# Patient Record
Sex: FEMALE | Race: WHITE | NOT HISPANIC OR LATINO | Employment: UNEMPLOYED | URBAN - METROPOLITAN AREA
[De-identification: names, ages, dates, MRNs, and addresses within clinical notes are randomized per-mention and may not be internally consistent; named-entity substitution may affect disease eponyms.]

---

## 2018-01-18 NOTE — MISCELLANEOUS
Message  Return to work or school:   Yadiel Chandra is under my professional care  She was seen in my office on 02/25/2016       Please excuse illness          Signatures   Electronically signed by : Yakov Moran, [facility]; Feb 25 2016  9:24PM EST                       (Author)    Electronically signed by : Yakov Moran, [facility]; Feb 25 2016 10:10PM EST

## 2018-01-18 NOTE — PROGRESS NOTES
Assessment    1  Acute sinusitis (461 9) (J01 90)    Plan  Acute sinusitis    · Amoxicillin-Pot Clavulanate 875-125 MG Oral Tablet (Augmentin); take 1 tab po BID  x 7 days   · Follow Up if Not Better Evaluation and Treatment  Follow-up  Status: Complete  Done:  [id number]   · Apply warm moist compresses to the affected area 3 times a day for 5 minutes ;  Status:Complete;   Done: 60XLM5565   · Drink at least 6 glasses of water or juice a day ; Status:Complete;   Done: 85ZRK4123   · How to use a nasal spray ; Status:Complete;   Done: 99IYF0131   · Taking a hot steamy shower may help your condition ; Status:Complete;   Done:  [id number]  Sore throat    · (1) THROAT CULTURE (CULTURE, UPPER RESPIRATORY); Status:Active; Requested  for:[id number];     Discussion/Summary  Discussion Summary:   Acute Sinusitis   - rapid strep test is negative   - Augmentin x 7 days prescribed   - I have advised rest, plenty of fluids, Tylenol or Motrin as needed, running a humidifier at home, and Flonase nasal spray   - if symptoms persist despite treatment or worsen, should follow up w/ PCP for re-check  - patient verbalizes understanding and agrees w/ treatment plan  Medication Side Effects Reviewed: Possible side effects of new medications were reviewed with the patient/guardian today  Understands and agrees with treatment plan: The treatment plan was reviewed with the patient/guardian  The patient/guardian understands and agrees with the treatment plan   Counseling Documentation With Imm: The patient was counseled regarding instructions for management, importance of compliance with treatment  Follow Up Instructions: Follow Up with your Primary Care Provider in 5-7 days  If your symptoms worsen, go to the nearest [facility] Emergency [facility]        Chief Complaint  Chief Complaint Free Text Note Form: pt is here for an acute visit due to sore throat, fever, chills, congestion, ear pain for more than 2 days      History of Present Illness  HPI: 24 yo F presents c/o fever, sore throat, body aches, +nasal congestion w/ greenish drainage, sinus pain and pressure, bilateral ear pain, HA x 3 days  Has not taken her temperature at home but felt warm, does have a fever at this time  No GI sx  No skin rashes  No eye complaints  No cough  No chest pain, SOB, or wheezing  Non-smoker  Has had sick contacts at work  No recent travel  Has not tried much for the symptoms at home  Hospital Based Practices Required Assessment:   Abuse And Domestic Violence Screen    Yes, the patient is safe at home  The patient states no one is hurting them  Depression And Suicide Screen  No, the patient has not had thoughts of hurting themself  No, the patient has not felt depressed in the past 7 days  Prefered Language is  [place]  Primary Language is  English  Readiness To Learn: Receptive  Barriers To Learning: none  Teaching Method: verbal   Person Taught: patient   Evaluation Of Learning: verbalized/demonstrated understanding      Review of Systems  Focused-Female:   Constitutional: fever, chills and feeling tired, but as noted in HPI    ENT: earache, sore throat and nasal discharge, but as noted in HPI  Cardiovascular: no complaints of slow or fast heart rate, no chest pain, no palpitations, no leg claudication or lower extremity edema, no chest pain and no palpitations  Respiratory: as noted in HPI, no shortness of breath, no cough and no wheezing  Gastrointestinal: no complaints of abdominal pain, no constipation, no nausea or diarrhea, no vomiting, no bloody stools  Musculoskeletal: myalgias, but as noted in HPI, no arthralgias, no joint swelling, no limb pain, no joint stiffness and no limb swelling  Integumentary: no complaints of skin rash or lesion, no itching or dry skin, no skin wounds  Neurological: headache, but as noted in HPI, no numbness, no tingling, no confusion, no dizziness and no fainting        Active Problems 1  Depo contraception (V25 49) (Z30 49)   2  Female pelvic pain (625 9) (R10 2)   3  Folliculitis (249 9) (F72 5)   4  Glomerulonephritis (583 9)   5  Gynecologic Services Contraceptive Management (V25 9)   6  Hashimoto's thyroiditis (245 2) (E06 3)   7  Irregular menstrual cycle (626 4) (N92 6)   8  Vaginal discharge (623 5) (N89 8)   9  Vaginitis due to Candida albicans (112 1) (B37 3)    Past Medical History    1  History of eczema (V13 3) (Z87 2)   2  History of vaginitis (V13 29) (Z87 42)   3  History of Never Pregnant  Active Problems And Past Medical History Reviewed: The active problems and past medical history were reviewed and updated today  Family History    1  Family history of Alcoholism    2  Family history of Hypercholesterolemia   3  Family history of Hypertension (V17 49)   4  Family history of Lung Cancer (V16 1)   5  Family history of Pancreatic Lymphoma  Family History Reviewed: The family history was reviewed and updated today  Social History    · Denied: History of Alcohol Use (History)   · Never A Smoker   · Single  Social History Reviewed: The social history was reviewed and updated today  Surgical History    1  History of Destruction Of Benign Lesion   2  Gynecologic Services Contraceptive Management (V25 9)   3  History of Oral Surgery Tooth Extraction   4  History of Tonsillectomy  Surgical History Reviewed: The surgical history was reviewed and updated today  Current Meds   1  Levothyroxine Sodium 75 MCG Oral Tablet; Therapy: 43OII3271 to Recorded   2  Vitamin C Oral Tablet Chewable; Therapy: 42UZI5886 to Recorded   3  Xanax 0 5 MG Oral Tablet; Therapy: 37OPG3340 to Recorded  Medication List Reviewed: The medication list was reviewed and updated today  Allergies    1  No Known Drug Allergies    2  Animal dander   3   Milk    Vitals  Signs [Data Includes: Current Encounter]   Recorded: 86VOE0674 07:25PM   Temperature: 100 5 F  Heart Rate: 98  Respiration: 16  Systolic: 317  Diastolic: 65  Height: 5 ft 8 8 in  Weight: 127 lb 4 oz  BMI Calculated: 18 9  BSA Calculated: 1 7  O2 Saturation: 100    Physical Exam    Constitutional   General appearance: No acute distress, well appearing and well nourished  Eyes   Conjunctiva and lids: No swelling, erythema or discharge  Pupils and irises: Equal, round and reactive to light  Ears, Nose, Mouth, and Throat   External inspection of ears and nose: Normal     Otoscopic examination: Tympanic membranes translucent with normal light reflex  Canals patent without erythema  Nasal mucosa, septum, and turbinates: Abnormal   maxillary sinuses tender to palpation  Oropharynx: Abnormal   The posterior pharynx was erythematous, but did not have an exudate  Oral mucosa was normal  The palate examination showed no abnormalities  The tongue was normal  The tonsils were absent  Pulmonary   Respiratory effort: No increased work of breathing or signs of respiratory distress  Auscultation of lungs: Clear to auscultation  Cardiovascular   Auscultation of heart: Normal rate and rhythm, normal S1 and S2, without murmurs  Abdomen   Abdomen: Non-tender, no masses  Liver and spleen: No hepatomegaly or splenomegaly  Lymphatic   Palpation of lymph nodes in neck: No lymphadenopathy  no axillary lymphadenopathy  Musculoskeletal   Gait and station: Normal     Skin   Skin and subcutaneous tissue: Normal without rashes or lesions      Psychiatric   Orientation to person, place, and time: Normal     Mood and affect: Normal        Results/Data  Lab Studies Reviewed: Rapid strep test: NEGATIVE      Signatures   Electronically signed by : Verner Rosella, M D ; Feb 4 2016  6:55PM EST                       (Author)

## 2018-01-18 NOTE — MISCELLANEOUS
Message  Return to work or school:   Vini Braden is under my professional care   She was seen in my office on 02/02/2016   She is able to return to work on  02/04/2016            Signatures   Electronically signed by : DANA Vickers ; Feb 4 2016  7:10PM EST                       (Author)

## 2018-03-28 ENCOUNTER — OFFICE VISIT (OUTPATIENT)
Dept: URGENT CARE | Facility: CLINIC | Age: 23
End: 2018-03-28
Payer: COMMERCIAL

## 2018-03-28 VITALS
HEART RATE: 75 BPM | OXYGEN SATURATION: 98 % | TEMPERATURE: 98 F | SYSTOLIC BLOOD PRESSURE: 100 MMHG | RESPIRATION RATE: 16 BRPM | DIASTOLIC BLOOD PRESSURE: 56 MMHG

## 2018-03-28 DIAGNOSIS — L03.115 CELLULITIS OF RIGHT LOWER EXTREMITY: Primary | ICD-10-CM

## 2018-03-28 PROCEDURE — 99213 OFFICE O/P EST LOW 20 MIN: CPT | Performed by: FAMILY MEDICINE

## 2018-03-28 PROCEDURE — S9088 SERVICES PROVIDED IN URGENT: HCPCS | Performed by: FAMILY MEDICINE

## 2018-03-28 RX ORDER — SULFAMETHOXAZOLE AND TRIMETHOPRIM 800; 160 MG/1; MG/1
1 TABLET ORAL EVERY 12 HOURS SCHEDULED
Qty: 20 TABLET | Refills: 0 | Status: SHIPPED | OUTPATIENT
Start: 2018-03-28 | End: 2018-04-07

## 2018-03-28 NOTE — PROGRESS NOTES
[facility]        NAME: Emmie Rueda is a 23 y o  female  : 1995    MRN: [id number]  DATE: 2018  TIME: 11:37 AM    Assessment and Plan   Cellulitis of right lower extremity [L03 115]  1  Cellulitis of right lower extremity  sulfamethoxazole-trimethoprim (BACTRIM DS) 800-160 mg per tablet         Patient Instructions   Take bactrim as directed  Apply warm compresses to the area  OTC ibuprofen for pain  Go to ER if develop fever or chills  Follow up with PCP in 3 days if no improvement      Follow up with PCP in 3-5 days  Proceed to  ER if symptoms worsen  Chief Complaint     Chief Complaint   Patient presents with    Insect Bite     Pt reports a spider bite on her right leg 3 days ago  The area is red and painful  History of Present Illness       HPI    Review of Systems   Review of Systems   Constitutional: Negative for chills, fatigue and fever  Skin: Positive for color change (erythema to lower extremity) and wound (3 bites to the lower right extremity)  Current Medications       Current Outpatient Prescriptions:     sulfamethoxazole-trimethoprim (BACTRIM DS) 800-160 mg per tablet, Take 1 tablet by mouth every 12 (twelve) hours for 10 days, Disp: 20 tablet, Rfl: 0    Current Allergies     Allergies as of 2018    (No Known Allergies)            The following portions of the patient's history were reviewed and updated as appropriate: allergies, current medications, past family history, past medical history, past social history, past surgical history and problem list      Past Medical History:   Diagnosis Date    Disease of thyroid gland        Past Surgical History:   Procedure Laterality Date    TONSILLECTOMY      WISDOM TOOTH EXTRACTION         No family history on file  Medications have been verified          Objective   /56   Pulse 75   Temp 98 °F (36 7 °C)   Resp 16   SpO2 98%        Physical Exam     Physical Exam   Constitutional: She appears well-developed and well-nourished  No distress  Skin:   Right lower extremity: 3 superficial insect bite wounds to the posterior upper calf area with surrounding erythema, slightly warm to touch  Without discharge or ecchymosis

## 2018-03-28 NOTE — LETTER
March 28, 2018     Patient: Fazal Casey   YOB: 1995   Date of Visit: 3/28/2018       To Whom it May Concern:    Fazal Casey was seen in my clinic on 3/28/2018  If you have any questions or concerns, please don't hesitate to call           Sincerely,          Valente Lancaster DO        CC: No Recipients

## 2018-03-28 NOTE — PATIENT INSTRUCTIONS
Take bactrim as directed  Apply warm compresses to the area  OTC ibuprofen for pain  Go to ER if develop fever or chills  Follow up with PCP in 3 days if no improvement

## 2018-05-10 ENCOUNTER — APPOINTMENT (OUTPATIENT)
Dept: LAB | Facility: CLINIC | Age: 23
End: 2018-05-10
Payer: COMMERCIAL

## 2018-05-10 ENCOUNTER — TRANSCRIBE ORDERS (OUTPATIENT)
Dept: LAB | Facility: CLINIC | Age: 23
End: 2018-05-10

## 2018-05-10 ENCOUNTER — OFFICE VISIT (OUTPATIENT)
Dept: INTERNAL MEDICINE CLINIC | Facility: CLINIC | Age: 23
End: 2018-05-10
Payer: COMMERCIAL

## 2018-05-10 VITALS
TEMPERATURE: 97 F | BODY MASS INDEX: 18.46 KG/M2 | HEIGHT: 69 IN | OXYGEN SATURATION: 98 % | DIASTOLIC BLOOD PRESSURE: 60 MMHG | HEART RATE: 79 BPM | WEIGHT: 124.6 LBS | RESPIRATION RATE: 16 BRPM | SYSTOLIC BLOOD PRESSURE: 100 MMHG

## 2018-05-10 DIAGNOSIS — L72.3 SEBACEOUS CYST: ICD-10-CM

## 2018-05-10 DIAGNOSIS — F41.1 GENERALIZED ANXIETY DISORDER: ICD-10-CM

## 2018-05-10 DIAGNOSIS — Z11.59 ENCOUNTER FOR HEPATITIS C SCREENING TEST FOR LOW RISK PATIENT: ICD-10-CM

## 2018-05-10 DIAGNOSIS — F17.200 CURRENT SMOKER: ICD-10-CM

## 2018-05-10 DIAGNOSIS — E06.3 HASHIMOTO'S THYROIDITIS: ICD-10-CM

## 2018-05-10 DIAGNOSIS — E06.3 HASHIMOTO'S THYROIDITIS: Primary | ICD-10-CM

## 2018-05-10 LAB
ALBUMIN SERPL BCP-MCNC: 3.9 G/DL (ref 3.5–5)
ALP SERPL-CCNC: 38 U/L (ref 46–116)
ALT SERPL W P-5'-P-CCNC: 20 U/L (ref 12–78)
ANION GAP SERPL CALCULATED.3IONS-SCNC: 3 MMOL/L (ref 4–13)
AST SERPL W P-5'-P-CCNC: 14 U/L (ref 5–45)
BASOPHILS # BLD AUTO: 0.01 THOUSANDS/ΜL (ref 0–0.1)
BASOPHILS NFR BLD AUTO: 0 % (ref 0–1)
BILIRUB SERPL-MCNC: 0.36 MG/DL (ref 0.2–1)
BUN SERPL-MCNC: 16 MG/DL (ref 5–25)
CALCIUM SERPL-MCNC: 9.9 MG/DL (ref 8.3–10.1)
CHLORIDE SERPL-SCNC: 106 MMOL/L (ref 100–108)
CO2 SERPL-SCNC: 28 MMOL/L (ref 21–32)
CREAT SERPL-MCNC: 0.71 MG/DL (ref 0.6–1.3)
EOSINOPHIL # BLD AUTO: 0.25 THOUSAND/ΜL (ref 0–0.61)
EOSINOPHIL NFR BLD AUTO: 5 % (ref 0–6)
ERYTHROCYTE [DISTWIDTH] IN BLOOD BY AUTOMATED COUNT: 13 % (ref 11.6–15.1)
GFR SERPL CREATININE-BSD FRML MDRD: 119 ML/MIN/1.73SQ M
GLUCOSE SERPL-MCNC: 77 MG/DL (ref 65–140)
HCT VFR BLD AUTO: 42.1 % (ref 34.8–46.1)
HGB BLD-MCNC: 13.2 G/DL (ref 11.5–15.4)
LYMPHOCYTES # BLD AUTO: 2 THOUSANDS/ΜL (ref 0.6–4.47)
LYMPHOCYTES NFR BLD AUTO: 40 % (ref 14–44)
MCH RBC QN AUTO: 29.9 PG (ref 26.8–34.3)
MCHC RBC AUTO-ENTMCNC: 31.4 G/DL (ref 31.4–37.4)
MCV RBC AUTO: 96 FL (ref 82–98)
MONOCYTES # BLD AUTO: 0.69 THOUSAND/ΜL (ref 0.17–1.22)
MONOCYTES NFR BLD AUTO: 14 % (ref 4–12)
NEUTROPHILS # BLD AUTO: 2.09 THOUSANDS/ΜL (ref 1.85–7.62)
NEUTS SEG NFR BLD AUTO: 41 % (ref 43–75)
NRBC BLD AUTO-RTO: 0 /100 WBCS
PLATELET # BLD AUTO: 217 THOUSANDS/UL (ref 149–390)
PMV BLD AUTO: 9.9 FL (ref 8.9–12.7)
POTASSIUM SERPL-SCNC: 4.6 MMOL/L (ref 3.5–5.3)
PROT SERPL-MCNC: 8 G/DL (ref 6.4–8.2)
RBC # BLD AUTO: 4.41 MILLION/UL (ref 3.81–5.12)
SODIUM SERPL-SCNC: 137 MMOL/L (ref 136–145)
T4 FREE SERPL-MCNC: 0.86 NG/DL (ref 0.76–1.46)
TSH SERPL DL<=0.05 MIU/L-ACNC: 6.74 UIU/ML (ref 0.36–3.74)
WBC # BLD AUTO: 5.05 THOUSAND/UL (ref 4.31–10.16)

## 2018-05-10 PROCEDURE — 84439 ASSAY OF FREE THYROXINE: CPT | Performed by: INTERNAL MEDICINE

## 2018-05-10 PROCEDURE — 84443 ASSAY THYROID STIM HORMONE: CPT | Performed by: INTERNAL MEDICINE

## 2018-05-10 PROCEDURE — 87389 HIV-1 AG W/HIV-1&-2 AB AG IA: CPT

## 2018-05-10 PROCEDURE — 86803 HEPATITIS C AB TEST: CPT

## 2018-05-10 PROCEDURE — 99203 OFFICE O/P NEW LOW 30 MIN: CPT | Performed by: INTERNAL MEDICINE

## 2018-05-10 PROCEDURE — 85025 COMPLETE CBC W/AUTO DIFF WBC: CPT | Performed by: INTERNAL MEDICINE

## 2018-05-10 PROCEDURE — 80053 COMPREHEN METABOLIC PANEL: CPT | Performed by: INTERNAL MEDICINE

## 2018-05-10 PROCEDURE — 36415 COLL VENOUS BLD VENIPUNCTURE: CPT | Performed by: INTERNAL MEDICINE

## 2018-05-10 RX ORDER — TRIAMCINOLONE ACETONIDE 5 MG/G
OINTMENT TOPICAL
COMMUNITY
Start: 2017-10-12 | End: 2018-08-21 | Stop reason: ALTCHOICE

## 2018-05-10 RX ORDER — CEPHALEXIN 500 MG/1
500 CAPSULE ORAL
COMMUNITY
Start: 2018-05-08 | End: 2018-05-13

## 2018-05-10 RX ORDER — CHLORHEXIDINE GLUCONATE 4 G/100ML
1 SOLUTION TOPICAL DAILY PRN
Qty: 120 ML | Refills: 0 | Status: SHIPPED | OUTPATIENT
Start: 2018-05-10 | End: 2018-10-23 | Stop reason: SDUPTHER

## 2018-05-10 NOTE — PROGRESS NOTES
Assessment/Plan:     Diagnoses and all orders for this visit:    Hashimoto's thyroiditis  -     Comprehensive metabolic panel  -     TSH, 3rd generation with T4 reflex  -     CBC and differential  -     HIV 1/2 AG-AB combo; Future    Sebaceous cyst  -     HIV 1/2 AG-AB combo; Future  -     chlorhexidine (HIBICLENS) 4 % external liquid; Apply 1 application topically daily as needed for wound care    Encounter for hepatitis C screening test for low risk patient  -     Hepatitis C antibody; Future    Generalized anxiety disorder    Other orders  -     cephalexin (KEFLEX) 500 mg capsule; Take 500 mg by mouth  -     triamcinolone (KENALOG) 0 5 % ointment; Apply thin layer to affected area BID prn eczema flare      Hugh Reid was seen and examined in the office today  Please see HPI for details  With her history, I have requested that she complete additional blood work  She was advised to use a warm compress over her current infected lesion as well as complete the course of Keflex  She was given a script for hibeclens to use thereafter  I'd like to keep her off of benzodiazepines for her anxiety and referred her to therapist which she thought may be helpful as well  Otherwise no other changes were made  Subjective:      Patient ID: Sandie Lenz is a 20 y o  female  Hugh Reid is here today to establish care  Medical history was reviewed and updated in the chart  See chart for history  She reports having GN as a child and was previously following with a nephrologist but has not in some time  She also reports having abnormal thyroid function and was previously on medication but has not been in over a year  She has not had follow up for this either  She reports have generalized anxiety and previously was on anxiolytics  She does continue to experience this time to time but is not on anything at this time  She reports having recurrent infectious lesions of the RLE and is unsure why  She denies known bites   She has previously been on Bactrim but caused headaches  She is currently on Keflex for a lesion of the LE and it is improving  The following portions of the patient's history were reviewed and updated as appropriate: allergies, current medications, past family history, past medical history, past social history, past surgical history and problem list     Review of Systems   Constitutional: Negative for chills, fever and unexpected weight change  HENT: Positive for rhinorrhea  Negative for sinus pain and sinus pressure  Respiratory: Negative for cough, shortness of breath and wheezing  Cardiovascular: Negative for chest pain, palpitations and leg swelling  Gastrointestinal: Negative for abdominal pain, blood in stool, constipation, diarrhea, nausea and vomiting  Genitourinary: Negative for difficulty urinating  Musculoskeletal: Negative for arthralgias, back pain, joint swelling and myalgias  Skin: Positive for rash  Neurological: Positive for numbness  Negative for syncope, light-headedness and headaches  Psychiatric/Behavioral: Negative for dysphoric mood and sleep disturbance  The patient is not nervous/anxious  Objective:      /60 (BP Location: Left arm, Patient Position: Sitting, Cuff Size: Adult)   Pulse 79   Temp (!) 97 °F (36 1 °C) (Tympanic)   Resp 16   Ht 5' 9" (1 753 m)   Wt 56 5 kg (124 lb 9 6 oz)   SpO2 98%   BMI 18 40 kg/m²          Physical Exam   Constitutional: She is oriented to person, place, and time  She appears well-developed and well-nourished  Thin   HENT:   Head: Normocephalic and atraumatic  Eyes: Conjunctivae are normal    Neck: Normal range of motion  Neck supple  Cardiovascular: Normal rate, regular rhythm and normal heart sounds  No murmur heard  Pulmonary/Chest: Effort normal and breath sounds normal  No respiratory distress  Abdominal: Soft  Bowel sounds are normal    Musculoskeletal: Normal range of motion  She exhibits no edema  Lymphadenopathy:     She has no cervical adenopathy  Neurological: She is alert and oriented to person, place, and time  Skin: Skin is warm and dry          Psychiatric: Thought content normal    Flat affect somewhat

## 2018-05-11 LAB
HCV AB SER QL: NORMAL
HIV 1+2 AB+HIV1 P24 AG SERPL QL IA: NORMAL

## 2018-05-14 ENCOUNTER — TELEPHONE (OUTPATIENT)
Dept: INTERNAL MEDICINE CLINIC | Facility: CLINIC | Age: 23
End: 2018-05-14

## 2018-05-16 DIAGNOSIS — E03.9 HYPOTHYROIDISM, UNSPECIFIED TYPE: Primary | ICD-10-CM

## 2018-05-16 NOTE — TELEPHONE ENCOUNTER
Notified patient with providers message  Patient is aware that Endo will call her for appt after I put the referral in  Patient is also aware that lab slip will be mailed to her home to have completed in six months

## 2018-05-16 NOTE — TELEPHONE ENCOUNTER
Lab results message is in there already  I think you left her a message  She could see [facility] if she would like   I think we could just monitor the thyroid function for now and repeat in 6 months (TSH and reflex T4)

## 2018-06-11 ENCOUNTER — OFFICE VISIT (OUTPATIENT)
Dept: INTERNAL MEDICINE CLINIC | Facility: CLINIC | Age: 23
End: 2018-06-11
Payer: COMMERCIAL

## 2018-06-11 VITALS
RESPIRATION RATE: 16 BRPM | SYSTOLIC BLOOD PRESSURE: 90 MMHG | HEART RATE: 93 BPM | OXYGEN SATURATION: 98 % | TEMPERATURE: 98.8 F | HEIGHT: 69 IN | WEIGHT: 124.8 LBS | BODY MASS INDEX: 18.48 KG/M2 | DIASTOLIC BLOOD PRESSURE: 50 MMHG

## 2018-06-11 DIAGNOSIS — E06.3 HASHIMOTO'S THYROIDITIS: ICD-10-CM

## 2018-06-11 DIAGNOSIS — L72.3 SEBACEOUS CYST: Primary | ICD-10-CM

## 2018-06-11 LAB — SL AMB POCT HEMOGLOBIN AIC: 5.1

## 2018-06-11 PROCEDURE — 83036 HEMOGLOBIN GLYCOSYLATED A1C: CPT | Performed by: INTERNAL MEDICINE

## 2018-06-11 PROCEDURE — 99213 OFFICE O/P EST LOW 20 MIN: CPT | Performed by: INTERNAL MEDICINE

## 2018-06-11 RX ORDER — CEPHALEXIN 500 MG/1
500 CAPSULE ORAL EVERY 8 HOURS SCHEDULED
Qty: 21 CAPSULE | Refills: 0 | Status: SHIPPED | OUTPATIENT
Start: 2018-06-11 | End: 2018-06-18

## 2018-06-12 NOTE — PROGRESS NOTES
Assessment/Plan:     Diagnoses and all orders for this visit:    Sebaceous cyst  -     cephalexin (KEFLEX) 500 mg capsule; Take 1 capsule (500 mg total) by mouth every 8 (eight) hours for 7 days    Hashimoto's thyroiditis  -     POCT hemoglobin A1c      Neetu Terrazas was seen and examined in the office today  I have given her another course of keflex but also suggested she get the prescribed Hibiclens given the recurrent nature  Otherwise no other changes were made  Subjective:      Patient ID: Rosina Capone is a 20 y o  female  Neetu Terrazas is here today for a recurrent abscess  See previous note  She reports that she had an abscess of the RLE and started taking the prescribed Keflex  She reports that it dissipated and did not complete the course  It reoccurred in the last few days  She reports it drained somewhat  The following portions of the patient's history were reviewed and updated as appropriate: allergies, current medications, past family history, past medical history, past social history, past surgical history and problem list     Review of Systems   Constitutional: Negative for chills, fatigue and fever  Cardiovascular: Negative for chest pain, palpitations and leg swelling  Gastrointestinal: Negative for abdominal pain, diarrhea, nausea and vomiting  Skin: Negative for color change, rash and wound  Abscess          Objective:      BP 90/50 (BP Location: Left arm, Patient Position: Sitting, Cuff Size: Adult)   Pulse 93   Temp 98 8 °F (37 1 °C) (Tympanic)   Resp 16   Ht 5' 9" (1 753 m)   Wt 56 6 kg (124 lb 12 8 oz)   SpO2 98%   BMI 18 43 kg/m²          Physical Exam   Constitutional: She is oriented to person, place, and time  She appears well-developed and well-nourished  Pulmonary/Chest: Effort normal    Musculoskeletal: She exhibits no edema  Neurological: She is alert and oriented to person, place, and time  Skin: Skin is warm and dry  No erythema

## 2018-08-21 ENCOUNTER — OFFICE VISIT (OUTPATIENT)
Dept: OBGYN CLINIC | Facility: CLINIC | Age: 23
End: 2018-08-21
Payer: COMMERCIAL

## 2018-08-21 VITALS
WEIGHT: 120.2 LBS | HEIGHT: 65 IN | SYSTOLIC BLOOD PRESSURE: 100 MMHG | DIASTOLIC BLOOD PRESSURE: 60 MMHG | BODY MASS INDEX: 20.03 KG/M2

## 2018-08-21 DIAGNOSIS — Z01.419 ENCOUNTER FOR GYNECOLOGICAL EXAMINATION WITHOUT ABNORMAL FINDING: Primary | ICD-10-CM

## 2018-08-21 PROCEDURE — 99385 PREV VISIT NEW AGE 18-39: CPT | Performed by: OBSTETRICS & GYNECOLOGY

## 2018-08-21 RX ORDER — ALPRAZOLAM 0.5 MG/1
TABLET ORAL
COMMUNITY
End: 2018-08-21 | Stop reason: ALTCHOICE

## 2018-08-21 RX ORDER — CLONAZEPAM 0.5 MG/1
0.5 TABLET ORAL 2 TIMES DAILY
COMMUNITY
End: 2018-08-21 | Stop reason: ALTCHOICE

## 2018-08-21 RX ORDER — LEVOTHYROXINE SODIUM 0.05 MG/1
50 TABLET ORAL DAILY
COMMUNITY
End: 2018-08-21 | Stop reason: ALTCHOICE

## 2018-08-21 NOTE — PROGRESS NOTES
CC:  Annual exam    HPI: Donal Burnett presents for routine annual visit  She is doing well and her only concern is whether not she can get pregnant  She does live with her boyfriend of several years, they do not use birth control  On the other hand, when asked if they are actively trying, Crista Valdez did reply no  I explained how it was necessary for them to be actively trying, for least a year, before we could consider an infertility evaluation  The patient was also advised that there is no test yet available, that can absolutely determine, whether or not a patient can become pregnant  Crista Valdez did have a Pap smear 2 years ago which was within normal limits  She also has had a fibroadenoma of the right breast which was followed by ultrasounds and found to be stable  Past Medical History:  Past Medical History:   Diagnosis Date    Disease of thyroid gland     Eczema     Glomerulonephritis     Urinary tract infection        Past Surgical History:  Past Surgical History:   Procedure Laterality Date    SKIN LESION EXCISION      destruction of benign lesion    TONSILLECTOMY      TONSILLECTOMY  2006    WISDOM TOOTH EXTRACTION      WISDOM TOOTH EXTRACTION  2011       Past OB/Gyn History:  Menstrual cycles every 25 days, with 5 days of normal bleeding  Denies any history of sexually transmitted infection  No history of abnormal pap smears  Her last pap smear was 2016      ALLERGIES:   Allergies   Allergen Reactions    Lac Bovis     Other     Bactrim [Sulfamethoxazole-Trimethoprim] Headache     migrains       MEDS:   Current Outpatient Prescriptions:     chlorhexidine (HIBICLENS) 4 % external liquid    Family History:  Family History   Problem Relation Age of Onset    Coronary artery disease Father     Alcohol abuse Father     Drug abuse Father     Anxiety disorder Mother     Anxiety disorder Brother     Post-traumatic stress disorder Brother     Drug abuse Brother     Lung cancer Maternal Grandfather  Breast cancer Maternal Aunt     Hypertension Family     Lung cancer Family     Lymphoma Family         pancreatic    Hyperlipidemia Family         pure       Social History:  Social History     Social History    Marital status: Single     Spouse name: N/A    Number of children: N/A    Years of education: N/A     Occupational History    Not on file  Social History Main Topics    Smoking status: Current Every Day Smoker     Packs/day: 0 50     Types: Cigarettes    Smokeless tobacco: Never Used    Alcohol use No      Comment: social     Drug use: No    Sexual activity: Yes     Partners: Male     Birth control/ protection: None     Other Topics Concern    Not on file     Social History Narrative    No narrative on file         Review of Systems:  Skin: No rashes or discolorations of any concern  RESP: Denies SOB, no cough  CV: Denies chest pain or palpitations  Breasts: Denies masses, pain, skin changes and nipple discharge  GI: Denies abdominal pain, heartburn, nausea, vomiting, changes in bowel habits  : Denies dysuria, frequency, CVA tenderness, incontinence and hematuria  Genitalia: Denies abnormal vaginal discharge, external lesions, rashes, pelvic pain, pressure, abnormal bleeding  Rectal:  Denies pain, bleeding, hemorrhoids,    Physical Exam:  /60 (BP Location: Left arm, Patient Position: Sitting, Cuff Size: Standard)   Ht 5' 5" (1 651 m)   Wt 54 5 kg (120 lb 3 2 oz)   LMP 07/27/2018 (Exact Date)   Breastfeeding? No   BMI 20 00 kg/m²    Gen: The patient was alert and oriented x3, pleasant well-appearing female in no acute distress  Neck:  Unremarkable, no anterior or posterior lymphadenopathy, no thyromegaly  Breasts: Symmetric  No dominant, discrete, fixed  or suspicious masses are noted  Masses of the right breast, consistent with fibroadenoma, are palpated  No skin or nipple changes  No palpable axillary nodes     Abd:  Soft, nontender, nondistended, no masses or organomegaly  Back:  No CVA tenderness, no tenderness to palpation along spine  Pelvic  Normal appearing external female genitalia, no visible lesions, no rashes  Vagina is free of discharge, normal vaginal epithelium, no abnormal  lesions, no evidence of prolapse anteriorly or posteriorly  Normal appearing cervix, mobile and nontender  A thin prep pap smear was not obtained  Uterus is normal size, mobile and, nontender  No palpable adnexal masses or tenderness  No anoperineal lesions  Skin:  No concerning lesions  Extremeties: No edema      Assessment & Plan:   1  Routine annual exam      RTO one year orPRN  2   Infertility issues, discussed with patient as noted above under HPI

## 2018-09-17 ENCOUNTER — OFFICE VISIT (OUTPATIENT)
Dept: ENDOCRINOLOGY | Facility: CLINIC | Age: 23
End: 2018-09-17
Payer: COMMERCIAL

## 2018-09-17 VITALS
DIASTOLIC BLOOD PRESSURE: 62 MMHG | BODY MASS INDEX: 18.44 KG/M2 | HEART RATE: 66 BPM | WEIGHT: 124.5 LBS | HEIGHT: 69 IN | SYSTOLIC BLOOD PRESSURE: 104 MMHG

## 2018-09-17 DIAGNOSIS — E06.3 HYPOTHYROIDISM DUE TO HASHIMOTO'S THYROIDITIS: Primary | ICD-10-CM

## 2018-09-17 DIAGNOSIS — E03.9 HYPOTHYROIDISM, UNSPECIFIED TYPE: ICD-10-CM

## 2018-09-17 DIAGNOSIS — E03.8 HYPOTHYROIDISM DUE TO HASHIMOTO'S THYROIDITIS: Primary | ICD-10-CM

## 2018-09-17 DIAGNOSIS — R53.83 FATIGUE, UNSPECIFIED TYPE: ICD-10-CM

## 2018-09-17 DIAGNOSIS — E06.3 HASHIMOTO'S THYROIDITIS: ICD-10-CM

## 2018-09-17 PROCEDURE — 99244 OFF/OP CNSLTJ NEW/EST MOD 40: CPT | Performed by: INTERNAL MEDICINE

## 2018-09-17 RX ORDER — LEVOTHYROXINE SODIUM 0.03 MG/1
TABLET ORAL
Qty: 30 TABLET | Refills: 6 | Status: SHIPPED | OUTPATIENT
Start: 2018-09-17 | End: 2018-11-13 | Stop reason: SDUPTHER

## 2018-09-17 NOTE — ASSESSMENT & PLAN NOTE
Subclinical hypothyroidism secondary to Hashimoto thyroiditis-given her age will opt to treat and normalized TS H  Start levothyroxine 25 mcg daily  Repeat thyroid function test in the next 6 weeks    Goal TSH between 1 and 2   She has history of anxiety-advised to monitor her symptoms after starting levothyroxine

## 2018-09-17 NOTE — PROGRESS NOTES
Katya Hooper 20 y o  female MRN: [id number]    Encounter: [id number]      Assessment/Plan     Problem List Items Addressed This Visit     Hashimoto's thyroiditis    Relevant Medications    levothyroxine 25 mcg tablet    Hypothyroidism - Primary     Subclinical hypothyroidism secondary to Hashimoto thyroiditis-given her age will opt to treat and normalized TS H  Start levothyroxine 25 mcg daily  Repeat thyroid function test in the next 6 weeks  Goal TSH between 1 and 2   She has history of anxiety-advised to monitor her symptoms after starting levothyroxine         Relevant Medications    levothyroxine 25 mcg tablet    Other Relevant Orders    TSH, 3rd generation Lab Collect    T4, free Lab Collect    TSH, 3rd generation Lab Collect    T4, free Lab Collect    Fatigue     Reassess after TSH normalizes             CC:   Thyroid dysfunction    History of Present Illness     HPI:  18-year-old woman here for evaluation of thyroid dysfunction  She states that she was diagnosed with hypothyroidism and Hashimoto thyroiditis a few years back  She was on levothyroxine a couple of years back but it was stopped after TSH normalized  She currently complains of fatigue, weight has been stable, denies any menstrual irregularities, denies any constipation or cold intolerance  Denies any dry skin or hair loss  Review of Systems   Constitutional: Positive for fatigue  Negative for unexpected weight change  Eyes: Negative for visual disturbance  Respiratory: Negative for cough and shortness of breath  Cardiovascular: Negative for chest pain and leg swelling  Gastrointestinal: Negative for constipation, diarrhea, nausea and vomiting  Endocrine: Negative for polydipsia and polyuria  Musculoskeletal: Negative for gait problem  Skin: Negative for color change, pallor, rash and wound  Psychiatric/Behavioral: Negative for confusion and sleep disturbance  The patient is not nervous/anxious      All other systems reviewed and are negative  Historical Information   Past Medical History:   Diagnosis Date    Disease of thyroid gland     Eczema     Glomerulonephritis     Urinary tract infection      Past Surgical History:   Procedure Laterality Date    SKIN LESION EXCISION      destruction of benign lesion    TONSILLECTOMY      TONSILLECTOMY  2006    WISDOM TOOTH EXTRACTION      WISDOM TOOTH EXTRACTION  2011     Social History   History   Alcohol Use No     Comment: social      History   Drug Use No     History   Smoking Status    Current Every Day Smoker    Packs/day: 0 50    Types: Cigarettes   Smokeless Tobacco    Never Used     Family History:   Family History   Problem Relation Age of Onset    Coronary artery disease Father     Alcohol abuse Father     Drug abuse Father     Anxiety disorder Mother     Anxiety disorder Brother     Post-traumatic stress disorder Brother     Drug abuse Brother     Lung cancer Maternal Grandfather     Breast cancer Maternal Aunt     Hypertension Family     Lung cancer Family     Lymphoma Family         pancreatic    Hyperlipidemia Family         pure       Meds/Allergies   Current Outpatient Prescriptions   Medication Sig Dispense Refill    chlorhexidine (HIBICLENS) 4 % external liquid Apply 1 application topically daily as needed for wound care (Patient not taking: Reported on 8/21/2018 ) 120 mL 0    levothyroxine 25 mcg tablet 1 tab daily 30 tablet 6     No current facility-administered medications for this visit  Allergies   Allergen Reactions    Lac Bovis     Other     Bactrim [Sulfamethoxazole-Trimethoprim] Headache     migrains       Objective   Vitals: Blood pressure 104/62, pulse 66, height 5' 9" (1 753 m), weight 56 5 kg (124 lb 8 oz), not currently breastfeeding  Physical Exam   Constitutional: She is oriented to person, place, and time  She appears well-developed and well-nourished  No distress  HENT:   Head: Normocephalic and atraumatic  Mouth/Throat: No oropharyngeal exudate  Eyes: Conjunctivae and EOM are normal  No scleral icterus  Neck: Normal range of motion  Neck supple  No thyromegaly present  Cardiovascular: Normal rate, regular rhythm and normal heart sounds  No murmur heard  Pulmonary/Chest: Effort normal and breath sounds normal  No respiratory distress  She has no wheezes  She has no rales  Abdominal: Soft  Bowel sounds are normal  She exhibits no distension  There is no tenderness  There is no rebound  Musculoskeletal: Normal range of motion  She exhibits no edema or deformity  Lymphadenopathy:     She has no cervical adenopathy  Neurological: She is alert and oriented to person, place, and time  Skin: Skin is warm and dry  No rash noted  No erythema  No pallor  Psychiatric: She has a normal mood and affect  Her behavior is normal  Thought content normal        The history was obtained from the review of the chart, patient  Lab Results:   Lab Results   Component Value Date/Time    TSH 3RD GENERATON 6 740 (H) 05/10/2018 11:20 AM    Free T4 0 86 05/10/2018 11:20 AM     October 2015-TSH 5 9, free T4 0 85    October 2015-TPO antibody 245    Portions of the record may have been created with voice recognition software  Occasional wrong word or "sound a like" substitutions may have occurred due to the inherent limitations of voice recognition software  Read the chart carefully and recognize, using context, where substitutions have occurred

## 2018-09-17 NOTE — LETTER
September 17, 2018     DO Kori  [address]    Patient: Grace Workman   YOB: 1995   Date of Visit: 9/17/2018       Dear Dr Cesar Levin: Thank you for referring Grace Workman to me for evaluation  Below are my notes for this consultation  If you have questions, please do not hesitate to call me  I look forward to following your patient along with you  Sincerely,        David Mc MD        CC: No Recipients  David Mc MD  9/17/2018 11:59 AM  Sign at close encounter   Grace Workman 20 y o  female MRN: [id number]    Encounter: [id number]      Assessment/Plan     Problem List Items Addressed This Visit     Hashimoto's thyroiditis    Relevant Medications    levothyroxine 25 mcg tablet    Hypothyroidism - Primary     Subclinical hypothyroidism secondary to [place] thyroiditis-given her age will opt to treat and normalized TS H  Start levothyroxine 25 mcg daily  Repeat thyroid function test in the next 6 weeks  Goal TSH between 1 and 2   She has history of anxiety-advised to monitor her symptoms after starting levothyroxine         Relevant Medications    levothyroxine 25 mcg tablet    Other Relevant Orders    TSH, 3rd generation Lab Collect    T4, free Lab Collect    TSH, 3rd generation Lab Collect    T4, free Lab Collect    Fatigue     Reassess after TSH normalizes             CC:   Thyroid dysfunction    History of Present Illness     HPI:  24-year-old woman here for evaluation of thyroid dysfunction  She states that she was diagnosed with hypothyroidism and Hashimoto thyroiditis a few years back  She was on levothyroxine a couple of years back but it was stopped after TSH normalized  She currently complains of fatigue, weight has been stable, denies any menstrual irregularities, denies any constipation or cold intolerance  Denies any dry skin or hair loss  Review of Systems   Constitutional: Positive for fatigue   Negative for unexpected weight change  Eyes: Negative for visual disturbance  Respiratory: Negative for cough and shortness of breath  Cardiovascular: Negative for chest pain and leg swelling  Gastrointestinal: Negative for constipation, diarrhea, nausea and vomiting  Endocrine: Negative for polydipsia and polyuria  Musculoskeletal: Negative for gait problem  Skin: Negative for color change, pallor, rash and wound  Psychiatric/Behavioral: Negative for confusion and sleep disturbance  The patient is not nervous/anxious  All other systems reviewed and are negative        Historical Information   Past Medical History:   Diagnosis Date    Disease of thyroid gland     Eczema     Glomerulonephritis     Urinary tract infection      Past Surgical History:   Procedure Laterality Date    SKIN LESION EXCISION      destruction of benign lesion    TONSILLECTOMY      TONSILLECTOMY  2006    WISDOM TOOTH EXTRACTION      WISDOM TOOTH EXTRACTION  2011     Social History   History   Alcohol Use No     Comment: social      History   Drug Use No     History   Smoking Status    Current Every Day Smoker    Packs/day: 0 50    Types: Cigarettes   Smokeless Tobacco    Never Used     Family History:   Family History   Problem Relation Age of Onset    Coronary artery disease Father     Alcohol abuse Father     Drug abuse Father     Anxiety disorder Mother     Anxiety disorder Brother     Post-traumatic stress disorder Brother     Drug abuse Brother     Lung cancer Maternal Grandfather     Breast cancer Maternal Aunt     Hypertension Family     Lung cancer Family     Lymphoma Family         pancreatic    Hyperlipidemia Family         pure       Meds/Allergies   Current Outpatient Prescriptions   Medication Sig Dispense Refill    chlorhexidine (HIBICLENS) 4 % external liquid Apply 1 application topically daily as needed for wound care (Patient not taking: Reported on 8/21/2018 ) 120 mL 0    levothyroxine 25 mcg tablet 1 tab daily 30 tablet 6     No current facility-administered medications for this visit  Allergies   Allergen Reactions    Lac Bovis     Other     Bactrim [Sulfamethoxazole-Trimethoprim] Headache     migrains       Objective   Vitals: Blood pressure 104/62, pulse 66, height 5' 9" (1 753 m), weight 56 5 kg (124 lb 8 oz), not currently breastfeeding  Physical Exam   Constitutional: She is oriented to person, place, and time  She appears well-developed and well-nourished  No distress  HENT:   Head: Normocephalic and atraumatic  Mouth/Throat: No oropharyngeal exudate  Eyes: Conjunctivae and EOM are normal  No scleral icterus  Neck: Normal range of motion  Neck supple  No thyromegaly present  Cardiovascular: Normal rate, regular rhythm and normal heart sounds  No murmur heard  Pulmonary/Chest: Effort normal and breath sounds normal  No respiratory distress  She has no wheezes  She has no rales  Abdominal: Soft  Bowel sounds are normal  She exhibits no distension  There is no tenderness  There is no rebound  Musculoskeletal: Normal range of motion  She exhibits no edema or deformity  Lymphadenopathy:     She has no cervical adenopathy  Neurological: She is alert and oriented to person, place, and time  Skin: Skin is warm and dry  No rash noted  No erythema  No pallor  Psychiatric: She has a normal mood and affect  Her behavior is normal  Thought content normal        The history was obtained from the review of the chart, patient  Lab Results:   Lab Results   Component Value Date/Time    TSH 3RD GENERATON 6 740 (H) 05/10/2018 11:20 AM    Free T4 0 86 05/10/2018 11:20 AM     October 2015-TSH 5 9, free T4 0 85    October 2015-TPO antibody 245    Portions of the record may have been created with voice recognition software  Occasional wrong word or "sound a like" substitutions may have occurred due to the inherent limitations of voice recognition software   Read the chart carefully and recognize, using context, where substitutions have occurred

## 2018-10-14 ENCOUNTER — OFFICE VISIT (OUTPATIENT)
Dept: URGENT CARE | Facility: CLINIC | Age: 23
End: 2018-10-14
Payer: COMMERCIAL

## 2018-10-14 VITALS
DIASTOLIC BLOOD PRESSURE: 70 MMHG | RESPIRATION RATE: 16 BRPM | HEART RATE: 96 BPM | BODY MASS INDEX: 18.22 KG/M2 | TEMPERATURE: 98.2 F | WEIGHT: 123 LBS | HEIGHT: 69 IN | OXYGEN SATURATION: 98 % | SYSTOLIC BLOOD PRESSURE: 110 MMHG

## 2018-10-14 DIAGNOSIS — H57.89 REDNESS OF EYE, LEFT: Primary | ICD-10-CM

## 2018-10-14 PROCEDURE — S9088 SERVICES PROVIDED IN URGENT: HCPCS | Performed by: FAMILY MEDICINE

## 2018-10-14 PROCEDURE — 99213 OFFICE O/P EST LOW 20 MIN: CPT | Performed by: FAMILY MEDICINE

## 2018-10-14 RX ORDER — TOBRAMYCIN 3 MG/ML
1 SOLUTION/ DROPS OPHTHALMIC
Qty: 5 ML | Refills: 0 | Status: SHIPPED | OUTPATIENT
Start: 2018-10-14 | End: 2018-11-06

## 2018-10-14 NOTE — LETTER
October 14, 2018     Patient: Bridget Tyson   YOB: 1995   Date of Visit: 10/14/2018       To Whom it May Concern:    Bridget Tyson was seen in my clinic on 10/14/2018  She may return to work on 10/15/2018  If you have any questions or concerns, please don't hesitate to call           Sincerely,          Manda Avendaño DO        CC: No Recipients

## 2018-10-14 NOTE — PROGRESS NOTES
[facility]        NAME: Corazon Ricks is a 20 y o  female  : 1995    MRN: [id number]  DATE: 2018  TIME: 3:50 PM    Assessment and Plan   Redness of eye, left [H57 89]  1  Redness of eye, left           Patient Instructions   Patient Instructions   Can continue to use Bleph 10 eye drops she has been using for the past few days however eye exam is normal  Patient called out of work yesterday as well as today and came in today to be evaluated, note written for out of work today may return tomorrow        Proceed to  ER if symptoms worsen  Chief Complaint     Chief Complaint   Patient presents with    Conjunctivitis     Started 2 days ago with crusted shut left eye, yellow discharge  Today woke up with bright red eye  Using old eye drops   Infection     Started a few days ago with abcess on right calf area  Hx) abcess- Treated with antibiotics previously but they keep coming back  History of Present Illness       Patient presents with complaint of left eye purulent drainage for the past 2 days  She has been using an existing prescription of Bleph 10 eye drops in the left eye 4-5 times daily and states she awakens every morning with crusting of the eye and yellow globs that she removes daily  No sinus congestion or pressure she states she started to have early involvement of the contralateral eye  No fever no chills  No visual changes  Review of Systems   Review of Systems   Constitutional: Negative  HENT: Negative  Eyes: Positive for discharge, redness and itching  Negative for pain  Respiratory: Negative  Cardiovascular: Negative  Neurological: Negative            Current Medications       Current Outpatient Prescriptions:     levothyroxine 25 mcg tablet, 1 tab daily, Disp: 30 tablet, Rfl: 6    chlorhexidine (HIBICLENS) 4 % external liquid, Apply 1 application topically daily as needed for wound care (Patient not taking: Reported on 2018 ), Disp: 120 mL, Rfl: 0    Current Allergies     Allergies as of 10/14/2018 - Reviewed 10/14/2018   Allergen Reaction Noted    Lac bovis  10/02/2015    Other  11/14/2013    Bactrim [sulfamethoxazole-trimethoprim] Headache 05/10/2018            The following portions of the patient's history were reviewed and updated as appropriate: allergies, current medications, past family history, past medical history, past social history, past surgical history and problem list      Past Medical History:   Diagnosis Date    Disease of thyroid gland     Eczema     Glomerulonephritis     Urinary tract infection        Past Surgical History:   Procedure Laterality Date    SKIN LESION EXCISION      destruction of benign lesion    TONSILLECTOMY      TONSILLECTOMY  2006    WISDOM TOOTH EXTRACTION      WISDOM TOOTH EXTRACTION  2011       Family History   Problem Relation Age of Onset    Coronary artery disease Father     Alcohol abuse Father     Drug abuse Father     Anxiety disorder Mother     Anxiety disorder Brother     Post-traumatic stress disorder Brother     Drug abuse Brother     Lung cancer Maternal Grandfather     Breast cancer Maternal Aunt     Hypertension Family     Lung cancer Family     Lymphoma Family         pancreatic    Hyperlipidemia Family         pure         Medications have been verified  Objective   /70   Pulse 96   Temp 98 2 °F (36 8 °C)   Resp 16   Ht 5' 9" (1 753 m)   Wt 55 8 kg (123 lb)   SpO2 98%   BMI 18 16 kg/m²        Physical Exam     Physical Exam   Constitutional: She appears well-developed and well-nourished  No distress  HENT:   Head: Normocephalic  Right Ear: External ear normal    Left Ear: External ear normal    Mouth/Throat: Oropharynx is clear and moist  No oropharyngeal exudate  Eyes: Pupils are equal, round, and reactive to light  Conjunctivae and EOM are normal  Right eye exhibits no discharge  Left eye exhibits no discharge     Neck: Neck supple  Cardiovascular: Normal rate, regular rhythm and normal heart sounds  Pulmonary/Chest: Effort normal and breath sounds normal    Lymphadenopathy:     She has no cervical adenopathy  Skin:   Right calf with juan diego follicular mild hyperemia, no induration no warmth no swelling

## 2018-10-14 NOTE — PATIENT INSTRUCTIONS
Can continue to use Bleph 10 eye drops she has been using for the past few days however eye exam is normal  Patient called out of work yesterday as well as today and came in today to be evaluated, note written for out of work today may return tomorrow    After visit:  Patient's mother called on happy because patient has been using somebody else's eyedrop prescription and she wants her own  Tobramycin given instead of Bleph 10 given patient's history of sulfa allergy    Patient also wanting an antibiotic for a complaint of what she states is an abscess on her right calf and states she gets them recurrently and is always given antibiotics by her PCP, urgent care centers etc  Patient does not have evidence of infections cellulitis or abscess on the calf, antibiotics not indicated at this time she is to follow up with her PCP if symptoms worsen, if she gets them recurrently she should be evaluated for the possibility of nosocomial MRSA and treated according to protocol  Patient also wanted and extended out of work note on the possibility she has pinkeye could be contagious and wants out of work tomorrow, again eye exam is normal with no drainage no redness and no crusting

## 2018-10-23 ENCOUNTER — OFFICE VISIT (OUTPATIENT)
Dept: INTERNAL MEDICINE CLINIC | Facility: CLINIC | Age: 23
End: 2018-10-23
Payer: COMMERCIAL

## 2018-10-23 ENCOUNTER — TELEPHONE (OUTPATIENT)
Dept: INTERNAL MEDICINE CLINIC | Facility: CLINIC | Age: 23
End: 2018-10-23

## 2018-10-23 VITALS
TEMPERATURE: 99.3 F | SYSTOLIC BLOOD PRESSURE: 100 MMHG | OXYGEN SATURATION: 98 % | DIASTOLIC BLOOD PRESSURE: 70 MMHG | HEART RATE: 81 BPM | BODY MASS INDEX: 18.6 KG/M2 | WEIGHT: 125.6 LBS | HEIGHT: 69 IN

## 2018-10-23 DIAGNOSIS — Z23 NEEDS FLU SHOT: ICD-10-CM

## 2018-10-23 DIAGNOSIS — E03.9 HYPOTHYROIDISM, UNSPECIFIED TYPE: Primary | ICD-10-CM

## 2018-10-23 DIAGNOSIS — L72.3 SEBACEOUS CYST: ICD-10-CM

## 2018-10-23 DIAGNOSIS — F41.1 GENERALIZED ANXIETY DISORDER: ICD-10-CM

## 2018-10-23 PROCEDURE — 90686 IIV4 VACC NO PRSV 0.5 ML IM: CPT

## 2018-10-23 PROCEDURE — 90471 IMMUNIZATION ADMIN: CPT

## 2018-10-23 PROCEDURE — 99214 OFFICE O/P EST MOD 30 MIN: CPT | Performed by: INTERNAL MEDICINE

## 2018-10-23 RX ORDER — BUSPIRONE HYDROCHLORIDE 5 MG/1
5 TABLET ORAL 3 TIMES DAILY
Qty: 90 TABLET | Refills: 1 | Status: SHIPPED | OUTPATIENT
Start: 2018-10-23 | End: 2018-11-06

## 2018-10-23 RX ORDER — CHLORHEXIDINE GLUCONATE 4 G/100ML
1 SOLUTION TOPICAL DAILY PRN
Qty: 120 ML | Refills: 2 | Status: SHIPPED | OUTPATIENT
Start: 2018-10-23 | End: 2018-11-06

## 2018-10-23 NOTE — PROGRESS NOTES
Assessment/Plan:       Diagnoses and all orders for this visit:    Hypothyroidism, unspecified type  -She was started on Levothyroxine and reports that she stopped this thinking it may be worsening her anxiety  I asked that she restart this and have her blood work completed in about 4-6 weeks  Generalized anxiety disorder  -     busPIRone (BUSPAR) 5 mg tablet; Take 1 tablet (5 mg total) by mouth 3 (three) times a day  We will start buspar and have her come back in about 4 weeks for a recheck  I would like to avoid benzodiazepines and has been on this in the past via Dr Curt Khan  Sebaceous cyst  -     chlorhexidine (HIBICLENS) 4 % external liquid; Apply 1 application topically daily as needed for wound care  She does not have any currently but reports that the will occur  Encouraged to use Hibiclens  Needs flu shot  -     SYRINGE/SINGLE-DOSE VIAL: influenza vaccine, [id number], quadrivalent, 0 5 mL, preservative-free, for patients 3+ yr (FLUZONE)        Subjective:      Patient ID: Rosalba Crane is a 20 y o  female  Nessa Victoria is here for a few things  She reports recently having conjunctivitis and was seen by the urgent care as well as her optometrist for this  She was given new contacts and does not have glasses to wear  She has been using the eye drops and notes some crusting  Denies vision changes or pain  She has a history of anxiety and has been having increased anxiety and depression  It may be related to life stressors with her brother now living with her  He has had a substance abuse problem and she is helping him through this, including getting him to appointments  She is missing work for this as well and is thinking about applying for short term disability           The following portions of the patient's history were reviewed and updated as appropriate: allergies, past family history, past medical history, past social history, past surgical history and problem list     Review of Systems Constitutional: Negative for chills, diaphoresis and fever  Eyes: Positive for discharge and itching  Negative for redness and visual disturbance  Cardiovascular: Negative for chest pain, palpitations and leg swelling  Gastrointestinal: Negative for abdominal pain, nausea and vomiting  Musculoskeletal: Negative for arthralgias  Skin:        Recurrent cysts    Neurological: Negative for dizziness, light-headedness and headaches  Psychiatric/Behavioral: Positive for dysphoric mood and sleep disturbance  Negative for decreased concentration  The patient is nervous/anxious  Objective:      /70 (BP Location: Left arm, Patient Position: Sitting, Cuff Size: Standard)   Pulse 81   Temp 99 3 °F (37 4 °C) (Tympanic)   Ht 5' 9" (1 753 m)   Wt 57 kg (125 lb 9 6 oz)   SpO2 98%   BMI 18 55 kg/m²          Physical Exam   Constitutional: She is oriented to person, place, and time  She appears well-developed and well-nourished  No distress  HENT:   Head: Normocephalic and atraumatic  Eyes: Conjunctivae and EOM are normal  Right eye exhibits no discharge  Left eye exhibits no discharge  No scleral icterus  Neck: Normal range of motion  Cardiovascular: Normal rate, regular rhythm and normal heart sounds  No murmur heard  Pulmonary/Chest: Effort normal and breath sounds normal  No respiratory distress  She has no wheezes  She exhibits no tenderness  Musculoskeletal: Normal range of motion  She exhibits no edema  Lymphadenopathy:     She has no cervical adenopathy  Neurological: She is alert and oriented to person, place, and time  No cranial nerve deficit  Skin: Skin is warm and dry  She is not diaphoretic  No erythema  Psychiatric: She has a normal mood and affect  Her speech is normal and behavior is normal  Judgment and thought content normal    Vitals reviewed

## 2018-10-23 NOTE — TELEPHONE ENCOUNTER
Sloan Bragg not too sure if she wants to try something else? If so, we can do Zoloft 25 mg daily   I do not know what the cost will be

## 2018-11-06 ENCOUNTER — HOSPITAL ENCOUNTER (EMERGENCY)
Facility: HOSPITAL | Age: 23
Discharge: HOME | End: 2018-11-06
Attending: EMERGENCY MEDICINE
Payer: COMMERCIAL

## 2018-11-06 VITALS
BODY MASS INDEX: 18.51 KG/M2 | SYSTOLIC BLOOD PRESSURE: 118 MMHG | RESPIRATION RATE: 18 BRPM | DIASTOLIC BLOOD PRESSURE: 75 MMHG | TEMPERATURE: 98.9 F | HEART RATE: 86 BPM | WEIGHT: 125 LBS | HEIGHT: 69 IN | OXYGEN SATURATION: 100 %

## 2018-11-06 DIAGNOSIS — L73.9 FOLLICULITIS: Primary | ICD-10-CM

## 2018-11-06 PROCEDURE — 99283 EMERGENCY DEPT VISIT LOW MDM: CPT

## 2018-11-06 PROCEDURE — 87186 SC STD MICRODIL/AGAR DIL: CPT | Performed by: EMERGENCY MEDICINE

## 2018-11-06 PROCEDURE — 87147 CULTURE TYPE IMMUNOLOGIC: CPT | Performed by: EMERGENCY MEDICINE

## 2018-11-06 PROCEDURE — 87205 SMEAR GRAM STAIN: CPT | Performed by: EMERGENCY MEDICINE

## 2018-11-06 PROCEDURE — 87070 CULTURE OTHR SPECIMN AEROBIC: CPT | Performed by: EMERGENCY MEDICINE

## 2018-11-07 NOTE — DISCHARGE INSTRUCTIONS
Folliculitis   WHAT YOU NEED TO KNOW:   Folliculitis is inflammation of your hair follicles  A hair follicle is a sac under your skin  Your hair grows out of the follicle  Folliculitis is caused by bacteria or fungus, most commonly a germ called Staph  Folliculitis can occur anywhere you have hair  DISCHARGE INSTRUCTIONS:   Medicines:   · Antibiotics: This medicine is given to fight or prevent an infection caused by bacteria  It may be given as an ointment that you apply to your skin or as a pill  Always take your antibiotics exactly as ordered by your healthcare provider  Never save antibiotics or take leftover antibiotics that were given to you for another illness  · Antifungal medicine: This medicine helps kill fungus that may be causing your folliculitis  It may be given as an cream that you apply to your skin or as a pill  · NSAIDs , such as ibuprofen, help decrease swelling, pain, and fever  This medicine is available with or without a doctor's order  NSAIDs can cause stomach bleeding or kidney problems in certain people  If you take blood thinner medicine, always ask if NSAIDs are safe for you  Always read the medicine label and follow directions  Do not give these medicines to children under 10months of age without direction from your child's healthcare provider  · Antihistamines: This medicine may be given to help decrease itching  · Take your medicine as directed  Contact your healthcare provider if you think your medicine is not helping or if you have side effects  Tell him of her if you are allergic to any medicine  Keep a list of the medicines, vitamins, and herbs you take  Include the amounts, and when and why you take them  Bring the list or the pill bottles to follow-up visits  Carry your medicine list with you in case of an emergency    Follow up with your healthcare provider or dermatologist as directed:  Write down your questions so you remember to ask them during your visits  Manage folliculitis:   · Use warm compresses:  Wet a washcloth with warm water and apply it to the infected skin area to help decrease pain and swelling  Warm compresses may also help drain pus and improve healing  · Clean the area:  Use antibacterial soap to wash the affected area  Change your washcloths and towels every day  · Avoid shaving the area: If possible, do not shave areas that have folliculitis  If you must shave, use an electric razor or new blade every time you shave  Prevent folliculitis:   · Do not share personal items:  Do not share towels, soap, or any personal items with other people  · Do not wear tight clothing:  Do not wear tight-fitting clothes that rub against and irritate your skin  · Treat skin injuries right away:  Treat injuries such as cuts and scrapes right away  Wash them with warm, soapy water, and cover the area to prevent infection  Contact your healthcare provider or dermatologist if:  · You have a fever  · You have foul-smelling pus coming from the bumps on your skin  · Your rash is spreading  · You have questions or concerns about your condition or care  Return to the emergency department if:  · You develop large areas of red, warm, tender skin around the folliculitis  · You develop boils  © 2017 [employer]  Information is for End User's use only and may not be sold, redistributed or otherwise used for commercial purposes  All illustrations and images included in CareNotes® are the copyrighted property of A [employer]  or Tobi Jaffe  The above information is an  only  It is not intended as medical advice for individual conditions or treatments  Talk to your doctor, nurse or pharmacist before following any medical regimen to see if it is safe and effective for you

## 2018-11-07 NOTE — ED PROVIDER NOTES
History  Chief Complaint   Patient presents with    Abscess     patient presents to the ED with C/O reoccurring abscesses on right lower leg for the past year  Patient has a hx of MRSA 10 years ago in her arm and hand  This 70-year-old female with history of hypothyroidism and anxiety complains of recurrent skin infection right lower extremity  In the past she was told with sebaceous cyst   She has been washing antibacterial soap occasionally  She states she has a small wound that she just noticed today and wants it checked before gets worse  She requests wound culture to rule out MRSA  Patient denies recent fever chills cough dysuria vomiting diarrhea  She does have frequent postnasal drip  He had recent conjunctivitis after starting new contact lenses  She is concerned that she is immune suppressed for some reason  Prior to Admission Medications   Prescriptions Last Dose Informant Patient Reported? Taking?   levothyroxine 25 mcg tablet   No No   Si tab daily      Facility-Administered Medications: None       Past Medical History:   Diagnosis Date    Disease of thyroid gland     Eczema     Glomerulonephritis     Urinary tract infection        Past Surgical History:   Procedure Laterality Date    SKIN LESION EXCISION      destruction of benign lesion    TONSILLECTOMY      TONSILLECTOMY  2006    WISDOM TOOTH EXTRACTION      WISDOM TOOTH EXTRACTION         Family History   Problem Relation Age of Onset    Coronary artery disease Father     Alcohol abuse Father     Drug abuse Father     Anxiety disorder Mother     Anxiety disorder Brother     Post-traumatic stress disorder Brother     Drug abuse Brother     Lung cancer Maternal Grandfather     Breast cancer Maternal Aunt     Hypertension Family     Lung cancer Family     Lymphoma Family         pancreatic    Hyperlipidemia Family         pure     I have reviewed and agree with the history as documented      Social History   Substance Use Topics    Smoking status: Current Every Day Smoker     Packs/day: 0 50     Types: Cigarettes    Smokeless tobacco: Never Used    Alcohol use No      Comment: social         Review of Systems   Constitutional: Negative  HENT: Positive for postnasal drip  Negative for dental problem, rhinorrhea, sinus pain, sore throat and trouble swallowing  Eyes: Positive for discharge  Negative for photophobia, pain and visual disturbance  Respiratory: Negative  Cardiovascular: Negative  Gastrointestinal: Negative  Endocrine: Negative  Genitourinary: Negative  Musculoskeletal: Negative  Skin: Positive for wound (Recurrent skin abscesses of right lower extremity)  Allergic/Immunologic: Negative  Neurological: Negative  Hematological: Negative  Psychiatric/Behavioral: Negative  All other systems reviewed and are negative  Physical Exam  Physical Exam   Constitutional: She is oriented to person, place, and time  She appears well-developed and well-nourished  HENT:   Head: Normocephalic and atraumatic  Eyes: Pupils are equal, round, and reactive to light  Conjunctivae and EOM are normal    Neck: Normal range of motion  Neck supple  Cardiovascular: Normal rate and regular rhythm  No murmur heard  Pulmonary/Chest: Effort normal and breath sounds normal    Abdominal: Soft  Bowel sounds are normal    Musculoskeletal: Normal range of motion  She exhibits no edema  Neurological: She is alert and oriented to person, place, and time  She has normal reflexes  No cranial nerve deficit  Coordination normal    Skin: Skin is warm and dry  No rash noted  There is a non draining 2 millimeter vesicle complaining yellow exudate on the posterior right calf  There is no surrounding induration erythema or lymphangitic streaks  No vesicles or other signs of cellulitis  No calf cord tenderness  Psychiatric: She has a normal mood and affect     Nursing note and vitals reviewed  Vital Signs  ED Triage Vitals   Temperature Pulse Respirations Blood Pressure SpO2   11/06/18 2318 11/06/18 2315 11/06/18 2315 11/06/18 2315 11/06/18 2315   98 9 °F (37 2 °C) 86 18 116/72 100 %      Temp Source Heart Rate Source Patient Position - Orthostatic VS BP Location FiO2 (%)   11/06/18 2318 11/06/18 2318 11/06/18 2318 11/06/18 2318 --   Temporal Monitor Sitting Right arm       Pain Score       11/06/18 2318       No Pain           Vitals:    11/06/18 2315 11/06/18 2318   BP: 116/72 118/75   Pulse: 86 86   Patient Position - Orthostatic VS:  Sitting       Visual Acuity      ED Medications  Medications - No data to display    Diagnostic Studies  Results Reviewed     Procedure Component Value Units Date/Time    Wound culture and Gram stain [[id number]] Collected:  11/06/18 2329    Lab Status:  No result Specimen:  Wound from Leg, Right                  No orders to display              Procedures  Incision/Drainage  Date/Time: 11/6/2018 11:23 PM  Performed by: Jorden Grace by: Dc Ladd     Patient location:  ED  Other Assisting Provider: No    Consent:     Consent obtained:  Verbal    Consent given by:  Patient    Risks discussed:  Bleeding, incomplete drainage and infection    Alternatives discussed:  No treatment  Universal protocol:     Procedure explained and questions answered to patient or proxy's satisfaction: yes    Location:     Indications for incision and drainage: Folliculitis  Size:  0 2    Location: Right lower extremity  Anesthesia (see MAR for exact dosages): Anesthesia method:  None  Procedure details:     Complexity:  Simple    Incision types:  Single straight    Scalpel size: Tip of an 18 gauge needle  Approach:  Open    Incision depth:  Skin    Drainage:  Purulent    Drainage amount:  Scant    Wound treatment:  Wound left open    Packing materials:  None  Post-procedure details:     Patient tolerance of procedure:   Tolerated well, no immediate complications           Phone Contacts  ED Phone Contact    ED Course                               MDM  Number of Diagnoses or Management Options  Folliculitis: established and worsening     Amount and/or Complexity of Data Reviewed  Clinical lab tests: ordered      CritCare Time    Disposition  Final diagnoses: Folliculitis     Time reflects when diagnosis was documented in both MDM as applicable and the Disposition within this note     Time User Action Codes Description Comment    11/6/2018 11:29 PM Imelda Newman Add [D36 9] Folliculitis       ED Disposition     ED Disposition Condition Comment    Discharge  Bienvenido King discharge to home/self care  Condition at discharge: Stable        Follow-up Information     Follow up With Specialties Details Why [address], DO Internal Medicine  As needed; ask them to check results of the wound culture in 2 or more days [address]  [address] [place] [address]  [phone]            Patient's Medications   Discharge Prescriptions    No medications on file     No discharge procedures on file      ED Provider  Electronically Signed by           Nelly Knowles DO  11/06/18 3867

## 2018-11-07 NOTE — ED NOTES
Patient states that the abscesses on her right lower leg are reoccurring over the past year  Patient states they start out as small pimple like abscesses that grow to golf ball sized abscesses that affect her mobility  Patient states that she has seen doctors for it in the past but with no improvement        Carlos Childress RN  11/06/18 7673

## 2018-11-08 ENCOUNTER — TELEPHONE (OUTPATIENT)
Dept: FAMILY MEDICINE CLINIC | Facility: CLINIC | Age: 23
End: 2018-11-08

## 2018-11-08 NOTE — PROGRESS NOTES
Called patient concerning result  She is not currently on an antibiotic, but was told to f/u with PCP in 2 days according to ER chart  LMOM x 1  Awaiting final result

## 2018-11-08 NOTE — ED NOTES
Addendum 11/8/18 1504: Patient returned call, she states the area that was cultured has healed already, but she is concerned because these abscesses keep recurring  I advised chlorhexadine wash every other week for a couple months and bacitracin to nose and groin and f/u with PCP        Elba Kaufman PA-C  11/08/18 [id number]

## 2018-11-08 NOTE — TELEPHONE ENCOUNTER
Was seen in  ER on 11/6 for lesion on leg  It was cultured and she was told she has staph infection  She is not currently on any abx and asking if she needs rx? She was told to micah zhou her pcp

## 2018-11-09 LAB
BACTERIA WND AEROBE CULT: ABNORMAL
GRAM STN SPEC: ABNORMAL

## 2018-11-11 ENCOUNTER — LAB (OUTPATIENT)
Dept: LAB | Facility: MEDICAL CENTER | Age: 23
End: 2018-11-11
Payer: COMMERCIAL

## 2018-11-11 DIAGNOSIS — E03.8 HYPOTHYROIDISM DUE TO HASHIMOTO'S THYROIDITIS: ICD-10-CM

## 2018-11-11 DIAGNOSIS — E06.3 HYPOTHYROIDISM DUE TO HASHIMOTO'S THYROIDITIS: ICD-10-CM

## 2018-11-11 DIAGNOSIS — E03.9 HYPOTHYROIDISM, UNSPECIFIED TYPE: ICD-10-CM

## 2018-11-11 LAB
T4 FREE SERPL-MCNC: 0.81 NG/DL (ref 0.76–1.46)
TSH SERPL DL<=0.05 MIU/L-ACNC: 9.26 UIU/ML (ref 0.36–3.74)

## 2018-11-11 PROCEDURE — 84439 ASSAY OF FREE THYROXINE: CPT

## 2018-11-11 PROCEDURE — 36415 COLL VENOUS BLD VENIPUNCTURE: CPT

## 2018-11-11 PROCEDURE — 84443 ASSAY THYROID STIM HORMONE: CPT

## 2018-11-11 NOTE — PROGRESS NOTES
Please call the patient regarding labs - tsh higher - is she taking levothyroxine on a regular basis ?

## 2018-11-12 ENCOUNTER — TELEPHONE (OUTPATIENT)
Dept: ENDOCRINOLOGY | Facility: CLINIC | Age: 23
End: 2018-11-12

## 2018-11-12 NOTE — TELEPHONE ENCOUNTER
Sorry about that  I can send a script for bactrim to her pharmacy which I will do  I had another message about this too but we have tested her for things including HIV to make sure there was nothing underlying causing increased risk for infection  Literature says to continue proper hygiene including not sharing towels, using topical triple antibiotic to lesions, and hibiclens to the skin daily  At times, I refer to the surgeon can stella and treat the lesions too and she can certainly see Dr Rayna Mckinney or even ID but im not sure they would suggest anything else  I could also repeat blood work as well

## 2018-11-12 NOTE — TELEPHONE ENCOUNTER
----- Message from Naomi Ron MD sent at 11/11/2018  3:42 PM EST -----  Please call the patient regarding labs - tsh higher - is she taking levothyroxine on a regular basis ?

## 2018-11-13 ENCOUNTER — OFFICE VISIT (OUTPATIENT)
Dept: ENDOCRINOLOGY | Facility: CLINIC | Age: 23
End: 2018-11-13
Payer: COMMERCIAL

## 2018-11-13 VITALS
SYSTOLIC BLOOD PRESSURE: 108 MMHG | DIASTOLIC BLOOD PRESSURE: 62 MMHG | HEIGHT: 69 IN | WEIGHT: 125 LBS | HEART RATE: 68 BPM | BODY MASS INDEX: 18.51 KG/M2

## 2018-11-13 DIAGNOSIS — E03.9 HYPOTHYROIDISM, UNSPECIFIED TYPE: ICD-10-CM

## 2018-11-13 DIAGNOSIS — E06.3 HYPOTHYROIDISM DUE TO HASHIMOTO'S THYROIDITIS: ICD-10-CM

## 2018-11-13 DIAGNOSIS — E03.8 HYPOTHYROIDISM DUE TO HASHIMOTO'S THYROIDITIS: ICD-10-CM

## 2018-11-13 DIAGNOSIS — E06.3 HASHIMOTO'S THYROIDITIS: Primary | ICD-10-CM

## 2018-11-13 PROCEDURE — 99213 OFFICE O/P EST LOW 20 MIN: CPT | Performed by: PHYSICIAN ASSISTANT

## 2018-11-13 RX ORDER — DOXYCYCLINE HYCLATE 100 MG
1 TABLET ORAL 2 TIMES DAILY
COMMUNITY
End: 2018-11-19 | Stop reason: ALTCHOICE

## 2018-11-13 RX ORDER — LEVOTHYROXINE SODIUM 0.03 MG/1
TABLET ORAL
Qty: 45 TABLET | Refills: 1 | Status: SHIPPED | OUTPATIENT
Start: 2018-11-13 | End: 2019-08-23 | Stop reason: SDUPTHER

## 2018-11-13 NOTE — TELEPHONE ENCOUNTER
PT WENT TO THE ER AND WAS TOLD THAT SHE HAS MRSA SHE STATES ITS OK BECAUSE SHE KNOWS THAT'S IT WOULD HAVE TO BE AN OUT BREAK FOR YOU TO DIAGNOSE HER THEY DID GIVE HER A MEDICATION

## 2018-11-13 NOTE — ASSESSMENT & PLAN NOTE
Resume levothyroxine at 12 5mcg daily  Check TSH/Free T4 in 6 weeks  Advised her to use birth control until TSH <2 5 due to risk of accidental pregnancy with untreated hypothyroidism

## 2018-11-13 NOTE — PATIENT INSTRUCTIONS
Reduce levothyroxine 1/2 tab daily (12 5mcg daily)  Take daily on empty stomach  Do lab testing in 6 weeks  Need to use form of birth control to prevent pregnancy until TSH level less than 2 5  If you do become pregnant, let us know

## 2018-11-13 NOTE — PROGRESS NOTES
Established Patient Progress Note       Chief Complaint   Patient presents with    Hypothyroidism        History of Present Illness:     Lexie Mora is a 20 y o  female with a history of hypothyroidism due to hashimotot's thyroiditis  At last visit, was started on levothyroxine 25mcg daily  Since starting medication she does complain of irritability so medication was stopped  Since last test, has started taking it on and off  Denies symptoms of hypothyroidism such as weight gain and constipation  She does get periods on a regular basis  She lives with boyfriend of 5 years and is not trying to get pregnant, but no preventing pregnancy either  She is concerned that she will have difficulty conceiving          Patient Active Problem List   Diagnosis    Hashimoto's thyroiditis    History of glomerulonephritis    Sebaceous cyst    Generalized anxiety disorder    Hypothyroidism    Fatigue      Past Medical History:   Diagnosis Date    Disease of thyroid gland     Eczema     Glomerulonephritis     Urinary tract infection       Past Surgical History:   Procedure Laterality Date    SKIN LESION EXCISION      destruction of benign lesion    TONSILLECTOMY      TONSILLECTOMY  2006    WISDOM TOOTH EXTRACTION      WISDOM TOOTH EXTRACTION  2011      Family History   Problem Relation Age of Onset    Coronary artery disease Father     Alcohol abuse Father     Drug abuse Father     Anxiety disorder Mother     Anxiety disorder Brother     Post-traumatic stress disorder Brother     Drug abuse Brother     Lung cancer Maternal Grandfather     Breast cancer Maternal Aunt     Hypertension Family     Lung cancer Family     Lymphoma Family         pancreatic    Hyperlipidemia Family         pure     Social History   Substance Use Topics    Smoking status: Current Every Day Smoker     Packs/day: 0 50     Types: Cigarettes    Smokeless tobacco: Never Used    Alcohol use No      Comment: social Allergies   Allergen Reactions    Lac Bovis     Other     Bactrim [Sulfamethoxazole-Trimethoprim] Headache     migrains       Current Outpatient Prescriptions:     doxycycline hyclate (VIBRA-TABS) 100 mg tablet, Take 1 tablet by mouth 2 (two) times a day, Disp: , Rfl:     levothyroxine 25 mcg tablet, 1/2 tab daily, Disp: 45 tablet, Rfl: 1    Review of Systems   Constitutional: Negative for activity change, appetite change, chills, diaphoresis, fatigue, fever and unexpected weight change  HENT: Negative for trouble swallowing and voice change  Eyes: Negative for visual disturbance  Respiratory: Negative for shortness of breath  Cardiovascular: Negative for chest pain and palpitations  Gastrointestinal: Negative for abdominal pain, constipation and diarrhea  Endocrine: Negative for cold intolerance, heat intolerance, polydipsia, polyphagia and polyuria  Genitourinary: Negative for frequency and menstrual problem  Musculoskeletal: Negative for arthralgias and myalgias  Skin: Negative for rash  Allergic/Immunologic: Negative for food allergies  Neurological: Negative for dizziness and tremors  Hematological: Negative for adenopathy  Psychiatric/Behavioral: Negative for sleep disturbance  The patient is nervous/anxious and is hyperactive  All other systems reviewed and are negative  Physical Exam:  Body mass index is 18 46 kg/m²  /62   Pulse 68   Ht 5' 9" (1 753 m)   Wt 56 7 kg (125 lb)   BMI 18 46 kg/m²    Wt Readings from Last 3 Encounters:   11/13/18 56 7 kg (125 lb)   11/06/18 56 7 kg (125 lb)   10/23/18 57 kg (125 lb 9 6 oz)       Physical Exam   Constitutional: She is oriented to person, place, and time  She appears well-developed and well-nourished  No distress  HENT:   Head: Normocephalic and atraumatic  Eyes: Pupils are equal, round, and reactive to light  Conjunctivae are normal    Neck: Normal range of motion  Neck supple  No thyromegaly present  Cardiovascular: Normal rate, regular rhythm and normal heart sounds  Pulmonary/Chest: Effort normal and breath sounds normal  No respiratory distress  She has no wheezes  She has no rales  Abdominal: Soft  Bowel sounds are normal  She exhibits no distension  There is no tenderness  Musculoskeletal: Normal range of motion  She exhibits no edema  Neurological: She is alert and oriented to person, place, and time  Skin: Skin is warm and dry  Psychiatric: She has a normal mood and affect  Vitals reviewed  Labs:   Component      Latest Ref Rng & Units 4/13/2016 5/10/2018 11/11/2018   TSH 3RD GENERATON      0 358 - 3 740 uIU/mL 1 794 6 740 (H) 9 260 (H)   Free T4      0 76 - 1 46 ng/dL  0 86 0 81       Lab Results   Component Value Date    CREATININE 0 71 05/10/2018    CREATININE 0 75 04/13/2016    BUN 16 05/10/2018    K 4 6 05/10/2018     05/10/2018    CO2 28 05/10/2018     eGFR   Date Value Ref Range Status   05/10/2018 119 ml/min/1 73sq m Final       No results found for: CHOL, HDL, TRIG, CHOLHDL    Lab Results   Component Value Date    ALT 20 05/10/2018    AST 14 05/10/2018    ALKPHOS 38 (L) 05/10/2018       Lab Results   Component Value Date    FREET4 0 81 11/11/2018         Impression & Plan:    Problem List Items Addressed This Visit     Hashimoto's thyroiditis - Primary    Relevant Medications    levothyroxine 25 mcg tablet    Hypothyroidism     Resume levothyroxine at 12 5mcg daily  Check TSH/Free T4 in 6 weeks  Advised her to use birth control until TSH <2 5 due to risk of accidental pregnancy with untreated hypothyroidism  Relevant Medications    levothyroxine 25 mcg tablet    Other Relevant Orders    TSH, 3rd generation    T4, free          Orders Placed This Encounter   Procedures    TSH, 3rd generation     This is a patient instruction: This test is non-fasting  Please drink two glasses of water morning of bloodwork          Standing Status:   Future     Standing Expiration Date:   11/13/2019    T4, free     Standing Status:   Future     Standing Expiration Date:   11/13/2019       Patient Instructions   Reduce levothyroxine 1/2 tab daily (12 5mcg daily)  Take daily on empty stomach  Do lab testing in 6 weeks  Need to use form of birth control to prevent pregnancy until TSH level less than 2 5  If you do become pregnant, let us know  Discussed with the patient and all questioned fully answered  She will call me if any problems arise  Follow-up appointment in 3 months       Counseled patient on diagnostic results, prognosis, risk and benefit of treatment options, instruction for management, importance of treatment compliance, Risk  factor reduction and impressions      Radha Alegre PA-C

## 2018-11-18 ENCOUNTER — OFFICE VISIT (OUTPATIENT)
Dept: URGENT CARE | Facility: CLINIC | Age: 23
End: 2018-11-18
Payer: COMMERCIAL

## 2018-11-18 VITALS
HEIGHT: 69 IN | WEIGHT: 125 LBS | TEMPERATURE: 98.2 F | RESPIRATION RATE: 16 BRPM | DIASTOLIC BLOOD PRESSURE: 60 MMHG | HEART RATE: 71 BPM | SYSTOLIC BLOOD PRESSURE: 90 MMHG | OXYGEN SATURATION: 98 % | BODY MASS INDEX: 18.51 KG/M2

## 2018-11-18 DIAGNOSIS — J02.9 ACUTE VIRAL PHARYNGITIS: Primary | ICD-10-CM

## 2018-11-18 LAB — S PYO AG THROAT QL: NEGATIVE

## 2018-11-18 PROCEDURE — S9088 SERVICES PROVIDED IN URGENT: HCPCS | Performed by: PHYSICIAN ASSISTANT

## 2018-11-18 PROCEDURE — 99213 OFFICE O/P EST LOW 20 MIN: CPT | Performed by: PHYSICIAN ASSISTANT

## 2018-11-18 PROCEDURE — 87070 CULTURE OTHR SPECIMN AEROBIC: CPT | Performed by: PHYSICIAN ASSISTANT

## 2018-11-18 NOTE — PATIENT INSTRUCTIONS
RST-  Call in 48 hours throat culture results  Follow up with PCP in 1-2 days  Go to the ER for worsening symptoms  Warm water salt gargles, throat lozenges, honey/tea for sore throat  Take motrin as directed for pain  Increase fluid intake

## 2018-11-18 NOTE — PROGRESS NOTES
[facility] [facility]        NAME: Bren Anguiano is a 20 y o  female  : 1995    MRN: [id number]      Assessment and Plan   Acute viral pharyngitis [J02 8, B97 89]  1  Acute viral pharyngitis  POCT rapid strepA    Throat culture         Patient Instructions     Patient Instructions   RST-  Call in 48 hours throat culture results  Follow up with PCP in 1-2 days  Go to the ER for worsening symptoms  Warm water salt gargles, throat lozenges, honey/tea for sore throat  Take motrin as directed for pain  Increase fluid intake       Chief Complaint     Chief Complaint   Patient presents with    Sore Throat     fever ( but has no thermometer), c/o dizziness, took dramamine @ 1200; reports blister on lip appeared 2 days ago, pain 3/10         History of Present Illness       Sore Throat    This is a new problem  The current episode started today  The problem has been unchanged  There has been no fever  Associated symptoms include congestion  Pertinent negatives include no abdominal pain, coughing, diarrhea, drooling, ear discharge, ear pain, headaches, hoarse voice, plugged ear sensation, neck pain, shortness of breath, stridor, swollen glands, trouble swallowing or vomiting  Associated symptoms comments: Dizziness made better with dramamine    She has had no exposure to strep or mono  She has tried nothing for the symptoms  Review of Systems   Review of Systems   Constitutional: Negative for chills, fatigue and fever  HENT: Positive for congestion and sore throat  Negative for drooling, ear discharge, ear pain, hearing loss, hoarse voice, postnasal drip, sinus pain, sinus pressure and trouble swallowing  Eyes: Negative for pain and discharge  Respiratory: Negative for cough, chest tightness, shortness of breath and stridor  Cardiovascular: Negative for chest pain  Gastrointestinal: Negative for abdominal pain, constipation, diarrhea, nausea and vomiting     Genitourinary: Negative for difficulty urinating  Musculoskeletal: Negative for arthralgias, myalgias and neck pain  Skin: Negative for rash  Neurological: Negative for dizziness and headaches  Psychiatric/Behavioral: Negative for behavioral problems  Current Medications       Current Outpatient Prescriptions:     doxycycline hyclate (VIBRA-TABS) 100 mg tablet, Take 1 tablet by mouth 2 (two) times a day, Disp: , Rfl:     levothyroxine 25 mcg tablet, 1/2 tab daily, Disp: 45 tablet, Rfl: 1    Current Allergies     Allergies as of 11/18/2018 - Reviewed 11/18/2018   Allergen Reaction Noted    Lac bovis  10/02/2015    Other  11/14/2013    Bactrim [sulfamethoxazole-trimethoprim] Headache 05/10/2018            The following portions of the patient's history were reviewed and updated as appropriate: allergies, current medications, past family history, past medical history, past social history, past surgical history and problem list      Past Medical History:   Diagnosis Date    Disease of thyroid gland     Eczema     Glomerulonephritis     Urinary tract infection        Past Surgical History:   Procedure Laterality Date    SKIN LESION EXCISION      destruction of benign lesion    TONSILLECTOMY      TONSILLECTOMY  2006    WISDOM TOOTH EXTRACTION      WISDOM TOOTH EXTRACTION  2011       Family History   Problem Relation Age of Onset    Coronary artery disease Father     Alcohol abuse Father     Drug abuse Father     Anxiety disorder Mother     Anxiety disorder Brother     Post-traumatic stress disorder Brother     Drug abuse Brother     Lung cancer Maternal Grandfather     Breast cancer Maternal Aunt     Hypertension Family     Lung cancer Family     Lymphoma Family         pancreatic    Hyperlipidemia Family         pure         Medications have been verified          Objective   BP 90/60   Pulse 71   Temp 98 2 °F (36 8 °C) (Tympanic)   Resp 16   Ht 5' 9" (1 753 m)   Wt 56 7 kg (125 lb)   SpO2 98%   BMI 18 46 kg/m²        Physical Exam     Physical Exam   Constitutional: She is oriented to person, place, and time  She appears well-developed and well-nourished  HENT:   Right Ear: Tympanic membrane and external ear normal    Left Ear: Tympanic membrane and external ear normal    Nose: Rhinorrhea present  Mouth/Throat:       Neck: Normal range of motion  No edema present  Cardiovascular: Normal rate, regular rhythm, S1 normal, S2 normal and normal heart sounds  No murmur heard  Pulmonary/Chest: Effort normal and breath sounds normal  No respiratory distress  She has no wheezes  She has no rales  She exhibits no tenderness  Lymphadenopathy:     She has no cervical adenopathy  Neurological: She is alert and oriented to person, place, and time  Skin: Skin is warm, dry and intact  No rash noted  Psychiatric: She has a normal mood and affect  Her speech is normal and behavior is normal    Nursing note and vitals reviewed

## 2018-11-19 ENCOUNTER — OFFICE VISIT (OUTPATIENT)
Dept: INTERNAL MEDICINE CLINIC | Facility: CLINIC | Age: 23
End: 2018-11-19
Payer: COMMERCIAL

## 2018-11-19 VITALS
HEIGHT: 69 IN | WEIGHT: 123.2 LBS | BODY MASS INDEX: 18.25 KG/M2 | DIASTOLIC BLOOD PRESSURE: 62 MMHG | SYSTOLIC BLOOD PRESSURE: 92 MMHG | HEART RATE: 65 BPM | TEMPERATURE: 99.2 F | OXYGEN SATURATION: 99 %

## 2018-11-19 DIAGNOSIS — L20.82 FLEXURAL ECZEMA: ICD-10-CM

## 2018-11-19 DIAGNOSIS — F40.10 SOCIAL ANXIETY DISORDER: Primary | ICD-10-CM

## 2018-11-19 DIAGNOSIS — E03.9 HYPOTHYROIDISM, UNSPECIFIED TYPE: ICD-10-CM

## 2018-11-19 DIAGNOSIS — F41.0 PANIC ATTACKS: ICD-10-CM

## 2018-11-19 DIAGNOSIS — F17.200 SMOKING: ICD-10-CM

## 2018-11-19 PROBLEM — R53.83 FATIGUE: Status: RESOLVED | Noted: 2018-09-17 | Resolved: 2018-11-19

## 2018-11-19 PROBLEM — IMO0001 SMOKING: Status: ACTIVE | Noted: 2018-11-19

## 2018-11-19 PROCEDURE — 3008F BODY MASS INDEX DOCD: CPT | Performed by: INTERNAL MEDICINE

## 2018-11-19 PROCEDURE — 99214 OFFICE O/P EST MOD 30 MIN: CPT | Performed by: INTERNAL MEDICINE

## 2018-11-19 NOTE — PROGRESS NOTES
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, panic disorder and OCD:  After lengthy conversation, turn I agree that counseling would be very effective for her with a right psychologist   She is somewhat averse to taking medications on a regular basis but might consider anti anxiety medications but declines antianxiety antidepressant combination medications  Fortunately, there is no element of any drug or alcohol use involved  Social supports are good  Plan is referral to [facility], understand that will not receive direct communication and I asked Allison Beavers call about 1 her first appointment is, or for any issue she has with starting this process  She will return in 4 weeks but was encouraged to call sooner for any questions or problems  Hypothyroidism:  Currently, levothyroxine dose is 25 mcg a day, recently increased from 12 5 mcg a day, by Endocrinology and most recent note was reviewed as well as a TSH of 9 260 on November 11th, preceded by [facility] of 6 740 on May 10th when initially on 25 mcg a day, with subsequent decrease in dose because of subjective feeling of anxiety, and normal TSH of 1 096 on April 13, 2016 prior to 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  Active follow-up with Endocrinology will continue including follow-up TSH  Eczema with history of chronic MRSA:  Definitive treatment in the past week with doxycycline 200 mg twice daily seems have been very effective and well tolerated  Prior to this, Hibiclens washes, and cephalosporin antibiotics were not effective    Bactrim was not tolerate because of headache  We discussed treating underlying eczema triamcinolone cream and being very vigilant about any symptoms or signs of recurrent MRSA and call the office right away at which time doxycycline will be restarted ahead of being seen in short order  Smoking:  Mariia Hopson feels she is not ready to quit smoking but can do this on her own  I encouraged her to call if she changes her mind about needing help encourage her to quit smoking explained the benefits, verses the risks of continuing to smoke  Subjective   Patient ID: Solitario Borden is a 20 y o  female, here to establish care with me today  I did review her electronic record including recent visits with Endocrinology and recent visits to this office  Also reviewed was recent labs including normal CBC, and essentially normal CMP of May 10th, a series of TSH values, as documented in the assessment, and we discussed at length her health history and recent problems  Vitals:    11/19/18 1427   BP: 92/62   Pulse: 65   Temp: 99 2 °F (37 3 °C)   SpO2: 99%     HPI   Mariia Hopson had a 1 year history of chronic MRSA cellulitis, with some initial difficulty with diagnosis, then trouble tolerating treatment, initially with Bactrim with intolerance due to headache, then a trial of Hibiclens which did not help multiple cellulitic furuncles of the extremities, with emergency department evaluation and specially follow-up, culminating with doxycycline 200 mg twice daily starting 1 week ago and completing 7 day course yesterday with resolution of MRSA  We also had a lengthy conversation better longstanding history of endogenous generalized anxiety disorder with panic disorder, OCD, without history of depression, inpatient psychiatric care, any drug or alcohol use    Mariia Hopson also describes a significant social anxiety disorder, leading her to quit her job recently because of lots of social pressures and some extenuating circumstance with living with a co-worker where she has to live   She is now living with her brother, and boyfriend and she is helping him with his problems with drug and alcohol issues  She did see a counselor once 5 to 6 years ago, very briefly and found this helpful but has not seen a counselor since that time  She denies depression on review  There are no episodes of sue, no history of bipolar illness  We discussed options for treatment and I recommended starting a foundation of treating with a clinical psychologist and recommended [facility] and referral was made  The past she had responded somewhat to clonazepam but I felt this was not an initial step that would be warranted, but rather start evaluation by Psychology and treatment and then decide about medications at another date  She does not wish to take SSRIs, SNRI eyes, other medicines taken daily that treat both anxiety and depression as she she is concerned that she should not be taking medications for depression which she does not have depression  Since quitting her job recently, she has since smoking half a pack of cigarettes a day, is going to ask every 1 house to smoke outside the house is going to try to quit  She states she started this out of habit of working third shift, 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 [place] she quit her job recently, and feels she can quit on her own  The following portions of the patient's history were reviewed and updated as appropriate: allergies, current medications, past family history, past medical history, past social history, past surgical history and problem list     Review of Systems no cough, no wheezing, no history of asthma  Objective   Physical Exam   Vital signs stable, appears in no acute distress  Eye contact is good, well dressed and well groomed, nutritional status normal   Skin without eczema or evidence of cellulitis  Thyroid exam:  Very mild thyroid enlargement without nodularity or tenderness    There is no appearance of myxedema and conversely, no presentation consistent with thyrotoxicosis  ENT exam unremarkable  Eyes without iritis or conjunctivitis  Hearing and vision grossly normal   Head neck without adenopathy  Lungs clear to auscultation  Cardiac:  Regular rate and rhythm, normal S1 and S2, no click and no murmur is audible  Peripheral circulation intact        Patient Active Problem List   Diagnosis    Hashimoto's thyroiditis    History of glomerulonephritis    Sebaceous cyst    Generalized anxiety disorder    Hypothyroidism    Smoking    Flexural eczema     Current Outpatient Prescriptions:     levothyroxine 25 mcg tablet, 1/2 tab daily, Disp: 45 tablet, Rfl: 1

## 2018-11-20 LAB — BACTERIA THROAT CULT: NORMAL

## 2018-12-01 ENCOUNTER — HOSPITAL ENCOUNTER (EMERGENCY)
Facility: HOSPITAL | Age: 23
Discharge: HOME | End: 2018-12-01
Attending: EMERGENCY MEDICINE | Admitting: EMERGENCY MEDICINE
Payer: COMMERCIAL

## 2018-12-01 VITALS
BODY MASS INDEX: 18.51 KG/M2 | WEIGHT: 125 LBS | SYSTOLIC BLOOD PRESSURE: 132 MMHG | TEMPERATURE: 97.1 F | DIASTOLIC BLOOD PRESSURE: 57 MMHG | OXYGEN SATURATION: 96 % | HEART RATE: 94 BPM | HEIGHT: 69 IN | RESPIRATION RATE: 18 BRPM

## 2018-12-01 DIAGNOSIS — Z75.8 OTHER PROBLEMS RELATED TO MEDICAL FACILITIES AND OTHER HEALTH CARE: Primary | ICD-10-CM

## 2018-12-01 LAB
AMPHETAMINES SERPL QL SCN: NEGATIVE
BARBITURATES UR QL: NEGATIVE
BENZODIAZ UR QL: NEGATIVE
COCAINE UR QL: NEGATIVE
METHADONE UR QL: NEGATIVE
OPIATES UR QL SCN: NEGATIVE
PCP UR QL: NEGATIVE
THC UR QL: NEGATIVE

## 2018-12-01 PROCEDURE — 80307 DRUG TEST PRSMV CHEM ANLYZR: CPT | Performed by: EMERGENCY MEDICINE

## 2018-12-01 PROCEDURE — 99282 EMERGENCY DEPT VISIT SF MDM: CPT
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
## 2018-12-01 NOTE — DISCHARGE INSTRUCTIONS
Visit [email or web address] if you have additional concerns and would like additional testing done  Drugs of abuse testing, urine   GENERAL INFORMATION:  What is this panel? Laboratory panels (a set of tests) are done for many reasons  Panels may be performed for routine health screenings or if a disease or toxicity is suspected  Panels may be used to determine if a medical condition is improving or worsening  Panels may also be used to measure the success or failure of a medication or treatment plan  Lab panels may be ordered for professional or legal reasons  Laboratory tests included in a panel may require one or more samples of different types  For example, both blood and urine samples may be needed  The samples may be taken at different times and using different methods  The following are tests that may be included in this panel:   · Benzodiazepine measurement, urine  · Drugs of abuse urine screening test  · Lysergic acid diethylamide measurement  · Urine amphetamine measurement  · Urine barbiturate measurement  · Urine cannabinoids screening test  · Urine codeine measurement  · Urine flunitrazepam level  · Urine opiates screening test  · Urine propoxyphene screening test    CARE AGREEMENT:   You have the right to help plan your care  To help with this plan, you must learn about your health condition and how it may be treated  You can then discuss treatment options with your caregivers  Work with them to decide what care may be used to treat you  You always have the right to refuse treatment  © 2018 [address]  Information is for End User's use only and may not be sold, redistributed or otherwise used for commercial purposes  The above information is an  only  It is not intended as a substitute for medical advice for your individual conditions or treatments   Talk to your doctor, nurse or pharmacist before following any medical regimen to see if it is safe and effective for you

## 2018-12-03 ENCOUNTER — TELEPHONE (OUTPATIENT)
Dept: INTERNAL MEDICINE CLINIC | Facility: CLINIC | Age: 23
End: 2018-12-03

## 2018-12-03 DIAGNOSIS — L02.91 ABSCESS: Primary | ICD-10-CM

## 2018-12-03 DIAGNOSIS — Z22.322 MRSA (METHICILLIN RESISTANT STAPH AUREUS) CULTURE POSITIVE: ICD-10-CM

## 2018-12-03 NOTE — TELEPHONE ENCOUNTER
Patient called in stating that she has another abscess that had appeared on her back  She was instructed by the doctor if one more had appeared to call immediately,  since she has tested positive for MRSA in the past   I spoke with Dr Michael Alfaro and he suggested that she see a general surgeon  I did let her know this information and set her up to see Dr Laci Hernandez at [facility]   She has an appointment for 12/4/2018 at 11:00  The patient was very happy and willing to go see the surgeon

## 2018-12-04 ENCOUNTER — OFFICE VISIT (OUTPATIENT)
Dept: SURGERY | Facility: CLINIC | Age: 23
End: 2018-12-04
Payer: COMMERCIAL

## 2018-12-04 VITALS
SYSTOLIC BLOOD PRESSURE: 132 MMHG | DIASTOLIC BLOOD PRESSURE: 57 MMHG | WEIGHT: 125 LBS | BODY MASS INDEX: 18.51 KG/M2 | TEMPERATURE: 97.2 F | RESPIRATION RATE: 18 BRPM | HEART RATE: 88 BPM | HEIGHT: 69 IN

## 2018-12-04 DIAGNOSIS — L72.3 SEBACEOUS CYST: Primary | ICD-10-CM

## 2018-12-04 DIAGNOSIS — L02.91 ABSCESS: ICD-10-CM

## 2018-12-04 DIAGNOSIS — Z22.322 MRSA (METHICILLIN RESISTANT STAPH AUREUS) CULTURE POSITIVE: ICD-10-CM

## 2018-12-04 PROCEDURE — 99243 OFF/OP CNSLTJ NEW/EST LOW 30: CPT | Performed by: PHYSICIAN ASSISTANT

## 2018-12-04 RX ORDER — CHLORHEXIDINE GLUCONATE 4 G/100ML
1 SOLUTION TOPICAL DAILY
Qty: 120 ML | Refills: 1 | Status: SHIPPED | OUTPATIENT
Start: 2018-12-04 | End: 2018-12-11

## 2018-12-04 RX ORDER — DOXYCYCLINE HYCLATE 100 MG/1
100 TABLET, DELAYED RELEASE ORAL 2 TIMES DAILY
Qty: 14 TABLET | Refills: 0 | Status: SHIPPED | OUTPATIENT
Start: 2018-12-04 | End: 2018-12-11

## 2018-12-04 NOTE — PROGRESS NOTES
Assessment/Plan:   Andres Hagen is a 20 y  o female who is here for Diagnoses of Abscess and MRSA (methicillin resistant staph aureus) culture positive were pertinent to this visit  Patient with history of MRSA , patient normally responsive doxycycline and Hibiclens in the past   Area of tenderness and redness, patient concern for recurrent abscess    On physical exam area of scabbing with tenderness and erythema no fluctuance but induration      Plan: We will start oral antibiotics and Hibiclens washes along with warm compresses  Do not feel it is ready for incision and drainage at this point but may need in the near future        Preoperative Clearance: None          _______________________________________________________  CC:No chief complaint on file  Devaughn Ballard HPI:  Andres Hagen is a 20 y  o female who was referred for evaluation of No chief complaint on file       Currently patient reports area of erythema and redness  Patient with history of MRSA and history of multiple abscesses  Most recent abscess was on lower extremity that was I indeed and positive for MRSA  Patient normally drains her wounds by herself and admits to picking at scabbed on back  Reports: not changing and painful    ROS:  General ROS: negative  negative for - chills, fatigue, fever or night sweats, weight loss  Respiratory ROS: no cough, shortness of breath, or wheezing  Cardiovascular ROS: no chest pain or dyspnea on exertion  Genito-Urinary ROS: no dysuria, trouble voiding, or hematuria  Musculoskeletal ROS: negative for - gait disturbance, joint pain or muscle pain  Neurological ROS: no TIA or stroke symptoms  Skin ROS: See HPI  GI ROS: see HPI  Skin ROS: no new rashes or lesions   Lymphatic ROS: no new adenopathy noted by pt     GYN ROS: see HPI, no new GYN history or bleeding noted  Psy ROS: no new mental or behavioral disturbances       Patient Active Problem List   Diagnosis    Hashimoto's thyroiditis    History of glomerulonephritis    Sebaceous cyst    Generalized anxiety disorder    Hypothyroidism    Smoking    Flexural eczema         Allergies:  Lac bovis; Other; and Bactrim [sulfamethoxazole-trimethoprim]      Current Outpatient Prescriptions:     chlorhexidine (HIBICLENS) 4 % external liquid, Apply 1 application topically daily for 7 days, Disp: 120 mL, Rfl: 1    doxycycline (DORYX) 100 MG EC tablet, Take 1 tablet (100 mg total) by mouth 2 (two) times a day for 7 days, Disp: 14 tablet, Rfl: 0    levothyroxine 25 mcg tablet, 1/2 tab daily, Disp: 45 tablet, Rfl: 1    Past Medical History:   Diagnosis Date    Disease of thyroid gland     Eczema     Glomerulonephritis     Urinary tract infection        Past Surgical History:   Procedure Laterality Date    SKIN LESION EXCISION      destruction of benign lesion    TONSILLECTOMY      TONSILLECTOMY  2006    WISDOM TOOTH EXTRACTION      WISDOM TOOTH EXTRACTION  2011       Family History   Problem Relation Age of Onset    Coronary artery disease Father     Alcohol abuse Father     Drug abuse Father     Anxiety disorder Mother     Anxiety disorder Brother     Post-traumatic stress disorder Brother     Drug abuse Brother     Lung cancer Maternal Grandfather     Breast cancer Maternal Aunt     Hypertension Family     Lung cancer Family     Lymphoma Family         pancreatic    Hyperlipidemia Family         pure        reports that she has been smoking Cigarettes  She has been smoking about 0 50 packs per day  She has never used smokeless tobacco  She reports that she does not drink alcohol or use drugs      PHYSICAL EXAM  General Appearance:    Alert, cooperative, no distress   Head:    Normocephalic without obvious abnormality   Eyes:    PERRL, conjunctiva/corneas clear, EOM's intact        Neck:   Supple, no adenopathy, no JVD   Back:     Symmetric, no spinal or CVA tenderness   Lungs:     Clear to auscultation bilaterally, no wheezing or rhonchi Heart:    Regular rate and rhythm, S1 and S2 normal, no murmur   Abdomen:     Benign, no rebound or guarding  Extremities:   Extremities normal  No clubbing, cyanosis or edema   Psych:   Normal Affect, AOx3  Neurologic:  Skin:   CNII-XII intact  Strength symmetric, speech intact    Warm, dry, intact, no visible rashes or lesions except as follows:  Small assure on upper back with surrounding erythema and tenderness some induration but no fluctuance               Some portions of this record may have been generated with voice recognition software  There may be translation, syntax,  or grammatical errors  Occasional wrong word or "sound-a-like" substitutions may have occurred due to the inherent limitations of the voice recognition software  Read the chart carefully and recognize, using context, where substitutions may have occurred  If you have any questions, please contact the dictating provider for clarification or correction, as needed  This encounter has been coded by a non-certified coder         Peter Boone PA-C    Date: 12/4/2018 Time: 11:23 AM

## 2018-12-04 NOTE — LETTER
December 4, 2018     Tameka Marrero MD  [id number]     Patient: Governor Begum   YOB: 1995   Date of Visit: 12/4/2018       Dear Dr Zina Cabrera: Thank you for referring Governor Begum to me for evaluation  Below are my notes for this consultation  If you have questions, please do not hesitate to call me  I look forward to following your patient along with you  Sincerely,        Molly Hamilton PA-C        CC: No Recipients  Pat Porras  12/4/2018 11:36 AM  Sign at close encounter  Assessment/Plan:   Governor Begum is a 20 y  o female who is here for Diagnoses of Abscess and MRSA (methicillin resistant staph aureus) culture positive were pertinent to this visit  Patient with history of MRSA , patient normally responsive doxycycline and Hibiclens in the past   Area of tenderness and redness, patient concern for recurrent abscess    On physical exam area of scabbing with tenderness and erythema no fluctuance but induration      Plan: We will start oral antibiotics and Hibiclens washes along with warm compresses  Do not feel it is ready for incision and drainage at this point but may need in the near future        Preoperative Clearance: None          _______________________________________________________  CC:No chief complaint on file  Anastacia Gerard HPI:  Governor Begum is a 20 y  o female who was referred for evaluation of No chief complaint on file       Currently patient reports area of erythema and redness  Patient with history of MRSA and history of multiple abscesses  Most recent abscess was on lower extremity that was I indeed and positive for MRSA  Patient normally drains her wounds by herself and admits to picking at scabbed on back       Reports: not changing and painful    ROS:  General ROS: negative  negative for - chills, fatigue, fever or night sweats, weight loss  Respiratory ROS: no cough, shortness of breath, or wheezing  Cardiovascular ROS: no chest pain or dyspnea on exertion  Genito-Urinary ROS: no dysuria, trouble voiding, or hematuria  Musculoskeletal ROS: negative for - gait disturbance, joint pain or muscle pain  Neurological ROS: no TIA or stroke symptoms  Skin ROS: See HPI  GI ROS: see HPI  Skin ROS: no new rashes or lesions   Lymphatic ROS: no new adenopathy noted by pt  GYN ROS: see HPI, no new GYN history or bleeding noted  Psy ROS: no new mental or behavioral disturbances       Patient Active Problem List   Diagnosis    Hashimoto's thyroiditis    History of glomerulonephritis    Sebaceous cyst    Generalized anxiety disorder    Hypothyroidism    Smoking    Flexural eczema         Allergies:  Lac bovis;  Other; and Bactrim [sulfamethoxazole-trimethoprim]      Current Outpatient Prescriptions:     chlorhexidine (HIBICLENS) 4 % external liquid, Apply 1 application topically daily for 7 days, Disp: 120 mL, Rfl: 1    doxycycline (DORYX) 100 MG EC tablet, Take 1 tablet (100 mg total) by mouth 2 (two) times a day for 7 days, Disp: 14 tablet, Rfl: 0    levothyroxine 25 mcg tablet, 1/2 tab daily, Disp: 45 tablet, Rfl: 1    Past Medical History:   Diagnosis Date    Disease of thyroid gland     Eczema     Glomerulonephritis     Urinary tract infection        Past Surgical History:   Procedure Laterality Date    SKIN LESION EXCISION      destruction of benign lesion    TONSILLECTOMY      TONSILLECTOMY  2006    WISDOM TOOTH EXTRACTION      WISDOM TOOTH EXTRACTION  2011       Family History   Problem Relation Age of Onset    Coronary artery disease Father     Alcohol abuse Father     Drug abuse Father     Anxiety disorder Mother     Anxiety disorder Brother     Post-traumatic stress disorder Brother     Drug abuse Brother     Lung cancer Maternal Grandfather     Breast cancer Maternal Aunt     Hypertension Family     Lung cancer Family     Lymphoma Family         pancreatic    Hyperlipidemia Family         pure reports that she has been smoking Cigarettes  She has been smoking about 0 50 packs per day  She has never used smokeless tobacco  She reports that she does not drink alcohol or use drugs  PHYSICAL EXAM  General Appearance:    Alert, cooperative, no distress   Head:    Normocephalic without obvious abnormality   Eyes:    PERRL, conjunctiva/corneas clear, EOM's intact        Neck:   Supple, no adenopathy, no JVD   Back:     Symmetric, no spinal or CVA tenderness   Lungs:     Clear to auscultation bilaterally, no wheezing or rhonchi   Heart:    Regular rate and rhythm, S1 and S2 normal, no murmur   Abdomen:     Benign, no rebound or guarding  Extremities:   Extremities normal  No clubbing, cyanosis or edema   Psych:   Normal Affect, AOx3  Neurologic:  Skin:   CNII-XII intact  Strength symmetric, speech intact    Warm, dry, intact, no visible rashes or lesions except as follows:  Small assure on upper back with surrounding erythema and tenderness some induration but no fluctuance               Some portions of this record may have been generated with voice recognition software  There may be translation, syntax,  or grammatical errors  Occasional wrong word or "sound-a-like" substitutions may have occurred due to the inherent limitations of the voice recognition software  Read the chart carefully and recognize, using context, where substitutions may have occurred  If you have any questions, please contact the dictating provider for clarification or correction, as needed  This encounter has been coded by a non-certified coder         Crissy Underwood PA-C    Date: 12/4/2018 Time: 11:23 AM

## 2018-12-10 ENCOUNTER — HOSPITAL ENCOUNTER (EMERGENCY)
Facility: HOSPITAL | Age: 23
Discharge: HOME | End: 2018-12-10
Attending: EMERGENCY MEDICINE | Admitting: EMERGENCY MEDICINE
Payer: COMMERCIAL

## 2018-12-10 VITALS
HEIGHT: 69 IN | SYSTOLIC BLOOD PRESSURE: 123 MMHG | TEMPERATURE: 96.8 F | RESPIRATION RATE: 20 BRPM | WEIGHT: 125 LBS | OXYGEN SATURATION: 99 % | DIASTOLIC BLOOD PRESSURE: 71 MMHG | BODY MASS INDEX: 18.51 KG/M2 | HEART RATE: 73 BPM

## 2018-12-10 DIAGNOSIS — T78.40XA ALLERGIC REACTION, INITIAL ENCOUNTER: Primary | ICD-10-CM

## 2018-12-10 LAB — EXT PREG TEST URINE: NEGATIVE

## 2018-12-10 PROCEDURE — 81025 URINE PREGNANCY TEST: CPT | Performed by: EMERGENCY MEDICINE

## 2018-12-10 PROCEDURE — 99283 EMERGENCY DEPT VISIT LOW MDM: CPT

## 2018-12-10 RX ORDER — FAMOTIDINE 20 MG/1
20 TABLET, FILM COATED ORAL ONCE
Status: COMPLETED | OUTPATIENT
Start: 2018-12-10 | End: 2018-12-10

## 2018-12-10 RX ORDER — PREDNISONE 20 MG/1
60 TABLET ORAL ONCE
Status: COMPLETED | OUTPATIENT
Start: 2018-12-10 | End: 2018-12-10

## 2018-12-10 RX ADMIN — FAMOTIDINE 20 MG: 20 TABLET ORAL at 02:22

## 2018-12-10 RX ADMIN — PREDNISONE 60 MG: 20 TABLET ORAL at 02:22

## 2018-12-10 NOTE — DISCHARGE INSTRUCTIONS
General Allergic Reaction   AMBULATORY CARE:   A general allergic reaction  is your body's response to an allergen  Allergens include medicines, food, insect stings, animal dander, mold, latex, chemicals, and dust mites  Pollen from trees, grass, and weeds can also cause an allergic reaction  Seek care immediately if:   · You have a skin rash, hives, swelling, or itching that gets worse  · You have trouble breathing, shortness of breath, wheezing, or coughing  · Your throat tightens, or your lips or tongue swell  · You have trouble swallowing or speaking  · You have dizziness, lightheadedness, fainting, or confusion  · You have nausea, vomiting, diarrhea, or abdominal cramps  · You have chest pain or tightness  Contact your healthcare provider if:   · You have questions or concerns about your condition or care  Treatment for a general allergic reaction  may include medicines to relieve certain allergy symptoms such as itching, sneezing, and swelling  You may take them as a pill or use drops in your nose or eyes  Topical treatments may be given to put directly on your skin to help decrease itching or swelling  Manage allergic reactions:   · Avoid the allergen  that you think may have caused your allergic reaction  · Use cold compresses  on your skin or eyes if they were affected by the allergic reaction  Cold compresses may help to soothe your skin or eyes  · Rinse your nasal passages  with a saline solution  Daily rinsing may help clear your nose of allergens  · Do not smoke  Your allergy symptoms may decrease if you are not around smoke  Nicotine and other chemicals in cigarettes and cigars can also cause lung damage  Ask your healthcare provider for information if you currently smoke and need help to quit  E-cigarettes or smokeless tobacco still contain nicotine  Talk to your healthcare provider before you use these products    Follow up with your healthcare provider as directed:  Write down your questions so you remember to ask them during your visits  © 2017 [address] Information is for End User's use only and may not be sold, redistributed or otherwise used for commercial purposes  All illustrations and images included in CareNotes® are the copyrighted property of [employer]  or [employer]obi Jaffe  The above information is an  only  It is not intended as medical advice for individual conditions or treatments  Talk to your doctor, nurse or pharmacist before following any medical regimen to see if it is safe and effective for you

## 2018-12-10 NOTE — ED PROVIDER NOTES
History  Chief Complaint   Patient presents with    Allergic Reaction     patient presents to the ED with c/o itchy and swollen eyes after exposure to a wild rabbit  Patient took benadryl 15 minutes ago with no relief      Patient complains of eye swelling, redness and itchy, tearing since petting and holding a rabbit about 30 minutes ago  She took benadryl 15 minutes ago but did not notice any relief  Vision is intact, no trouble swallowing or breathing  Her throat was itchy that's when she put the rabbit down and got out of the house  She almost used her epi-pen but was concerned with the side effects of the pen  Prior to Admission Medications   Prescriptions Last Dose Informant Patient Reported?  Taking?   chlorhexidine (HIBICLENS) 4 % external liquid  Self No No   Sig: Apply 1 application topically daily for 7 days   doxycycline (DORYX) 100 MG EC tablet  Self No No   Sig: Take 1 tablet (100 mg total) by mouth 2 (two) times a day for 7 days   levothyroxine 25 mcg tablet  Self No No   Si/2 tab daily      Facility-Administered Medications: None       Past Medical History:   Diagnosis Date    Disease of thyroid gland     Eczema     Glomerulonephritis     Urinary tract infection        Past Surgical History:   Procedure Laterality Date    SKIN LESION EXCISION      destruction of benign lesion    TONSILLECTOMY      TONSILLECTOMY  2006    WISDOM TOOTH EXTRACTION      WISDOM TOOTH EXTRACTION         Family History   Problem Relation Age of Onset    Coronary artery disease Father     Alcohol abuse Father     Drug abuse Father     Anxiety disorder Mother     Anxiety disorder Brother     Post-traumatic stress disorder Brother     Drug abuse Brother     Lung cancer Maternal Grandfather     Breast cancer Maternal Aunt     Hypertension Family     Lung cancer Family     Lymphoma Family         pancreatic    Hyperlipidemia Family         pure     I have reviewed and agree with the history as documented  Social History   Substance Use Topics    Smoking status: Current Every Day Smoker     Packs/day: 0 50     Types: Cigarettes    Smokeless tobacco: Never Used    Alcohol use No      Comment: social         Review of Systems   All other systems reviewed and are negative  Physical Exam  Physical Exam   Constitutional: She appears well-developed and well-nourished  HENT:   Mouth/Throat: Oropharynx is clear and moist    Eyes: Pupils are equal, round, and reactive to light  EOM are normal  Right eye exhibits chemosis  Left eye exhibits chemosis  Right conjunctiva is injected  Left conjunctiva is injected  Right eye exhibits normal extraocular motion and no nystagmus  Left eye exhibits normal extraocular motion and no nystagmus  Mild periorbital edema bilateral   Neck: Normal range of motion  Neck supple  No spinous process tenderness present  Cardiovascular: Normal rate, regular rhythm, normal heart sounds and intact distal pulses  Pulmonary/Chest: Effort normal and breath sounds normal  No respiratory distress  She has no wheezes  Abdominal: Soft  Bowel sounds are normal  She exhibits no distension  There is no tenderness  Musculoskeletal: Normal range of motion  Neurological: She is alert  She has normal strength  No sensory deficit  GCS eye subscore is 4  GCS verbal subscore is 5  GCS motor subscore is 6  Skin: Skin is warm and dry  No rash noted  Psychiatric: She has a normal mood and affect  Nursing note and vitals reviewed        Vital Signs  ED Triage Vitals [12/10/18 0209]   Temperature Pulse Respirations Blood Pressure SpO2   (!) 96 8 °F (36 °C) 73 20 123/71 99 %      Temp Source Heart Rate Source Patient Position - Orthostatic VS BP Location FiO2 (%)   Temporal -- Sitting Right arm --      Pain Score       9           Vitals:    12/10/18 0209   BP: 123/71   Pulse: 73   Patient Position - Orthostatic VS: Sitting       Visual Acuity      ED Medications  Medications   predniSONE tablet 60 mg (60 mg Oral Given 12/10/18 0222)   famotidine (PEPCID) tablet 20 mg (20 mg Oral Given 12/10/18 0222)       Diagnostic Studies  Results Reviewed     Procedure Component Value Units Date/Time    POCT pregnancy, urine [[id number]]  (Normal) Resulted:  12/10/18 0226    Lab Status:  Final result Updated:  12/10/18 0226     EXT PREG TEST UR (Ref: Negative) negative                 No orders to display              Procedures  Procedures       Phone Contacts  ED Phone Contact    ED Course      Confirmed that patient was not pregnant before giving prednisone    less swelling less redness at eyes upon reassessment                          MDM  Number of Diagnoses or Management Options  Allergic reaction, initial encounter: new and does not require workup     Amount and/or Complexity of Data Reviewed  Clinical lab tests: ordered and reviewed    Patient Progress  Patient progress: improved    CritCare Time    Disposition  Final diagnoses: Allergic reaction, initial encounter - to a rabbit     Time reflects when diagnosis was documented in both MDM as applicable and the Disposition within this note     Time User Action Codes Description Comment    12/10/2018  2:51 AM Cookie Nichoel Add [T78 40XA] Allergic reaction, initial encounter     12/10/2018  2:51 AM Cookie Nichole Modify [T78 40XA] Allergic reaction, initial encounter to a rabbit      ED Disposition     ED Disposition Condition Comment    Discharge  Bienvenido King discharge to home/self care  Condition at discharge: Good        Follow-up Information     Follow up With Specialties Details Why Evan Maxwell MD Internal Medicine  As needed [id number]  [facility]            Discharge Medication List as of 12/10/2018  2:52 AM      CONTINUE these medications which have NOT CHANGED    Details   chlorhexidine (HIBICLENS) 4 % external liquid Apply 1 application topically daily for 7 days, Starting Tue 12/4/2018, Until Tue 12/11/2018, Normal      doxycycline (DORYX) 100 MG EC tablet Take 1 tablet (100 mg total) by mouth 2 (two) times a day for 7 days, Starting Tue 12/4/2018, Until Tue 12/11/2018, Normal      levothyroxine 25 mcg tablet 1/2 tab daily, Normal           No discharge procedures on file      ED Provider  Electronically Signed by           Judith Pierce DO  12/10/18 1234

## 2018-12-30 ENCOUNTER — HOSPITAL ENCOUNTER (EMERGENCY)
Facility: HOSPITAL | Age: 23
Discharge: HOME | End: 2018-12-31
Attending: EMERGENCY MEDICINE
Payer: COMMERCIAL

## 2018-12-30 VITALS
HEART RATE: 74 BPM | RESPIRATION RATE: 18 BRPM | WEIGHT: 125 LBS | TEMPERATURE: 98 F | SYSTOLIC BLOOD PRESSURE: 121 MMHG | BODY MASS INDEX: 18.51 KG/M2 | HEIGHT: 69 IN | DIASTOLIC BLOOD PRESSURE: 78 MMHG | OXYGEN SATURATION: 99 %

## 2018-12-30 DIAGNOSIS — T07.XXXA MULTIPLE CONTUSIONS: Primary | ICD-10-CM

## 2018-12-30 LAB
BASOPHILS # BLD AUTO: 0.03 THOUSANDS/ΜL (ref 0–0.1)
BASOPHILS NFR BLD AUTO: 1 % (ref 0–1)
EOSINOPHIL # BLD AUTO: 0.24 THOUSAND/ΜL (ref 0–0.61)
EOSINOPHIL NFR BLD AUTO: 4 % (ref 0–6)
ERYTHROCYTE [DISTWIDTH] IN BLOOD BY AUTOMATED COUNT: 12.5 % (ref 11.6–15.1)
HCT VFR BLD AUTO: 37.3 % (ref 34.8–46.1)
HGB BLD-MCNC: 12.3 G/DL (ref 11.5–15.4)
IMM GRANULOCYTES # BLD AUTO: 0.03 THOUSAND/UL (ref 0–0.2)
IMM GRANULOCYTES NFR BLD AUTO: 1 % (ref 0–2)
LYMPHOCYTES # BLD AUTO: 2.11 THOUSANDS/ΜL (ref 0.6–4.47)
LYMPHOCYTES NFR BLD AUTO: 37 % (ref 14–44)
MCH RBC QN AUTO: 31.1 PG (ref 26.8–34.3)
MCHC RBC AUTO-ENTMCNC: 33 G/DL (ref 31.4–37.4)
MCV RBC AUTO: 94 FL (ref 82–98)
MONOCYTES # BLD AUTO: 0.76 THOUSAND/ΜL (ref 0.17–1.22)
MONOCYTES NFR BLD AUTO: 13 % (ref 4–12)
NEUTROPHILS # BLD AUTO: 2.61 THOUSANDS/ΜL (ref 1.85–7.62)
NEUTS SEG NFR BLD AUTO: 44 % (ref 43–75)
NRBC BLD AUTO-RTO: 0 /100 WBCS
PLATELET # BLD AUTO: 194 THOUSANDS/UL (ref 149–390)
PMV BLD AUTO: 9.3 FL (ref 8.9–12.7)
RBC # BLD AUTO: 3.96 MILLION/UL (ref 3.81–5.12)
WBC # BLD AUTO: 5.78 THOUSAND/UL (ref 4.31–10.16)

## 2018-12-30 PROCEDURE — 81025 URINE PREGNANCY TEST: CPT | Performed by: EMERGENCY MEDICINE

## 2018-12-30 PROCEDURE — 36415 COLL VENOUS BLD VENIPUNCTURE: CPT | Performed by: EMERGENCY MEDICINE

## 2018-12-30 PROCEDURE — 85025 COMPLETE CBC W/AUTO DIFF WBC: CPT | Performed by: EMERGENCY MEDICINE

## 2018-12-30 PROCEDURE — 85610 PROTHROMBIN TIME: CPT | Performed by: EMERGENCY MEDICINE

## 2018-12-30 PROCEDURE — 80053 COMPREHEN METABOLIC PANEL: CPT | Performed by: EMERGENCY MEDICINE

## 2018-12-30 PROCEDURE — 81002 URINALYSIS NONAUTO W/O SCOPE: CPT | Performed by: EMERGENCY MEDICINE

## 2018-12-30 PROCEDURE — 85730 THROMBOPLASTIN TIME PARTIAL: CPT | Performed by: EMERGENCY MEDICINE

## 2018-12-30 PROCEDURE — 99283 EMERGENCY DEPT VISIT LOW MDM: CPT

## 2018-12-31 LAB
ALBUMIN SERPL BCP-MCNC: 3.5 G/DL (ref 3.5–5)
ALP SERPL-CCNC: 39 U/L (ref 46–116)
ALT SERPL W P-5'-P-CCNC: 18 U/L (ref 12–78)
ANION GAP SERPL CALCULATED.3IONS-SCNC: 13 MMOL/L (ref 4–13)
APTT PPP: 30 SECONDS (ref 26–38)
AST SERPL W P-5'-P-CCNC: 15 U/L (ref 5–45)
BILIRUB SERPL-MCNC: 0.2 MG/DL (ref 0.2–1)
BUN SERPL-MCNC: 17 MG/DL (ref 5–25)
CALCIUM SERPL-MCNC: 8.5 MG/DL (ref 8.3–10.1)
CHLORIDE SERPL-SCNC: 106 MMOL/L (ref 100–108)
CLARITY, POC: CLEAR
CO2 SERPL-SCNC: 22 MMOL/L (ref 21–32)
COLOR, POC: YELLOW
CREAT SERPL-MCNC: 0.76 MG/DL (ref 0.6–1.3)
EXT BILIRUBIN, UA: NEGATIVE
EXT BLOOD URINE: NORMAL
EXT GLUCOSE, UA: NEGATIVE
EXT KETONES: NEGATIVE
EXT NITRITE, UA: NEGATIVE
EXT PH, UA: 6.5
EXT PREG TEST URINE: NORMAL
EXT PROTEIN, UA: NORMAL
EXT SPECIFIC GRAVITY, UA: 1.01
EXT UROBILINOGEN: NEGATIVE
GFR SERPL CREATININE-BSD FRML MDRD: 109 ML/MIN/1.73SQ M
GLUCOSE SERPL-MCNC: 91 MG/DL (ref 65–140)
INR PPP: 1.11 (ref 0.86–1.17)
POTASSIUM SERPL-SCNC: 3.9 MMOL/L (ref 3.5–5.3)
PROT SERPL-MCNC: 7.2 G/DL (ref 6.4–8.2)
PROTHROMBIN TIME: 13.7 SECONDS (ref 11.8–14.2)
SODIUM SERPL-SCNC: 141 MMOL/L (ref 136–145)
WBC # BLD EST: NEGATIVE 10*3/UL

## 2018-12-31 NOTE — DISCHARGE INSTRUCTIONS

## 2018-12-31 NOTE — ED PROVIDER NOTES
History  Chief Complaint   Patient presents with    Skin Problem     to ed c/o "random bruising" on both legs for past weeks  Denies any injury  This is a 70-year-old female presents with scattered ecchymotic areas over the bilateral lateral upper thigh areas over the past week she denies any bleeding in her urine or stool no other areas bruising she denies any known trauma to the area she was previously on doxycycline for a MRSA infection she denies any chest pain shortness of breath fevers or chills        History provided by:  Patient  Medical Problem   Location:  Bilateral thighs  Quality:  Bruises  Severity:  Mild  Onset quality:  Unable to specify  Duration:  1 week  Chronicity:  New  Context:  No specific trauma or other areas of bleeding      Prior to Admission Medications   Prescriptions Last Dose Informant Patient Reported? Taking?   levothyroxine 25 mcg tablet  Self No No   Si/2 tab daily      Facility-Administered Medications: None       Past Medical History:   Diagnosis Date    Disease of thyroid gland     Eczema     Glomerulonephritis     Urinary tract infection        Past Surgical History:   Procedure Laterality Date    SKIN LESION EXCISION      destruction of benign lesion    TONSILLECTOMY      TONSILLECTOMY  2006    WISDOM TOOTH EXTRACTION      WISDOM TOOTH EXTRACTION         Family History   Problem Relation Age of Onset    Coronary artery disease Father     Alcohol abuse Father     Drug abuse Father     Anxiety disorder Mother     Anxiety disorder Brother     Post-traumatic stress disorder Brother     Drug abuse Brother     Lung cancer Maternal Grandfather     Breast cancer Maternal Aunt     Hypertension Family     Lung cancer Family     Lymphoma Family         pancreatic    Hyperlipidemia Family         pure     I have reviewed and agree with the history as documented      Social History   Substance Use Topics    Smoking status: Current Every Day Smoker Packs/day: 0 50     Types: Cigarettes    Smokeless tobacco: Never Used    Alcohol use No      Comment: social         Review of Systems   Skin:        Contusions   All other systems reviewed and are negative  Physical Exam  Physical Exam   Constitutional: She is oriented to person, place, and time  She appears well-developed  No distress  HENT:   Head: Normocephalic and atraumatic  Nose: Nose normal    Mouth/Throat: Oropharynx is clear and moist    Eyes: Pupils are equal, round, and reactive to light  EOM are normal  Right eye exhibits no discharge  Left eye exhibits no discharge  No scleral icterus  Neck: Neck supple  No tracheal deviation present  Cardiovascular: Normal rate, regular rhythm and intact distal pulses  Exam reveals no gallop and no friction rub  No murmur heard  Pulmonary/Chest: Effort normal and breath sounds normal  No stridor  No respiratory distress  She has no wheezes  She has no rales  Abdominal: Soft  Bowel sounds are normal  She exhibits no distension  There is no tenderness  There is no rebound and no guarding  Musculoskeletal: Normal range of motion  She exhibits no edema, tenderness or deformity  Neurological: She is alert and oriented to person, place, and time  No cranial nerve deficit or sensory deficit  She exhibits normal muscle tone  Coordination normal    Skin: Skin is warm and dry  She is not diaphoretic  Scattered 1-2 cm contusions to the bilateral lateral thigh areas without any vesicles pustules or sign of infection  No petechiae or skin rashes   Psychiatric: She has a normal mood and affect  Her behavior is normal  Thought content normal    Nursing note and vitals reviewed        Vital Signs  ED Triage Vitals [12/30/18 2314]   Temperature Pulse Respirations Blood Pressure SpO2   98 °F (36 7 °C) 74 18 121/78 99 %      Temp Source Heart Rate Source Patient Position - Orthostatic VS BP Location FiO2 (%)   Temporal Monitor Lying Right arm --      Pain Score       --           Vitals:    12/30/18 2314   BP: 121/78   Pulse: 74   Patient Position - Orthostatic VS: Lying       Visual Acuity      ED Medications  Medications - No data to display    Diagnostic Studies  Results Reviewed     Procedure Component Value Units Date/Time    POCT pregnancy, urine [[id number]]  (Normal) Resulted:  12/31/18 0009    Lab Status:  Final result Updated:  12/31/18 0009     EXT PREG TEST UR (Ref: Negative) negative  POCT urinalysis dipstick [[id number]]  (Normal) Resulted:  12/31/18 0008    Lab Status:  Final result Specimen:  Urine Updated:  12/31/18 0009     Color, UA yellow     Clarity, UA clear     Glucose, UA (Ref: Negative) negative     Bilirubin, UA (Ref: Negative) negative     Ketones, UA (Ref: Negative) negative     Spec Grav, UA (Ref:1 003-1 030) 1 010     Blood, UA (Ref: Negative) LARGE     pH, UA (Ref: 4 5-8 0) 6 5     Protein, UA (Ref: Negative) 100+     Urobilinogen, UA (Ref: 0 2- 1 0) negative      Leukocytes, UA (Ref: Negative) negative     Nitrite, UA (Ref: Negative) negative    Comprehensive metabolic panel [[id number]]  (Abnormal) Collected:  12/30/18 2332    Lab Status:  Final result Specimen:  Blood from Arm, Right Updated:  12/31/18 0006     Sodium 141 mmol/L      Potassium 3 9 mmol/L      Chloride 106 mmol/L      CO2 22 mmol/L      ANION GAP 13 mmol/L      BUN 17 mg/dL      Creatinine 0 76 mg/dL      Glucose 91 mg/dL      Calcium 8 5 mg/dL      AST 15 U/L      ALT 18 U/L      Alkaline Phosphatase 39 (L) U/L      Total Protein 7 2 g/dL      Albumin 3 5 g/dL      Total Bilirubin 0 20 mg/dL      eGFR 109 ml/min/1 73sq m     Narrative:         National Kidney Disease Education Program recommendations are as follows:  GFR calculation is accurate only with a steady state creatinine  Chronic Kidney disease less than 60 ml/min/1 73 sq  meters  Kidney failure less than 15 ml/min/1 73 sq  meters      Protime-INR [[id number]]  (Normal) Collected:  12/30/18 2332    Lab Status: Final result Specimen:  Blood from Arm, Right Updated:  12/31/18 0002     Protime 13 7 seconds      INR 1 11    APTT [[id number]]  (Normal) Collected:  12/30/18 2332    Lab Status:  Final result Specimen:  Blood from Arm, Right Updated:  12/31/18 0002     PTT 30 seconds     CBC and differential [[id number]]  (Abnormal) Collected:  12/30/18 2332    Lab Status:  Final result Specimen:  Blood from Arm, Right Updated:  12/30/18 2348     WBC 5 78 Thousand/uL      RBC 3 96 Million/uL      Hemoglobin 12 3 g/dL      Hematocrit 37 3 %      MCV 94 fL      MCH 31 1 pg      MCHC 33 0 g/dL      RDW 12 5 %      MPV 9 3 fL      Platelets 622 Thousands/uL      nRBC 0 /100 WBCs      Neutrophils Relative 44 %      Immat GRANS % 1 %      Lymphocytes Relative 37 %      Monocytes Relative 13 (H) %      Eosinophils Relative 4 %      Basophils Relative 1 %      Neutrophils Absolute 2 61 Thousands/µL      Immature Grans Absolute 0 03 Thousand/uL      Lymphocytes Absolute 2 11 Thousands/µL      Monocytes Absolute 0 76 Thousand/µL      Eosinophils Absolute 0 24 Thousand/µL      Basophils Absolute 0 03 Thousands/µL                  No orders to display              Procedures  Procedures       Phone Contacts  ED Phone Contact    ED Course                               MDM  CritCare Time    Disposition  Final diagnoses:   Multiple contusions - Bilateral thighs     Time reflects when diagnosis was documented in both MDM as applicable and the Disposition within this note     Time User Action Codes Description Comment    12/30/2018 11:22 PM Rosemary Chavis  XXXA] Multiple contusions     12/30/2018 11:22 PM Laura Wood  XXXA] Multiple contusions Bilateral thighs      ED Disposition     ED Disposition Condition Comment    Discharge  Azalea Felton discharge to home/self care      Condition at discharge: Stable        Follow-up Information     Follow up With Specialties Details Why Chris Lincoln MD Internal Medicine In 1 week  [id number] [facility]            Patient's Medications   Discharge Prescriptions    No medications on file     No discharge procedures on file      ED Provider  Electronically Signed by           Eric Ohara DO  12/31/18 9613

## 2019-01-10 ENCOUNTER — OFFICE VISIT (OUTPATIENT)
Dept: INTERNAL MEDICINE CLINIC | Facility: CLINIC | Age: 24
End: 2019-01-10
Payer: COMMERCIAL

## 2019-01-10 VITALS
HEART RATE: 72 BPM | TEMPERATURE: 98.5 F | DIASTOLIC BLOOD PRESSURE: 72 MMHG | WEIGHT: 128.8 LBS | BODY MASS INDEX: 19.08 KG/M2 | OXYGEN SATURATION: 97 % | HEIGHT: 69 IN | SYSTOLIC BLOOD PRESSURE: 110 MMHG

## 2019-01-10 DIAGNOSIS — L21.9 SEBORRHEA: ICD-10-CM

## 2019-01-10 DIAGNOSIS — R59.0 POSTERIOR CERVICAL ADENOPATHY: ICD-10-CM

## 2019-01-10 DIAGNOSIS — Z87.448 HISTORY OF GLOMERULONEPHRITIS: Primary | ICD-10-CM

## 2019-01-10 DIAGNOSIS — L30.9 ECZEMA, UNSPECIFIED TYPE: ICD-10-CM

## 2019-01-10 PROCEDURE — 99214 OFFICE O/P EST MOD 30 MIN: CPT | Performed by: INTERNAL MEDICINE

## 2019-01-10 RX ORDER — KETOCONAZOLE 20 MG/ML
1 SHAMPOO TOPICAL 2 TIMES WEEKLY
Qty: 120 ML | Refills: 2 | Status: SHIPPED | OUTPATIENT
Start: 2019-01-10 | End: 2019-09-19

## 2019-01-10 RX ORDER — TRIAMCINOLONE ACETONIDE 5 MG/G
OINTMENT TOPICAL 2 TIMES DAILY
Qty: 60 G | Refills: 3 | Status: SHIPPED | OUTPATIENT
Start: 2019-01-10 | End: 2019-09-13 | Stop reason: SDUPTHER

## 2019-01-10 NOTE — PROGRESS NOTES
Assessment/Plan:    No problem-specific Assessment & Plan notes found for this encounter  Diagnoses and all orders for this visit:    History of glomerulonephritis  -     Ambulatory referral to Nephrology; Future  -     ketoconazole (NIZORAL) 2 % shampoo; Apply 1 application topically 2 (two) times a week    Eczema, unspecified type  -     triamcinolone (KENALOG) 0 5 % ointment; Apply topically 2 (two) times a day  -     ketoconazole (NIZORAL) 2 % shampoo; Apply 1 application topically 2 (two) times a week    Seborrhea    Posterior cervical adenopathy      plan:  Referral to [facility] for evaluation of possible recurrent clearly for itis, and recent related concerns about transient ecchymoses, possibly related to autoimmune vasculitis, without evidence currently  Hydroxyzine and triamcinolone were renewed for allergic conjunctivitis and eczema  Ketoconazole shampoo was prescribed proper use explained to be used twice a week for 4 weeks as needed and to observe for resolution of seborrhea and enlarged right posterior cervical node and this continues to be enlarged to return to the office  She will keep her upcoming appointment with Endocrinology regarding autoimmune thyroiditis  Subjective:      Patient ID: Jose Cedeño is a 20 y o  female here today with her mother to discuss several issues  First, she was recently in the emergency department because of a finding of ecchymoses of the upper thighs, at which time labs on December 30th showed a normal CBC including a platelet count of [id number]  Urinalysis did show positivity for blood and protein and Casandra Bassam has a history of glomerulonephritis with extensive workup in she was a teenager, including referral to specialist in [place] and active follow-up by her previous nephrologist who is no longer available because of insurance reasons  She used to see Dr Chavez Li      HPI   Also, Casandra Banegas has a history of eczema and seborrhea, and has run out of triamcinolone cream for her eczema which has been quite effective treatment for this, and also has allergic conjunctivitis of the eyes especially when exposed to farm animals [place] pets  Hydroxyzine ophthalmic solution as work well and was renewed  Adrian Alfreda has noticed a recurrence of seborrhea of the right parietal scalp and there is a soft nontender lump of the right posterior cervical region that she is concerned about  She has not noticed any folliculitis or other MRSA related skin eruptions with history of MRSA infections  The following portions of the patient's history were reviewed and updated as appropriate: allergies, current medications, past family history, past medical history, past social history, past surgical history and problem list     Review of Systems  no bleeding gums, no epistaxis, no fever, chills, night sweats, no peripheral edema, no rash other than that mentioned above  Objective:      /72 (BP Location: Left arm, Patient Position: Sitting, Cuff Size: Standard)   Pulse 72   Temp 98 5 °F (36 9 °C) (Tympanic)   Ht 5' 9" (1 753 m)   Wt 58 4 kg (128 lb 12 8 oz)   SpO2 97%   BMI 19 02 kg/m²      Wt Readings from Last 3 Encounters:   01/10/19 58 4 kg (128 lb 12 8 oz)   12/30/18 56 7 kg (125 lb)   12/10/18 56 7 kg (125 lb)     Temp Readings from Last 3 Encounters:   01/10/19 98 5 °F (36 9 °C) (Tympanic)   12/30/18 98 °F (36 7 °C) (Temporal)   12/10/18 (!) 96 8 °F (36 °C) (Temporal)     BP Readings from Last 3 Encounters:   01/10/19 110/72   12/30/18 121/78   12/10/18 123/71     Pulse Readings from Last 3 Encounters:   01/10/19 72   12/30/18 74   12/10/18 73        Physical Exam    Vital signs stable and afebrile, with normal blood pressure no distress  There is no peripheral edema  Skin without any ecchymoses or petechiae    There is seborrhea of the right parietal scalp and there is a single painless soft, freely movable in large right posterior cervical lymph node with no other adenopathy of the head or neck  There is extensor extremity eczema, no secondary infection  Skin without evidence of abscess or cellulitis  Patient Active Problem List   Diagnosis    Hashimoto's thyroiditis    History of glomerulonephritis    Sebaceous cyst    Generalized anxiety disorder    Hypothyroidism    Smoking    Flexural eczema       Current Outpatient Prescriptions on File Prior to Visit   Medication Sig Dispense Refill    levothyroxine 25 mcg tablet 1/2 tab daily 45 tablet 1     No current facility-administered medications on file prior to visit

## 2019-01-14 ENCOUNTER — TELEPHONE (OUTPATIENT)
Dept: INTERNAL MEDICINE CLINIC | Facility: CLINIC | Age: 24
End: 2019-01-14

## 2019-01-14 DIAGNOSIS — B00.9 HERPES: Primary | ICD-10-CM

## 2019-01-14 RX ORDER — ACYCLOVIR 50 MG/G
OINTMENT TOPICAL
Qty: 15 G | Refills: 3 | Status: SHIPPED | OUTPATIENT
Start: 2019-01-14 | End: 2019-09-19

## 2019-01-14 NOTE — TELEPHONE ENCOUNTER
Sara Gaskin forgot to mention that she needs a script for Zovirax cream   She does tend to get cold sores now and then  If you feel she would benefit more from the pills, she is ok with that  Please call with any questions

## 2019-01-17 ENCOUNTER — TELEPHONE (OUTPATIENT)
Dept: INTERNAL MEDICINE CLINIC | Facility: CLINIC | Age: 24
End: 2019-01-17

## 2019-01-17 DIAGNOSIS — Z91.09 ENVIRONMENTAL ALLERGIES: Primary | ICD-10-CM

## 2019-01-17 NOTE — TELEPHONE ENCOUNTER
Aleksandra Mccracken called in to let you know that Dr Ramon Hebert is not taking patients  His office will be calling her back with a list of other physicians that could see her  Do you have any other recommendations? Also she said that Hydroxyzine was not called in  Could you please send this script to [facility] for her? Please call Aleksandra Mccracken at work  [phone]

## 2019-01-17 NOTE — TELEPHONE ENCOUNTER
Please send in the prescription for hydroxyzine  I am okay with Dr Corona slightly another physician is group as I trust this group

## 2019-01-18 DIAGNOSIS — Z91.09 ENVIRONMENTAL ALLERGIES: Primary | ICD-10-CM

## 2019-01-18 RX ORDER — HYDROXYZINE HYDROCHLORIDE 10 MG/1
10 TABLET, FILM COATED ORAL EVERY 6 HOURS PRN
Qty: 30 TABLET | Refills: 0 | Status: SHIPPED | OUTPATIENT
Start: 2019-01-18 | End: 2019-08-23

## 2019-01-18 NOTE — TELEPHONE ENCOUNTER
PT's mom, Romel Bradford, called about new med, hydroxyzine  Has this been sent to [facility]?

## 2019-01-21 RX ORDER — HYDROXYZINE HYDROCHLORIDE 10 MG/1
25 TABLET, FILM COATED ORAL EVERY 6 HOURS PRN
Qty: 30 TABLET | Refills: 0 | Status: SHIPPED | OUTPATIENT
Start: 2019-01-21 | End: 2019-08-23

## 2019-01-31 ENCOUNTER — CONSULT (OUTPATIENT)
Dept: NEPHROLOGY | Facility: CLINIC | Age: 24
End: 2019-01-31
Payer: COMMERCIAL

## 2019-01-31 VITALS
WEIGHT: 128 LBS | DIASTOLIC BLOOD PRESSURE: 60 MMHG | SYSTOLIC BLOOD PRESSURE: 108 MMHG | HEIGHT: 69 IN | BODY MASS INDEX: 18.96 KG/M2

## 2019-01-31 DIAGNOSIS — Z87.448 HISTORY OF GLOMERULONEPHRITIS: ICD-10-CM

## 2019-01-31 DIAGNOSIS — R31.9 HEMATURIA, UNSPECIFIED TYPE: ICD-10-CM

## 2019-01-31 DIAGNOSIS — R80.9 PROTEINURIA, UNSPECIFIED TYPE: Primary | ICD-10-CM

## 2019-01-31 DIAGNOSIS — E06.3 HASHIMOTO'S THYROIDITIS: ICD-10-CM

## 2019-01-31 LAB
SL AMB  POCT GLUCOSE, UA: NEGATIVE
SL AMB LEUKOCYTE ESTERASE,UA: NEGATIVE
SL AMB POCT BILIRUBIN,UA: NEGATIVE
SL AMB POCT BLOOD,UA: ABNORMAL
SL AMB POCT CLARITY,UA: CLEAR
SL AMB POCT COLOR,UA: YELLOW
SL AMB POCT KETONES,UA: NEGATIVE
SL AMB POCT NITRITE,UA: NEGATIVE
SL AMB POCT PH,UA: 6
SL AMB POCT SPECIFIC GRAVITY,UA: 1.03
SL AMB POCT URINE PROTEIN: 30
SL AMB POCT UROBILINOGEN: 0.2

## 2019-01-31 PROCEDURE — 99244 OFF/OP CNSLTJ NEW/EST MOD 40: CPT | Performed by: INTERNAL MEDICINE

## 2019-01-31 PROCEDURE — 81002 URINALYSIS NONAUTO W/O SCOPE: CPT | Performed by: INTERNAL MEDICINE

## 2019-01-31 NOTE — PATIENT INSTRUCTIONS
Get blood work and urine test today    ~ Please AVOID the following pain medications    LIST OF NSAIDS (NONSTEROIDAL ANTI-INFLAMMATORY DRUGS) AND VELEZ-2 INHIBITORS    DIFLUNISAL (DOLOBID)  IBUPROFEN (MOTRIN, ADVIL)  FLURBIPROFEN (ANSAID)  KETOPROFEN (ORUDIS, ORUVAIL)  FENOPROFEN (NALFON)  NABUMETONE (RELAFEN)  PIROXICAM (FELDENE)  NAPROXEN (ALEVE, NAPROSYN, NAPRELAN, ANAPROX)  DICLOFENAC (VOLTAREN, CATAFLAM)  INDOMETHACIN (INDOCIN)  SULINDAC (CLINORIL)  TOLMETIN (TOLETIN)  ETODOLAC (LODINE)  MELOXICAM (MOBIC)  KETOROLAC (TORADOL)  OXAPROZIN (DAYPRO)  CELECOXIB (CELEBREX)

## 2019-01-31 NOTE — PROGRESS NOTES
Nephrology Initial Consultation  Yinka Majano 20 y o  female MRN: [id number]            REASON FOR CONSULTATION:  Yinka Majano is a 20 y  o female who was referred by Nakia Machuca MD for evaluation of Hematuria and Proteinuria    ASSESSMENT / PLAN:   20 y o   female with pmh including hypothyroidism due to Hashimoto's thyroiditis and post infectious GN in 2003 presented to the office for evaluation and management of hematuria and proteinuria  1  CKD stage I / hematuria / proteinuria:  - Patient has a baseline creatinine of 0 6-0 8 mg per dL  Most recent labs show a Creatinine of 0 76 mg/dL  Renal function remains stable  - has underlying CKD secondary to history of post infectious GN   -renal ultrasound from 2003 suggestive bilateral kidneys 11 cm no hydronephrosis  - Proteinuria - last documented microalbumin to creatinine ratio from November 21, 2016 was 596 mg  Will check UA, spot urine protein and creatinine    - Acid base and lytes stable  - Clinically the patient appears to be euvolemic   - review of records of appears to show that she is having increasing proteinuria  There was concern for underlying systemic disease, possibly immune mediated leading to recurrent MRSA infections  Recommend detailed workup for glomerular  Nephritis  - check complements, hepatitis profile, MYKE, vasculitis panel, protein electrophoresis etc  - advised patient that depending on serological workup may likely pursue renal biopsy in the near future  Discussed renal biopsy in detail  - check urine pregnancy test in case she needs a renal biopsy  - may even need referral to an immunologist due to concern for recurrent infections and easy bruisability  - check CPK level also since last UA had large blood with only 2 RBCs  - UA evaluated today -urine microscopy:  Showed multiple dysmorphic RBCs, no RBC casts  - Recommend to avoid use of NSAIDs, nephrotoxins  Caution advised with regards to exposure to IV contrast dye  - Discussed with the patient in depth her renal status, including the possible etiologies for developing CKD  2  Blood pressure:  - Patient is on no medications  - Goal BP of < 140/90  - Instructed to follow low sodium (2gm)diet  3  Hemoglobin:  - Goal Hb of 10-12 g/dL  - Most recent labs suggestive of 12 3 grams/deciliter  - no role for IV iron at this time    4  CKD-MBD(Mineral Bone Disease): - Based on patients CKD stage following is the goal of therapy  - Maintain calcium phosphorus product of < 55     5  Lipids:  - Management as per PCP    6  Nutrition:  - Encouraged patient to follow regular diet  - Will check serum albumin with next blood work  7  Followup:  - Patient is to follow-up in 2 months, with lab work to be performed a few days prior to the visit  Blood work after the visit today    Claudia Fields MD, FASN, 1/31/2019, 1:53 PM             HISTORY OF PRESENT ILLNESS: 20 y o  female with past medical history of hypothyroidism due to Hashimoto's thyroiditis and post infectious GN in 2003 presented to the office for evaluation and management of hematuria and proteinuria  Review of records shows that she was last seen by Nephrology at [place] for her nephrological concerns  Based on the detailed note suggested that patient had issues with hematuria and proteinuria at the time of for post infectious GN  And needed only monitoring twice a year with a UA  Review of records shows that the patient has baseline creatinine of 0 6-0 8 mg per dL  UA from 2016 as well as December 31, 2018 both showed large blood  However there is a progression of proteinuria from 30+ to 100+ in the 2 year period  She did not have a kidney biopsy for the GN initially  Had MRSA abscesses on the lower extremity in 2018  Has been having recurrent MRSA abscesses  Also few weeks ago just woke up with spontaneous bruising on the lower extremity  Has not noted blood in urine   Last saw Barber Alonso in 2017 and had some protein in the urine at that time  Currently has no complaints  Presents to the office with her mother  No family history of renal disease or systemic illnesses  Mother just has history of nephrolithiasis and complex cyst   Requesting pregnancy test   No NSAID use, no recent IV contrast administration  Happy with all information and care that she has gotten today  Review of Systems   Constitutional: Negative for appetite change, fatigue and fever  HENT: Negative for congestion and sore throat  Respiratory: Negative for cough, shortness of breath and wheezing  Cardiovascular: Negative for chest pain, palpitations and leg swelling  Gastrointestinal: Negative for abdominal pain, constipation, diarrhea, nausea and vomiting  Genitourinary: Negative for difficulty urinating, dysuria, flank pain and hematuria  Musculoskeletal: Negative for arthralgias and back pain  Skin: Negative for pallor, rash and wound  Neurological: Negative for dizziness and headaches  Psychiatric/Behavioral: Negative for agitation and confusion  All other systems reviewed and are negative  PAST MEDICAL HISTORY:  Past Medical History:   Diagnosis Date    Disease of thyroid gland     Eczema     Glomerulonephritis     Urinary tract infection        PROBLEM LIST    Patient Active Problem List   Diagnosis    Hashimoto's thyroiditis    History of glomerulonephritis    Sebaceous cyst    Generalized anxiety disorder    Hypothyroidism    Smoking    Flexural eczema    Hematuria    Proteinuria       PAST SURGICAL HISTORY:  Past Surgical History:   Procedure Laterality Date    SKIN LESION EXCISION      destruction of benign lesion    TONSILLECTOMY      TONSILLECTOMY  2006    WISDOM TOOTH EXTRACTION      WISDOM TOOTH EXTRACTION  2011       SOCIAL HISTORY :   reports that she has been smoking Cigarettes  She has a 1 50 pack-year smoking history   She has never used smokeless tobacco  She reports that she drinks alcohol  She reports that she does not use drugs  FAMILY HISTORY:  Family History   Problem Relation Age of Onset    Coronary artery disease Father     Alcohol abuse Father     Drug abuse Father     Anxiety disorder Mother     Anxiety disorder Brother     Post-traumatic stress disorder Brother     Drug abuse Brother     Lung cancer Maternal Grandfather     Breast cancer Maternal Aunt     Hypertension Family     Lung cancer Family     Lymphoma Family         pancreatic    Hyperlipidemia Family         pure       ALLERGIES:  Allergies   Allergen Reactions    Lac Bovis     Other      Pet allergies    Bactrim [Sulfamethoxazole-Trimethoprim] Headache     migrains           PHYSICAL EXAM:  Vitals:    01/31/19 1310   BP: 108/60   BP Location: Right arm   Patient Position: Sitting   Cuff Size: Standard   Weight: 58 1 kg (128 lb)   Height: 5' 9" (1 753 m)     Body mass index is 18 9 kg/m²  Physical Exam   Constitutional: She is oriented to person, place, and time  She appears well-developed and well-nourished  No distress  HENT:   Head: Normocephalic and atraumatic  Mouth/Throat: Oropharynx is clear and moist  No oropharyngeal exudate  Eyes: No scleral icterus  Neck: Neck supple  No JVD present  No tracheal deviation present  Cardiovascular: Normal rate and normal heart sounds  Exam reveals no friction rub  No murmur heard  Pulmonary/Chest: She has no wheezes  She has no rales  Abdominal: Soft  She exhibits no mass  There is no tenderness  There is no rebound  Musculoskeletal: She exhibits no edema  Neurological: She is alert and oriented to person, place, and time  Skin: Skin is warm and dry  No rash noted  She is not diaphoretic  No erythema  Psychiatric: She has a normal mood and affect  Her behavior is normal    Vitals reviewed          LABORATORY DATA:       Results from last 6 Months  Lab Units 12/30/18  2332   WBC Thousand/uL 5 78   HEMOGLOBIN g/dL 12 3   HEMATOCRIT % 37 3   PLATELETS Thousands/uL 194   POTASSIUM mmol/L 3 9   CHLORIDE mmol/L 106   CO2 mmol/L 22   BUN mg/dL 17   CREATININE mg/dL 0 76   CALCIUM mg/dL 8 5        rest all reviewed    RADIOLOGY:  No orders to display     Rest all reviewed        MEDICATIONS:    Current Outpatient Prescriptions:     hydrOXYzine HCL (ATARAX) 10 mg tablet, Take 1 tablet (10 mg total) by mouth every 6 (six) hours as needed for itching, Disp: 30 tablet, Rfl: 0    levothyroxine 25 mcg tablet, 1/2 tab daily, Disp: 45 tablet, Rfl: 1    acyclovir (ZOVIRAX) 5 % ointment, Apply 4 times a day as needed (Patient not taking: Reported on 1/31/2019 ), Disp: 15 g, Rfl: 3    hydrOXYzine HCL (ATARAX) 10 mg tablet, Take 2 5 tablets (25 mg total) by mouth every 6 (six) hours as needed for itching (Patient not taking: Reported on 1/31/2019 ), Disp: 30 tablet, Rfl: 0    ketoconazole (NIZORAL) 2 % shampoo, Apply 1 application topically 2 (two) times a week (Patient not taking: Reported on 1/31/2019 ), Disp: 120 mL, Rfl: 2    triamcinolone (KENALOG) 0 5 % ointment, Apply topically 2 (two) times a day (Patient not taking: Reported on 1/31/2019 ), Disp: 60 g, Rfl: 3        Portions of the record may have been created with voice recognition software  Occasional wrong word or "sound a like" substitutions may have occurred due to the inherent limitations of voice recognition software  Read the chart carefully and recognize, using context, where substitutions have occurred  If you have any questions, please contact the dictating provider

## 2019-01-31 NOTE — LETTER
January 31, 2019     Suzette Matthews MD  [id number]     Patient: Emmie Rueda   YOB: 1995   Date of Visit: 1/31/2019       Dear Dr Dulce Oshea: Thank you for referring Emmie Rueda to me for evaluation  Below are my notes for this consultation  If you have questions, please do not hesitate to call me  I look forward to following your patient along with you  Sincerely,        Grant Ruiz MD        CC: No Recipients  Grant Ruiz MD  1/31/2019  2:02 PM  Sign at close encounter    Nephrology Initial Consultation  Emmie Rueda 20 y o  female MRN: [id number]            REASON FOR CONSULTATION:  Emmie Rueda is a 20 y  o female who was referred by Suzette Matthews MD for evaluation of Hematuria and Proteinuria    ASSESSMENT / PLAN:   20 y o   female with pmh including hypothyroidism due to Hashimoto's thyroiditis and post infectious GN in 2003 presented to the office for evaluation and management of hematuria and proteinuria  1  CKD stage I / hematuria / proteinuria:  - Patient has a baseline creatinine of 0 6-0 8 mg per dL  Most recent labs show a Creatinine of 0 76 mg/dL  Renal function remains stable  - has underlying CKD secondary to history of post infectious GN   -renal ultrasound from 2003 suggestive bilateral kidneys 11 cm no hydronephrosis  - Proteinuria - last documented microalbumin to creatinine ratio from November 21, 2016 was 596 mg  Will check UA, spot urine protein and creatinine    - Acid base and lytes stable  - Clinically the patient appears to be euvolemic   - review of records of appears to show that she is having increasing proteinuria  There was concern for underlying systemic disease, possibly immune mediated leading to recurrent MRSA infections  Recommend detailed workup for glomerular  Nephritis    - check complements, hepatitis profile, MYKE, vasculitis panel, protein electrophoresis etc  - advised patient that depending on serological workup may likely pursue renal biopsy in the near future  Discussed renal biopsy in detail  - check urine pregnancy test in case she needs a renal biopsy  - may even need referral to an immunologist due to concern for recurrent infections and easy bruisability  - check CPK level also since last UA had large blood with only 2 RBCs  - UA evaluated today -urine microscopy:  Showed multiple dysmorphic RBCs, no RBC casts  - Recommend to avoid use of NSAIDs, nephrotoxins  Caution advised with regards to exposure to IV contrast dye    - Discussed with the patient in depth her renal status, including the possible etiologies for developing CKD  2  Blood pressure:  - Patient is on no medications  - Goal BP of < 140/90  - Instructed to follow low sodium (2gm)diet  3  Hemoglobin:  - Goal Hb of 10-12 g/dL  - Most recent labs suggestive of 12 3 grams/deciliter  - no role for IV iron at this time    4  CKD-MBD(Mineral Bone Disease): - Based on patients CKD stage following is the goal of therapy  - Maintain calcium phosphorus product of < 55     5  Lipids:  - Management as per PCP    6  Nutrition:  - Encouraged patient to follow regular diet  - Will check serum albumin with next blood work  7  Followup:  - Patient is to follow-up in 2 months, with lab work to be performed a few days prior to the visit  Blood work after the visit today    Christy Ha MD, [facility], 1/31/2019, 1:53 PM             HISTORY OF PRESENT ILLNESS: 20 y o  female with past medical history of hypothyroidism due to Hashimoto's thyroiditis and post infectious GN in 2003 presented to the office for evaluation and management of hematuria and proteinuria  Review of records shows that she was last seen by Nephrology at [place] for her nephrological concerns  Based on the detailed note suggested that patient had issues with hematuria and proteinuria at the time of for post infectious GN    And needed only monitoring twice a year with a UA   Review of records shows that the patient has baseline creatinine of 0 6-0 8 mg per dL  UA from 2016 as well as December 31, 2018 both showed large blood  However there is a progression of proteinuria from 30+ to 100+ in the 2 year period  She did not have a kidney biopsy for the GN initially  Had MRSA abscesses on the lower extremity in 2018  Has been having recurrent MRSA abscesses  Also few weeks ago just woke up with spontaneous bruising on the lower extremity  Has not noted blood in urine  Last saw Rocio Carroll in 2017 and had some protein in the urine at that time  Currently has no complaints  Presents to the office with her mother  No family history of renal disease or systemic illnesses  Mother just has history of nephrolithiasis and complex cyst   Requesting pregnancy test   No NSAID use, no recent IV contrast administration  Happy with all information and care that she has gotten today  Review of Systems   Constitutional: Negative for appetite change, fatigue and fever  HENT: Negative for congestion and sore throat  Respiratory: Negative for cough, shortness of breath and wheezing  Cardiovascular: Negative for chest pain, palpitations and leg swelling  Gastrointestinal: Negative for abdominal pain, constipation, diarrhea, nausea and vomiting  Genitourinary: Negative for difficulty urinating, dysuria, flank pain and hematuria  Musculoskeletal: Negative for arthralgias and back pain  Skin: Negative for pallor, rash and wound  Neurological: Negative for dizziness and headaches  Psychiatric/Behavioral: Negative for agitation and confusion  All other systems reviewed and are negative        PAST MEDICAL HISTORY:  Past Medical History:   Diagnosis Date    Disease of thyroid gland     Eczema     Glomerulonephritis     Urinary tract infection        PROBLEM LIST    Patient Active Problem List   Diagnosis    Hashimoto's thyroiditis    History of glomerulonephritis    Sebaceous cyst    Generalized anxiety disorder    Hypothyroidism    Smoking    Flexural eczema    Hematuria    Proteinuria       PAST SURGICAL HISTORY:  Past Surgical History:   Procedure Laterality Date    SKIN LESION EXCISION      destruction of benign lesion    TONSILLECTOMY      TONSILLECTOMY  2006    WISDOM TOOTH EXTRACTION      WISDOM TOOTH EXTRACTION  2011       SOCIAL HISTORY :   reports that she has been smoking Cigarettes  She has a 1 50 pack-year smoking history  She has never used smokeless tobacco  She reports that she drinks alcohol  She reports that she does not use drugs  FAMILY HISTORY:  Family History   Problem Relation Age of Onset    Coronary artery disease Father     Alcohol abuse Father     Drug abuse Father     Anxiety disorder Mother     Anxiety disorder Brother     Post-traumatic stress disorder Brother     Drug abuse Brother     Lung cancer Maternal Grandfather     Breast cancer Maternal Aunt     Hypertension Family     Lung cancer Family     Lymphoma Family         pancreatic    Hyperlipidemia Family         pure       ALLERGIES:  Allergies   Allergen Reactions    Lac Bovis     Other      Pet allergies    Bactrim [Sulfamethoxazole-Trimethoprim] Headache     migrains           PHYSICAL EXAM:  Vitals:    01/31/19 1310   BP: 108/60   BP Location: Right arm   Patient Position: Sitting   Cuff Size: Standard   Weight: 58 1 kg (128 lb)   Height: 5' 9" (1 753 m)     Body mass index is 18 9 kg/m²  Physical Exam   Constitutional: She is oriented to person, place, and time  She appears well-developed and well-nourished  No distress  HENT:   Head: Normocephalic and atraumatic  Mouth/Throat: Oropharynx is clear and moist  No oropharyngeal exudate  Eyes: No scleral icterus  Neck: Neck supple  No JVD present  No tracheal deviation present  Cardiovascular: Normal rate and normal heart sounds  Exam reveals no friction rub  No murmur heard    Pulmonary/Chest: She has no wheezes  She has no rales  Abdominal: Soft  She exhibits no mass  There is no tenderness  There is no rebound  Musculoskeletal: She exhibits no edema  Neurological: She is alert and oriented to person, place, and time  Skin: Skin is warm and dry  No rash noted  She is not diaphoretic  No erythema  Psychiatric: She has a normal mood and affect  Her behavior is normal    Vitals reviewed  LABORATORY DATA:       Results from last 6 Months  Lab Units 12/30/18  2332   WBC Thousand/uL 5 78   HEMOGLOBIN g/dL 12 3   HEMATOCRIT % 37 3   PLATELETS Thousands/uL 194   POTASSIUM mmol/L 3 9   CHLORIDE mmol/L 106   CO2 mmol/L 22   BUN mg/dL 17   CREATININE mg/dL 0 76   CALCIUM mg/dL 8 5        rest all reviewed    RADIOLOGY:  No orders to display     Rest all reviewed        MEDICATIONS:    Current Outpatient Prescriptions:     hydrOXYzine HCL (ATARAX) 10 mg tablet, Take 1 tablet (10 mg total) by mouth every 6 (six) hours as needed for itching, Disp: 30 tablet, Rfl: 0    levothyroxine 25 mcg tablet, 1/2 tab daily, Disp: 45 tablet, Rfl: 1    acyclovir (ZOVIRAX) 5 % ointment, Apply 4 times a day as needed (Patient not taking: Reported on 1/31/2019 ), Disp: 15 g, Rfl: 3    hydrOXYzine HCL (ATARAX) 10 mg tablet, Take 2 5 tablets (25 mg total) by mouth every 6 (six) hours as needed for itching (Patient not taking: Reported on 1/31/2019 ), Disp: 30 tablet, Rfl: 0    ketoconazole (NIZORAL) 2 % shampoo, Apply 1 application topically 2 (two) times a week (Patient not taking: Reported on 1/31/2019 ), Disp: 120 mL, Rfl: 2    triamcinolone (KENALOG) 0 5 % ointment, Apply topically 2 (two) times a day (Patient not taking: Reported on 1/31/2019 ), Disp: 60 g, Rfl: 3        Portions of the record may have been created with voice recognition software  Occasional wrong word or "sound a like" substitutions may have occurred due to the inherent limitations of voice recognition software   Read the chart carefully and recognize, using context, where substitutions have occurred  If you have any questions, please contact the dictating provider

## 2019-02-01 ENCOUNTER — TELEPHONE (OUTPATIENT)
Dept: NEPHROLOGY | Facility: CLINIC | Age: 24
End: 2019-02-01

## 2019-02-01 ENCOUNTER — LAB (OUTPATIENT)
Dept: LAB | Facility: MEDICAL CENTER | Age: 24
End: 2019-02-01
Payer: COMMERCIAL

## 2019-02-01 DIAGNOSIS — R31.9 HEMATURIA, UNSPECIFIED TYPE: ICD-10-CM

## 2019-02-01 DIAGNOSIS — R80.9 PROTEINURIA, UNSPECIFIED TYPE: ICD-10-CM

## 2019-02-01 DIAGNOSIS — E03.9 HYPOTHYROIDISM, UNSPECIFIED TYPE: ICD-10-CM

## 2019-02-01 DIAGNOSIS — Z87.448 HISTORY OF GLOMERULONEPHRITIS: ICD-10-CM

## 2019-02-01 LAB
B-HCG SERPL-ACNC: <2 MIU/ML
T4 FREE SERPL-MCNC: 0.78 NG/DL (ref 0.76–1.46)
TSH SERPL DL<=0.05 MIU/L-ACNC: 5.85 UIU/ML (ref 0.36–3.74)

## 2019-02-01 PROCEDURE — 84702 CHORIONIC GONADOTROPIN TEST: CPT

## 2019-02-01 PROCEDURE — 86215 DEOXYRIBONUCLEASE ANTIBODY: CPT

## 2019-02-01 PROCEDURE — 84165 PROTEIN E-PHORESIS SERUM: CPT | Performed by: PATHOLOGY

## 2019-02-01 PROCEDURE — 84443 ASSAY THYROID STIM HORMONE: CPT

## 2019-02-01 PROCEDURE — 84439 ASSAY OF FREE THYROXINE: CPT

## 2019-02-01 NOTE — TELEPHONE ENCOUNTER
Patient was seen by Joellen Cooper on 01/31/19  The mother is calling because patient did try to go and get all labs done on 01/31/19 but was not able to due to one of the test being dated for 02/28/19  The test needs yesterdays date and the patient is going back in today to get all her labs done  The test is Anti DNA Se B Antibody

## 2019-02-04 ENCOUNTER — TELEPHONE (OUTPATIENT)
Dept: INTERNAL MEDICINE CLINIC | Facility: CLINIC | Age: 24
End: 2019-02-04

## 2019-02-04 ENCOUNTER — TELEPHONE (OUTPATIENT)
Dept: OTHER | Facility: HOSPITAL | Age: 24
End: 2019-02-04

## 2019-02-05 ENCOUNTER — TELEPHONE (OUTPATIENT)
Dept: OTHER | Facility: HOSPITAL | Age: 24
End: 2019-02-05

## 2019-02-05 DIAGNOSIS — A49.02 MRSA INFECTION: Primary | ICD-10-CM

## 2019-02-05 DIAGNOSIS — E03.9 HYPOTHYROIDISM, UNSPECIFIED TYPE: ICD-10-CM

## 2019-02-05 LAB — STREP DNASE B SER-ACNC: 113 U/ML (ref 0–120)

## 2019-02-05 NOTE — TELEPHONE ENCOUNTER
FYI Referral input in the system  I called Infectious Disease office to verified and they did contact pt and scheduled pt for first week in March

## 2019-02-05 NOTE — TELEPHONE ENCOUNTER
Return call discussed 1st blood work and urine test results  Offered for renal biopsy  She will discuss with her daughter and they will decide whether to go ahead with a renal biopsy are not at this time  She has an appointment with ID already  Advised that likely patient will need an appointment with an immunologist due to her recurrent MRSA infections

## 2019-02-05 NOTE — TELEPHONE ENCOUNTER
Initially called patient and left message just like prior to return call back so that we can discuss most recent blood work and urine test results to go ahead with future plans  Then also called and left a message with the patient's mom was with her at the appointment  If she could have the patient call me back so that we can discuss future plan of care

## 2019-02-06 ENCOUNTER — TELEPHONE (OUTPATIENT)
Dept: OTHER | Facility: HOSPITAL | Age: 24
End: 2019-02-06

## 2019-02-06 NOTE — TELEPHONE ENCOUNTER
Lu Lemon, pt mother, called today wanting to discuss renal biopsy with Dr Doloris Goldmann, she had further questions  The doctor let me know she will call her later  Yolanda roca you dont reach her at work

## 2019-02-06 NOTE — TELEPHONE ENCOUNTER
Returned call back to patient's mom she reports that patient would like to hold off on renal biopsy at this time  will discuss further in person when she has her follow-up appointment in April

## 2019-02-07 ENCOUNTER — TELEPHONE (OUTPATIENT)
Dept: ENDOCRINOLOGY | Facility: CLINIC | Age: 24
End: 2019-02-07

## 2019-02-07 NOTE — TELEPHONE ENCOUNTER
----- Message from Carl Padilla PA-C sent at 2/7/2019 12:42 PM EST -----  I do see note from dr John Carroll to continue same medication because thyroid function is improving, but since not using contraception and not preventing pregnancy Goal TSH less than 2 5 we should make an adjustment now  Since levothyroxine 25mcg daily was causing side effects-- would increase to 25mcg alternating with 12 5mcg  Check TSH/Free T4 in 6 weeks

## 2019-02-13 ENCOUNTER — TELEPHONE (OUTPATIENT)
Dept: INTERNAL MEDICINE CLINIC | Facility: CLINIC | Age: 24
End: 2019-02-13

## 2019-02-13 DIAGNOSIS — E06.3 HASHIMOTO'S THYROIDITIS: Primary | ICD-10-CM

## 2019-02-13 DIAGNOSIS — R23.8 EASY BRUISING: ICD-10-CM

## 2019-02-13 DIAGNOSIS — R23.8 EASY BRUISING: Primary | ICD-10-CM

## 2019-02-16 ENCOUNTER — HOSPITAL ENCOUNTER (EMERGENCY)
Facility: HOSPITAL | Age: 24
Discharge: HOME | End: 2019-02-16
Attending: EMERGENCY MEDICINE | Admitting: EMERGENCY MEDICINE
Payer: COMMERCIAL

## 2019-02-16 VITALS
TEMPERATURE: 98 F | BODY MASS INDEX: 19.2 KG/M2 | HEART RATE: 87 BPM | WEIGHT: 130 LBS | SYSTOLIC BLOOD PRESSURE: 118 MMHG | RESPIRATION RATE: 20 BRPM | OXYGEN SATURATION: 98 % | DIASTOLIC BLOOD PRESSURE: 82 MMHG

## 2019-02-16 DIAGNOSIS — N76.0 BACTERIAL VAGINOSIS: Primary | ICD-10-CM

## 2019-02-16 DIAGNOSIS — B96.89 BACTERIAL VAGINOSIS: Primary | ICD-10-CM

## 2019-02-16 LAB
CLARITY, POC: CLEAR
COLOR, POC: YELLOW
EXT BILIRUBIN, UA: NORMAL
EXT BLOOD URINE: NORMAL
EXT GLUCOSE, UA: NORMAL
EXT KETONES: NORMAL
EXT NITRITE, UA: NORMAL
EXT PH, UA: 6
EXT PREG TEST URINE: NORMAL
EXT PROTEIN, UA: NORMAL
EXT SPECIFIC GRAVITY, UA: 1.01
EXT UROBILINOGEN: NORMAL
WBC # BLD EST: NORMAL 10*3/UL

## 2019-02-16 PROCEDURE — 99283 EMERGENCY DEPT VISIT LOW MDM: CPT

## 2019-02-16 PROCEDURE — 87591 N.GONORRHOEAE DNA AMP PROB: CPT | Performed by: PHYSICIAN ASSISTANT

## 2019-02-16 PROCEDURE — 87491 CHLMYD TRACH DNA AMP PROBE: CPT | Performed by: PHYSICIAN ASSISTANT

## 2019-02-16 PROCEDURE — 81002 URINALYSIS NONAUTO W/O SCOPE: CPT | Performed by: PHYSICIAN ASSISTANT

## 2019-02-16 PROCEDURE — 81025 URINE PREGNANCY TEST: CPT | Performed by: PHYSICIAN ASSISTANT

## 2019-02-16 RX ORDER — METRONIDAZOLE 500 MG/1
500 TABLET ORAL EVERY 12 HOURS SCHEDULED
Qty: 14 TABLET | Refills: 0 | Status: SHIPPED | OUTPATIENT
Start: 2019-02-16 | End: 2019-02-23

## 2019-02-16 NOTE — ED PROVIDER NOTES
History  Chief Complaint   Patient presents with    Vaginal Discharge     To ED with c/o foul smelling discharge for one month  Patient denies any pain  States that she has a hx of bacterial vaginosis     21 yo female w/ hx of glomerulonephritis presents to the Emergency Department for evaluation of malodorous, thick white vaginal discharge x 1 month  Symptoms have not worsened over that time, however she has developed genital discomfort during urination in addition to blood tinged discharge recently  She denies pelvic or abd pain, dyspareunia, dysuria, hematuria or fever, chills, sweats  No hx of similar  Currently monogamous, was tested for STIs prior to starting with current partner  LMP prior to onset of symptoms  Prior to Admission Medications   Prescriptions Last Dose Informant Patient Reported?  Taking?   acyclovir (ZOVIRAX) 5 % ointment  Self No No   Sig: Apply 4 times a day as needed   Patient not taking: Reported on 2019    hydrOXYzine HCL (ATARAX) 10 mg tablet  Self No No   Sig: Take 2 5 tablets (25 mg total) by mouth every 6 (six) hours as needed for itching   Patient not taking: Reported on 2019    hydrOXYzine HCL (ATARAX) 10 mg tablet Not Taking at Unknown time Self No No   Sig: Take 1 tablet (10 mg total) by mouth every 6 (six) hours as needed for itching   Patient not taking: Reported on 2019   ketoconazole (NIZORAL) 2 % shampoo  Self No No   Sig: Apply 1 application topically 2 (two) times a week   Patient not taking: Reported on 2019    levothyroxine 25 mcg tablet Not Taking at Unknown time Self No No   Si/2 tab daily   Patient not taking: Reported on 2019   triamcinolone (KENALOG) 0 5 % ointment  Self No No   Sig: Apply topically 2 (two) times a day   Patient not taking: Reported on 2019       Facility-Administered Medications: None       Past Medical History:   Diagnosis Date    Disease of thyroid gland     Eczema     Glomerulonephritis     Urinary tract infection        Past Surgical History:   Procedure Laterality Date    SKIN LESION EXCISION      destruction of benign lesion    TONSILLECTOMY      TONSILLECTOMY  2006    WISDOM TOOTH EXTRACTION      WISDOM TOOTH EXTRACTION  2011       Family History   Problem Relation Age of Onset    Coronary artery disease Father     Alcohol abuse Father     Drug abuse Father     Anxiety disorder Mother     Anxiety disorder Brother     Post-traumatic stress disorder Brother     Drug abuse Brother     Lung cancer Maternal Grandfather     Breast cancer Maternal Aunt     Hypertension Family     Lung cancer Family     Lymphoma Family         pancreatic    Hyperlipidemia Family         pure     I have reviewed and agree with the history as documented  Social History     Tobacco Use    Smoking status: Current Every Day Smoker     Packs/day: 0 50     Years: 3 00     Pack years: 1 50     Types: Cigarettes    Smokeless tobacco: Never Used   Substance Use Topics    Alcohol use: Yes     Comment: socially    Drug use: No        Review of Systems   Constitutional: Negative for chills, diaphoresis and fever  Gastrointestinal: Negative for abdominal pain, diarrhea, nausea and vomiting  Genitourinary: Positive for vaginal bleeding and vaginal discharge  Negative for dysuria, flank pain, frequency, hematuria, pelvic pain, urgency and vaginal pain  Musculoskeletal: Negative for arthralgias, back pain and myalgias  Skin: Negative for color change and rash  Neurological: Negative for dizziness and light-headedness  Physical Exam  Physical Exam   Constitutional: She appears well-developed and well-nourished  No distress  HENT:   Head: Normocephalic and atraumatic  Mouth/Throat: Oropharynx is clear and moist    Eyes: Pupils are equal, round, and reactive to light  No scleral icterus  Cardiovascular: Normal rate and regular rhythm  Exam reveals no gallop and no friction rub     No murmur heard   Pulmonary/Chest: No respiratory distress  She has no wheezes  She has no rales  Abdominal: Soft  Bowel sounds are normal  She exhibits no distension  There is no tenderness  There is no guarding  Genitourinary:   Genitourinary Comments: Pt declined pelvic exam   Skin: Skin is dry  Capillary refill takes less than 2 seconds  She is not diaphoretic  Psychiatric: She has a normal mood and affect  Her behavior is normal    Vitals reviewed  Vital Signs  ED Triage Vitals [02/16/19 1825]   Temperature Pulse Respirations Blood Pressure SpO2   98 °F (36 7 °C) 87 20 118/82 98 %      Temp Source Heart Rate Source Patient Position - Orthostatic VS BP Location FiO2 (%)   Tympanic Monitor Sitting Right arm --      Pain Score       No Pain           Vitals:    02/16/19 1825   BP: 118/82   Pulse: 87   Patient Position - Orthostatic VS: Sitting       Visual Acuity      ED Medications  Medications - No data to display    Diagnostic Studies  Results Reviewed     Procedure Component Value Units Date/Time    Chlamydia/GC amplified DNA by PCR [[id number]] Collected:  02/16/19 1840    Lab Status:   In process Specimen:  Genital from Vaginal Updated:  02/16/19 1904    POCT pregnancy, urine [[id number]]  (Normal) Resulted:  02/16/19 1845    Lab Status:  Final result Updated:  02/16/19 1845     EXT PREG TEST UR (Ref: Negative) neg    POCT urinalysis dipstick [[id number]]  (Normal) Resulted:  02/16/19 1843    Lab Status:  Final result Updated:  02/16/19 1845     Color, UA yellow     Clarity, UA clear     Glucose, UA (Ref: Negative) neg     Bilirubin, UA (Ref: Negative) neg     Ketones, UA (Ref: Negative) neg     Spec Grav, UA (Ref:1 003-1 030) 1 015     Blood, UA (Ref: Negative) mod     pH, UA (Ref: 4 5-8 0) 6     Protein, UA (Ref: Negative) neg     Urobilinogen, UA (Ref: 0 2- 1 0) neg      Leukocytes, UA (Ref: Negative) neg     Nitrite, UA (Ref: Negative) neg                 No orders to display Procedures  Procedures       Phone Contacts  ED Phone Contact    ED Course                               MDM  Number of Diagnoses or Management Options  Bacterial vaginosis: new and requires workup  Diagnosis management comments: 19 yo female presents w/ vaginal discharge, likely BV in origin  She declined pelvic exam, which is reasonable given her lack of pain  UA/pregnancy test negative  STI testing sent, will treat empirically for BV  Amount and/or Complexity of Data Reviewed  Tests in the medicine section of CPT®: ordered  Review and summarize past medical records: yes        Disposition  Final diagnoses:   Bacterial vaginosis     Time reflects when diagnosis was documented in both MDM as applicable and the Disposition within this note     Time User Action Codes Description Comment    2/16/2019  6:35 PM Janet Frost Add [N76 0,  B96 89] Bacterial vaginosis       ED Disposition     ED Disposition Condition Date/Time Comment    Discharge Stable Sat Feb 16, 2019  6:35 PM Jose Cedeño discharge to home/self care              Follow-up Information     Follow up With Specialties Details Why [address] Obstetrics and Gynecology   [place] [address] [phone]  [address], [address], [place], [address] [facility]          Discharge Medication List as of 2/16/2019  6:36 PM      START taking these medications    Details   metroNIDAZOLE (FLAGYL) 500 mg tablet Take 1 tablet (500 mg total) by mouth every 12 (twelve) hours for 7 days, Starting Sat 2/16/2019, Until Sat 2/23/2019, Print         CONTINUE these medications which have NOT CHANGED    Details   acyclovir (ZOVIRAX) 5 % ointment Apply 4 times a day as needed, Normal      !! hydrOXYzine HCL (ATARAX) 10 mg tablet Take 2 5 tablets (25 mg total) by mouth every 6 (six) hours as needed for itching, Starting Mon 1/21/2019, Normal      !! hydrOXYzine HCL (ATARAX) 10 mg tablet Take 1 tablet (10 mg total) by mouth every 6 (six) hours as needed for itching, Starting Fri 1/18/2019, Normal      ketoconazole (NIZORAL) 2 % shampoo Apply 1 application topically 2 (two) times a week, Starting Thu 1/10/2019, Normal      levothyroxine 25 mcg tablet 1/2 tab daily, Normal      triamcinolone (KENALOG) 0 5 % ointment Apply topically 2 (two) times a day, Starting Thu 1/10/2019, Normal       !! - Potential duplicate medications found  Please discuss with provider  No discharge procedures on file      ED Provider  Electronically Signed by           Rita Silva PA-C  02/16/19 9829

## 2019-02-18 LAB
C TRACH DNA SPEC QL NAA+PROBE: NEGATIVE
N GONORRHOEA DNA SPEC QL NAA+PROBE: NEGATIVE

## 2019-02-22 ENCOUNTER — TRANSCRIBE ORDERS (OUTPATIENT)
Dept: LAB | Facility: CLINIC | Age: 24
End: 2019-02-22

## 2019-02-22 ENCOUNTER — APPOINTMENT (OUTPATIENT)
Dept: LAB | Facility: CLINIC | Age: 24
End: 2019-02-22
Payer: COMMERCIAL

## 2019-02-22 PROCEDURE — 83516 IMMUNOASSAY NONANTIBODY: CPT | Performed by: INTERNAL MEDICINE

## 2019-02-23 LAB
TTG IGA SER-ACNC: <2 U/ML (ref 0–3)
TTG IGG SER-ACNC: 4 U/ML (ref 0–5)

## 2019-02-26 ENCOUNTER — HOSPITAL ENCOUNTER (EMERGENCY)
Facility: HOSPITAL | Age: 24
Discharge: HOME | End: 2019-02-27
Attending: EMERGENCY MEDICINE
Payer: COMMERCIAL

## 2019-02-26 VITALS
WEIGHT: 130 LBS | HEIGHT: 69 IN | HEART RATE: 81 BPM | TEMPERATURE: 99.1 F | DIASTOLIC BLOOD PRESSURE: 78 MMHG | OXYGEN SATURATION: 98 % | SYSTOLIC BLOOD PRESSURE: 121 MMHG | RESPIRATION RATE: 16 BRPM | BODY MASS INDEX: 19.26 KG/M2

## 2019-02-26 DIAGNOSIS — L02.415 CELLULITIS AND ABSCESS OF RIGHT LOWER EXTREMITY: Primary | ICD-10-CM

## 2019-02-26 DIAGNOSIS — L03.115 CELLULITIS AND ABSCESS OF RIGHT LOWER EXTREMITY: Primary | ICD-10-CM

## 2019-02-26 LAB
ANION GAP SERPL CALCULATED.3IONS-SCNC: 10 MMOL/L (ref 4–13)
BASOPHILS # BLD AUTO: 0.02 THOUSANDS/ΜL (ref 0–0.1)
BASOPHILS NFR BLD AUTO: 0 % (ref 0–1)
BUN SERPL-MCNC: 15 MG/DL (ref 5–25)
CALCIUM SERPL-MCNC: 8.9 MG/DL (ref 8.3–10.1)
CHLORIDE SERPL-SCNC: 103 MMOL/L (ref 100–108)
CO2 SERPL-SCNC: 27 MMOL/L (ref 21–32)
CREAT SERPL-MCNC: 0.66 MG/DL (ref 0.6–1.3)
EOSINOPHIL # BLD AUTO: 0.11 THOUSAND/ΜL (ref 0–0.61)
EOSINOPHIL NFR BLD AUTO: 1 % (ref 0–6)
ERYTHROCYTE [DISTWIDTH] IN BLOOD BY AUTOMATED COUNT: 12.6 % (ref 11.6–15.1)
GFR SERPL CREATININE-BSD FRML MDRD: 123 ML/MIN/1.73SQ M
GLUCOSE SERPL-MCNC: 104 MG/DL (ref 65–140)
HCT VFR BLD AUTO: 38.8 % (ref 34.8–46.1)
HGB BLD-MCNC: 12.6 G/DL (ref 11.5–15.4)
IMM GRANULOCYTES # BLD AUTO: 0.04 THOUSAND/UL (ref 0–0.2)
IMM GRANULOCYTES NFR BLD AUTO: 1 % (ref 0–2)
LYMPHOCYTES # BLD AUTO: 1.87 THOUSANDS/ΜL (ref 0.6–4.47)
LYMPHOCYTES NFR BLD AUTO: 24 % (ref 14–44)
MCH RBC QN AUTO: 31 PG (ref 26.8–34.3)
MCHC RBC AUTO-ENTMCNC: 32.5 G/DL (ref 31.4–37.4)
MCV RBC AUTO: 96 FL (ref 82–98)
MONOCYTES # BLD AUTO: 0.78 THOUSAND/ΜL (ref 0.17–1.22)
MONOCYTES NFR BLD AUTO: 10 % (ref 4–12)
NEUTROPHILS # BLD AUTO: 4.88 THOUSANDS/ΜL (ref 1.85–7.62)
NEUTS SEG NFR BLD AUTO: 64 % (ref 43–75)
NRBC BLD AUTO-RTO: 0 /100 WBCS
PLATELET # BLD AUTO: 230 THOUSANDS/UL (ref 149–390)
PMV BLD AUTO: 9.8 FL (ref 8.9–12.7)
POTASSIUM SERPL-SCNC: 3.3 MMOL/L (ref 3.5–5.3)
RBC # BLD AUTO: 4.06 MILLION/UL (ref 3.81–5.12)
SODIUM SERPL-SCNC: 140 MMOL/L (ref 136–145)
WBC # BLD AUTO: 7.7 THOUSAND/UL (ref 4.31–10.16)

## 2019-02-26 PROCEDURE — 96365 THER/PROPH/DIAG IV INF INIT: CPT

## 2019-02-26 PROCEDURE — 87040 BLOOD CULTURE FOR BACTERIA: CPT | Performed by: PHYSICIAN ASSISTANT

## 2019-02-26 PROCEDURE — 99283 EMERGENCY DEPT VISIT LOW MDM: CPT

## 2019-02-26 PROCEDURE — 96375 TX/PRO/DX INJ NEW DRUG ADDON: CPT

## 2019-02-26 PROCEDURE — 80048 BASIC METABOLIC PNL TOTAL CA: CPT | Performed by: PHYSICIAN ASSISTANT

## 2019-02-26 PROCEDURE — 96366 THER/PROPH/DIAG IV INF ADDON: CPT

## 2019-02-26 PROCEDURE — 36415 COLL VENOUS BLD VENIPUNCTURE: CPT | Performed by: PHYSICIAN ASSISTANT

## 2019-02-26 PROCEDURE — 85025 COMPLETE CBC W/AUTO DIFF WBC: CPT | Performed by: PHYSICIAN ASSISTANT

## 2019-02-26 RX ORDER — NAPROXEN 500 MG/1
500 TABLET ORAL 2 TIMES DAILY WITH MEALS
Qty: 14 TABLET | Refills: 0 | Status: SHIPPED | OUTPATIENT
Start: 2019-02-26 | End: 2019-10-25 | Stop reason: ALTCHOICE

## 2019-02-26 RX ORDER — CLINDAMYCIN PHOSPHATE 600 MG/50ML
600 INJECTION INTRAVENOUS ONCE
Status: COMPLETED | OUTPATIENT
Start: 2019-02-26 | End: 2019-02-26

## 2019-02-26 RX ORDER — CLINDAMYCIN HYDROCHLORIDE 150 MG/1
300 CAPSULE ORAL 4 TIMES DAILY
Qty: 80 CAPSULE | Refills: 0 | Status: SHIPPED | OUTPATIENT
Start: 2019-02-26 | End: 2019-03-07 | Stop reason: ALTCHOICE

## 2019-02-26 RX ORDER — HYDROCODONE BITARTRATE AND ACETAMINOPHEN 5; 325 MG/1; MG/1
1 TABLET ORAL EVERY 6 HOURS PRN
Qty: 8 TABLET | Refills: 0 | Status: SHIPPED | OUTPATIENT
Start: 2019-02-26 | End: 2019-02-28

## 2019-02-26 RX ORDER — KETOROLAC TROMETHAMINE 30 MG/ML
15 INJECTION, SOLUTION INTRAMUSCULAR; INTRAVENOUS ONCE
Status: COMPLETED | OUTPATIENT
Start: 2019-02-26 | End: 2019-02-26

## 2019-02-26 RX ADMIN — CLINDAMYCIN IN 5 PERCENT DEXTROSE 600 MG: 12 INJECTION, SOLUTION INTRAVENOUS at 22:07

## 2019-02-26 RX ADMIN — KETOROLAC TROMETHAMINE 15 MG: 30 INJECTION, SOLUTION INTRAMUSCULAR; INTRAVENOUS at 22:06

## 2019-02-27 NOTE — ED PROVIDER NOTES
History  Chief Complaint   Patient presents with    Abscess     Patient reports she has history of MRSA, has abscess behind right knee starting 3 days ago- states she drained it yesterday and leg appears to be red around abscess  29-year-old female presents to emergency room for evaluation of right posterior knee abscess  Onset 3 days ago  Yesterday she was able to remove a scab and squeeze out a bunch of yellow-green liquid  Today pain is increased and redness has spread some  Patient is able to walk however pain is worse with it  Denies calf pain denies thigh pain  Denies anterior knee pain  Denies fever  Denies nausea or vomiting  Patient states she has a history of frequent abscesses and was positive for MRSA in the past   Patient is concerned as to why she keeps getting these despite using a Hibiclens wash  History provided by:  Patient      Prior to Admission Medications   Prescriptions Last Dose Informant Patient Reported?  Taking?   acyclovir (ZOVIRAX) 5 % ointment Not Taking at Unknown time Self No No   Sig: Apply 4 times a day as needed   Patient not taking: Reported on 2019    hydrOXYzine HCL (ATARAX) 10 mg tablet Not Taking at Unknown time Self No No   Sig: Take 2 5 tablets (25 mg total) by mouth every 6 (six) hours as needed for itching   Patient not taking: Reported on 2019    hydrOXYzine HCL (ATARAX) 10 mg tablet Not Taking at Unknown time Self No No   Sig: Take 1 tablet (10 mg total) by mouth every 6 (six) hours as needed for itching   Patient not taking: Reported on 2019   ketoconazole (NIZORAL) 2 % shampoo Not Taking at Unknown time Self No No   Sig: Apply 1 application topically 2 (two) times a week   Patient not taking: Reported on 2019    levothyroxine 25 mcg tablet Not Taking at Unknown time Self No No   Si/2 tab daily   Patient not taking: Reported on 2019   triamcinolone (KENALOG) 0 5 % ointment Not Taking at Unknown time Self No No   Sig: Apply topically 2 (two) times a day   Patient not taking: Reported on 1/31/2019       Facility-Administered Medications: None       Past Medical History:   Diagnosis Date    Disease of thyroid gland     Eczema     Glomerulonephritis     Urinary tract infection        Past Surgical History:   Procedure Laterality Date    SKIN LESION EXCISION      destruction of benign lesion    TONSILLECTOMY      TONSILLECTOMY  2006    WISDOM TOOTH EXTRACTION      WISDOM TOOTH EXTRACTION  2011       Family History   Problem Relation Age of Onset    Coronary artery disease Father     Alcohol abuse Father     Drug abuse Father     Anxiety disorder Mother     Anxiety disorder Brother     Post-traumatic stress disorder Brother     Drug abuse Brother     Lung cancer Maternal Grandfather     Breast cancer Maternal Aunt     Hypertension Family     Lung cancer Family     Lymphoma Family         pancreatic    Hyperlipidemia Family         pure     I have reviewed and agree with the history as documented  Social History     Tobacco Use    Smoking status: Current Every Day Smoker     Packs/day: 0 50     Years: 3 00     Pack years: 1 50     Types: Cigarettes    Smokeless tobacco: Never Used   Substance Use Topics    Alcohol use: Yes     Comment: socially    Drug use: No        Review of Systems   Constitutional: Negative for activity change and fever  HENT: Negative for congestion  Respiratory: Negative for cough  Gastrointestinal: Negative for abdominal pain, nausea and vomiting  Genitourinary: Negative for dysuria  Musculoskeletal: Negative for back pain and neck pain  Skin: Positive for rash and wound  Neurological: Negative for dizziness and syncope  Psychiatric/Behavioral: Negative for confusion  Physical Exam  Physical Exam   Constitutional: She is oriented to person, place, and time  She appears well-developed and well-nourished  HENT:   Head: Normocephalic and atraumatic     Eyes: Conjunctivae are normal    Neck: Neck supple  Cardiovascular: Normal rate, regular rhythm and normal heart sounds  Pulmonary/Chest: Effort normal and breath sounds normal    Musculoskeletal:        Right knee: She exhibits decreased range of motion (flexion only)  She exhibits no effusion  Legs:  Neurological: She is alert and oriented to person, place, and time  Skin: Skin is warm and dry  Capillary refill takes less than 2 seconds  Rash noted  Psychiatric: She has a normal mood and affect  Nursing note and vitals reviewed        Vital Signs  ED Triage Vitals [02/26/19 1927]   Temperature Pulse Respirations Blood Pressure SpO2   99 1 °F (37 3 °C) 87 18 126/83 99 %      Temp Source Heart Rate Source Patient Position - Orthostatic VS BP Location FiO2 (%)   Tympanic Monitor Sitting Left arm --      Pain Score       Worst Possible Pain           Vitals:    02/26/19 1927 02/26/19 2209   BP: 126/83 121/78   Pulse: 87 77   Patient Position - Orthostatic VS: Sitting        Visual Acuity      ED Medications  Medications   ketorolac (TORADOL) injection 15 mg (15 mg Intravenous Given 2/26/19 2206)   clindamycin (CLEOCIN) IVPB (premix) 600 mg (600 mg Intravenous New Bag 2/26/19 2207)       Diagnostic Studies  Results Reviewed     Procedure Component Value Units Date/Time    Basic metabolic panel [[id number]]  (Abnormal) Collected:  02/26/19 2204    Lab Status:  Final result Specimen:  Blood from Arm, Left Updated:  02/26/19 2243     Sodium 140 mmol/L      Potassium 3 3 mmol/L      Chloride 103 mmol/L      CO2 27 mmol/L      ANION GAP 10 mmol/L      BUN 15 mg/dL      Creatinine 0 66 mg/dL      Glucose 104 mg/dL      Calcium 8 9 mg/dL      eGFR 123 ml/min/1 73sq m     Narrative:       National Kidney Disease Education Program recommendations are as follows:  GFR calculation is accurate only with a steady state creatinine  Chronic Kidney disease less than 60 ml/min/1 73 sq  meters  Kidney failure less than 15 ml/min/1 73 sq  meters  CBC and differential [[id number]] Collected:  02/26/19 2204    Lab Status:  Final result Specimen:  Blood from Arm, Right Updated:  02/26/19 2220     WBC 7 70 Thousand/uL      RBC 4 06 Million/uL      Hemoglobin 12 6 g/dL      Hematocrit 38 8 %      MCV 96 fL      MCH 31 0 pg      MCHC 32 5 g/dL      RDW 12 6 %      MPV 9 8 fL      Platelets 452 Thousands/uL      nRBC 0 /100 WBCs      Neutrophils Relative 64 %      Immat GRANS % 1 %      Lymphocytes Relative 24 %      Monocytes Relative 10 %      Eosinophils Relative 1 %      Basophils Relative 0 %      Neutrophils Absolute 4 88 Thousands/µL      Immature Grans Absolute 0 04 Thousand/uL      Lymphocytes Absolute 1 87 Thousands/µL      Monocytes Absolute 0 78 Thousand/µL      Eosinophils Absolute 0 11 Thousand/µL      Basophils Absolute 0 02 Thousands/µL     Blood culture #2 [[id number]] Collected:  02/26/19 2204    Lab Status: In process Specimen:  Blood from Arm, Left Updated:  02/26/19 2217    Blood culture #1 [[id number]] Collected:  02/26/19 2204    Lab Status: In process Specimen:  Blood from Arm, Left Updated:  02/26/19 2217                 No orders to display              Procedures  Procedures       Phone Contacts  ED Phone Contact    ED Course                               MDM  Number of Diagnoses or Management Options  Cellulitis and abscess of right lower extremity:      Amount and/or Complexity of Data Reviewed  Clinical lab tests: ordered and reviewed    Risk of Complications, Morbidity, and/or Mortality  General comments: Discussed with patient importance of completing a course of antibiotics, follow-up for recheck in 2 days and warm compresses at least 3 to 4 times a day to the area of infection      Patient Progress  Patient progress: improved (Toradol  significantly helped pain)      Disposition  Final diagnoses:   Cellulitis and abscess of right lower extremity     Time reflects when diagnosis was documented in both MDM as applicable and the Disposition within this note     Time User Action Codes Description Comment    2/26/2019 11:10 PM Yusef Jacques [D22 [id number],  L02 [id number]] Cellulitis and abscess of right lower extremity       ED Disposition     ED Disposition Condition Date/Time Comment    Discharge Stable Tue Feb 26, 2019 11:10 PM Nileshefren Mora discharge to home/self care  Follow-up Information     Follow up With Specialties Details Why Contact Info Additional Phuong Romero MD Internal Medicine In 2 days For wound re-check Replaced by [facility]  74 Emergency Department Emergency Medicine  If symptoms worsen [address] [place] [address]  [address] [place] [address] [address] ED, [address], [facility]          Patient's Medications   Discharge Prescriptions    CLINDAMYCIN (CLEOCIN) 150 MG CAPSULE    Take 2 capsules (300 mg total) by mouth 4 (four) times a day for 10 days       Start Date: 2/26/2019 End Date: 3/8/2019       Order Dose: 300 mg       Quantity: 80 capsule    Refills: 0    NAPROXEN (NAPROSYN) 500 MG TABLET    Take 1 tablet (500 mg total) by mouth 2 (two) times a day with meals for 7 days       Start Date: 2/26/2019 End Date: 3/5/2019       Order Dose: 500 mg       Quantity: 14 tablet    Refills: 0     No discharge procedures on file      ED Provider  Electronically Signed by           Virginia Lobo PA-C  02/27/19 5856

## 2019-03-04 LAB
BACTERIA BLD CULT: NORMAL
BACTERIA BLD CULT: NORMAL

## 2019-03-05 ENCOUNTER — TELEPHONE (OUTPATIENT)
Dept: INTERNAL MEDICINE CLINIC | Facility: CLINIC | Age: 24
End: 2019-03-05

## 2019-03-05 NOTE — TELEPHONE ENCOUNTER
----- Message from Kim Jorge MD sent at 3/4/2019  7:28 AM EST -----  Please call Corrine Hamilton: her test for Celiac disease is negative

## 2019-03-07 ENCOUNTER — OFFICE VISIT (OUTPATIENT)
Dept: INFECTIOUS DISEASES | Facility: CLINIC | Age: 24
End: 2019-03-07
Payer: COMMERCIAL

## 2019-03-07 VITALS
DIASTOLIC BLOOD PRESSURE: 65 MMHG | WEIGHT: 126 LBS | HEIGHT: 69 IN | HEART RATE: 82 BPM | RESPIRATION RATE: 15 BRPM | BODY MASS INDEX: 18.66 KG/M2 | SYSTOLIC BLOOD PRESSURE: 95 MMHG | TEMPERATURE: 98.4 F

## 2019-03-07 DIAGNOSIS — L02.91 ABSCESS: Primary | ICD-10-CM

## 2019-03-07 DIAGNOSIS — F17.200 SMOKING: ICD-10-CM

## 2019-03-07 DIAGNOSIS — Z22.322 MRSA COLONIZATION: ICD-10-CM

## 2019-03-07 DIAGNOSIS — F41.1 GENERALIZED ANXIETY DISORDER: ICD-10-CM

## 2019-03-07 PROCEDURE — 99244 OFF/OP CNSLTJ NEW/EST MOD 40: CPT | Performed by: INTERNAL MEDICINE

## 2019-03-07 RX ORDER — CHLORHEXIDINE GLUCONATE 4 G/100ML
1 SOLUTION TOPICAL
Qty: 120 ML | Refills: 10 | Status: SHIPPED | OUTPATIENT
Start: 2019-03-07 | End: 2019-04-06

## 2019-03-07 NOTE — PATIENT INSTRUCTIONS
ID Instructions:  -he has been recommended to remove acrylic nails and to not use gel nail Polish  -maintain short nails, and clean nails  -recommended chlorhexidine wash every other day for 30 days  -recommended Mupirocin Nasally twice a day for 14 days  -please refrain from picking abscesses and be seen by your primary or the emergency department to have them lanced and to receive antibiotics  -no chronic antibiotics have been recommended  -you will not need to take previously prescribed clindamycin  -others living in year home are recommended to also use the above wash and nasal ointment  -recommend cleaning common household surfaces with bleach wipes  -recommend washing linens and close in warm water with color safe bleach  -recommend changing razors and not sharing hygiene products with others  -even recommended to stop smoking   -recommend evaluation by Dermatology and possibly psychotherapy  _________________________________________________________________________________________  Mupirocin (Into the nose)   Mupirocin (mue-PIR-oh-sin)  Treats or prevents certain bacterial infections in your nose  This medicine is an antibiotic  Brand Name(s): Bactroban   There may be other brand names for this medicine  When This Medicine Should Not Be Used: This medicine is not right for everyone  Do not use it if you had an allergic reaction to mupirocin  How to Use This Medicine:   Ointment  · Use your medicine as directed  · This medicine is for use only in the nose  Do not get any of it in your eyes or on your skin  If it does get on these areas, rinse it off right away  · Missed dose: Apply a dose as soon as you can  If it is almost time for your next dose, wait until then and apply a regular dose  Do not apply extra medicine to make up for a missed dose  · Store the medicine in a closed container at room temperature, away from heat, moisture, and direct light  Do not refrigerate    Drugs and Foods to Avoid: Ask your doctor or pharmacist before using any other medicine, including over-the-counter medicines, vitamins, and herbal products  · Some medicines can affect how mupirocin nasal ointment works  Tell your doctor if you are using any other medicine in your nose, such as drops, sprays, gels, or other ointments  Warnings While Using This Medicine:   · Tell your doctor if you are pregnant or breastfeeding, or if you have kidney disease  · This medicine can cause diarrhea  Call your doctor if the diarrhea becomes severe, does not stop, or is bloody  Do not take any medicine to stop diarrhea until you have talked to your doctor  Diarrhea can occur 2 months or more after you stop taking this medicine  · Call your doctor if your symptoms do not improve or if they get worse  · Keep all medicine out of the reach of children  Never share your medicine with anyone  Possible Side Effects While Using This Medicine:   Call your doctor right away if you notice any of these side effects:  · Allergic reaction: Itching or hives, swelling in your face or hands, swelling or tingling in your mouth or throat, chest tightness, trouble breathing  · Severe diarrhea that may be bloody, stomach cramps or pain  · Severe irritation in your nose  If you notice these less serious side effects, talk with your doctor:   · Burning or itching in your nose  · Headache  · Mild diarrhea  · Stuffy or runny nose  If you notice other side effects that you think are caused by this medicine, tell your doctor  Call your doctor for medical advice about side effects  You may report side effects to FDA at 1-341-FDA-4791  © 2017 [employer] Information is for End User's use only and may not be sold, redistributed or otherwise used for commercial purposes  The above information is an  only  It is not intended as medical advice for individual conditions or treatments   Talk to your doctor, nurse or pharmacist before following any medical regimen to see if it is safe and effective for you   _______________________________________________________________________________________  Chlorhexidine (On the skin)   Chlorhexidine (klor-HEX-i-rj)  Cleans your skin after an injury, before surgery, or before an injection  Brand Name(s): Antiseptic Skin Cleanser, Aplicare Antiseptic Chlorhexidine Gluconate, Betasept, DermacinRx Clorhexacin, DermacinRx Surgical PharmaPak, Dermawerx Surgical Plus Gregorio, Alicia-Hex 4, Alicia-Hex2, Hibiclens, Hibistat, NuSurgePak, Surgilube, Whytederm SurgiPak   There may be other brand names for this medicine  When This Medicine Should Not Be Used: You should not use this medicine if you have had an allergic reaction to chlorhexidine or isopropyl alcohol  This medicine should not be used for an extended period of time on large areas of your body  Do not use the medicine in your eyes, ears, or mouth, or on the genital area  This medicine should not be used on a baby unless your doctor tells you to  How to Use This Medicine:   Gel/Jelly, Liquid, Pad, Sponge  · Some of the chlorhexidine products will be used in a hospital or clinic setting  The medicine will be applied by a nurse or other trained health professional   · You may be instructed to use this medicine at home  Your doctor will tell you how much medicine to use  Do not use more than directed  To do so may cause irritation of the skin  · Different forms of the medicine are used in different ways  Be sure you understand the directions for the product you are given before you use it  Ask your doctor or pharmacist if you have any questions  · Follow your doctor's instructions about how to clean and care for your skin before and after you use this medicine  Make sure you understand all of the directions, and ask questions if you find something is not clear  · This medicine should only be used on the skin   Do not swallow it or get it in the eyes, ears, mouth, or nose  Do not use it on the genital area (sex organs) or anal area  If it does get on these areas, rinse it off right away  · To clean the hands (eg, Hibistat®): ¨ The Hibistat® product contains large amounts of alcohol (70%) and is flammable  Apply the medicine in a well-ventilated place and do not use it while you are smoking  ¨ Wash your hands with soap and water  Dry your hands with a towel  ¨ Rub the hands with the hand wipe for 15 seconds  Make sure to use the wipe under the fingernails and between the fingers  The medicine will dry quickly so you should not use a towel for drying  Do not wet your hands a second time  ¨ Pour 1 teaspoonful (5 milliliters) of the hand rinse in your cupped hand  Rub the hands together for 15 seconds  Make sure to apply the rinse under the fingernails and between the fingers  The medicine will dry quickly so you should not use a towel for drying  Do not wet your hands a second time  · To clean the skin or a wound (eg, Betasept®, Hibiclens®):  ¨ Rinse the area to be cleaned with water  Apply the smallest amount of liquid needed to cover the skin or wound area and wash gently  Thoroughly rinse the area again with water  · To use before surgery or an injection (eg, Chloraprep®): ¨ The Chloraprep® product contains large amounts of alcohol (70%) and is flammable  Do not use it on skin areas that have cuts or scrapes  Apply the medicine in a well-ventilated place and do not use it while you are smoking  ¨ Open the pouch and use the handle to remove the swabstick applicator  Do not touch the applicator tip  Rub the applicator on the skin with the flat side against the skin  Use a back and forth motion for 30 seconds  ¨ Make sure the skin is completely wet  Let the skin air dry for one minute  Do not use a towel for drying  ¨ Do not cover the treated area until the skin is completely dry  This is usually one minute or longer for hairless skin   If you must apply the medicine to a hairy area of the body, wipe the area with a towel to remove extra medicine  How to Store and Dispose of This Medicine:   · Store the medicine in a closed container at room temperature, away from heat, moisture, and direct light  Do not freeze  · The Chloraprep® and Hibistat® products contain large amounts of alcohol (70%) and are flammable  Do not store them near a flame, heater, or electrical device  · Ask your pharmacist or doctor how to dispose of the medicine container and any leftover or  medicine  · Keep all medicine out of the reach of children  Never share your medicine with anyone  Drugs and Foods to Avoid:      Ask your doctor or pharmacist before using any other medicine, including over-the-counter medicines, vitamins, and herbal products  Warnings While Using This Medicine:   · Make sure your doctor knows if you are pregnant or breastfeeding  Tell your doctor if you have had an allergic reaction to a topical antibacterial medicine in the past   · This medicine may cause serious and permanent injury when placed in the eyes, ears, or mouth  Carefully follow all instructions before using this medicine to prevent these serious side effects  · Some antibacterial skin medicines may cause discoloration of the skin, hair, or nails  Ask your doctor about this if you have any concerns  · This medicine may stain clothing  These stains may not be removed by washing  Use only non-chlorine products to wash or bleach fabrics exposed to this medicine  Follow the directions on the package about washing these fabrics  · The Chloraprep® and Hibistat® products contain large amounts of alcohol (70%) and are flammable  Do not use them or store them near a flame, heater, or electrical device  Do not use the medicine while you are smoking  Apply the medicine in a well-ventilated place  · Do not use this medicine to treat a skin problem your doctor has not examined    Possible Side Effects While Using This Medicine:   Call your doctor right away if you notice any of these side effects:  · Allergic reaction: Itching or hives, swelling in your face or hands, swelling or tingling in your mouth or throat, chest tightness, trouble breathing  · New or worsening skin rash, redness, burning, itching, or swelling in the area where the medicine is applied  If you notice other side effects that you think are caused by this medicine, tell your doctor  Call your doctor for medical advice about side effects  You may report side effects to FDA at 5-931-FDA-6170  © 2017 [employer]  Information is for End User's use only and may not be sold, redistributed or otherwise used for commercial purposes  The above information is an  only  It is not intended as medical advice for individual conditions or treatments  Talk to your doctor, nurse or pharmacist before following any medical regimen to see if it is safe and effective for you     ______________________________________________________________________________________________________  MRSA (Methicillin-Resistant Staphylococcus Aureus)   WHAT YOU NEED TO KNOW:   MRSA (methicillin-resistant Staphylococcus aureus) is a strain of staph bacteria that can cause infection  Staph bacteria are normal on your skin and in your nose  They do not usually cause infection  The bacteria can cause an infection if they get inside your body through a break in your skin  Usually, antibiotics are used to kill bacteria  MRSA bacteria are resistant to the common antibiotics used to treat Staph infections  MRSA infections are most common as skin infections  You can also have MRSA bacteria in your blood, lungs, heart, and bone  DISCHARGE INSTRUCTIONS:   Return to the emergency department if:   · You develop new symptoms such as a cough or fever during or after treatment for MRSA infection  · Your symptoms get worse    Contact your healthcare provider if: · Your symptoms do not get better within 2 days of treatment  · Your symptoms return after treatment  · You have questions and concerns about your condition or care  Follow up with your healthcare provider within 2 days or as directed: You may be referred to an infectious disease specialist  Danielito Lerma may need an exam or more tests to make sure your infection is healing  Write down your questions so you remember to ask them during your visits  Medicines: You may  need the following:  · Antibiotics  may help treat a bacterial infection  You may need to take antibiotics for weeks to months  Take all of your antibiotics until they are finished  · Take your medicine as directed  Contact your healthcare provider if you think your medicine is not helping or if you have side effects  Tell him or her if you are allergic to any medicine  Keep a list of the medicines, vitamins, and herbs you take  Include the amounts, and when and why you take them  Bring the list or the pill bottles to follow-up visits  Carry your medicine list with you in case of an emergency  Prevent the spread of MRSA:  Do the following if you have an active MRSA infection:  · Wash your hands often  Ask others in your home to wash their hands often  This is the most important thing you can do to prevent the spread of infection  Wash your hands several times each day, especially before and after you change your bandage  If someone else changes your bandage, they should wash their hands after  Carry germ-killing gel with you and use it to clean your hands when you have no soap and water  · Do not touch sores  Do not poke or squeeze sores  This can make the infection go deeper into your tissue  · Cover infected sores with a bandage  Make sure the sore is completely covered during activities that may cause skin to skin contact with another person  Examples include sports such as wrestling or football   Put an extra bandage on a sore that is draining fluid  This helps keep infected drainage off surfaces that others can touch  · Do not share personal items with others  This includes washcloths, towels, uniforms, clothes, and razors  · Do not use public pools, hot tubs, or therapy pools until your infection has healed  Your infected sore can spread the infection to another person through the water  · Tell all healthcare providers that you have a MRSA infection  If you are admitted to the hospital, you may be placed in a private room  Healthcare providers will wear gowns and gloves when they come into your room  They will also wash their hands often and clean your room well  Your visitors may also be asked to wear a gown and gloves  All of these practices help prevent the spread of MRSA to healthcare providers and other patients  MRSA and your home:  MRSA can stay on surfaces for weeks  It is important to keep others safe by doing the following:  · Clean surfaces daily with a disinfectant  Follow directions on the label for how to apply the disinfectant  Items that you use often should be cleaned daily  Examples include kitchen or bathroom counters, phones, doorknobs, and remote controls  Clean the shower or bathtub after each use  · Wash dishes and silverware in a  or in hot water  Do not share unwashed dishes or silverware with anyone  · Wash used sheets, towels, and clothes with water and laundry detergent  Put dirty laundry in the washer immediately  Put it in a plastic bag if you are not able to wash it immediately  You do no need to wash this laundry separately from other laundry  Use the warmest water possible for the type of clothing  Wash your hands after you touch dirty laundry and before you handle clean laundry  Dry laundry completely in a warm or hot dryer  Apply a warm compress to small abscesses only if directed:  A warm compress will help the abscess drain   Wet a washcloth in warm, but not hot, water  Apply the compress for 10 minutes  Repeat this 4 times each day  Do not  press on an abscess or try to open it with a needle  You may push the bacteria deeper or into your blood  If your abscess opens, keep it covered with a bandage at all times  © 2017 [employer] Information is for End User's use only and may not be sold, redistributed or otherwise used for commercial purposes  All illustrations and images included in CareNotes® are the copyrighted property of [employer]  or Tobi J[employer]  The above information is an  only  It is not intended as medical advice for individual conditions or treatments  Talk to your doctor, nurse or pharmacist before following any medical regimen to see if it is safe and effective for you

## 2019-03-07 NOTE — PROGRESS NOTES
Consultation - Infectious Disease   Rosalba Crane 20 y o  female MRN: [id number]  Unit/Bed#:  Encounter: [id number]      IMPRESSION & RECOMMENDATIONS:   1  Recurrent skin abscesses and eczema:  Concern at this time is that the patient is potentially scratching in area where her eczema is worse leading to her developing skin abscesses  Additionally the patient may have underlying generalized anxiety disorder/bipolar that may lead to either picking or frequent scratching of the skin  It is likely that the patient is colonized with MRSA and her boyfriend may be the same  She has not been adequately trialed on Hibiclens/decolonization therapy    -would not recommend chronic antibiotic therapy for this issue as there is no added benefit but there is potential for proper dating resistance  -recommended evaluation with Dermatology for her eczema  -recommend that she not pick her abscesses/drain them on her own in the future  -recommend immediate evaluation if an abscess develops for drainage as there is concern that she may be superinfecting sebaceous cysts  -patient's most recent lesion is unremarkable and she is stable off antibiotics; recommended that she not take the previously prescribed clindamycin   -additional interventions as below  2  Skin colonization with MRSA:  As above the concern at this time is that the patient may be colonized with MRSA and regardless of the inciting event when local skin trauma occurred she is at increased risk of developing an abscess  Provided patient with the following regimen for decolonization    Had detailed discussion with the patient that though this may decolonize her if she does not adjust other risk factor or work towards preventing the initial injury that leads to her abscesses then they will recur   -recommend Hibiclens washes every other day for 1 month  -recommend mupirocin nasally twice a day for 14 days  -provided detailed instructions for washes and mupirocin  -recommend boyfriend to do the same  -recommend patient remove fake nails, keep nails short, and no Gel [place]  -recommend changing razor blades and not sharing hygiene products  -recommend washing bed sheets and clothing in warm water with color safe bleach  -recommend using cleanser with bleach on common surfaces in the home  -additional risk factor modification as below    3  Active smoking:  Patient reports that she is still actively smoking  Discussed in detail with the patient that this may contribute to recurrent skin infections as well as worsening condition of her skin  -recommended that the patient stop smoking   -recommended she discuss with her primary methods for cessation  4  Generalized anxiety disorder:  Patient reports a history of generalized anxiety disorder which she is no longer on medication for  However she does report episodes of feeling very depressed followed by episodes of feeling very high  She does report a history of picking at her skin in the past related to her anxiety issues  She notes that she is no longer picking but is unsure if she may be scratching  She reports she was recommended therapy in the past   -recommended that the patient actively seek out psychotherapy continue to work on issues related to her anxiety  A total of 45 minutes was spent with the patient during this visit with greater than 50% of the time spent in counseling, coordination of care and discussion of the above  Patient will not require formal follow-up in the ID office  She can be seen as needed  Recommend close follow-up with primary otherwise  HISTORY OF PRESENT ILLNESS:  Reason for Consult:  Recurrent MRSA infection    HPI: Bridget Tyson is a 20y o  year old female with past medical history significant for glomerulonephritis for which she is following with renal   She also reports a chronic history of eczema as well as previous history of generalized anxiety disorder  Patient reports that over the last year she has had about 3-4 episodes of skin and soft tissue abscesses  One of her most recent episodes had cultured out with MRSA  The patient reported that the majority of these episodes have been on her leg she had 1 abscess that formed on her shoulder and 1 in her scalp  Every time these episodes occurred the patient would often drain these abscesses herself and not seek medical care  Most recently she sought out medical care because they continued to occur  She does also report that about 9 months ago she was living with her boyfriend and brother who are active drug users at that time and she notes that her living conditions were not very clean  She reported that at home they had 9 cats and multiple dogs she was concern at 1 point that an abscess developed because she may have been bitten by a bug  Since that time she has moved into a new home which is much   Only has 2 dogs in her home  Her boyfriend is no longer using drugs  Her brother is no longer living with them who is actively using  She herself does not use any IV drugs in the past 4 years and has not been smoking any marijuana  She continues to smoke cigarettes actively  She can't recall any significant trauma related to these abscesses  Since moving she has had about 3 episodes  She notes that her boyfriend has also had a MRSA skin infection on his abdomen  The patient also reports having history of eczema and is not chronically on any medication and does not see a dermatologist   She does note that these abscesses often happen on the back of her legs and she is sure that she was not picking at her skin that she can't be certain that she was not scratching prior to these events  The patient also for the past few months has been wearing long fake nails with gel nail [place]  She reports that she was previously on medication for anxiety which she is no longer    She does mention having episodes of feeling very depressed and then subsequently having very manic thoughts  She does note that previously with her anxiety issues to pick her skin while she was in middle school  She has a difficult relationship with her mother  Patient's most recent episode she went to the emergency department and the abscess was drained and she was given IV antibiotics  She was subsequently sent home then with clindamycin which she did not take  The lesion itself resolved  The patient did report that she was picking at her skin before she presented to the emergency department and tried to drain the abscess herself  When the redness and warmth spread across the rest of her skin that prompted her to come to the ER  Currently she feels that the lesion is better  She denies having any other systemic symptoms such as fevers, chills, nausea, vomiting or shortness of breath  The patient has tried Hibiclens rinse in the past but she notes that all she would do was rub it on the affected area such as her leg for about 3 4 days and then did not continue after  She denies having any shaving trauma leading to these abscesses  REVIEW OF SYSTEMS:  A complete 12 point system-based review of systems is otherwise negative      PAST MEDICAL HISTORY:  Past Medical History:   Diagnosis Date    Disease of thyroid gland     Eczema     Glomerulonephritis     Urinary tract infection      Past Surgical History:   Procedure Laterality Date    SKIN LESION EXCISION      destruction of benign lesion    TONSILLECTOMY      TONSILLECTOMY  2006    WISDOM TOOTH EXTRACTION      WISDOM TOOTH EXTRACTION  2011       FAMILY HISTORY:  Non-contributory    SOCIAL HISTORY:  Social History   Social History     Substance and Sexual Activity   Alcohol Use Yes    Comment: socially     Social History     Substance and Sexual Activity   Drug Use No     Social History     Tobacco Use   Smoking Status Current Every Day Smoker    Packs/day: 0 50    Years: 3 00  Pack years: 1 50    Types: Cigarettes   Smokeless Tobacco Never Used       ALLERGIES:  Allergies   Allergen Reactions    Lac Bovis     Other      Pet allergies    Bactrim [Sulfamethoxazole-Trimethoprim] Headache     migrains    Milk-Related Compounds Rash       MEDICATIONS:  All current active medications have been reviewed  Antibiotics: none    PHYSICAL EXAM:  Vitals:    03/07/19 1355 03/07/19 1356   BP:  95/65   Pulse:  82   Resp: 15    Temp:  98 4 °F (36 9 °C)   Weight: 57 2 kg (126 lb)    Height: 5' 8 75" (1 746 m)          General Appearance:  Appearing well, nontoxic, and in no distress   Head:  Normocephalic, without obvious abnormality, atraumatic   Eyes:  Conjunctiva pink and sclera anicteric, both eyes   Nose: Nares normal, mucosa normal, no drainage   Throat: Oropharynx moist without lesions; stains on teeth noted   Neck: Supple, symmetrical, no adenopathy, no tenderness/mass/nodules   Back:   Symmetric, no curvature, ROM normal, no CVA tenderness   Lungs:   Clear to auscultation bilaterally, respirations unlabored   Chest Wall:  No tenderness or deformity   Heart:  RRR; no murmur, rub or gallop   Abdomen:   Soft, non-tender, non-distended, positive bowel sounds,    Extremities: No cyanosis, clubbing or edema   Skin: Patient has no active lesions in her scalp or on her back  She has no active lesions noted on her legs  The previous lesion that was present behind her right knee there is a small area of thickened skin but no active drainage, erythema or induration  The patient does have though cracked and dried skin on most of her extensor and flexor surfaces  Area/patch slightly hyperpigmented skin along her right forearm and scratches noted around that lesion  Lymph nodes: Cervical, supraclavicular nodes normal   Neurologic: Alert and oriented times 3, extremity strength 5/5 and symmetric       LABS, IMAGING, & OTHER STUDIES:  Lab Results:  I have personally reviewed pertinent labs    Lab Results   Component Value Date    K 3 3 (L) 02/26/2019     02/26/2019    CO2 27 02/26/2019    BUN 15 02/26/2019    CREATININE 0 66 02/26/2019    CALCIUM 8 9 02/26/2019    AST 15 12/30/2018    ALT 18 12/30/2018    ALKPHOS 39 (L) 12/30/2018    EGFR 123 02/26/2019     Lab Results   Component Value Date    WBC 7 70 02/26/2019    HGB 12 6 02/26/2019    HCT 38 8 02/26/2019    MCV 96 02/26/2019     02/26/2019   No results found for: SEDRATE      Imaging Studies:   I have personally reviewed pertinent imaging study reports and images in PACS  Other Studies:   I have personally reviewed pertinent reports

## 2019-03-12 ENCOUNTER — DOCUMENTATION (OUTPATIENT)
Dept: HEMATOLOGY ONCOLOGY | Facility: CLINIC | Age: 24
End: 2019-03-12

## 2019-03-12 NOTE — PROGRESS NOTES
Called pt to go over her benefits & the services the f/c offer got her voicemail  left message for pt to call me back

## 2019-03-15 ENCOUNTER — APPOINTMENT (OUTPATIENT)
Dept: LAB | Facility: MEDICAL CENTER | Age: 24
End: 2019-03-15
Payer: COMMERCIAL

## 2019-03-15 ENCOUNTER — CONSULT (OUTPATIENT)
Dept: HEMATOLOGY ONCOLOGY | Facility: CLINIC | Age: 24
End: 2019-03-15
Payer: COMMERCIAL

## 2019-03-15 VITALS
HEIGHT: 69 IN | OXYGEN SATURATION: 99 % | BODY MASS INDEX: 18.07 KG/M2 | RESPIRATION RATE: 18 BRPM | WEIGHT: 122 LBS | SYSTOLIC BLOOD PRESSURE: 106 MMHG | TEMPERATURE: 98.3 F | HEART RATE: 66 BPM | DIASTOLIC BLOOD PRESSURE: 64 MMHG

## 2019-03-15 DIAGNOSIS — S80.10XA CONTUSION OF LOWER LEG, UNSPECIFIED LATERALITY, INITIAL ENCOUNTER: Primary | ICD-10-CM

## 2019-03-15 DIAGNOSIS — R23.8 EASY BRUISING: ICD-10-CM

## 2019-03-15 DIAGNOSIS — R63.6 LOW BODY WEIGHT DUE TO INADEQUATE CALORIC INTAKE: ICD-10-CM

## 2019-03-15 LAB
FOLATE SERPL-MCNC: >20 NG/ML (ref 3.1–17.5)
VIT B12 SERPL-MCNC: 311 PG/ML (ref 100–900)

## 2019-03-15 PROCEDURE — 82746 ASSAY OF FOLIC ACID SERUM: CPT

## 2019-03-15 PROCEDURE — 99244 OFF/OP CNSLTJ NEW/EST MOD 40: CPT | Performed by: INTERNAL MEDICINE

## 2019-03-15 PROCEDURE — 85245 CLOT FACTOR VIII VW RISTOCTN: CPT

## 2019-03-15 PROCEDURE — 85247 CLOT FACTOR VIII MULTIMETRIC: CPT

## 2019-03-15 PROCEDURE — 85240 CLOT FACTOR VIII AHG 1 STAGE: CPT

## 2019-03-15 PROCEDURE — 82607 VITAMIN B-12: CPT

## 2019-03-15 PROCEDURE — 36415 COLL VENOUS BLD VENIPUNCTURE: CPT

## 2019-03-15 PROCEDURE — 85246 CLOT FACTOR VIII VW ANTIGEN: CPT

## 2019-03-15 NOTE — PROGRESS NOTES
3/15/2019    Kevin Plascencia    She is 20years old and presents with multiple bruises of the lower extremities occurring several times in the past year, most recently in January 2019, at which time she had skin abscesses which may have resulted from scratching  The patient has drained the abscesses herself  She was seen in consultation by Infectious Disease specialist Sukhi Beckford on March 7, 2019 who noted that in view of history of eczema and history of generalized anxiety disorder she may have a propensity to scratch her skin frequently, and furthermore, colonization with MSRA is likely  Chronic antibiotic therapy was not recommended, rather, a detailed discussion as to decolonization of skin was recommended by Dr Murphy, and furthermore, she is to be evaluated by a dermatologist as to management of eczema  Hematology/Oncology History:  None    Review of systems:      Constitutional: She notes easy fatigue, she denies chills or sweats  HEENT: She denies nose bleeds  She denies oral cavity or throat soreness  Cardiovascular:  She denies chest pain, no edema  Respiratory:  She denies cough or dyspnea  GI:  Her appetite has been fairly good  (she states that she would eat more if meals were prepared for her )  No abdominal pain  Bowel habits have been formed and regular  :  Menstrual periods have been regular and not excessive    Musculoskeletal:  She denies joint pain or back pain  Neurologic: She has a few headaches per year  She  denies paresthesias  She denies difficulty walking  Skin:  She denies rash  Psychiatric:  See HPI  Hematologic:  She denies easy bruising  Past medical history:    Glomerulonephritis was diagnosed 2008  Hashimoto's thyroiditis diagnosed 2009, the patient notes that she does not take levothyroxine regularly as this medication aggravate her anxiety  Generalized anxiety disorder with occasional panic attacks    Vasovagal syncope about 6 episodes, however, not since 2018  Eczema since childhood  Fibroadenoma of the right breast was diagnosed by targeted ultrasound of the right breast on July 18, 2016, January 23, 2017 and October 23, 2017    Past surgical history: Tonsil and adenoid surgery in the 8th grade  Birth jeannie excision in the 8th grade  [place] teeth extraction 2011    Family history:    Her mother has mitral valve prolapse  Her father age 61 has COPD and heart disease  She has 2 brothers, 1 is in good health, the other has PTSD and drug abuse  Social history:      She has smoked a half pack per day of cigarettes in the past 3 years  She has 1-2 alcoholic beverages about 3 times per week  She has a boyfriend  She does not have children  She works as a , previously she worked in a [employer] and as a   Physical Examination:    Blood pressure 106/64, pulse 66, temperature 98 3 °F (36 8 °C), resp  rate 18, height 5' 8 75" (1 746 m), weight 55 3 kg (122 lb), SpO2 99 %, not currently breastfeeding  Body surface area is 1 67 meters squared  She is thin but appears well  EOMI  No hearing deficit  Orophaynx clear  No lymphadenopathy of the neck  No axillary lymphadenopathy  (Breast examination was not done today )   Lungs are clear bilaterally  Heart has regular rate and rhythm  No hepatic or splenic enlargement  No inguinal lymphadenopathy  She declined to have her lower extremities examined (the patient was wearing long pants and boots )  No ecchymoses of the upper extremities or trunk  Normal gait and station  Neurological is grossly intact  Oriented x3, normal mood and affect  ECOG 0    Laboratory:    From November 6, 2018: Wound culture from right leg wound with 1+ growth of methicillin-resistant Staphylococcus aureus    From December 30, 2018:  AST 15, ALT 18, alk-phos 39, bili 0 20, PT INR is 1 11, APTT is 30 seconds      From February 1, 2019:  Free T4 0 78, TSH is 5 850 (0 358-3 740), SPEP with no monoclonal protein bands detected, serum free light chains are normal range, MYKE is negative  From February 26, 2019:  Blood cultures x2 no growth in 5 days, creatinine 0 66, calcium 8 9, WBC 7 70, hemoglobin 12 6, platelets 558, WBC differential neutrophils 64 percent, immature is 1 percent, lymphs 24 percent, monos 10 percent, eos 1 percent  Assessment/Plan:    Most likely bruises that appeared on the lower extremities in the past year are result of scratching similar to the recurrent skin abscesses  There is lack of clinical evidence of a bleeding disorder, and furthermore, CBC/diff, PT and APTT are all normal range  The most common platelet functional disorder is that of von Willebrand's disease and there is little evidence to suspect this diagnosis, i e  other than the lower extremity bruises, whereas, the patient has not experienced other mucocutaneous bleeding including post wisdom teeth extraction and other surgical procedures, and furthermore, menstrual periods are not excessively heavy  I offered testing for VWD for her reassurance  The patient indicated that she is not in favor of the VWD testing at this time  Furthermore, I recommended that the patient be evaluated by a nutritionalist noting that she is approximately 23 pounds (16 percent) below ideal body weight for her height for recommendations as to a diet sufficient in calories and various nutrients  Upon questioning she appears to have a variety of foods in her diet although her caloric intake is likely below that of her energy expendature  However, I suggested vitamin Q27 and folic acid testing to be certain that she has sufficient levels for hematologic and neurological function  The patient declines referral to a nutritional specialist     The patient is ready been advised as to cigarette smoking cessation    She does not wish to participate in a cigarette smoking cessation program     The patient and her mother who was with in the office today are aware to seek medical attention for nose bleeds, easy bruising, heavy menstrual blood, excessive fatigue, or if other new problems arise  The patient plans to follow up with her primary care physician and at her preference a follow-up visit at our office was not scheduled  However, please not hesitate to let us know if we can be of further assistance in the care of [employer]  In particular I would like to see her again in the case of recurrent easy bruising without explanation or other unexplained bleeding phenomenon

## 2019-03-15 NOTE — PATIENT INSTRUCTIONS
The patient and her mother who was with in the office today are aware to seek medical attention for nose bleeds, easy bruising, heavy menstrual blood, excessive fatigue, or if other new problems arise

## 2019-03-19 ENCOUNTER — TELEPHONE (OUTPATIENT)
Dept: HEMATOLOGY ONCOLOGY | Facility: CLINIC | Age: 24
End: 2019-03-19

## 2019-03-19 LAB
FACT XIIIA PPP-ACNC: 57 % (ref 57–163)
VWF AG ACT/NOR PPP IA: 119 % (ref 50–200)
VWF:RCO ACT/NOR PPP PL AGG: 75 % (ref 50–200)

## 2019-03-19 NOTE — TELEPHONE ENCOUNTER
I left a voicemail message on Qian's phone to call back to the office in regards to recent laboratory tests  Subsequently, I spoke with the patient's mother Yoly Granger by telephone:  Vitamin E16 and folic acid levels are adequate and she does not really require replacement therapy  Von Willebrand's antigen 119%, von Willebrand's activity 75% , factor 8 activity 57% () are in normal range, and therefore, she does not have either type 1 or type 3 von Willebrand's disease, awaiting completion of the von Willebrand's profile

## 2019-03-21 LAB — FACT VIII AG ACT/NOR PPP IA: 109 %

## 2019-03-23 LAB — VWF MULTIMERS PPP IB: NORMAL

## 2019-04-01 ENCOUNTER — TELEPHONE (OUTPATIENT)
Dept: NEPHROLOGY | Facility: CLINIC | Age: 24
End: 2019-04-01

## 2019-04-03 ENCOUNTER — OFFICE VISIT (OUTPATIENT)
Dept: URGENT CARE | Facility: CLINIC | Age: 24
End: 2019-04-03
Payer: COMMERCIAL

## 2019-04-03 VITALS
RESPIRATION RATE: 15 BRPM | HEART RATE: 83 BPM | TEMPERATURE: 99.6 F | HEIGHT: 69 IN | SYSTOLIC BLOOD PRESSURE: 118 MMHG | OXYGEN SATURATION: 98 % | WEIGHT: 124 LBS | BODY MASS INDEX: 18.37 KG/M2 | DIASTOLIC BLOOD PRESSURE: 68 MMHG

## 2019-04-03 DIAGNOSIS — A08.4 VIRAL GASTROENTERITIS: Primary | ICD-10-CM

## 2019-04-03 PROCEDURE — 99213 OFFICE O/P EST LOW 20 MIN: CPT | Performed by: PHYSICIAN ASSISTANT

## 2019-04-03 PROCEDURE — S9088 SERVICES PROVIDED IN URGENT: HCPCS | Performed by: PHYSICIAN ASSISTANT

## 2019-05-17 ENCOUNTER — OFFICE VISIT (OUTPATIENT)
Dept: URGENT CARE | Facility: CLINIC | Age: 24
End: 2019-05-17
Payer: COMMERCIAL

## 2019-05-17 VITALS
HEART RATE: 81 BPM | TEMPERATURE: 98.7 F | SYSTOLIC BLOOD PRESSURE: 113 MMHG | WEIGHT: 123 LBS | RESPIRATION RATE: 16 BRPM | OXYGEN SATURATION: 96 % | DIASTOLIC BLOOD PRESSURE: 59 MMHG | HEIGHT: 69 IN | BODY MASS INDEX: 18.22 KG/M2

## 2019-05-17 DIAGNOSIS — J20.8 VIRAL BRONCHITIS: ICD-10-CM

## 2019-05-17 DIAGNOSIS — H69.93 DISORDER OF BOTH EUSTACHIAN TUBES: Primary | ICD-10-CM

## 2019-05-17 PROCEDURE — 99283 EMERGENCY DEPT VISIT LOW MDM: CPT | Performed by: PHYSICIAN ASSISTANT

## 2019-05-17 PROCEDURE — G0382 LEV 3 HOSP TYPE B ED VISIT: HCPCS | Performed by: PHYSICIAN ASSISTANT

## 2019-05-17 RX ORDER — PREDNISONE 10 MG/1
TABLET ORAL
Qty: 21 TABLET | Refills: 0 | Status: SHIPPED | OUTPATIENT
Start: 2019-05-17 | End: 2019-09-03

## 2019-07-31 ENCOUNTER — HOSPITAL ENCOUNTER (EMERGENCY)
Facility: HOSPITAL | Age: 24
Discharge: HOME | End: 2019-08-01
Attending: EMERGENCY MEDICINE | Admitting: EMERGENCY MEDICINE
Payer: COMMERCIAL

## 2019-07-31 DIAGNOSIS — F41.0 PANIC ATTACK: Primary | ICD-10-CM

## 2019-07-31 DIAGNOSIS — E03.9 HYPOTHYROIDISM: ICD-10-CM

## 2019-07-31 LAB
ALBUMIN SERPL BCP-MCNC: 3.5 G/DL (ref 3.5–5)
ALP SERPL-CCNC: 32 U/L (ref 46–116)
ALT SERPL W P-5'-P-CCNC: 14 U/L (ref 12–78)
ANION GAP SERPL CALCULATED.3IONS-SCNC: 6 MMOL/L (ref 4–13)
AST SERPL W P-5'-P-CCNC: 15 U/L (ref 5–45)
BASOPHILS # BLD AUTO: 0.03 THOUSANDS/ΜL (ref 0–0.1)
BASOPHILS NFR BLD AUTO: 0 % (ref 0–1)
BILIRUB SERPL-MCNC: 0.3 MG/DL (ref 0.2–1)
BUN SERPL-MCNC: 16 MG/DL (ref 5–25)
CALCIUM SERPL-MCNC: 8.9 MG/DL (ref 8.3–10.1)
CHLORIDE SERPL-SCNC: 104 MMOL/L (ref 100–108)
CLARITY, POC: CLEAR
CO2 SERPL-SCNC: 26 MMOL/L (ref 21–32)
COLOR, POC: YELLOW
CREAT SERPL-MCNC: 0.81 MG/DL (ref 0.6–1.3)
EOSINOPHIL # BLD AUTO: 0.11 THOUSAND/ΜL (ref 0–0.61)
EOSINOPHIL NFR BLD AUTO: 1 % (ref 0–6)
ERYTHROCYTE [DISTWIDTH] IN BLOOD BY AUTOMATED COUNT: 12.2 % (ref 11.6–15.1)
EXT BILIRUBIN, UA: NORMAL
EXT BLOOD URINE: NORMAL
EXT GLUCOSE, UA: NORMAL
EXT KETONES: NORMAL
EXT NITRITE, UA: NORMAL
EXT PH, UA: 6.5
EXT PREG TEST URINE: NEGATIVE
EXT PROTEIN, UA: NORMAL
EXT SPECIFIC GRAVITY, UA: 1
EXT UROBILINOGEN: NORMAL
EXT. CONTROL ED NAV: NORMAL
GFR SERPL CREATININE-BSD FRML MDRD: 101 ML/MIN/1.73SQ M
GLUCOSE SERPL-MCNC: 93 MG/DL (ref 65–140)
HCT VFR BLD AUTO: 37.4 % (ref 34.8–46.1)
HGB BLD-MCNC: 12.1 G/DL (ref 11.5–15.4)
IMM GRANULOCYTES # BLD AUTO: 0.02 THOUSAND/UL (ref 0–0.2)
IMM GRANULOCYTES NFR BLD AUTO: 0 % (ref 0–2)
LIPASE SERPL-CCNC: 170 U/L (ref 73–393)
LYMPHOCYTES # BLD AUTO: 2.1 THOUSANDS/ΜL (ref 0.6–4.47)
LYMPHOCYTES NFR BLD AUTO: 27 % (ref 14–44)
MCH RBC QN AUTO: 30.3 PG (ref 26.8–34.3)
MCHC RBC AUTO-ENTMCNC: 32.4 G/DL (ref 31.4–37.4)
MCV RBC AUTO: 94 FL (ref 82–98)
MONOCYTES # BLD AUTO: 0.82 THOUSAND/ΜL (ref 0.17–1.22)
MONOCYTES NFR BLD AUTO: 11 % (ref 4–12)
NEUTROPHILS # BLD AUTO: 4.59 THOUSANDS/ΜL (ref 1.85–7.62)
NEUTS SEG NFR BLD AUTO: 61 % (ref 43–75)
NRBC BLD AUTO-RTO: 0 /100 WBCS
PLATELET # BLD AUTO: 202 THOUSANDS/UL (ref 149–390)
PMV BLD AUTO: 9.4 FL (ref 8.9–12.7)
POTASSIUM SERPL-SCNC: 4.4 MMOL/L (ref 3.5–5.3)
PROT SERPL-MCNC: 7.1 G/DL (ref 6.4–8.2)
RBC # BLD AUTO: 3.99 MILLION/UL (ref 3.81–5.12)
SODIUM SERPL-SCNC: 136 MMOL/L (ref 136–145)
TSH SERPL DL<=0.05 MIU/L-ACNC: 5.64 UIU/ML (ref 0.36–3.74)
WBC # BLD AUTO: 7.67 THOUSAND/UL (ref 4.31–10.16)
WBC # BLD EST: NORMAL 10*3/UL

## 2019-07-31 PROCEDURE — 80053 COMPREHEN METABOLIC PANEL: CPT | Performed by: EMERGENCY MEDICINE

## 2019-07-31 PROCEDURE — 81002 URINALYSIS NONAUTO W/O SCOPE: CPT | Performed by: EMERGENCY MEDICINE

## 2019-07-31 PROCEDURE — 85025 COMPLETE CBC W/AUTO DIFF WBC: CPT | Performed by: EMERGENCY MEDICINE

## 2019-07-31 PROCEDURE — 36415 COLL VENOUS BLD VENIPUNCTURE: CPT | Performed by: EMERGENCY MEDICINE

## 2019-07-31 PROCEDURE — 81025 URINE PREGNANCY TEST: CPT | Performed by: EMERGENCY MEDICINE

## 2019-07-31 PROCEDURE — 84439 ASSAY OF FREE THYROXINE: CPT | Performed by: EMERGENCY MEDICINE

## 2019-07-31 PROCEDURE — 84443 ASSAY THYROID STIM HORMONE: CPT | Performed by: EMERGENCY MEDICINE

## 2019-07-31 PROCEDURE — 93005 ELECTROCARDIOGRAM TRACING: CPT

## 2019-07-31 PROCEDURE — 83690 ASSAY OF LIPASE: CPT | Performed by: EMERGENCY MEDICINE

## 2019-07-31 PROCEDURE — 99282 EMERGENCY DEPT VISIT SF MDM: CPT | Performed by: EMERGENCY MEDICINE

## 2019-07-31 PROCEDURE — 99284 EMERGENCY DEPT VISIT MOD MDM: CPT

## 2019-07-31 PROCEDURE — 96360 HYDRATION IV INFUSION INIT: CPT

## 2019-07-31 RX ADMIN — SODIUM CHLORIDE 1000 ML: 0.9 INJECTION, SOLUTION INTRAVENOUS at 23:05

## 2019-08-01 VITALS
BODY MASS INDEX: 18.51 KG/M2 | HEART RATE: 82 BPM | TEMPERATURE: 97.9 F | RESPIRATION RATE: 16 BRPM | HEIGHT: 69 IN | SYSTOLIC BLOOD PRESSURE: 113 MMHG | OXYGEN SATURATION: 98 % | DIASTOLIC BLOOD PRESSURE: 68 MMHG | WEIGHT: 125 LBS

## 2019-08-01 LAB
ATRIAL RATE: 61 BPM
P AXIS: 67 DEGREES
PR INTERVAL: 126 MS
QRS AXIS: 80 DEGREES
QRSD INTERVAL: 94 MS
QT INTERVAL: 404 MS
QTC INTERVAL: 406 MS
T WAVE AXIS: 66 DEGREES
T4 FREE SERPL-MCNC: 0.87 NG/DL (ref 0.76–1.46)
VENTRICULAR RATE: 61 BPM

## 2019-08-01 PROCEDURE — 93010 ELECTROCARDIOGRAM REPORT: CPT | Performed by: INTERNAL MEDICINE

## 2019-08-01 RX ORDER — HYDROXYZINE HYDROCHLORIDE 25 MG/1
25 TABLET, FILM COATED ORAL EVERY 6 HOURS PRN
Qty: 10 TABLET | Refills: 0 | Status: SHIPPED | OUTPATIENT
Start: 2019-08-01 | End: 2019-08-23

## 2019-08-01 NOTE — ED PROCEDURE NOTE
PROCEDURE  ECG 12 Lead Documentation Only  Date/Time: 7/31/2019 10:40 PM  Performed by: Ivania Cox DO  Authorized by: Ivania Cox DO     ECG reviewed by me, the ED Provider: yes    Patient location:  ED  Previous ECG:     Previous ECG:  Compared to current    Comparison ECG info:  Compared to EKG of April 6, 2017, incomplete right bundle branch block is now present    Similarity:  Changes noted  Rate:     ECG rate assessment: normal    Rhythm:     Rhythm: sinus rhythm    Ectopy:     Ectopy: none    QRS:     QRS axis:  Normal    QRS intervals:  Normal  Conduction:     Conduction: abnormal      Abnormal conduction: incomplete RBBB    ST segments:     ST segments:  Normal  T waves:     T waves: normal           Ivania Cox DO  08/01/19 0137

## 2019-08-23 ENCOUNTER — OFFICE VISIT (OUTPATIENT)
Dept: FAMILY MEDICINE CLINIC | Facility: CLINIC | Age: 24
End: 2019-08-23
Payer: COMMERCIAL

## 2019-08-23 ENCOUNTER — TELEPHONE (OUTPATIENT)
Dept: FAMILY MEDICINE CLINIC | Facility: CLINIC | Age: 24
End: 2019-08-23

## 2019-08-23 VITALS
HEART RATE: 80 BPM | OXYGEN SATURATION: 98 % | HEIGHT: 69 IN | SYSTOLIC BLOOD PRESSURE: 112 MMHG | TEMPERATURE: 98.2 F | DIASTOLIC BLOOD PRESSURE: 68 MMHG | BODY MASS INDEX: 18.69 KG/M2 | WEIGHT: 126.2 LBS

## 2019-08-23 DIAGNOSIS — Z12.4 CERVICAL CANCER SCREENING: ICD-10-CM

## 2019-08-23 DIAGNOSIS — F41.1 GENERALIZED ANXIETY DISORDER: ICD-10-CM

## 2019-08-23 DIAGNOSIS — Z23 NEED FOR TDAP VACCINATION: ICD-10-CM

## 2019-08-23 DIAGNOSIS — E06.3 HASHIMOTO'S THYROIDITIS: ICD-10-CM

## 2019-08-23 DIAGNOSIS — F41.0 PANIC ATTACKS: ICD-10-CM

## 2019-08-23 DIAGNOSIS — I45.10 RBBB: ICD-10-CM

## 2019-08-23 DIAGNOSIS — Z87.448 HISTORY OF GLOMERULONEPHRITIS: ICD-10-CM

## 2019-08-23 DIAGNOSIS — E03.9 HYPOTHYROIDISM, UNSPECIFIED TYPE: Primary | ICD-10-CM

## 2019-08-23 DIAGNOSIS — Z82.49 FAMILY HISTORY OF CARDIAC DISORDER: ICD-10-CM

## 2019-08-23 PROBLEM — F17.200 SMOKING: Status: RESOLVED | Noted: 2018-11-19 | Resolved: 2019-08-23

## 2019-08-23 PROBLEM — IMO0001 SMOKING: Status: RESOLVED | Noted: 2018-11-19 | Resolved: 2019-08-23

## 2019-08-23 PROCEDURE — 99204 OFFICE O/P NEW MOD 45 MIN: CPT | Performed by: FAMILY MEDICINE

## 2019-08-23 PROCEDURE — 1036F TOBACCO NON-USER: CPT | Performed by: FAMILY MEDICINE

## 2019-08-23 PROCEDURE — 3008F BODY MASS INDEX DOCD: CPT | Performed by: FAMILY MEDICINE

## 2019-08-23 PROCEDURE — 90715 TDAP VACCINE 7 YRS/> IM: CPT | Performed by: FAMILY MEDICINE

## 2019-08-23 PROCEDURE — 90471 IMMUNIZATION ADMIN: CPT | Performed by: FAMILY MEDICINE

## 2019-08-23 RX ORDER — LEVOTHYROXINE SODIUM 0.03 MG/1
25 TABLET ORAL
Qty: 90 TABLET | Refills: 3 | Status: SHIPPED | OUTPATIENT
Start: 2019-08-23 | End: 2019-08-26

## 2019-08-23 RX ORDER — HYDROXYZINE HYDROCHLORIDE 25 MG/1
25 TABLET, FILM COATED ORAL EVERY 6 HOURS PRN
Qty: 30 TABLET | Refills: 0 | Status: SHIPPED | OUTPATIENT
Start: 2019-08-23 | End: 2019-11-29 | Stop reason: ALTCHOICE

## 2019-08-23 RX ORDER — ESCITALOPRAM OXALATE 10 MG/1
10 TABLET ORAL DAILY
Qty: 30 TABLET | Refills: 0 | Status: SHIPPED | OUTPATIENT
Start: 2019-08-23 | End: 2019-09-13 | Stop reason: SDUPTHER

## 2019-08-23 NOTE — PROGRESS NOTES
Darien Ring 1995 female MRN: [id number]    Family Medicine New Patient     ASSESSMENT/PLAN  Problem List Items Addressed This Visit        Endocrine    Hashimoto's thyroiditis    Relevant Medications    levothyroxine 25 mcg tablet    Other Relevant Orders    Ambulatory referral to Endocrinology    Hypothyroidism - Primary    Relevant Medications    levothyroxine 25 mcg tablet    Other Relevant Orders    Ambulatory referral to Endocrinology       Cardiovascular and Mediastinum    RBBB    Relevant Orders    Ambulatory referral to Cardiology       Other    History of glomerulonephritis    Relevant Orders    Ambulatory referral to Nephrology    Generalized anxiety disorder    Relevant Medications    escitalopram (LEXAPRO) 10 mg tablet    hydrOXYzine HCL (ATARAX) 25 mg tablet    Panic attacks    Relevant Medications    hydrOXYzine HCL (ATARAX) 25 mg tablet    Family history of cardiac disorder    Relevant Orders    Ambulatory referral to Cardiology    Need for Tdap vaccination    Relevant Orders    TDAP VACCINE GREATER THAN OR EQUAL TO 6YO IM (Completed)    Cervical cancer screening    Relevant Orders    Ambulatory referral to Gynecology          Make apt with Antonietta Bravo  Follow up with me in 4 weeks for anxiety    Future Appointments   Date Time Provider Ashley Patino   9/20/2019  9:30 AM DO [facility]          SUBJECTIVE  CC: establish care (anxiety )      HPI:  Darien Ring is a 30 y o  female who presents for establish care  Works as a     PMH:   Eczema  Thyroid disease: states she is suppose to take thyroid medication but hasnt been taking because of "side effects"  Anxiety: states she has had this her whole life  Lots of panic attacks  Right BBB- per patient on her last ekg in ED  History of glomerulonephritis: states she was told she was normal and didn't need to see them any more   Saw nephro since age 4    Quit smoking 3 months ago    PSH: tonsils, wisdom teeth HPI    Review of Systems   Constitutional: Negative for chills, fatigue and fever  HENT: Negative for congestion, postnasal drip, rhinorrhea and sinus pressure  Eyes: Negative for photophobia and visual disturbance  Respiratory: Negative for cough and shortness of breath  Cardiovascular: Negative for chest pain, palpitations and leg swelling  Gastrointestinal: Negative for abdominal pain, constipation, diarrhea, nausea and vomiting  Genitourinary: Negative for difficulty urinating and dysuria  Musculoskeletal: Negative for arthralgias and myalgias  Skin: Positive for rash  Negative for color change  Neurological: Negative for dizziness, weakness, light-headedness and headaches  Psychiatric/Behavioral: Positive for sleep disturbance  The patient is nervous/anxious          Historical Information   The patient history was reviewed as follows:    Past Medical History:   Diagnosis Date    Anxiety     Disease of thyroid gland     Eczema     Glomerulonephritis     Urinary tract infection      Past Surgical History:   Procedure Laterality Date    SKIN LESION EXCISION      destruction of benign lesion    TONSILLECTOMY      TONSILLECTOMY  2006    WISDOM TOOTH EXTRACTION      WISDOM TOOTH EXTRACTION  2011     Family History   Problem Relation Age of Onset    Coronary artery disease Father     Alcohol abuse Father     Drug abuse Father     Anxiety disorder Mother     Anxiety disorder Brother     Post-traumatic stress disorder Brother     Drug abuse Brother     Lung cancer Maternal Grandfather     Breast cancer Maternal Aunt     Hypertension Family     Lung cancer Family     Lymphoma Family         pancreatic    Hyperlipidemia Family         pure      Social History   Social History     Substance and Sexual Activity   Alcohol Use Yes    Frequency: 2-4 times a month    Drinks per session: 1 or 2    Binge frequency: Never    Comment: socially     Social History     Substance and Sexual Activity   Drug Use No     Social History     Tobacco Use   Smoking Status Former Smoker    Packs/day: 0 50    Years: 3 00    Pack years: 1 50    Types: Cigarettes   Smokeless Tobacco Never Used   Tobacco Comment    vaping        Medications:     Current Outpatient Medications:     acyclovir (ZOVIRAX) 5 % ointment, Apply 4 times a day as needed (Patient not taking: Reported on 4/3/2019), Disp: 15 g, Rfl: 3    escitalopram (LEXAPRO) 10 mg tablet, Take 1 tablet (10 mg total) by mouth daily for 30 days, Disp: 30 tablet, Rfl: 0    hydrOXYzine HCL (ATARAX) 25 mg tablet, Take 1 tablet (25 mg total) by mouth every 6 (six) hours as needed for itching or anxiety, Disp: 30 tablet, Rfl: 0    ketoconazole (NIZORAL) 2 % shampoo, Apply 1 application topically 2 (two) times a week (Patient not taking: Reported on 1/31/2019 ), Disp: 120 mL, Rfl: 2    levothyroxine 25 mcg tablet, Take 1 tablet (25 mcg total) by mouth daily in the early morning, Disp: 90 tablet, Rfl: 3    naproxen (NAPROSYN) 500 mg tablet, Take 1 tablet (500 mg total) by mouth 2 (two) times a day with meals for 7 days, Disp: 14 tablet, Rfl: 0    predniSONE 10 mg tablet, Take 6 tablets today and decrease by one tablet daily until gone (Patient not taking: Reported on 7/31/2019), Disp: 21 tablet, Rfl: 0    triamcinolone (KENALOG) 0 5 % ointment, Apply topically 2 (two) times a day (Patient not taking: Reported on 4/3/2019), Disp: 60 g, Rfl: 3  Allergies   Allergen Reactions    Lac Bovis     Other      Pet allergies    Bactrim [Sulfamethoxazole-Trimethoprim] Headache     migrains    Milk-Related Compounds Rash       OBJECTIVE    Vitals:   Vitals:    08/23/19 0945   BP: 112/68   BP Location: Left arm   Patient Position: Sitting   Cuff Size: Standard   Pulse: 80   Temp: 98 2 °F (36 8 °C)   TempSrc: Tympanic   SpO2: 98%   Weight: 57 2 kg (126 lb 3 2 oz)   Height: 5' 9 25" (1 759 m)           Physical Exam   Constitutional: She is oriented to person, place, and time  She appears well-developed and well-nourished  HENT:   Head: Normocephalic and atraumatic  Mouth/Throat: Oropharynx is clear and moist    Eyes: Pupils are equal, round, and reactive to light  Neck: Normal range of motion  Neck supple  Cardiovascular: Normal rate, regular rhythm and normal heart sounds  Pulmonary/Chest: Effort normal and breath sounds normal  No respiratory distress  She has no wheezes  Abdominal: Soft  Bowel sounds are normal  She exhibits no distension  There is no tenderness  Musculoskeletal: Normal range of motion  She exhibits no edema or tenderness  Neurological: She is alert and oriented to person, place, and time  Skin: Skin is warm and dry  Psychiatric: She has a normal mood and affect   Her behavior is normal           DO Camila    8/23/2019

## 2019-08-23 NOTE — TELEPHONE ENCOUNTER
Patient called, she said that she spoke with Dr Geo Bermeo, who said they were going to switch her medication to synthroid instead of Levothyroxine  I told her I would have to leave message for the doctor and she would be in on Monday  Her mother got on the phone and said she would probably need a preauthorization for this, because her daughter can only take name brand, and that I need to check with another physician in the office, because she isn't suppose to be without her medication  I told her that someone would call her back    [phone]

## 2019-08-26 DIAGNOSIS — E03.9 HYPOTHYROIDISM, UNSPECIFIED TYPE: Primary | ICD-10-CM

## 2019-08-26 RX ORDER — LEVOTHYROXINE SODIUM 0.03 MG/1
25 TABLET ORAL DAILY
Qty: 90 TABLET | Refills: 0 | Status: SHIPPED | OUTPATIENT
Start: 2019-08-26 | End: 2019-09-03 | Stop reason: SDUPTHER

## 2019-08-27 ENCOUNTER — TELEPHONE (OUTPATIENT)
Dept: FAMILY MEDICINE CLINIC | Facility: CLINIC | Age: 24
End: 2019-08-27

## 2019-08-27 NOTE — TELEPHONE ENCOUNTER
Jessie,Please let patient know if her insurance wont cover medication either she will have to pay out of pocket for it or we can try to do a prior authorization but no guarantee it will get covered

## 2019-08-27 NOTE — TELEPHONE ENCOUNTER
WENDY FROM [facility] CALLED BECAUSE ISIDORO WANTS THE BRAND NAME SYNTHROID BUT HER INSURANCE WILL NOT COVER BRAND NAME SO THEY CALLED TO SEE WHAT THEY SHOULD DO    [phone] [facility] [address]

## 2019-08-29 ENCOUNTER — TELEPHONE (OUTPATIENT)
Dept: FAMILY MEDICINE CLINIC | Facility: CLINIC | Age: 24
End: 2019-08-29

## 2019-08-29 NOTE — TELEPHONE ENCOUNTER
----- Message from DO Camila sent at 8/29/2019 11:06 AM EDT -----  Regarding: Synthroid approved  Duane Pedroza,  Please let patient know I completed her paperwork yesterday for her Synthroid and it was approved  She can pick it up at the pharmacy  Thank you!

## 2019-09-03 ENCOUNTER — TELEPHONE (OUTPATIENT)
Dept: FAMILY MEDICINE CLINIC | Facility: CLINIC | Age: 24
End: 2019-09-03

## 2019-09-03 ENCOUNTER — HOSPITAL ENCOUNTER (EMERGENCY)
Facility: HOSPITAL | Age: 24
Discharge: HOME | End: 2019-09-03
Attending: EMERGENCY MEDICINE
Payer: COMMERCIAL

## 2019-09-03 VITALS
OXYGEN SATURATION: 99 % | TEMPERATURE: 97.6 F | SYSTOLIC BLOOD PRESSURE: 107 MMHG | HEART RATE: 78 BPM | BODY MASS INDEX: 18.62 KG/M2 | DIASTOLIC BLOOD PRESSURE: 75 MMHG | WEIGHT: 127 LBS

## 2019-09-03 DIAGNOSIS — E03.9 HYPOTHYROIDISM, UNSPECIFIED TYPE: ICD-10-CM

## 2019-09-03 DIAGNOSIS — H10.12 ALLERGIC CONJUNCTIVITIS OF LEFT EYE: Primary | ICD-10-CM

## 2019-09-03 DIAGNOSIS — L30.9 ECZEMA: ICD-10-CM

## 2019-09-03 PROCEDURE — 99284 EMERGENCY DEPT VISIT MOD MDM: CPT | Performed by: PHYSICIAN ASSISTANT

## 2019-09-03 PROCEDURE — 99282 EMERGENCY DEPT VISIT SF MDM: CPT

## 2019-09-03 RX ORDER — LEVOTHYROXINE SODIUM 0.03 MG/1
25 TABLET ORAL DAILY
Qty: 90 TABLET | Refills: 0 | Status: SHIPPED | OUTPATIENT
Start: 2019-09-03 | End: 2019-10-25 | Stop reason: SDUPTHER

## 2019-09-03 RX ORDER — METHYLPREDNISOLONE 4 MG/1
TABLET ORAL
Qty: 21 TABLET | Refills: 0 | Status: SHIPPED | OUTPATIENT
Start: 2019-09-03 | End: 2019-09-19

## 2019-09-03 NOTE — TELEPHONE ENCOUNTER
Patients mother called, she said that no preauthorization is needed for patients synthroid  The problem with the Rx is that it cannot say, name brand, per patient request   I told her if we have any questions, we could call her

## 2019-09-04 NOTE — ED NOTES
Currently not taking any of her medications, however, states she will be going this week to pick them up       Kira Dupont RN  09/03/19 2031

## 2019-09-04 NOTE — ED PROVIDER NOTES
History  Chief Complaint   Patient presents with    Eye Drainage     Left eye with "pus" drainage for three days; eyelid and lower eyelid erythematous, sore and itchy  47-year-old female presents emergency department for evaluation of eczema flare to the extremities as well as left eye swelling itching times week  She has been using prescription allergy eyedrops which has not helped  Does wear contacts, however has not worn them in the past 2-3 days  Denies purulent discharge  Prior to Admission Medications   Prescriptions Last Dose Informant Patient Reported?  Taking?   acyclovir (ZOVIRAX) 5 % ointment  Self No No   Sig: Apply 4 times a day as needed   Patient not taking: Reported on 4/3/2019   escitalopram (LEXAPRO) 10 mg tablet   No No   Sig: Take 1 tablet (10 mg total) by mouth daily for 30 days   hydrOXYzine HCL (ATARAX) 25 mg tablet   No No   Sig: Take 1 tablet (25 mg total) by mouth every 6 (six) hours as needed for itching or anxiety   ketoconazole (NIZORAL) 2 % shampoo  Self No No   Sig: Apply 1 application topically 2 (two) times a week   Patient not taking: Reported on 1/31/2019    levothyroxine (SYNTHROID) 25 mcg tablet   No No   Sig: Take 1 tablet (25 mcg total) by mouth daily for 90 days   naproxen (NAPROSYN) 500 mg tablet   No No   Sig: Take 1 tablet (500 mg total) by mouth 2 (two) times a day with meals for 7 days   triamcinolone (KENALOG) 0 5 % ointment  Self No No   Sig: Apply topically 2 (two) times a day   Patient not taking: Reported on 4/3/2019      Facility-Administered Medications: None       Past Medical History:   Diagnosis Date    Anxiety     Disease of thyroid gland     Eczema     Glomerulonephritis     Urinary tract infection        Past Surgical History:   Procedure Laterality Date    SKIN LESION EXCISION      destruction of benign lesion    TONSILLECTOMY      TONSILLECTOMY  2006    WISDOM TOOTH EXTRACTION      WISDOM TOOTH EXTRACTION  2011       Family History Problem Relation Age of Onset    Coronary artery disease Father     Alcohol abuse Father     Drug abuse Father     Anxiety disorder Mother     Anxiety disorder Brother     Post-traumatic stress disorder Brother     Drug abuse Brother     Lung cancer Maternal Grandfather     Breast cancer Maternal Aunt     Hypertension Family     Lung cancer Family     Lymphoma Family         pancreatic    Hyperlipidemia Family         pure     I have reviewed and agree with the history as documented  Social History     Tobacco Use    Smoking status: Former Smoker     Packs/day: 0 50     Years: 3 00     Pack years: 1 50     Types: Cigarettes    Smokeless tobacco: Never Used   Substance Use Topics    Alcohol use: Yes     Frequency: 2-4 times a month     Drinks per session: 1 or 2     Binge frequency: Never     Comment: socially    Drug use: No        Review of Systems   Constitutional: Negative for chills, diaphoresis and fever  Eyes: Positive for pain  Negative for redness and visual disturbance  Respiratory: Negative for cough and shortness of breath  Cardiovascular: Negative for chest pain and palpitations  Gastrointestinal: Negative for abdominal pain, diarrhea, nausea and vomiting  Genitourinary: Negative for dysuria, flank pain and frequency  Musculoskeletal: Negative for arthralgias and myalgias  Skin: Negative for color change, rash and wound  Allergic/Immunologic: Negative for immunocompromised state  Neurological: Negative for dizziness and light-headedness  Hematological: Does not bruise/bleed easily  Psychiatric/Behavioral: Negative for confusion  The patient is not nervous/anxious  Physical Exam  Physical Exam   Constitutional: She is oriented to person, place, and time  She appears well-developed and well-nourished  No distress  HENT:   Head: Normocephalic and atraumatic     Mouth/Throat: Oropharynx is clear and moist    Eyes: Pupils are equal, round, and reactive to light  No scleral icterus  Mild infra-orbital edema without conjunctival injection/discharge   Neck: No JVD present  Cardiovascular: Normal rate and regular rhythm  Exam reveals no gallop and no friction rub  No murmur heard  Pulmonary/Chest: No respiratory distress  She has no wheezes  She has no rales  Abdominal: Soft  Bowel sounds are normal  She exhibits no distension and no mass  There is no tenderness  There is no rebound and no guarding  Musculoskeletal: She exhibits no edema  Neurological: She is alert and oriented to person, place, and time  Skin: Skin is warm and dry  Capillary refill takes less than 2 seconds  She is not diaphoretic  No pallor  Psychiatric: She has a normal mood and affect  Her behavior is normal    Vitals reviewed  Vital Signs  ED Triage Vitals [09/03/19 2027]   Temperature Pulse Resp Blood Pressure SpO2   97 6 °F (36 4 °C) 74 -- 107/75 98 %      Temp src Heart Rate Source Patient Position - Orthostatic VS BP Location FiO2 (%)   -- Monitor Sitting Right arm --      Pain Score       2           Vitals:    09/03/19 2027 09/03/19 2030   BP: 107/75 107/75   Pulse: 74 78   Patient Position - Orthostatic VS: Sitting          Visual Acuity      ED Medications  Medications - No data to display    Diagnostic Studies  Results Reviewed     None                 No orders to display              Procedures  Procedures       ED Course                               MDM  Number of Diagnoses or Management Options  Allergic conjunctivitis of left eye: new and does not require workup  Eczema: new and does not require workup  Diagnosis management comments: Patient with mild left infraorbital edema without localized signs of conjunctivitis, in conjunction with diffuse eczema outbreak    Will discharge on steroid taper as topical antihistamine drops have not       Amount and/or Complexity of Data Reviewed  Review and summarize past medical records: yes        Disposition  Final diagnoses: Allergic conjunctivitis of left eye   Eczema     Time reflects when diagnosis was documented in both MDM as applicable and the Disposition within this note     Time User Action Codes Description Comment    9/3/2019  8:36 PM Franki Fairchild Add [H10 12] Allergic conjunctivitis of left eye     9/3/2019  8:36 PM Franki Fairchild Add [L30 9] Eczema       ED Disposition     ED Disposition Condition Date/Time Comment    Discharge Stable Tue Sep 3, 2019  8:35 PM Yasmineandrew Jos discharge to home/self care  Follow-up Information     Follow up With Specialties Details Why Contact Info    Milton Nevarez, [address]   [address]  [address]  [facility]  [phone]            Discharge Medication List as of 9/3/2019  8:36 PM      START taking these medications    Details   methylPREDNISolone 4 MG tablet therapy pack Use as directed on package, Print         CONTINUE these medications which have NOT CHANGED    Details   acyclovir (ZOVIRAX) 5 % ointment Apply 4 times a day as needed, Normal      escitalopram (LEXAPRO) 10 mg tablet Take 1 tablet (10 mg total) by mouth daily for 30 days, Starting Fri 8/23/2019, Until Sun 9/22/2019, Normal      hydrOXYzine HCL (ATARAX) 25 mg tablet Take 1 tablet (25 mg total) by mouth every 6 (six) hours as needed for itching or anxiety, Starting Fri 8/23/2019, Normal      ketoconazole (NIZORAL) 2 % shampoo Apply 1 application topically 2 (two) times a week, Starting Thu 1/10/2019, Normal      levothyroxine (SYNTHROID) 25 mcg tablet Take 1 tablet (25 mcg total) by mouth daily for 90 days, Starting Tue 9/3/2019, Until Mon 12/2/2019, Normal      naproxen (NAPROSYN) 500 mg tablet Take 1 tablet (500 mg total) by mouth 2 (two) times a day with meals for 7 days, Starting Tue 2/26/2019, Until Thu 3/7/2019, Print      triamcinolone (KENALOG) 0 5 % ointment Apply topically 2 (two) times a day, Starting Thu 1/10/2019, Normal           No discharge procedures on file      ED Provider  Electronically Signed by           Lidia Maurer PA-C  09/03/19 3447

## 2019-09-09 ENCOUNTER — VBI (OUTPATIENT)
Dept: FAMILY MEDICINE CLINIC | Facility: CLINIC | Age: 24
End: 2019-09-09

## 2019-09-09 NOTE — TELEPHONE ENCOUNTER
Eda Sinclair    ED Visit Information     Ed visit date: 9/3/2019  Diagnosis Description: Allergic conjunctivitis of left eye; Eczema  In Network? Yes [facility]  Discharge status: Home  Discharged with meds ? Yes  Number of ED visits to date: 4  ED Severity:n/a     Outreach Information    Outreach successful: Yes 2  Date letter mailed:09/10/2019  Date Finalized:09/10/2019    Care Coordination    Follow up appointment with pcp: no No ED f/u appt scheduled  Transportation issues ? NA    [employer] Outreach    Emergent necessity warranted by diagnosis:  No  [facility] PCP:  Yes  Transportation:  Self Transport  09/09/2019 01:25 PM Phone ([employer]) [place] [employer] [phone] (OTHER PHONE)   Missing or Invalid Number    09/09/2019 01:31 PM Phone ([employer]) [place] [employer]) [phone] (H)   Left Message  Unable to reach patient regarding recent ED visit on 9/3 for Allergic conjunctivitis of left eye; Eczema  Patient was discharged with medication (methylprednisone) and advised to follow up with PCP  2nd attempt will be made on 9/10      09/10/2019 12:46 PM Phone ([employer]) [place] [employer] [phone] (H)   Left Message  Unable to reach patient regarding recent ED visit on 9/3 for Allergic conjunctivitis of left eye; Eczema  Patient was discharged with medication (methylprednisone) and advised to follow up with PCP  Letter generated and mailed

## 2019-09-09 NOTE — LETTER
[facility]  [address] [facility] [address] [place] [address] Drive [address] [phone]    Date: 09/10/19    Ana Parra  [address] [facility]    Dear Lyndsay Sheikh: Thank you for choosing [facility] emergency department for care  As your primary care provider we want to make sure that your ongoing medical care is being addressed  If you require follow up care as a result of your emergency department visit, there are a few things we would like you to know  As part of our continuing commitment to caring for our patient, we have added more same day appointments and have extended our office hours to meet your medical needs  After hours, on-call physicians are available via telephone  We encourage you to contact our office prior to seeking treatment to discuss your symptoms with our medical staff  Together, we can determine the correct course of action  A majority of non-emergent conditions such as: common cold, flu-like symptoms, fevers, strains/sprains, dislocations, minor burns, cuts and animal bites can be treated at [facility] facilities  Diagnostic testing is available at some sites  Of course, if you are experiencing a life threatening medical emergency call 911 or proceed directly to the nearest emergency room      Your nearest [facility] facility is conveniently located at:    [address], [facility] [address]  [address]  [phone]    Sincerely,    [facility]  Dept: [phone]

## 2019-09-12 DIAGNOSIS — F41.1 GENERALIZED ANXIETY DISORDER: ICD-10-CM

## 2019-09-12 RX ORDER — ESCITALOPRAM OXALATE 10 MG/1
TABLET ORAL
Qty: 30 TABLET | Refills: 0 | OUTPATIENT
Start: 2019-09-12

## 2019-09-13 ENCOUNTER — TELEPHONE (OUTPATIENT)
Dept: FAMILY MEDICINE CLINIC | Facility: CLINIC | Age: 24
End: 2019-09-13

## 2019-09-13 DIAGNOSIS — L30.9 ECZEMA, UNSPECIFIED TYPE: ICD-10-CM

## 2019-09-13 DIAGNOSIS — F41.1 GENERALIZED ANXIETY DISORDER: ICD-10-CM

## 2019-09-13 RX ORDER — TRIAMCINOLONE ACETONIDE 5 MG/G
OINTMENT TOPICAL 2 TIMES DAILY
Qty: 60 G | Refills: 0 | Status: CANCELLED | OUTPATIENT
Start: 2019-09-13

## 2019-09-13 RX ORDER — ESCITALOPRAM OXALATE 10 MG/1
10 TABLET ORAL DAILY
Qty: 30 TABLET | Refills: 0 | Status: SHIPPED | OUTPATIENT
Start: 2019-09-13 | End: 2019-11-20 | Stop reason: SDUPTHER

## 2019-09-13 RX ORDER — TRIAMCINOLONE ACETONIDE 5 MG/G
OINTMENT TOPICAL 2 TIMES DAILY
Qty: 60 G | Refills: 3 | Status: SHIPPED | OUTPATIENT
Start: 2019-09-13 | End: 2019-09-16 | Stop reason: SDUPTHER

## 2019-09-13 NOTE — TELEPHONE ENCOUNTER
triamcinolone (KENALOG) 0 5 % ointment [[id number]  Gets the clear cream       Pt called, she asked if she could get a big tube, because she has an outbreak all over her body  escitalopram (LEXAPRO) 10 mg tablet [[id number]    Patient called, she said that her dog got a hold of the medication  They had to take her to the vet and get her stomach pumped  She needs a new Rx for this  I told her that I wasn't sure if it can be refilled  She said she does have the paperwork from the Vet to prove what happened  I told her I would check and someone would call her back  She uses the [facility]  Thank you    [phone]

## 2019-09-16 DIAGNOSIS — L30.9 ECZEMA, UNSPECIFIED TYPE: ICD-10-CM

## 2019-09-16 RX ORDER — TRIAMCINOLONE ACETONIDE 5 MG/G
OINTMENT TOPICAL 2 TIMES DAILY
Qty: 60 G | Refills: 3 | Status: SHIPPED | OUTPATIENT
Start: 2019-09-16 | End: 2019-11-29 | Stop reason: ALTCHOICE

## 2019-09-19 ENCOUNTER — OFFICE VISIT (OUTPATIENT)
Dept: OBGYN CLINIC | Facility: CLINIC | Age: 24
End: 2019-09-19
Payer: COMMERCIAL

## 2019-09-19 VITALS
HEIGHT: 69 IN | BODY MASS INDEX: 18.93 KG/M2 | DIASTOLIC BLOOD PRESSURE: 58 MMHG | SYSTOLIC BLOOD PRESSURE: 100 MMHG | WEIGHT: 127.8 LBS

## 2019-09-19 DIAGNOSIS — N93.9 ABNORMAL UTERINE BLEEDING: ICD-10-CM

## 2019-09-19 DIAGNOSIS — N93.0 PCB (POST COITAL BLEEDING): ICD-10-CM

## 2019-09-19 DIAGNOSIS — N92.6 IRREGULAR MENSES: ICD-10-CM

## 2019-09-19 DIAGNOSIS — T19.2XXA RETAINED FOREIGN BODY OF VAGINA, INITIAL ENCOUNTER: Primary | ICD-10-CM

## 2019-09-19 PROCEDURE — 99214 OFFICE O/P EST MOD 30 MIN: CPT | Performed by: OBSTETRICS & GYNECOLOGY

## 2019-09-19 NOTE — PROGRESS NOTES
CC:  Irregular bleeding, possible BV, bleeding with intercourse    HPI: Winnie Rocha presents for several issues in regards to having irregular spotting which she feels has been gone on for the past year in addition to bleeding with intercourse on a number of occasions and possible BV infection for an entire year  The patient apparently was seen close to a year ago in the emergency room, treated for BV with metronidazole for which the patient only could tolerate a few doses  She felt it went away but it apparently is continuing  She has not been able to be seen by anyone until recently  She claims to have nausea every night for which she did tell folks in the emergency room  She denies any fever chills or backache  She states that while she does not use contraception, she has taken various pregnancy test which are negative  All the while she has been having bleeding with intercourse and irregular bleeding off and on  Past Medical History:  Past Medical History:   Diagnosis Date    Anxiety     Disease of thyroid gland     Eczema     Glomerulonephritis     Urinary tract infection        Past Surgical History:  Past Surgical History:   Procedure Laterality Date    SKIN LESION EXCISION      destruction of benign lesion    TONSILLECTOMY      TONSILLECTOMY  2006    WISDOM TOOTH EXTRACTION      WISDOM TOOTH EXTRACTION  2011       Past OB/Gyn History:  Menstrual cycles are difficult to ascertain from the patient at this time      ALLERGIES:   Allergies   Allergen Reactions    Lac Bovis     Other      Pet allergies    Bactrim [Sulfamethoxazole-Trimethoprim] Headache     migrains    Milk-Related Compounds Rash       MEDS:   Current Outpatient Medications:     escitalopram (LEXAPRO) 10 mg tablet    levothyroxine (SYNTHROID) 25 mcg tablet    triamcinolone (KENALOG) 0 5 % ointment    hydrOXYzine HCL (ATARAX) 25 mg tablet    naproxen (NAPROSYN) 500 mg tablet    Review of Systems:  Skin: No rashes or discolorations of any concern  RESP: Denies SOB, no cough  CV: Denies chest pain or palpitations  Breasts: Denies masses, pain, skin changes and nipple discharge  GI: Denies abdominal pain, heartburn, nausea, vomiting, changes in bowel habits  : Denies dysuria, frequency, CVA tenderness, incontinence and hematuria  Genitalia: Denies abnormal vaginal discharge, external lesions, rashes, pelvic pain, pressure  Patient has been having abnormal bleeding in addition to bleeding with intercourse  Rectal:  Denies pain, bleeding, hemorrhoids,    Physical Exam:  /58 (BP Location: Right arm, Patient Position: Sitting, Cuff Size: Standard)   Ht 5' 9" (1 753 m)   Wt 58 kg (127 lb 12 8 oz)   LMP 09/01/2019 (Exact Date)   BMI 18 87 kg/m²    Gen: The patient was alert and oriented x3, pleasant well-appearing female in no acute distress  Abd:  Soft, nontender, nondistended, no masses or organomegaly  Back:  No CVA tenderness, no tenderness to palpation along spine  Pelvic  Normal appearing external female genitalia, no visible lesions, no rashes  Speculum exam immediately reveals a foreign body deep within the vagina which is a tampon  This is easily removed showing no obvious lesions of the vagina, no evidence of erythema and no evidence of discharge  A Pap smear the cervix is obtained any GC and chlamydia will be run off of this  The cervix otherwise is mobile and nontender  Uterus is anteverted normal size shape consistency with no adnexal masses or tenderness  Skin:  No concerning lesions  Extremeties: No edema      Assessment & Plan:   1  Retained tampon  Patient at this time shows no evidence of toxic shock  She was advised to call the office immediately should she show any signs of this which we reviewed over the next 48 hours  2  Abnormal bleeding    [facility]   We will also await the results of the Chlamydia and gonorrhea culture  3  Post coital bleeding, most likely secondary to the retained tampon  4  Unprotected intercourse, strongly advised patient and or significant other to consider some type of birth control  I have spent 27 minutes with Patient  today in which greater than 50% of this time was spent in counseling/coordination of care regarding Diagnostic results, Prognosis, Intructions for management, Risk factor reductions and Impressions

## 2019-09-21 LAB
ALBUMIN SERPL-MCNC: 3.9 G/DL (ref 3.6–5.1)
ALBUMIN/GLOB SERPL: 1.5 (CALC) (ref 1–2.5)
ALP SERPL-CCNC: 23 U/L (ref 33–115)
ALT SERPL-CCNC: 9 U/L (ref 6–29)
AST SERPL-CCNC: 12 U/L (ref 10–30)
BILIRUB SERPL-MCNC: 0.4 MG/DL (ref 0.2–1.2)
BUN SERPL-MCNC: 17 MG/DL (ref 7–25)
BUN/CREAT SERPL: ABNORMAL (CALC) (ref 6–22)
CALCIUM SERPL-MCNC: 9.4 MG/DL (ref 8.6–10.2)
CHLORIDE SERPL-SCNC: 106 MMOL/L (ref 98–110)
CO2 SERPL-SCNC: 29 MMOL/L (ref 20–32)
CREAT SERPL-MCNC: 0.74 MG/DL (ref 0.5–1.1)
GLOBULIN SER CALC-MCNC: 2.6 G/DL (CALC) (ref 1.9–3.7)
GLUCOSE SERPL-MCNC: 93 MG/DL (ref 65–99)
POTASSIUM SERPL-SCNC: 4 MMOL/L (ref 3.5–5.3)
PROT SERPL-MCNC: 6.5 G/DL (ref 6.1–8.1)
SL AMB EGFR AFRICAN AMERICAN: 130 ML/MIN/1.73M2
SL AMB EGFR NON AFRICAN AMERICAN: 112 ML/MIN/1.73M2
SODIUM SERPL-SCNC: 139 MMOL/L (ref 135–146)
TSH SERPL-ACNC: 2.92 MIU/L

## 2019-09-22 ENCOUNTER — HOSPITAL ENCOUNTER (EMERGENCY)
Facility: HOSPITAL | Age: 24
Discharge: HOME | End: 2019-09-22
Attending: EMERGENCY MEDICINE | Admitting: EMERGENCY MEDICINE
Payer: COMMERCIAL

## 2019-09-22 VITALS
RESPIRATION RATE: 19 BRPM | DIASTOLIC BLOOD PRESSURE: 89 MMHG | SYSTOLIC BLOOD PRESSURE: 120 MMHG | TEMPERATURE: 97.9 F | HEART RATE: 78 BPM

## 2019-09-22 DIAGNOSIS — G47.00 INSOMNIA: ICD-10-CM

## 2019-09-22 DIAGNOSIS — F41.9 ANXIETY: Primary | ICD-10-CM

## 2019-09-22 PROCEDURE — 99283 EMERGENCY DEPT VISIT LOW MDM: CPT

## 2019-09-22 PROCEDURE — 93005 ELECTROCARDIOGRAM TRACING: CPT

## 2019-09-22 PROCEDURE — 99284 EMERGENCY DEPT VISIT MOD MDM: CPT | Performed by: EMERGENCY MEDICINE

## 2019-09-22 NOTE — DISCHARGE INSTRUCTIONS
Anxiety   WHAT YOU NEED TO KNOW:   Anxiety is a condition that causes you to feel extremely worried or nervous  The feelings are so strong that they can cause problems with your daily activities or sleep  Anxiety may be triggered by something you fear, or it may happen without a cause  Family or work stress, smoking, caffeine, and alcohol can increase your risk for anxiety  Certain medicines or health conditions can also increase your risk  Anxiety can become a long-term condition if it is not managed or treated  DISCHARGE INSTRUCTIONS:   Call 911 if:   · You have chest pain, tightness, or heaviness that may spread to your shoulders, arms, jaw, neck, or back  · You feel like hurting yourself or someone else  Contact your healthcare provider if:   · Your symptoms get worse or do not get better with treatment  · Your anxiety keeps you from doing your regular daily activities  · You have new symptoms since your last visit  · You have questions or concerns about your condition or care  Medicines:   · Medicines  may be given to help you feel more calm and relaxed, and decrease your symptoms  · Take your medicine as directed  Contact your healthcare provider if you think your medicine is not helping or if you have side effects  Tell him of her if you are allergic to any medicine  Keep a list of the medicines, vitamins, and herbs you take  Include the amounts, and when and why you take them  Bring the list or the pill bottles to follow-up visits  Carry your medicine list with you in case of an emergency  Follow up with your healthcare provider within 2 weeks or as directed:  Write down your questions so you remember to ask them during your visits  Manage anxiety:   · Talk to someone about your anxiety  Your healthcare provider may suggest counseling  Cognitive behavioral therapy can help you understand and change how you react to events that trigger your symptoms   You might feel more comfortable talking with a friend or family member about your anxiety  Choose someone you know will be supportive and encouraging  · Find ways to relax  Activities such as exercise, meditation, or listening to music can help you relax  Spend time with friends, or do things you enjoy  · Practice deep breathing  Deep breathing can help you relax when you feel anxious  Focus on taking slow, deep breaths several times a day, or during an anxiety attack  Breathe in through your nose and out through your mouth  · Create a regular sleep routine  Regular sleep can help you feel calmer during the day  Go to sleep and wake up at the same times every day  Do not watch television or use the computer right before bed  Your room should be comfortable, dark, and quiet  · Eat a variety of healthy foods  Healthy foods include fruits, vegetables, low-fat dairy products, lean meats, fish, whole-grain breads, and cooked beans  Healthy foods can help you feel less anxious and have more energy  · Exercise regularly  Exercise can increase your energy level  Exercise may also lift your mood and help you sleep better  Your healthcare provider can help you create an exercise plan  · Do not smoke  Nicotine and other chemicals in cigarettes and cigars can increase anxiety  Ask your healthcare provider for information if you currently smoke and need help to quit  E-cigarettes or smokeless tobacco still contain nicotine  Talk to your healthcare provider before you use these products  · Do not have caffeine  Caffeine can make your symptoms worse  Do not have foods or drinks that are meant to increase your energy level  · Limit or do not drink alcohol  Ask your healthcare provider if alcohol is safe for you  You may not be able to drink alcohol if you take certain anxiety or depression medicines  Limit alcohol to 1 drink per day if you are a woman  Limit alcohol to 2 drinks per day if you are a man   A drink of alcohol is 12 ounces of beer, 5 ounces of wine, or 1½ ounces of liquor  · Do not use drugs  Drugs can make your anxiety worse  It can also make anxiety hard to manage  Talk to your healthcare provider if you use drugs and want help to quit  © 2017 [address] [employer] Information is for End User's use only and may not be sold, redistributed or otherwise used for commercial purposes  All illustrations and images included in CareNotes® are the copyrighted property of [employer]  or [employer]  The above information is an  only  It is not intended as medical advice for individual conditions or treatments  Talk to your doctor, nurse or pharmacist before following any medical regimen to see if it is safe and effective for you  Insomnia   WHAT YOU NEED TO KNOW:   Insomnia is a condition that makes it hard to fall or stay asleep  Lack of sleep can lead to attention or memory problems during the day  You may also be monae, depressed, clumsy, or have headaches  DISCHARGE INSTRUCTIONS:   Contact your healthcare provider if:   · Your symptoms do not get better, or they get worse  · You begin to use drugs or alcohol to fall asleep  · You have questions or concerns about your condition or care  Medicines:   · Medicines  may help you sleep more regularly or help you feel less anxious  · Take your medicine as directed  Contact your healthcare provider if you think your medicine is not helping or if you have side effects  Tell him or her if you are allergic to any medicine  Keep a list of the medicines, vitamins, and herbs you take  Include the amounts, and when and why you take them  Bring the list or the pill bottles to follow-up visits  Carry your medicine list with you in case of an emergency  What you can do to improve your sleep:   · Create a sleep schedule  This will help you form a sleep routine   Keep a record of your sleep patterns, and any sleeping problems you have  Bring the record to follow-up visits with healthcare providers  · Do not take naps  Naps could make it hard for you to fall asleep at bedtime  · Keep your bedroom cool, quiet, and dark  Turn on white noise, such as a fan, to help you relax  Do not use your bed for any activity that will keep you awake  Do not read, exercise, eat, or watch TV in your bedroom  · Get up if you do not fall asleep within 20 minutes  Move to another room and do something relaxing until you become sleepy  · Limit caffeine, alcohol, and food to earlier in the day  Only drink caffeine in the morning  Do not drink alcohol within 6 hours of bedtime  Do not eat a heavy meal right before you go to bed  · Exercise regularly  Daily exercise may help you sleep better  Do not exercise within 4 hours of bedtime  Follow up with your healthcare provider as directed: Your healthcare provider may refer you for cognitive behavioral therapy  A behavioral therapist may help you find ways to relax, decrease stress, and improve sleep  Write down your questions so you remember to ask them during your visits  © 2017 [address] [facility] Information is for End User's use only and may not be sold, redistributed or otherwise used for commercial purposes  All illustrations and images included in CareNotes® are the copyrighted property of [employer]  or [employer]  The above information is an  only  It is not intended as medical advice for individual conditions or treatments  Talk to your doctor, nurse or pharmacist before following any medical regimen to see if it is safe and effective for you

## 2019-09-22 NOTE — ED PROVIDER NOTES
History  Chief Complaint   Patient presents with    Anxiety     Pt states she is having an acisty attack  Pt states she was worried she was having an heart attack, pt denies any chets pain, sob, jaw pain  66-year-old female presents for evaluation of palpitations and anxiety  Patient states that she has had increased stress regarding her health as well as at home  She states she is currently being evaluated by her gynecologist after he retained tampon was removed and has outpatient labs that are pending  She states that she was unable to sleep tonight due to feeling like her heart was racing and the sensation of elevated blood pressure    Patient states that she does not have means of checking her blood pressure at home; however, feels that it must have been elevated as her pulse was very strong in her neck  Patient states that she has history of anxiety with prior panic attacks, but feels that this episode is slightly different than her prior episodes  History provided by:  Patient  Panic Attack   Presenting symptoms: agitation    Degree of incapacity (severity):  Mild  Onset quality:  Gradual  Timing:  Constant  Progression:  Waxing and waning  Chronicity:  Recurrent  Context: stressful life event    Treatment compliance:  Most of the time  Relieved by:  Nothing  Worsened by:  Lack of sleep (External stressors)  Ineffective treatments:  Antidepressants  Associated symptoms: anxiety, insomnia and irritability    Associated symptoms: no abdominal pain, no appetite change, no chest pain and no headaches        Prior to Admission Medications   Prescriptions Last Dose Informant Patient Reported?  Taking?   escitalopram (LEXAPRO) 10 mg tablet   No No   Sig: Take 1 tablet (10 mg total) by mouth daily   hydrOXYzine HCL (ATARAX) 25 mg tablet   No No   Sig: Take 1 tablet (25 mg total) by mouth every 6 (six) hours as needed for itching or anxiety   Patient not taking: Reported on 9/19/2019   levothyroxine (SYNTHROID) 25 mcg tablet   No No   Sig: Take 1 tablet (25 mcg total) by mouth daily for 90 days   naproxen (NAPROSYN) 500 mg tablet   No No   Sig: Take 1 tablet (500 mg total) by mouth 2 (two) times a day with meals for 7 days   triamcinolone (KENALOG) 0 5 % ointment   No No   Sig: Apply topically 2 (two) times a day      Facility-Administered Medications: None       Past Medical History:   Diagnosis Date    Anxiety     Disease of thyroid gland     Eczema     Glomerulonephritis     Urinary tract infection        Past Surgical History:   Procedure Laterality Date    SKIN LESION EXCISION      destruction of benign lesion    TONSILLECTOMY      TONSILLECTOMY  2006    WISDOM TOOTH EXTRACTION      WISDOM TOOTH EXTRACTION  2011       Family History   Problem Relation Age of Onset    Coronary artery disease Father     Alcohol abuse Father     Drug abuse Father     Anxiety disorder Mother     Anxiety disorder Brother     Post-traumatic stress disorder Brother     Drug abuse Brother     Lung cancer Maternal Grandfather     Breast cancer Maternal Aunt     Hypertension Family     Lung cancer Family     Lymphoma Family         pancreatic    Hyperlipidemia Family         pure     I have reviewed and agree with the history as documented  Social History     Tobacco Use    Smoking status: Former Smoker     Packs/day: 0 50     Years: 3 00     Pack years: 1 50     Types: Cigarettes    Smokeless tobacco: Never Used   Substance Use Topics    Alcohol use: Yes     Frequency: 2-4 times a month     Drinks per session: 1 or 2     Binge frequency: Never     Comment: socially    Drug use: No        Review of Systems   Constitutional: Positive for irritability  Negative for appetite change, chills and fever  HENT: Negative for congestion, rhinorrhea and sore throat  Respiratory: Negative for cough, chest tightness and shortness of breath  Cardiovascular: Positive for palpitations   Negative for chest pain and leg swelling  Gastrointestinal: Negative for abdominal pain, constipation, diarrhea, nausea and vomiting  Genitourinary: Negative for dysuria, frequency and hematuria  Musculoskeletal: Negative for myalgias, neck pain and neck stiffness  Skin: Negative for pallor  Neurological: Negative for syncope, weakness and headaches  Psychiatric/Behavioral: Positive for agitation  The patient is nervous/anxious and has insomnia  All other systems reviewed and are negative  Physical Exam  Physical Exam   Constitutional: She is oriented to person, place, and time  She appears well-developed and well-nourished  Non-toxic appearance  No distress  HENT:   Head: Normocephalic and atraumatic  Eyes: Pupils are equal, round, and reactive to light  Conjunctivae and EOM are normal    Neck: Normal range of motion  Neck supple  No tracheal deviation present  No thyromegaly present  Cardiovascular: Normal rate, regular rhythm, normal heart sounds and intact distal pulses  Pulmonary/Chest: Effort normal and breath sounds normal    Abdominal: Soft  Bowel sounds are normal  She exhibits no distension  There is no tenderness  Lymphadenopathy:     She has no cervical adenopathy  Neurological: She is alert and oriented to person, place, and time  Skin: Skin is warm and dry  She is not diaphoretic  Psychiatric: Her mood appears anxious  Nursing note and vitals reviewed        Vital Signs  ED Triage Vitals [09/22/19 0549]   Temperature Pulse Respirations Blood Pressure SpO2   97 9 °F (36 6 °C) 78 19 120/89 --      Temp Source Heart Rate Source Patient Position - Orthostatic VS BP Location FiO2 (%)   Tympanic Monitor Sitting Right arm --      Pain Score       --           Vitals:    09/22/19 0549   BP: 120/89   Pulse: 78   Patient Position - Orthostatic VS: Sitting         Visual Acuity  Visual Acuity      Most Recent Value   L Pupil Size (mm)  3   R Pupil Size (mm)  3          ED Medications  Medications - No data to display    Diagnostic Studies  Results Reviewed     None                 No orders to display              Procedures  ECG 12 Lead Documentation Only  Date/Time: 9/22/2019 5:53 AM  Performed by: Christiano Esteves MD  Authorized by: Christiano Esteves MD     Indications / Diagnosis:  Palpitations  Patient location:  ED  Previous ECG:     Previous ECG:  Unavailable  Interpretation:     Interpretation: normal    Rate:     ECG rate:  61    ECG rate assessment: normal    Rhythm:     Rhythm: sinus rhythm    Ectopy:     Ectopy: none    QRS:     QRS axis:  Normal    QRS intervals:  Normal  Conduction:     Conduction: normal    ST segments:     ST segments:  Normal  T waves:     T waves: inverted      Inverted:  AVL           ED Course                               MDM  Number of Diagnoses or Management Options  Anxiety: new and requires workup  Insomnia: new and requires workup  Diagnosis management comments: 18-year-old female presents for evaluation of anxiety and palpitations  EKG unremarkable  Normal blood pressure  Patient reassured  Advised at continue following up with her gynecologist regarding her outpatient labs  Discussed return precautions with patient  Patient Progress  Patient progress: stable      Disposition  Final diagnoses:   Anxiety   Insomnia     Time reflects when diagnosis was documented in both MDM as applicable and the Disposition within this note     Time User Action Codes Description Comment    9/22/2019  6:09 AM Evone Hals Add [F41 9] Anxiety     9/22/2019  6:09 AM Evone Hals Add [G47 00] Insomnia       ED Disposition     ED Disposition Condition Date/Time Comment    Discharge Stable Sun Sep 22, 2019  6:09 AM Mary Jane Arguello discharge to home/self care              Follow-up Information     Follow up With Specialties Details Why Contact Info Additional [facility],  Family Medicine Schedule an appointment as soon as possible for a visit in 1 week for re-evaluation if your anxiety is not improving [address] [facility] Emergency Department Emergency Medicine Go to  If symptoms worsen [address]  [address] ED, [address], [place], [place], [address]          Discharge Medication List as of 9/22/2019  6:10 AM      CONTINUE these medications which have NOT CHANGED    Details   escitalopram (LEXAPRO) 10 mg tablet Take 1 tablet (10 mg total) by mouth daily, Starting Fri 9/13/2019, Until Sun 10/13/2019, Normal      hydrOXYzine HCL (ATARAX) 25 mg tablet Take 1 tablet (25 mg total) by mouth every 6 (six) hours as needed for itching or anxiety, Starting Fri 8/23/2019, Normal      levothyroxine (SYNTHROID) 25 mcg tablet Take 1 tablet (25 mcg total) by mouth daily for 90 days, Starting Tue 9/3/2019, Until Mon 12/2/2019, Normal      naproxen (NAPROSYN) 500 mg tablet Take 1 tablet (500 mg total) by mouth 2 (two) times a day with meals for 7 days, Starting Tue 2/26/2019, Until Thu 3/7/2019, Print      triamcinolone (KENALOG) 0 5 % ointment Apply topically 2 (two) times a day, Starting Mon 9/16/2019, Normal           No discharge procedures on file      ED Provider  Electronically Signed by           Naldo Zaldivar MD  09/22/19 6795

## 2019-09-23 LAB
ATRIAL RATE: 61 BPM
P AXIS: 75 DEGREES
PR INTERVAL: 154 MS
QRS AXIS: 82 DEGREES
QRSD INTERVAL: 88 MS
QT INTERVAL: 422 MS
QTC INTERVAL: 424 MS
T WAVE AXIS: 72 DEGREES
VENTRICULAR RATE: 61 BPM

## 2019-09-23 PROCEDURE — 93010 ELECTROCARDIOGRAM REPORT: CPT | Performed by: INTERNAL MEDICINE

## 2019-09-25 LAB
C TRACH RRNA CVX QL NAA+PROBE: NEGATIVE
CYTOLOGIST CVX/VAG CYTO: NORMAL
DX ICD CODE: NORMAL
Lab: NORMAL
N GONORRHOEA RRNA CVX QL NAA+PROBE: NEGATIVE
OTHER STN SPEC: NORMAL
OTHER STN SPEC: NORMAL
PATH REPORT.FINAL DX SPEC: NORMAL
SL AMB NOTE:: NORMAL
SL AMB SPECIMEN ADEQUACY: NORMAL
SL AMB TEST METHODOLOGY: NORMAL

## 2019-10-08 ENCOUNTER — PROCEDURE VISIT (OUTPATIENT)
Dept: OBGYN CLINIC | Facility: CLINIC | Age: 24
End: 2019-10-08
Payer: COMMERCIAL

## 2019-10-08 ENCOUNTER — TELEPHONE (OUTPATIENT)
Dept: OBGYN CLINIC | Facility: CLINIC | Age: 24
End: 2019-10-08

## 2019-10-08 DIAGNOSIS — N93.9 ABNORMAL UTERINE BLEEDING: Primary | ICD-10-CM

## 2019-10-08 PROCEDURE — 99213 OFFICE O/P EST LOW 20 MIN: CPT | Performed by: OBSTETRICS & GYNECOLOGY

## 2019-10-08 NOTE — PROGRESS NOTES
Patient was scheduled today for a sonohysterogram with possible endometrial sampling because of a year history of abnormal uterine bleeding  The patient who stated her problem recently improved declined to have the testing done despite my recommendations that she have this performed  The patient adamantly refused and left the office without any further ability to discuss her situation with her

## 2019-10-09 ENCOUNTER — TELEPHONE (OUTPATIENT)
Dept: OBGYN CLINIC | Facility: CLINIC | Age: 24
End: 2019-10-09

## 2019-10-09 NOTE — TELEPHONE ENCOUNTER
----- Message from Leon Coates MA sent at 10/9/2019  1:20 PM EDT -----  Regarding: sono  Patient refused sono please follow up with patient and explain the reasoning for the sono (tampon in for a year) and how it is needed  Patient came in and then didn't want to do it because she was "uninformed" of what was going to happen  Brigida Monique would like this to be done  He said he would follow up with you on it

## 2019-10-14 ENCOUNTER — TELEPHONE (OUTPATIENT)
Dept: OBGYN CLINIC | Facility: CLINIC | Age: 24
End: 2019-10-14

## 2019-10-14 NOTE — TELEPHONE ENCOUNTER
----- Message from Celso Hilario MA sent at 10/9/2019  1:20 PM EDT -----  Regarding: sono  Patient refused sono please follow up with patient and explain the reasoning for the sono (tampon in for a year) and how it is needed  Patient came in and then didn't want to do it because she was "uninformed" of what was going to happen  Florin Mcmanus would like this to be done  He said he would follow up with you on it

## 2019-10-17 ENCOUNTER — OFFICE VISIT (OUTPATIENT)
Dept: URGENT CARE | Facility: CLINIC | Age: 24
End: 2019-10-17
Payer: COMMERCIAL

## 2019-10-17 VITALS
WEIGHT: 127.2 LBS | HEART RATE: 71 BPM | TEMPERATURE: 98.7 F | HEIGHT: 69 IN | SYSTOLIC BLOOD PRESSURE: 118 MMHG | RESPIRATION RATE: 16 BRPM | OXYGEN SATURATION: 98 % | DIASTOLIC BLOOD PRESSURE: 60 MMHG | BODY MASS INDEX: 18.84 KG/M2

## 2019-10-17 DIAGNOSIS — R10.12 LEFT UPPER QUADRANT PAIN: Primary | ICD-10-CM

## 2019-10-17 PROCEDURE — 99283 EMERGENCY DEPT VISIT LOW MDM: CPT | Performed by: PHYSICIAN ASSISTANT

## 2019-10-17 PROCEDURE — G0382 LEV 3 HOSP TYPE B ED VISIT: HCPCS | Performed by: PHYSICIAN ASSISTANT

## 2019-10-17 NOTE — PROGRESS NOTES
[address] [facility]        NAME: Whit Jamison is a 30 y o  female  : 1995    MRN: [id number]  DATE: 2019  TIME: 6:49 PM    Assessment and Plan   Left upper quadrant pain [R10 12]  1  Left upper quadrant pain           Patient Instructions     Recommend supportive treatment at this time - bland diet, fluids, heating pad, OTC tylenol as needed  Follow up with PCP in 3-5 days  Proceed to  ER if symptoms worsen  Chief Complaint     Chief Complaint   Patient presents with    Abdominal Pain     left side, under rib cage; fatigue and increased pain with raising arm up; x1 day; denies fevers         History of Present Illness       Patient presents with complaint of LUQ/rib pain since yesterday  She reports that her only other symptom is increased fatigue but states that 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  Pt denies any injury, increase in physical activity, or event that provoked the pain  Pt denies fever, chills, night sweats, chest pain, dyspnea, heartburn, nausea, vomiting, diarrhea, constipation, blood in stool, back pain, and dysuria  She denies trying any palliative measures  Review of Systems   Review of Systems   Constitutional: Negative for chills, fatigue and fever  Respiratory: Negative for cough, chest tightness, shortness of breath and wheezing  Cardiovascular: Negative for chest pain and palpitations  Gastrointestinal: Positive for abdominal pain  Negative for blood in stool, constipation, diarrhea, nausea and vomiting  Musculoskeletal: Negative for back pain, myalgias and neck pain  Skin: Negative for color change, rash and wound  Neurological: Negative for headaches  All other systems reviewed and are negative          Current Medications       Current Outpatient Medications:     levothyroxine (SYNTHROID) 25 mcg tablet, Take 1 tablet (25 mcg total) by mouth daily for 90 days, Disp: 90 tablet, Rfl: 0    triamcinolone (KENALOG) 0 5 % ointment, Apply topically 2 (two) times a day, Disp: 60 g, Rfl: 3    escitalopram (LEXAPRO) 10 mg tablet, Take 1 tablet (10 mg total) by mouth daily, Disp: 30 tablet, Rfl: 0    hydrOXYzine HCL (ATARAX) 25 mg tablet, Take 1 tablet (25 mg total) by mouth every 6 (six) hours as needed for itching or anxiety (Patient not taking: Reported on 9/19/2019), Disp: 30 tablet, Rfl: 0    naproxen (NAPROSYN) 500 mg tablet, Take 1 tablet (500 mg total) by mouth 2 (two) times a day with meals for 7 days, Disp: 14 tablet, Rfl: 0    Current Allergies     Allergies as of 10/17/2019 - Reviewed 10/17/2019   Allergen Reaction Noted    Lac bovis  10/02/2015    Other  11/14/2013    Bactrim [sulfamethoxazole-trimethoprim] Headache 05/10/2018    Milk-related compounds Rash 02/26/2019            The following portions of the patient's history were reviewed and updated as appropriate: allergies, current medications, past family history, past medical history, past social history, past surgical history and problem list      Past Medical History:   Diagnosis Date    Anxiety     Disease of thyroid gland     Eczema     Glomerulonephritis     Urinary tract infection        Past Surgical History:   Procedure Laterality Date    SKIN LESION EXCISION      destruction of benign lesion    TONSILLECTOMY      TONSILLECTOMY  2006    WISDOM TOOTH EXTRACTION      WISDOM TOOTH EXTRACTION  2011       Family History   Problem Relation Age of Onset    Coronary artery disease Father     Alcohol abuse Father     Drug abuse Father     Anxiety disorder Mother     Anxiety disorder Brother     Post-traumatic stress disorder Brother     Drug abuse Brother     Lung cancer Maternal Grandfather     Breast cancer Maternal Aunt     Hypertension Family     Lung cancer Family     Lymphoma Family         pancreatic    Hyperlipidemia Family         pure         Medications have been verified          Objective   /60   Pulse 71   Temp 98 7 °F (37 1 °C) (Tympanic)   Resp 16   Ht 5' 9" (1 753 m)   Wt 57 7 kg (127 lb 3 2 oz)   SpO2 98%   BMI 18 78 kg/m²        Physical Exam     Physical Exam   Constitutional: She is oriented to person, place, and time  She appears well-developed and well-nourished  Non-toxic appearance  She does not appear ill  No distress  HENT:   Head: Normocephalic and atraumatic  Eyes: Pupils are equal, round, and reactive to light  Conjunctivae and EOM are normal    Neck: Normal range of motion  Neck supple  Cardiovascular: Normal rate, regular rhythm and normal heart sounds  Pulmonary/Chest: Effort normal and breath sounds normal  No stridor  No respiratory distress  She has no wheezes  She has no rales  Abdominal: Soft  Normal appearance and bowel sounds are normal  She exhibits no shifting dullness, no distension, no fluid wave, no ascites and no mass  There is no hepatosplenomegaly  There is tenderness in the left upper quadrant  There is no rigidity, no rebound, no tenderness at McBurney's point and negative Prince's sign  TTP over LUQ and distal ribs   Lymphadenopathy:     She has no cervical adenopathy  Neurological: She is alert and oriented to person, place, and time  No cranial nerve deficit or sensory deficit  Skin: Skin is warm and dry  Capillary refill takes less than 2 seconds  No rash noted  She is not diaphoretic  Psychiatric: She has a normal mood and affect  Her behavior is normal  Thought content normal    Nursing note and vitals reviewed

## 2019-10-25 ENCOUNTER — OFFICE VISIT (OUTPATIENT)
Dept: FAMILY MEDICINE CLINIC | Facility: CLINIC | Age: 24
End: 2019-10-25
Payer: COMMERCIAL

## 2019-10-25 VITALS
RESPIRATION RATE: 15 BRPM | BODY MASS INDEX: 18.63 KG/M2 | WEIGHT: 125.8 LBS | HEART RATE: 64 BPM | DIASTOLIC BLOOD PRESSURE: 74 MMHG | TEMPERATURE: 97.1 F | SYSTOLIC BLOOD PRESSURE: 108 MMHG | HEIGHT: 69 IN | OXYGEN SATURATION: 98 %

## 2019-10-25 DIAGNOSIS — E03.9 HYPOTHYROIDISM, UNSPECIFIED TYPE: Primary | ICD-10-CM

## 2019-10-25 DIAGNOSIS — R11.2 INTRACTABLE VOMITING WITH NAUSEA, UNSPECIFIED VOMITING TYPE: ICD-10-CM

## 2019-10-25 DIAGNOSIS — F41.1 GENERALIZED ANXIETY DISORDER: ICD-10-CM

## 2019-10-25 DIAGNOSIS — J11.1 INFLUENZA-LIKE SYNDROME: ICD-10-CM

## 2019-10-25 PROCEDURE — 99214 OFFICE O/P EST MOD 30 MIN: CPT | Performed by: FAMILY MEDICINE

## 2019-10-25 RX ORDER — LEVOTHYROXINE SODIUM 0.03 MG/1
25 TABLET ORAL DAILY
Qty: 90 TABLET | Refills: 1 | Status: SHIPPED | OUTPATIENT
Start: 2019-10-25 | End: 2019-12-02 | Stop reason: ALTCHOICE

## 2019-10-25 RX ORDER — ONDANSETRON 4 MG/1
4 TABLET, ORALLY DISINTEGRATING ORAL EVERY 8 HOURS PRN
Qty: 20 TABLET | Refills: 0 | Status: SHIPPED | OUTPATIENT
Start: 2019-10-25 | End: 2019-11-29 | Stop reason: ALTCHOICE

## 2019-10-25 NOTE — LETTER
October 25, 2019     Patient: Lencho Young   YOB: 1995   Date of Visit: 10/25/2019       To Whom it May Concern:    Lencho Young is under my professional care  She was seen in my office on 10/25/2019  She may return to work on 10/28/19  If you have any questions or concerns, please don't hesitate to call           Sincerely,          Denia Villa DO        CC: No Recipients

## 2019-10-25 NOTE — PROGRESS NOTES
Yancy Garcia 1995 female MRN: [id number]    Family Medicine Follow-up Visit    ASSESSMENT/PLAN  Problem List Items Addressed This Visit        Digestive    Nausea & vomiting    Relevant Medications    ondansetron (ZOFRAN-ODT) 4 mg disintegrating tablet       Endocrine    Hypothyroidism - Primary     Continue levothyroxine 25 mcg daily            Relevant Medications    levothyroxine (SYNTHROID) 25 mcg tablet       Other    Generalized anxiety disorder     She did not start medication as prescribed last visit  She says that since she started thyroid medication her anxiety is much better  Will continue to monitor          Influenza-like syndrome     Advised supportive care   Continue increased hydration, rest, OTC medication use   Follow up if new or worsening symptoms                Follow up in 3 months or sooner if needed     Future Appointments   Date Time Provider Ashley Patino   1/27/2020 11:00 AM DO EDWIGE [facility]   1/31/2020 11:00 AM DO EDWIGE Levine [facility]          SUBJECTIVE  CC: Follow-up (medication check up-pt did not start anti-depressant med); Vomiting (for 1 day ); Fever (for 3 days ); Chills; and sinus congestion      HPI:  Yancy Garcia is a 30 y o  female who presents for follow up  Sick: states vomiting, diarrhea, x2 days, body aches, throat is sore  States she had a fever the first night  Taking ibuprofen, OTC cough and congestion     Taking her thyroid medication and this is feeling good  States her anxiety is much better now that she is taking her thyroid medication, she was afraid of side effects and so she didn't take it       HPI    Review of Systems   Constitutional: Negative for chills, fatigue and fever  HENT: Positive for congestion  Negative for postnasal drip, rhinorrhea and sinus pressure  Eyes: Negative for photophobia and visual disturbance  Respiratory: Negative for cough and shortness of breath      Cardiovascular: Negative for chest pain, palpitations and leg swelling  Gastrointestinal: Positive for nausea and vomiting  Negative for abdominal pain, constipation and diarrhea  Genitourinary: Negative for difficulty urinating and dysuria  Musculoskeletal: Negative for arthralgias and myalgias  Skin: Negative for color change and rash  Neurological: Negative for dizziness, weakness, light-headedness and headaches         Historical Information   The patient history was reviewed as follows:    Past Medical History:   Diagnosis Date    Anxiety     Disease of thyroid gland     Eczema     Glomerulonephritis     Urinary tract infection      Past Surgical History:   Procedure Laterality Date    SKIN LESION EXCISION      destruction of benign lesion    TONSILLECTOMY      TONSILLECTOMY  2006    WISDOM TOOTH EXTRACTION      WISDOM TOOTH EXTRACTION  2011     Family History   Problem Relation Age of Onset    Coronary artery disease Father     Alcohol abuse Father     Drug abuse Father     Anxiety disorder Mother     Anxiety disorder Brother     Post-traumatic stress disorder Brother     Drug abuse Brother     Lung cancer Maternal Grandfather     Breast cancer Maternal Aunt     Hypertension Family     Lung cancer Family     Lymphoma Family         pancreatic    Hyperlipidemia Family         pure      Social History   Social History     Substance and Sexual Activity   Alcohol Use Yes    Frequency: 2-4 times a month    Drinks per session: 1 or 2    Binge frequency: Never    Comment: socially     Social History     Substance and Sexual Activity   Drug Use No     Social History     Tobacco Use   Smoking Status Former Smoker    Packs/day: 0 50    Years: 3 00    Pack years: 1 50    Types: Cigarettes   Smokeless Tobacco Never Used       Medications:     Current Outpatient Medications:     levothyroxine (SYNTHROID) 25 mcg tablet, Take 1 tablet (25 mcg total) by mouth daily, Disp: 90 tablet, Rfl: 1   escitalopram (LEXAPRO) 10 mg tablet, Take 1 tablet (10 mg total) by mouth daily (Patient not taking: Reported on 10/25/2019), Disp: 30 tablet, Rfl: 0    hydrOXYzine HCL (ATARAX) 25 mg tablet, Take 1 tablet (25 mg total) by mouth every 6 (six) hours as needed for itching or anxiety (Patient not taking: Reported on 9/19/2019), Disp: 30 tablet, Rfl: 0    ondansetron (ZOFRAN-ODT) 4 mg disintegrating tablet, Take 1 tablet (4 mg total) by mouth every 8 (eight) hours as needed for nausea or vomiting, Disp: 20 tablet, Rfl: 0    triamcinolone (KENALOG) 0 5 % ointment, Apply topically 2 (two) times a day (Patient not taking: Reported on 10/25/2019), Disp: 60 g, Rfl: 3  Allergies   Allergen Reactions    Lac Bovis     Other      Pet allergies    Bactrim [Sulfamethoxazole-Trimethoprim] Headache     migrains    Milk-Related Compounds Rash       OBJECTIVE    Vitals:   Vitals:    10/25/19 1046   BP: 108/74   BP Location: Left arm   Patient Position: Sitting   Cuff Size: Standard   Pulse: 64   Resp: 15   Temp: (!) 97 1 °F (36 2 °C)   TempSrc: Tympanic   SpO2: 98%   Weight: 57 1 kg (125 lb 12 8 oz)   Height: 5' 9" (1 753 m)           Physical Exam   Constitutional: She is oriented to person, place, and time  She appears well-developed and well-nourished  HENT:   Head: Normocephalic and atraumatic  Nose: Rhinorrhea present  Mouth/Throat: Oropharynx is clear and moist    Eyes: Pupils are equal, round, and reactive to light  Neck: Normal range of motion  Neck supple  Cardiovascular: Normal rate, regular rhythm and normal heart sounds  Pulmonary/Chest: Effort normal and breath sounds normal  No respiratory distress  She has no wheezes  Abdominal: Soft  Bowel sounds are normal  She exhibits no distension  There is no tenderness  Musculoskeletal: Normal range of motion  She exhibits no edema or tenderness  Neurological: She is alert and oriented to person, place, and time  Skin: Skin is warm and dry  Psychiatric: She has a normal mood and affect   Her behavior is normal               Annmarie Kay DO    10/25/2019

## 2019-10-25 NOTE — ASSESSMENT & PLAN NOTE
She did not start medication as prescribed last visit  She says that since she started thyroid medication her anxiety is much better  Will continue to monitor

## 2019-10-25 NOTE — ASSESSMENT & PLAN NOTE
Advised supportive care   Continue increased hydration, rest, OTC medication use   Follow up if new or worsening symptoms

## 2019-10-29 ENCOUNTER — APPOINTMENT (EMERGENCY)
Dept: RADIOLOGY | Facility: HOSPITAL | Age: 24
End: 2019-10-29
Payer: COMMERCIAL

## 2019-10-29 ENCOUNTER — HOSPITAL ENCOUNTER (EMERGENCY)
Facility: HOSPITAL | Age: 24
Discharge: HOME | End: 2019-10-30
Attending: EMERGENCY MEDICINE
Payer: COMMERCIAL

## 2019-10-29 VITALS
OXYGEN SATURATION: 98 % | WEIGHT: 127 LBS | HEIGHT: 69 IN | BODY MASS INDEX: 18.81 KG/M2 | DIASTOLIC BLOOD PRESSURE: 79 MMHG | SYSTOLIC BLOOD PRESSURE: 129 MMHG | TEMPERATURE: 98.1 F | HEART RATE: 85 BPM | RESPIRATION RATE: 19 BRPM

## 2019-10-29 DIAGNOSIS — R05.8 POST-VIRAL COUGH SYNDROME: Primary | ICD-10-CM

## 2019-10-29 DIAGNOSIS — R21 RASH: ICD-10-CM

## 2019-10-29 LAB
EXT PREG TEST URINE: NEGATIVE
EXT. CONTROL ED NAV: NORMAL

## 2019-10-29 PROCEDURE — 81025 URINE PREGNANCY TEST: CPT | Performed by: EMERGENCY MEDICINE

## 2019-10-29 PROCEDURE — 99283 EMERGENCY DEPT VISIT LOW MDM: CPT

## 2019-10-29 PROCEDURE — 71046 X-RAY EXAM CHEST 2 VIEWS: CPT

## 2019-10-29 PROCEDURE — 99284 EMERGENCY DEPT VISIT MOD MDM: CPT | Performed by: EMERGENCY MEDICINE

## 2019-10-29 RX ORDER — ALBUTEROL SULFATE 90 UG/1
2 AEROSOL, METERED RESPIRATORY (INHALATION) ONCE
Status: COMPLETED | OUTPATIENT
Start: 2019-10-29 | End: 2019-10-29

## 2019-10-29 RX ADMIN — ALBUTEROL SULFATE 2 PUFF: 90 AEROSOL, METERED RESPIRATORY (INHALATION) at 23:18

## 2019-10-30 ENCOUNTER — TELEPHONE (OUTPATIENT)
Dept: FAMILY MEDICINE CLINIC | Facility: CLINIC | Age: 24
End: 2019-10-30

## 2019-10-30 DIAGNOSIS — J06.9 VIRAL URI WITH COUGH: Primary | ICD-10-CM

## 2019-10-30 RX ORDER — BENZONATATE 100 MG/1
100 CAPSULE ORAL 3 TIMES DAILY PRN
Qty: 20 CAPSULE | Refills: 0 | Status: SHIPPED | OUTPATIENT
Start: 2019-10-30 | End: 2019-11-29 | Stop reason: ALTCHOICE

## 2019-10-30 RX ORDER — PROMETHAZINE HYDROCHLORIDE 6.25 MG/5ML
6.25 SYRUP ORAL 4 TIMES DAILY PRN
Qty: 120 ML | Refills: 0 | Status: SHIPPED | OUTPATIENT
Start: 2019-10-30 | End: 2019-11-29 | Stop reason: ALTCHOICE

## 2019-10-30 NOTE — DISCHARGE INSTRUCTIONS
Use the albuterol inhaler every 6-8 hours as needed for chest tightness or shortness of breath  Acute Cough   WHAT YOU NEED TO KNOW:   An acute cough can last up to 3 weeks  Common causes of an acute cough include a cold, allergies, or a lung infection  DISCHARGE INSTRUCTIONS:   Return to the emergency department if:   You have trouble breathing or feel short of breath  You cough up blood, or you see blood in your mucus  You faint or feel weak or dizzy  You have chest pain when you cough or take a deep breath  You have new wheezing  Contact your healthcare provider if:   You have a fever  Your cough lasts longer than 4 weeks  Your symptoms do not improve with treatment  You have questions or concerns about your condition or care  Medicines:   Medicines  may be needed to stop the cough, decrease swelling in your airways, or help open your airways  Medicine may also be given to help you cough up mucus  Ask your healthcare provider what over-the-counter medicines you can take  If you have an infection caused by bacteria, you may need antibiotics  Take your medicine as directed  Contact your healthcare provider if you think your medicine is not helping or if you have side effects  Tell him or her if you are allergic to any medicine  Keep a list of the medicines, vitamins, and herbs you take  Include the amounts, and when and why you take them  Bring the list or the pill bottles to follow-up visits  Carry your medicine list with you in case of an emergency  Manage your symptoms:   Do not smoke and stay away from others who smoke  Nicotine and other chemicals in cigarettes and cigars can cause lung damage and make your cough worse  Ask your healthcare provider for information if you currently smoke and need help to quit  E-cigarettes or smokeless tobacco still contain nicotine  Talk to your healthcare provider before you use these products  Drink extra liquids as directed  Liquids will help thin and loosen mucus so you can cough it up  Liquids will also help prevent dehydration  Examples of good liquids to drink include water, fruit juice, and broth  Do not drink liquids that contain caffeine  Caffeine can increase your risk for dehydration  Ask your healthcare provider how much liquid to drink each day  Rest as directed  Do not do activities that make your cough worse, such as exercise  Use a humidifier or vaporizer  Use a cool mist humidifier or a vaporizer to increase air moisture in your home  This may make it easier for you to breathe and help decrease your cough  Eat 2 to 5 mL of honey 2 times each day  Honey can help thin mucus and decrease your cough  Use cough drops or lozenges  These can help decrease throat irritation and your cough  Follow up with your healthcare provider as directed:  Write down your questions so you remember to ask them during your visits  © 2017 [facility] Information is for End User's use only and may not be sold, redistributed or otherwise used for commercial purposes  All illustrations and images included in CareNotes® are the copyrighted property of [employer]  or [employer]  The above information is an  only  It is not intended as medical advice for individual conditions or treatments  Talk to your doctor, nurse or pharmacist before following any medical regimen to see if it is safe and effective for you

## 2019-10-30 NOTE — ED PROVIDER NOTES
History  Chief Complaint   Patient presents with    Cough     patient states having the flu 9 days ago which she was diagnosed with at the doctor  has been coughing at night so bad that she cannot breathe  states coughing up green sputum  has been sent back to work yesterday, states feeling better during the day, but at tnight time she cannot stop coughing  has not been taking temps but does not feel like she has been having a fever  30year old female presents for evaluation of persistent cough for the past 9 days  Patient states she was diagnosed with influenza by her PCP and has been taking over the counter cough and cold medications for her symtpoms  Her nasal congestion has since improved and postnasal drip has resolved; however, cough has persisted  Cough is worse when lying down at night  Cough is productive of green sputum  She denies fevers or chills  Mild lower chest pain with coughing only  No pleuritic chest pain  History provided by:  Patient  Cough   Cough characteristics:  Productive  Sputum characteristics:  Green  Severity:  Moderate  Onset quality:  Gradual  Duration:  9 days  Timing:  Constant  Progression:  Unchanged  Chronicity:  New  Smoker: no (former smoker)    Relieved by:  Nothing  Worsened by:  Lying down  Ineffective treatments: OTC cough and cold medications  Associated symptoms: no chest pain, no chills, no fever, no headaches, no myalgias, no rhinorrhea, no shortness of breath and no sore throat    Risk factors: recent infection        Prior to Admission Medications   Prescriptions Last Dose Informant Patient Reported?  Taking?   escitalopram (LEXAPRO) 10 mg tablet   No No   Sig: Take 1 tablet (10 mg total) by mouth daily   Patient not taking: Reported on 10/25/2019   hydrOXYzine HCL (ATARAX) 25 mg tablet  Self No No   Sig: Take 1 tablet (25 mg total) by mouth every 6 (six) hours as needed for itching or anxiety   Patient not taking: Reported on 9/19/2019   levothyroxine (SYNTHROID) 25 mcg tablet   No No   Sig: Take 1 tablet (25 mcg total) by mouth daily   ondansetron (ZOFRAN-ODT) 4 mg disintegrating tablet   No No   Sig: Take 1 tablet (4 mg total) by mouth every 8 (eight) hours as needed for nausea or vomiting   triamcinolone (KENALOG) 0 5 % ointment  Self No No   Sig: Apply topically 2 (two) times a day   Patient not taking: Reported on 10/25/2019      Facility-Administered Medications: None       Past Medical History:   Diagnosis Date    Anxiety     Disease of thyroid gland     Eczema     Glomerulonephritis     Urinary tract infection        Past Surgical History:   Procedure Laterality Date    SKIN LESION EXCISION      destruction of benign lesion    TONSILLECTOMY      TONSILLECTOMY  2006    WISDOM TOOTH EXTRACTION      WISDOM TOOTH EXTRACTION  2011       Family History   Problem Relation Age of Onset    Coronary artery disease Father     Alcohol abuse Father     Drug abuse Father     Anxiety disorder Mother     Anxiety disorder Brother     Post-traumatic stress disorder Brother     Drug abuse Brother     Lung cancer Maternal Grandfather     Breast cancer Maternal Aunt     Hypertension Family     Lung cancer Family     Lymphoma Family         pancreatic    Hyperlipidemia Family         pure     I have reviewed and agree with the history as documented  Social History     Tobacco Use    Smoking status: Former Smoker     Packs/day: 0 50     Years: 3 00     Pack years: 1 50     Types: Cigarettes    Smokeless tobacco: Never Used   Substance Use Topics    Alcohol use: Yes     Frequency: 2-4 times a month     Drinks per session: 1 or 2     Binge frequency: Never     Comment: socially    Drug use: No        Review of Systems   Constitutional: Negative for appetite change, chills and fever  HENT: Negative for congestion, rhinorrhea and sore throat  Respiratory: Positive for cough and chest tightness  Negative for shortness of breath      Cardiovascular: Negative for chest pain, palpitations and leg swelling  Gastrointestinal: Negative for abdominal pain, constipation, diarrhea, nausea and vomiting  Genitourinary: Negative for dysuria, frequency and hematuria  Musculoskeletal: Negative for myalgias, neck pain and neck stiffness  Skin: Negative for pallor  Neurological: Negative for syncope, weakness and headaches  All other systems reviewed and are negative  Physical Exam  Physical Exam   Constitutional: She is oriented to person, place, and time  She appears well-developed and well-nourished  Non-toxic appearance  No distress  HENT:   Head: Normocephalic and atraumatic  Mouth/Throat: Uvula is midline and oropharynx is clear and moist  Tonsils are 0 on the right  Tonsils are 0 on the left  Eyes: Pupils are equal, round, and reactive to light  Conjunctivae and EOM are normal    Neck: Normal range of motion  Neck supple  No tracheal deviation present  No thyromegaly present  Cardiovascular: Normal rate, regular rhythm, normal heart sounds and intact distal pulses  Pulmonary/Chest: Effort normal and breath sounds normal    Abdominal: Soft  Bowel sounds are normal  She exhibits no distension  There is no tenderness  Lymphadenopathy:     She has no cervical adenopathy  Neurological: She is alert and oriented to person, place, and time  Skin: Skin is warm and dry  She is not diaphoretic  Scattered maculopapular rash anterior chest   Nursing note and vitals reviewed        Vital Signs  ED Triage Vitals [10/29/19 2306]   Temperature Pulse Respirations Blood Pressure SpO2   98 1 °F (36 7 °C) 76 20 128/76 98 %      Temp Source Heart Rate Source Patient Position - Orthostatic VS BP Location FiO2 (%)   Temporal Monitor Lying Right arm --      Pain Score       No Pain           Vitals:    10/29/19 2306 10/29/19 2315   BP: 128/76 129/79   Pulse: 76 85   Patient Position - Orthostatic VS: Lying Sitting         Visual Acuity      ED Medications  Medications   albuterol (PROVENTIL HFA,VENTOLIN HFA) inhaler 2 puff (2 puffs Inhalation Given 10/29/19 2318)       Diagnostic Studies  Results Reviewed     Procedure Component Value Units Date/Time    POCT pregnancy, urine [[id number]]  (Normal) Resulted:  10/29/19 2325    Lab Status:  Final result Updated:  10/29/19 2325     EXT PREG TEST UR (Ref: Negative) negative     Control vaild                 XR chest 2 views   ED Interpretation by Jack Fierro MD (10/29 2341)   No acute pulmonary pathology                 Procedures  Procedures       ED Course                               MDM  Number of Diagnoses or Management Options  Post-viral cough syndrome: new and requires workup  Rash: new and requires workup  Diagnosis management comments: 30year old female presents with persistent cough for 9 days  CXR unremarkable  Improvement with albuterol  Likely post viral cough vs bronchitis  Albuterol PRN  Rash is nonspecific  Possible viral eruption  PCP follow up  Discussed return precautions with patient  Amount and/or Complexity of Data Reviewed  Tests in the radiology section of CPT®: ordered  Independent visualization of images, tracings, or specimens: yes    Patient Progress  Patient progress: stable      Disposition  Final diagnoses:   Post-viral cough syndrome   Rash     Time reflects when diagnosis was documented in both MDM as applicable and the Disposition within this note     Time User Action Codes Description Comment    10/30/2019 12:05 AM Joleen ESQUIVEL Add [R05] Post-viral cough syndrome     10/30/2019 12:05 AM Joe Gamino Add [R21] Rash       ED Disposition     ED Disposition Condition Date/Time Comment    Discharge Stable Wed Oct 30, 2019 12:06 AM Haydee Churchill discharge to home/self care              Follow-up Information     Follow up With Specialties Details Why Contact Info Additional [facility],  Family Medicine Schedule an appointment as soon as possible for a visit in 1 week if symptoms are not improving [facility] Emergency Department Emergency Medicine Go to  If symptoms worsen [address]  [address] ED, [place] [address], [place], [place], [address]          Discharge Medication List as of 10/30/2019 12:06 AM      CONTINUE these medications which have NOT CHANGED    Details   escitalopram (LEXAPRO) 10 mg tablet Take 1 tablet (10 mg total) by mouth daily, Starting Fri 9/13/2019, Until Sun 10/13/2019, Normal      hydrOXYzine HCL (ATARAX) 25 mg tablet Take 1 tablet (25 mg total) by mouth every 6 (six) hours as needed for itching or anxiety, Starting Fri 8/23/2019, Normal      levothyroxine (SYNTHROID) 25 mcg tablet Take 1 tablet (25 mcg total) by mouth daily, Starting Fri 10/25/2019, Until Thu 1/23/2020, Normal      ondansetron (ZOFRAN-ODT) 4 mg disintegrating tablet Take 1 tablet (4 mg total) by mouth every 8 (eight) hours as needed for nausea or vomiting, Starting Fri 10/25/2019, Normal      triamcinolone (KENALOG) 0 5 % ointment Apply topically 2 (two) times a day, Starting Mon 9/16/2019, Normal           No discharge procedures on file      ED Provider  Electronically Signed by           Jael Hartman MD  10/30/19 5518

## 2019-10-30 NOTE — TELEPHONE ENCOUNTER
MOTHER SENT YOU A MESSAGE THRU MY CHART RE Shanika Baumann RESPOND THRU MY CHART OR CALL LONNIE [phone] BEFORE 2;30 PM OR YOU CAN CALL LONNIE ON HER CELL [phone]

## 2019-11-20 DIAGNOSIS — F41.1 GENERALIZED ANXIETY DISORDER: ICD-10-CM

## 2019-11-20 RX ORDER — ESCITALOPRAM OXALATE 10 MG/1
10 TABLET ORAL DAILY
Qty: 30 TABLET | Refills: 0 | Status: SHIPPED | OUTPATIENT
Start: 2019-11-20 | End: 2019-12-11 | Stop reason: ALTCHOICE

## 2019-11-27 ENCOUNTER — HOSPITAL ENCOUNTER (EMERGENCY)
Facility: HOSPITAL | Age: 24
Discharge: HOME | End: 2019-11-27
Attending: EMERGENCY MEDICINE | Admitting: EMERGENCY MEDICINE
Payer: COMMERCIAL

## 2019-11-27 VITALS
BODY MASS INDEX: 18.75 KG/M2 | DIASTOLIC BLOOD PRESSURE: 75 MMHG | TEMPERATURE: 97.9 F | HEART RATE: 89 BPM | SYSTOLIC BLOOD PRESSURE: 131 MMHG | OXYGEN SATURATION: 95 % | WEIGHT: 126.98 LBS | RESPIRATION RATE: 18 BRPM

## 2019-11-27 DIAGNOSIS — F41.9 ANXIETY: Primary | ICD-10-CM

## 2019-11-27 DIAGNOSIS — R79.89 ELEVATED TSH: ICD-10-CM

## 2019-11-27 LAB
ANION GAP SERPL CALCULATED.3IONS-SCNC: 8 MMOL/L (ref 4–13)
BASOPHILS # BLD AUTO: 0.02 THOUSANDS/ΜL (ref 0–0.1)
BASOPHILS NFR BLD AUTO: 0 % (ref 0–1)
BUN SERPL-MCNC: 16 MG/DL (ref 5–25)
CALCIUM SERPL-MCNC: 8.8 MG/DL (ref 8.3–10.1)
CHLORIDE SERPL-SCNC: 103 MMOL/L (ref 100–108)
CO2 SERPL-SCNC: 26 MMOL/L (ref 21–32)
CREAT SERPL-MCNC: 0.8 MG/DL (ref 0.6–1.3)
EOSINOPHIL # BLD AUTO: 0.15 THOUSAND/ΜL (ref 0–0.61)
EOSINOPHIL NFR BLD AUTO: 2 % (ref 0–6)
ERYTHROCYTE [DISTWIDTH] IN BLOOD BY AUTOMATED COUNT: 13.1 % (ref 11.6–15.1)
EXT PREG TEST URINE: NEGATIVE
EXT. CONTROL ED NAV: NORMAL
GFR SERPL CREATININE-BSD FRML MDRD: 102 ML/MIN/1.73SQ M
GLUCOSE SERPL-MCNC: 84 MG/DL (ref 65–140)
HCT VFR BLD AUTO: 37.5 % (ref 34.8–46.1)
HGB BLD-MCNC: 12.1 G/DL (ref 11.5–15.4)
IMM GRANULOCYTES # BLD AUTO: 0.01 THOUSAND/UL (ref 0–0.2)
IMM GRANULOCYTES NFR BLD AUTO: 0 % (ref 0–2)
LYMPHOCYTES # BLD AUTO: 2.36 THOUSANDS/ΜL (ref 0.6–4.47)
LYMPHOCYTES NFR BLD AUTO: 36 % (ref 14–44)
MAGNESIUM SERPL-MCNC: 1.9 MG/DL (ref 1.6–2.6)
MCH RBC QN AUTO: 30.3 PG (ref 26.8–34.3)
MCHC RBC AUTO-ENTMCNC: 32.3 G/DL (ref 31.4–37.4)
MCV RBC AUTO: 94 FL (ref 82–98)
MONOCYTES # BLD AUTO: 0.73 THOUSAND/ΜL (ref 0.17–1.22)
MONOCYTES NFR BLD AUTO: 11 % (ref 4–12)
NEUTROPHILS # BLD AUTO: 3.33 THOUSANDS/ΜL (ref 1.85–7.62)
NEUTS SEG NFR BLD AUTO: 51 % (ref 43–75)
PLATELET # BLD AUTO: 219 THOUSANDS/UL (ref 149–390)
PMV BLD AUTO: 9.6 FL (ref 8.9–12.7)
POTASSIUM SERPL-SCNC: 3.8 MMOL/L (ref 3.5–5.3)
RBC # BLD AUTO: 4 MILLION/UL (ref 3.81–5.12)
SODIUM SERPL-SCNC: 137 MMOL/L (ref 136–145)
T4 FREE SERPL-MCNC: 0.89 NG/DL (ref 0.76–1.46)
TSH SERPL DL<=0.05 MIU/L-ACNC: 6.46 UIU/ML (ref 0.36–3.74)
WBC # BLD AUTO: 6.6 THOUSAND/UL (ref 4.31–10.16)

## 2019-11-27 PROCEDURE — 99284 EMERGENCY DEPT VISIT MOD MDM: CPT | Performed by: EMERGENCY MEDICINE

## 2019-11-27 PROCEDURE — 84443 ASSAY THYROID STIM HORMONE: CPT | Performed by: EMERGENCY MEDICINE

## 2019-11-27 PROCEDURE — 81025 URINE PREGNANCY TEST: CPT | Performed by: EMERGENCY MEDICINE

## 2019-11-27 PROCEDURE — 36415 COLL VENOUS BLD VENIPUNCTURE: CPT | Performed by: EMERGENCY MEDICINE

## 2019-11-27 PROCEDURE — 85025 COMPLETE CBC W/AUTO DIFF WBC: CPT | Performed by: EMERGENCY MEDICINE

## 2019-11-27 PROCEDURE — 80048 BASIC METABOLIC PNL TOTAL CA: CPT | Performed by: EMERGENCY MEDICINE

## 2019-11-27 PROCEDURE — 84439 ASSAY OF FREE THYROXINE: CPT | Performed by: EMERGENCY MEDICINE

## 2019-11-27 PROCEDURE — 99283 EMERGENCY DEPT VISIT LOW MDM: CPT

## 2019-11-27 PROCEDURE — 83735 ASSAY OF MAGNESIUM: CPT | Performed by: EMERGENCY MEDICINE

## 2019-11-27 RX ORDER — LORAZEPAM 0.5 MG/1
0.5 TABLET ORAL ONCE
Status: COMPLETED | OUTPATIENT
Start: 2019-11-27 | End: 2019-11-27

## 2019-11-27 RX ADMIN — LORAZEPAM 0.5 MG: 0.5 TABLET ORAL at 02:20

## 2019-11-27 NOTE — ED NOTES
Pt also states she has anxiety but does not take meds for it            Jorge A Cannon, RN  11/27/19 2138

## 2019-11-27 NOTE — DISCHARGE INSTRUCTIONS
Your TSH was elevated at today's visit, please follow-up with your primary care provider to re-evaluate your thyroid hormone supplementation levels      If you symptoms worsen please return to emergency department, if you symptoms continue please follow up with Neurology for further care

## 2019-11-27 NOTE — ED PROVIDER NOTES
History  Chief Complaint   Patient presents with    Oral Pain     pt states she is having reaction to synthroid that she started 3 months ago  States tongue is twitching  Pt states her lips are swelling     42-year-old female with past medical history of anxiety status post multiple previous evaluation, hyperthyroidism presents for evaluation of multiple symptoms which she attributed to starting Synthroid, low dose approximately 3 months ago  Patient states she has been having side effects including mouth twitching, dry mouth feeling in trouble swallowing, discussed worse approximately 1 week ago after she took a double dose of 25 mcg of Synthroid 1 evening  She gets the symptoms mostly at night  No similar symptoms in the past   Otherwise no rashes, no trouble breathing, no chest pain or shortness of breath no other new medications          Prior to Admission Medications   Prescriptions Last Dose Informant Patient Reported?  Taking?   benzonatate (TESSALON PERLES) 100 mg capsule Not Taking at Unknown time  No No   Sig: Take 1 capsule (100 mg total) by mouth 3 (three) times a day as needed for cough   Patient not taking: Reported on 11/27/2019   escitalopram (LEXAPRO) 10 mg tablet Not Taking at Unknown time  No No   Sig: Take 1 tablet (10 mg total) by mouth daily   Patient not taking: Reported on 11/27/2019   hydrOXYzine HCL (ATARAX) 25 mg tablet Not Taking at Unknown time Self No No   Sig: Take 1 tablet (25 mg total) by mouth every 6 (six) hours as needed for itching or anxiety   Patient not taking: Reported on 9/19/2019   levothyroxine (SYNTHROID) 25 mcg tablet 11/26/2019 at Unknown time  No Yes   Sig: Take 1 tablet (25 mcg total) by mouth daily   ondansetron (ZOFRAN-ODT) 4 mg disintegrating tablet Not Taking at Unknown time  No No   Sig: Take 1 tablet (4 mg total) by mouth every 8 (eight) hours as needed for nausea or vomiting   Patient not taking: Reported on 11/27/2019   promethazine (PHENERGAN) 12 5 mg/10 mL syrup Not Taking at Unknown time  No No   Sig: Take 5 mL (6 25 mg total) by mouth 4 (four) times a day as needed (cough)   Patient not taking: Reported on 11/27/2019   triamcinolone (KENALOG) 0 5 % ointment Not Taking at Unknown time Self No No   Sig: Apply topically 2 (two) times a day   Patient not taking: Reported on 10/25/2019      Facility-Administered Medications: None       Past Medical History:   Diagnosis Date    Anxiety     Disease of thyroid gland     Eczema     Glomerulonephritis     Urinary tract infection        Past Surgical History:   Procedure Laterality Date    SKIN LESION EXCISION      destruction of benign lesion    TONSILLECTOMY      TONSILLECTOMY  2006    WISDOM TOOTH EXTRACTION      WISDOM TOOTH EXTRACTION  2011       Family History   Problem Relation Age of Onset    Coronary artery disease Father     Alcohol abuse Father     Drug abuse Father     Anxiety disorder Mother     Anxiety disorder Brother     Post-traumatic stress disorder Brother     Drug abuse Brother     Lung cancer Maternal Grandfather     Breast cancer Maternal Aunt     Hypertension Family     Lung cancer Family     Lymphoma Family         pancreatic    Hyperlipidemia Family         pure     I have reviewed and agree with the history as documented  Social History     Tobacco Use    Smoking status: Former Smoker     Packs/day: 0 50     Years: 3 00     Pack years: 1 50     Types: Cigarettes    Smokeless tobacco: Never Used   Substance Use Topics    Alcohol use: Yes     Frequency: 2-4 times a month     Drinks per session: 1 or 2     Binge frequency: Never     Comment: socially    Drug use: No        Review of Systems   Constitutional: Negative for appetite change and fever  HENT: Negative for rhinorrhea and sore throat  Tongue twitching and tingling   Eyes: Negative for photophobia and visual disturbance  Respiratory: Negative for cough, chest tightness and wheezing      Cardiovascular: Negative for chest pain, palpitations and leg swelling  Gastrointestinal: Negative for abdominal distention, abdominal pain, blood in stool, constipation and diarrhea  Genitourinary: Negative for dysuria, flank pain, frequency, hematuria and urgency  Musculoskeletal: Negative for back pain  Skin: Negative for rash  Neurological: Negative for dizziness, weakness and headaches  All other systems reviewed and are negative  Physical Exam  Physical Exam   Constitutional: She is oriented to person, place, and time  She appears well-developed and well-nourished  HENT:   Head: Normocephalic and atraumatic  Mouth/Throat: Oropharynx is clear and moist  No oropharyngeal exudate  Tongue without any fasciculations    Maintaining airway, no drooling   Eyes: Pupils are equal, round, and reactive to light  EOM are normal    Neck: Normal range of motion  Neck supple  Cardiovascular: Normal rate and regular rhythm  Exam reveals no gallop and no friction rub  No murmur heard  Pulmonary/Chest: Effort normal  She has no wheezes  She has no rales  She exhibits no tenderness  Abdominal: Soft  She exhibits no distension and no mass  There is no rebound and no guarding  Neurological: She is alert and oriented to person, place, and time  No cranial nerve deficit  Extraocular movements intact, no nystagmus, no rigidity, no clonus, no dysmetria   Skin: Skin is warm and dry  Psychiatric: She has a normal mood and affect  Nursing note and vitals reviewed        Vital Signs  ED Triage Vitals [11/27/19 0106]   Temperature Pulse Respirations Blood Pressure SpO2   97 9 °F (36 6 °C) 89 18 131/75 95 %      Temp Source Heart Rate Source Patient Position - Orthostatic VS BP Location FiO2 (%)   Temporal Monitor Lying Right arm --      Pain Score       No Pain           Vitals:    11/27/19 0106   BP: 131/75   Pulse: 89   Patient Position - Orthostatic VS: Lying         Visual Acuity      ED Medications  Medications LORazepam (ATIVAN) tablet 0 5 mg (0 5 mg Oral Given 11/27/19 0220)       Diagnostic Studies  Results Reviewed     Procedure Component Value Units Date/Time    Basic metabolic panel [[id number]] Collected:  11/27/19 0125    Lab Status:  Final result Specimen:  Blood from Arm, Right Updated:  11/27/19 0232     Sodium 137 mmol/L      Potassium 3 8 mmol/L      Chloride 103 mmol/L      CO2 26 mmol/L      ANION GAP 8 mmol/L      BUN 16 mg/dL      Creatinine 0 80 mg/dL      Glucose 84 mg/dL      Calcium 8 8 mg/dL      eGFR 102 ml/min/1 73sq m     Narrative:       [place] guidelines for Chronic Kidney Disease (CKD):     Stage 1 with normal or high GFR (GFR > 90 mL/min/1 73 square meters)    Stage 2 Mild CKD (GFR = 60-89 mL/min/1 73 square meters)    Stage 3A Moderate CKD (GFR = 45-59 mL/min/1 73 square meters)    Stage 3B Moderate CKD (GFR = 30-44 mL/min/1 73 square meters)    Stage 4 Severe CKD (GFR = 15-29 mL/min/1 73 square meters)    Stage 5 End Stage CKD (GFR <15 mL/min/1 73 square meters)  Note: GFR calculation is accurate only with a steady state creatinine    TSH, 3rd generation with Free T4 reflex [[id number]]  (Abnormal) Collected:  11/27/19 0125    Lab Status:  Final result Specimen:  Blood from Arm, Right Updated:  11/27/19 0232     TSH 3RD GENERATON 6 461 uIU/mL     Narrative:       Patients undergoing fluorescein dye angiography may retain small amounts of fluorescein in the body for 48-72 hours post procedure  Samples containing fluorescein can produce falsely depressed TSH values  If the patient had this procedure,a specimen should be resubmitted post fluorescein clearance  T4, free [[id number]] Collected:  11/27/19 0125    Lab Status:   In process Specimen:  Blood from Arm, Right Updated:  11/27/19 0232    Magnesium [[id number]]  (Normal) Collected:  11/27/19 0125    Lab Status:  Final result Specimen:  Blood from Arm, Right Updated:  11/27/19 0218     Magnesium 1 9 mg/dL CBC and differential [[id number]]  (Normal) Collected:  11/27/19 0125    Lab Status:  Final result Specimen:  Blood from Arm, Right Updated:  11/27/19 0159     WBC 6 60 Thousand/uL      RBC 4 00 Million/uL      Hemoglobin 12 1 g/dL      Hematocrit 37 5 %      MCV 94 fL      MCH 30 3 pg      MCHC 32 3 g/dL      RDW 13 1 %      MPV 9 6 fL      Platelets 880 Thousands/uL      Neutrophils Relative 51 %      Immat GRANS % 0 %      Lymphocytes Relative 36 %      Monocytes Relative 11 %      Eosinophils Relative 2 %      Basophils Relative 0 %      Neutrophils Absolute 3 33 Thousands/µL      Immature Grans Absolute 0 01 Thousand/uL      Lymphocytes Absolute 2 36 Thousands/µL      Monocytes Absolute 0 73 Thousand/µL      Eosinophils Absolute 0 15 Thousand/µL      Basophils Absolute 0 02 Thousands/µL     POCT pregnancy, urine [[id number]]  (Normal) Resulted:  11/27/19 0137    Lab Status:  Final result Updated:  11/27/19 0137     EXT PREG TEST UR (Ref: Negative) negative     Control valid                 No orders to display              Procedures  Procedures       ED Course                               MDM  Number of Diagnoses or Management Options  Diagnosis management comments: 38-year-old anxious appearing female presents for evaluation of multiple symptoms which she attributed to her Synthroid, nonfocal neurologic exam, patient is very anxious with a history of anxiety, will check basic lab work, thyroid function tests, will likely discharge with PCP follow-up      Disposition  Final diagnoses:   Anxiety   Elevated TSH     Time reflects when diagnosis was documented in both MDM as applicable and the Disposition within this note     Time User Action Codes Description Comment    11/27/2019  2:42 AM Frances Olivas Add [F41 9] Anxiety     11/27/2019  2:42 AM Frances Olivas Add [R79 89] Elevated TSH       ED Disposition     ED Disposition Condition Date/Time Comment    Discharge Stable Wed Nov 27, 2019  2:42 AM Martine Reyes discharge to home/self care  Follow-up Information     Follow up With Specialties Details Why Contact Info Additional [facility], DO Family Medicine Schedule an appointment as soon as possible for a visit   [facility]  [phone]       [facility] Emergency Department Emergency Medicine  If symptoms worsen [address]  [address] ED, [place] [address], [place], [place], [address]          Discharge Medication List as of 11/27/2019  2:43 AM      CONTINUE these medications which have NOT CHANGED    Details   levothyroxine (SYNTHROID) 25 mcg tablet Take 1 tablet (25 mcg total) by mouth daily, Starting Fri 10/25/2019, Until Thu 1/23/2020, Normal      benzonatate (TESSALON PERLES) 100 mg capsule Take 1 capsule (100 mg total) by mouth 3 (three) times a day as needed for cough, Starting Wed 10/30/2019, Normal      escitalopram (LEXAPRO) 10 mg tablet Take 1 tablet (10 mg total) by mouth daily, Starting Wed 11/20/2019, Until Fri 12/20/2019, Normal      hydrOXYzine HCL (ATARAX) 25 mg tablet Take 1 tablet (25 mg total) by mouth every 6 (six) hours as needed for itching or anxiety, Starting Fri 8/23/2019, Normal      ondansetron (ZOFRAN-ODT) 4 mg disintegrating tablet Take 1 tablet (4 mg total) by mouth every 8 (eight) hours as needed for nausea or vomiting, Starting Fri 10/25/2019, Normal      promethazine (PHENERGAN) 12 5 mg/10 mL syrup Take 5 mL (6 25 mg total) by mouth 4 (four) times a day as needed (cough), Starting Wed 10/30/2019, Normal      triamcinolone (KENALOG) 0 5 % ointment Apply topically 2 (two) times a day, Starting Mon 9/16/2019, Normal           No discharge procedures on file      ED Provider  Electronically Signed by           Yasemin Arguelles MD  11/27/19 4172

## 2019-12-02 ENCOUNTER — OFFICE VISIT (OUTPATIENT)
Dept: ENDOCRINOLOGY | Facility: CLINIC | Age: 24
End: 2019-12-02
Payer: COMMERCIAL

## 2019-12-02 VITALS
HEART RATE: 71 BPM | BODY MASS INDEX: 18.75 KG/M2 | DIASTOLIC BLOOD PRESSURE: 64 MMHG | SYSTOLIC BLOOD PRESSURE: 108 MMHG | HEIGHT: 69 IN | WEIGHT: 126.6 LBS

## 2019-12-02 DIAGNOSIS — E03.9 HYPOTHYROIDISM, UNSPECIFIED TYPE: Primary | ICD-10-CM

## 2019-12-02 PROCEDURE — 99213 OFFICE O/P EST LOW 20 MIN: CPT | Performed by: PHYSICIAN ASSISTANT

## 2019-12-02 RX ORDER — LEVOTHYROXINE SODIUM 25 MCG
25 TABLET ORAL DAILY
COMMUNITY
End: 2019-12-02 | Stop reason: ALTCHOICE

## 2019-12-02 NOTE — PROGRESS NOTES
Established Patient Progress Note       Chief Complaint   Patient presents with    Hypothyroidism        History of Present Illness:     Jasper Barkley is a 30 y o  female with a history of hypothyroidism due to hashimoto's thyroiditis  TSH has been in the 5-9 range over the past few years  She has been on and off levothyroxine for the past few years  She has been back on Synthroid since approx 8/23/2019  She did feel more anxious since starting medication  She went to ER 11/27/2019 and felt that she had a lot of twitching around her mouth and felt that this was a reaction to the synthroid  She has been having a lot of anxiety recently and was working too many hours and stressed at work as a   Currently not working  She denies history of symptoms of hypothyroidism prior to treatment and since she has been off levothyroxine  She is currently not planning pregnancy  She lives with her boyrfriend and says she is rarely sexually active  She doesn't want to take thyroid medication but her mother (not present at visit) Feels that she needs to be on thyroid medication         Patient Active Problem List   Diagnosis    Hashimoto's thyroiditis    History of glomerulonephritis    Sebaceous cyst    Generalized anxiety disorder    Hypothyroidism    Flexural eczema    Hematuria    Proteinuria    Superficial bruising of lower leg    Panic attacks    RBBB    Family history of cardiac disorder    Need for Tdap vaccination    Cervical cancer screening    Nausea & vomiting    Influenza-like syndrome      Past Medical History:   Diagnosis Date    Anxiety     Disease of thyroid gland     Eczema     Glomerulonephritis     Urinary tract infection       Past Surgical History:   Procedure Laterality Date    SKIN LESION EXCISION      destruction of benign lesion    TONSILLECTOMY      TONSILLECTOMY  2006    WISDOM TOOTH EXTRACTION      WISDOM TOOTH EXTRACTION  2011      Family History   Problem Relation Age of Onset    Coronary artery disease Father     Alcohol abuse Father     Drug abuse Father     Anxiety disorder Mother     Anxiety disorder Brother     Post-traumatic stress disorder Brother     Drug abuse Brother     Lung cancer Maternal Grandfather     Breast cancer Maternal Aunt     Hypertension Family     Lung cancer Family     Lymphoma Family         pancreatic    Hyperlipidemia Family         pure     Social History     Tobacco Use    Smoking status: Former Smoker     Packs/day: 0 50     Years: 3 00     Pack years: 1 50     Types: Cigarettes     Last attempt to quit: 2018     Years since quittin 9    Smokeless tobacco: Never Used   Substance Use Topics    Alcohol use: Yes     Frequency: 2-4 times a month     Drinks per session: 1 or 2     Binge frequency: Never     Comment: socially     Allergies   Allergen Reactions    Bactrim [Sulfamethoxazole-Trimethoprim] Headache     migrains    Lac Bovis Rash    Milk-Related Compounds Rash    Other Itching     Pet allergies       Current Outpatient Medications:     escitalopram (LEXAPRO) 10 mg tablet, Take 1 tablet (10 mg total) by mouth daily (Patient not taking: Reported on 2019), Disp: 30 tablet, Rfl: 0    Review of Systems   Constitutional: Negative for activity change, appetite change, chills, diaphoresis, fatigue, fever and unexpected weight change  HENT: Negative for trouble swallowing and voice change  Eyes: Negative for visual disturbance  Respiratory: Negative for shortness of breath  Cardiovascular: Negative for chest pain and palpitations  Gastrointestinal: Negative for abdominal pain, constipation and diarrhea  Endocrine: Negative for cold intolerance, heat intolerance, polydipsia, polyphagia and polyuria  Genitourinary: Negative for frequency and menstrual problem  Musculoskeletal: Negative for arthralgias and myalgias  Skin: Negative for rash  Allergic/Immunologic: Negative for food allergies  Neurological: Negative for dizziness and tremors  Hematological: Negative for adenopathy  Psychiatric/Behavioral: Negative for sleep disturbance  The patient is nervous/anxious  All other systems reviewed and are negative  Physical Exam:  Body mass index is 18 7 kg/m²  /64 (BP Location: Left arm, Patient Position: Sitting, Cuff Size: Standard)   Pulse 71   Ht 5' 9" (1 753 m)   Wt 57 4 kg (126 lb 9 6 oz)   BMI 18 70 kg/m²    Wt Readings from Last 3 Encounters:   12/02/19 57 4 kg (126 lb 9 6 oz)   11/27/19 57 6 kg (126 lb 15 8 oz)   10/29/19 57 6 kg (127 lb)       Physical Exam   Constitutional: She is oriented to person, place, and time  She appears well-developed and well-nourished  No distress  HENT:   Head: Normocephalic and atraumatic  Eyes: Pupils are equal, round, and reactive to light  Conjunctivae are normal    Neck: Normal range of motion  Neck supple  No thyromegaly present  Cardiovascular: Normal rate, regular rhythm and normal heart sounds  Pulmonary/Chest: Effort normal and breath sounds normal  No respiratory distress  She has no wheezes  She has no rales  Abdominal: Soft  Bowel sounds are normal  She exhibits no distension  There is no tenderness  Musculoskeletal: Normal range of motion  She exhibits no edema  Neurological: She is alert and oriented to person, place, and time  Skin: Skin is warm and dry  Psychiatric: She has a normal mood and affect  Vitals reviewed        Labs:     Component      Latest Ref Rng & Units 4/13/2016 5/10/2018 11/11/2018 2/1/2019   TSH 3RD GENERATON      0 358 - 3 740 uIU/mL 1 794 6 740 (H) 9 260 (H) 5 850 (H)     Component      Latest Ref Rng & Units 7/31/2019 11/27/2019   TSH 3RD GENERATON      0 358 - 3 740 uIU/mL 5 636 (H) 6 461 (H)         Impression & Plan:    Problem List Items Addressed This Visit        Endocrine    Hypothyroidism - Primary     She again reports side effects after starting Synthroid though no evidence of allergic reaction  There are no symptoms of hypothyroidism  Remain off synthroid for now  Repeat lab testing in 6 weeks along with thyroid antibodies panel  As long as TSH remains less than 10 and there are no symptoms of hypothyroidism, she can remain off medication  No plans for pregnancy at this time  Advised her to use contraception to prevent accidental pregnancy as hypothyroidism in pregnancy would increase risk for complications  If she is planning pregnancy, will need to start medication for Goal TSH <2 5  IF treatment required will start low dose Tirosint 13mcg daily  Relevant Orders    TSH, 3rd generation Lab Collect    T4, free Lab Collect    Thyroid Antibodies Panel Lab Collect          Orders Placed This Encounter   Procedures    TSH, 3rd generation Lab Collect     This is a patient instruction: This test is non-fasting  Please drink two glasses of water morning of bloodwork  Standing Status:   Future     Number of Occurrences:   1     Standing Expiration Date:   12/2/2020    T4, free Lab Collect     Standing Status:   Future     Number of Occurrences:   1     Standing Expiration Date:   12/2/2020       Patient Instructions   Subclinical Hypothyroidism   AMBULATORY CARE:   Subclinical hypothyroidism  is a condition that develops when your thyroid stimulating hormone (TSH) level is higher than normal  TSH is made in the brain and controls how much thyroid hormones are made  Thyroid hormones help control body temperature, heart rate, growth, and weight  Subclinical hypothyroidism can lead to hypothyroidism    Common signs and symptoms of subclinical hypothyroidism:  Any of the following may develop slowly, sometimes over several years:  · Exhaustion    · Sensitivity to cold    · Headaches or decreased concentration    · Muscle aches or weakness    · Constipation     · Dry, flaky skin or brittle nails    · Thinning hair    · Heavy or irregular monthly periods    · Depression or irritability  Call 911 for any of the following:   · You have sudden chest pain or shortness of breath  · You have a seizure  · You feel like you are going to faint  Seek care immediately if:   · You have swelling in your legs, ankles, or feet  · Your heart is beating faster or slower than is normal for you, or you feel restless  Contact your healthcare provider if:   · You have a fever  · Your signs and symptoms return or become worse  · You have pain, redness, and swelling in your muscles and joints  · You have questions or concerns about your condition or care  Treatment  depends on the amount of thyroid hormones in your body  You may need thyroid hormone replacement medicine to bring your thyroid hormone level back to normal   Follow up with your healthcare provider as directed: You will need to return for more blood tests to check your thyroid hormone level  Write down your questions so you remember to ask them during your visits  © 2017 [employer] Information is for End User's use only and may not be sold, redistributed or otherwise used for commercial purposes  All illustrations and images included in CareNotes® are the copyrighted property of [employer]  or Tobi Jaffe  The above information is an  only  It is not intended as medical advice for individual conditions or treatments  Talk to your doctor, nurse or pharmacist before following any medical regimen to see if it is safe and effective for you  Discussed with the patient and all questioned fully answered  She will call me if any problems arise  Follow-up appointment in 3 months       Counseled patient on diagnostic results, prognosis, risk and benefit of treatment options, instruction for management, importance of treatment compliance, Risk  factor reduction and impressions      Tobi Peguero PA-C

## 2019-12-02 NOTE — ASSESSMENT & PLAN NOTE
She again reports side effects after starting Synthroid though no evidence of allergic reaction  There are no symptoms of hypothyroidism  Remain off synthroid for now  Repeat lab testing in 6 weeks along with thyroid antibodies panel  As long as TSH remains less than 10 and there are no symptoms of hypothyroidism, she can remain off medication  No plans for pregnancy at this time  Advised her to use contraception to prevent accidental pregnancy as hypothyroidism in pregnancy would increase risk for complications  If she is planning pregnancy, will need to start medication for Goal TSH <2 5  IF treatment required will start low dose Tirosint 13mcg daily

## 2019-12-02 NOTE — PATIENT INSTRUCTIONS
Subclinical Hypothyroidism   AMBULATORY CARE:   Subclinical hypothyroidism  is a condition that develops when your thyroid stimulating hormone (TSH) level is higher than normal  TSH is made in the brain and controls how much thyroid hormones are made  Thyroid hormones help control body temperature, heart rate, growth, and weight  Subclinical hypothyroidism can lead to hypothyroidism  Common signs and symptoms of subclinical hypothyroidism:  Any of the following may develop slowly, sometimes over several years:  · Exhaustion    · Sensitivity to cold    · Headaches or decreased concentration    · Muscle aches or weakness    · Constipation     · Dry, flaky skin or brittle nails    · Thinning hair    · Heavy or irregular monthly periods    · Depression or irritability  Call 911 for any of the following:   · You have sudden chest pain or shortness of breath  · You have a seizure  · You feel like you are going to faint  Seek care immediately if:   · You have swelling in your legs, ankles, or feet  · Your heart is beating faster or slower than is normal for you, or you feel restless  Contact your healthcare provider if:   · You have a fever  · Your signs and symptoms return or become worse  · You have pain, redness, and swelling in your muscles and joints  · You have questions or concerns about your condition or care  Treatment  depends on the amount of thyroid hormones in your body  You may need thyroid hormone replacement medicine to bring your thyroid hormone level back to normal   Follow up with your healthcare provider as directed: You will need to return for more blood tests to check your thyroid hormone level  Write down your questions so you remember to ask them during your visits  © 2017 [employer] Information is for End User's use only and may not be sold, redistributed or otherwise used for commercial purposes   All illustrations and images included in CareNotes® are the copyrighted property of [employer]  or Tobi Jaffe  The above information is an  only  It is not intended as medical advice for individual conditions or treatments  Talk to your doctor, nurse or pharmacist before following any medical regimen to see if it is safe and effective for you

## 2019-12-03 ENCOUNTER — HOSPITAL ENCOUNTER (EMERGENCY)
Facility: HOSPITAL | Age: 24
Discharge: HOME | End: 2019-12-03
Attending: EMERGENCY MEDICINE | Admitting: EMERGENCY MEDICINE
Payer: COMMERCIAL

## 2019-12-03 VITALS
TEMPERATURE: 97.6 F | HEART RATE: 61 BPM | OXYGEN SATURATION: 100 % | WEIGHT: 126.98 LBS | RESPIRATION RATE: 16 BRPM | BODY MASS INDEX: 18.75 KG/M2 | SYSTOLIC BLOOD PRESSURE: 105 MMHG | DIASTOLIC BLOOD PRESSURE: 78 MMHG

## 2019-12-03 DIAGNOSIS — J01.90 ACUTE SINUSITIS: Primary | ICD-10-CM

## 2019-12-03 LAB
EXT PREG TEST URINE: NEGATIVE
EXT. CONTROL ED NAV: NORMAL

## 2019-12-03 PROCEDURE — 81025 URINE PREGNANCY TEST: CPT | Performed by: EMERGENCY MEDICINE

## 2019-12-03 PROCEDURE — 99284 EMERGENCY DEPT VISIT MOD MDM: CPT | Performed by: EMERGENCY MEDICINE

## 2019-12-03 PROCEDURE — 99283 EMERGENCY DEPT VISIT LOW MDM: CPT

## 2019-12-03 RX ORDER — AMOXICILLIN AND CLAVULANATE POTASSIUM 875; 125 MG/1; MG/1
1 TABLET, FILM COATED ORAL ONCE
Status: COMPLETED | OUTPATIENT
Start: 2019-12-03 | End: 2019-12-03

## 2019-12-03 RX ORDER — AMOXICILLIN AND CLAVULANATE POTASSIUM 875; 125 MG/1; MG/1
1 TABLET, FILM COATED ORAL EVERY 12 HOURS
Qty: 10 TABLET | Refills: 0 | Status: SHIPPED | OUTPATIENT
Start: 2019-12-03 | End: 2019-12-08

## 2019-12-03 RX ORDER — OXYMETAZOLINE HYDROCHLORIDE 0.05 G/100ML
2 SPRAY NASAL ONCE
Status: COMPLETED | OUTPATIENT
Start: 2019-12-03 | End: 2019-12-03

## 2019-12-03 RX ADMIN — OXYMETAZOLINE HYDROCHLORIDE 2 SPRAY: 0.05 SPRAY NASAL at 21:59

## 2019-12-03 RX ADMIN — AMOXICILLIN AND CLAVULANATE POTASSIUM 1 TABLET: 875; 125 TABLET, FILM COATED ORAL at 21:58

## 2019-12-04 NOTE — ED PROVIDER NOTES
History  Chief Complaint   Patient presents with    Sinus Problem     pt states she has had a "clogged nose" and a cough since being dx with flu 1 month ago  Pt  states she is concerned that she has had 6 days of facial twitching (has stopped today)     24-year-old female with past medical history of anxiety , Hashimoto's thyroiditis presents for evaluation of continued nasal congestion, facial pressure and postnasal drip x1 month  Patient states that she has been having purulent discharge from her nose making it hard for her to breathe ever since she was diagnosed with the flu approximately 1 month ago  She has been using Flonase prescribed by her primary care provider without any relief  She denies any chest pain denies any shortness of breath denies any productive cough denies any fevers or chills  Denies trouble swallowing or sore throat  Denies any ear pressure  No similar symptoms in the past   Did have frequent ear infections and strep throat as a child but has not been diagnosed with either in many years  No sick contacts  Prior to Admission Medications   Prescriptions Last Dose Informant Patient Reported?  Taking?   escitalopram (LEXAPRO) 10 mg tablet   No No   Sig: Take 1 tablet (10 mg total) by mouth daily   Patient not taking: Reported on 11/27/2019      Facility-Administered Medications: None       Past Medical History:   Diagnosis Date    Anxiety     Disease of thyroid gland     Eczema     Glomerulonephritis     Urinary tract infection        Past Surgical History:   Procedure Laterality Date    SKIN LESION EXCISION      destruction of benign lesion    TONSILLECTOMY      TONSILLECTOMY  2006    WISDOM TOOTH EXTRACTION      WISDOM TOOTH EXTRACTION  2011       Family History   Problem Relation Age of Onset    Coronary artery disease Father     Alcohol abuse Father     Drug abuse Father     Anxiety disorder Mother     Anxiety disorder Brother     Post-traumatic stress disorder Brother     Drug abuse Brother     Lung cancer Maternal Grandfather     Breast cancer Maternal Aunt     Hypertension Family     Lung cancer Family     Lymphoma Family         pancreatic    Hyperlipidemia Family         pure     I have reviewed and agree with the history as documented  Social History     Tobacco Use    Smoking status: Former Smoker     Packs/day: 0 50     Years: 3 00     Pack years: 1 50     Types: Cigarettes     Last attempt to quit: 2018     Years since quittin 9    Smokeless tobacco: Never Used   Substance Use Topics    Alcohol use: Yes     Frequency: 2-4 times a month     Drinks per session: 1 or 2     Binge frequency: Never     Comment: socially    Drug use: No        Review of Systems   Constitutional: Negative for appetite change and fever  HENT: Positive for congestion, postnasal drip and rhinorrhea  Negative for sore throat  Eyes: Negative for photophobia and visual disturbance  Respiratory: Negative for cough, chest tightness and wheezing  Cardiovascular: Negative for chest pain, palpitations and leg swelling  Gastrointestinal: Negative for abdominal distention, abdominal pain, blood in stool, constipation and diarrhea  Genitourinary: Negative for dysuria, flank pain, frequency, hematuria and urgency  Musculoskeletal: Negative for back pain  Skin: Negative for rash  Neurological: Negative for dizziness, weakness and headaches  All other systems reviewed and are negative  Physical Exam  Physical Exam   Constitutional: She is oriented to person, place, and time  She appears well-developed and well-nourished  HENT:   Head: Normocephalic and atraumatic     Right Ear: External ear normal    Left Ear: External ear normal    Boggy nasal mucosa mild tenderness to palpation over maxillary and frontal sinuses, pharyngeal exam unremarkable without any erythema or exudates    Bilateral TMs unremarkable   Eyes: Pupils are equal, round, and reactive to light  EOM are normal    Neck: Normal range of motion  Neck supple  Cardiovascular: Normal rate and regular rhythm  Exam reveals no gallop and no friction rub  No murmur heard  Pulmonary/Chest: Effort normal  She has no wheezes  She has no rales  She exhibits no tenderness  Abdominal: Soft  She exhibits no distension and no mass  There is no rebound and no guarding  Neurological: She is alert and oriented to person, place, and time  Skin: Skin is warm and dry  Psychiatric: She has a normal mood and affect  Nursing note and vitals reviewed        Vital Signs  ED Triage Vitals [12/03/19 2144]   Temperature Pulse Respirations Blood Pressure SpO2   97 6 °F (36 4 °C) 61 16 105/78 100 %      Temp Source Heart Rate Source Patient Position - Orthostatic VS BP Location FiO2 (%)   Temporal Monitor Lying Right arm --      Pain Score       No Pain           Vitals:    12/03/19 2144   BP: 105/78   Pulse: 61   Patient Position - Orthostatic VS: Lying         Visual Acuity      ED Medications  Medications   oxymetazoline (AFRIN) 0 05 % nasal spray 2 spray (2 sprays Each Nare Given 12/3/19 2159)   amoxicillin-clavulanate (AUGMENTIN) 875-125 mg per tablet 1 tablet (1 tablet Oral Given 12/3/19 2158)       Diagnostic Studies  Results Reviewed     Procedure Component Value Units Date/Time    POCT pregnancy, urine [[id number]]     Lab Status:  No result                  No orders to display              Procedures  Procedures         ED Course                               MDM  Number of Diagnoses or Management Options  Diagnosis management comments: 59-year-old female with 1 month of sinus congestion, purulent discharge from the nose, facial pressure , nontoxic appearing, afebrile, will start on short close of antibiotics given duration of symptoms, patient will follow up with ENT for further care        Disposition  Final diagnoses:   Acute sinusitis     Time reflects when diagnosis was documented in both MDM as applicable and the Disposition within this note     Time User Action Codes Description Comment    12/3/2019  9:57 PM Tiny Savanna Add [J01 90] Acute sinusitis       ED Disposition     ED Disposition Condition Date/Time Comment    Discharge Stable Tue Dec 3, 2019  9:57 PM Stephani Aschoff discharge to home/self care  Follow-up Information     Follow up With Specialties Details Why Contact Info Additional [facility],  Family Medicine Schedule an appointment as soon as possible for a visit  As needed [address] [place] [phone]       [facility] Emergency Department Emergency Medicine  If symptoms worsen [address] [facility]  [address] [facility] [address], [place] [address], [place], [place], [address] and Throat Otolaryngology Schedule an appointment as soon as possible for a visit   [address] [place] [phone]  [facility] and Throat, [place] [address] [address] [place] [address], [facility], [address] [place] [address]          Patient's Medications   Discharge Prescriptions    AMOXICILLIN-CLAVULANATE (AUGMENTIN) 875-125 MG PER TABLET    Take 1 tablet by mouth every 12 (twelve) hours for 5 days       Start Date: 12/3/2019 End Date: 12/8/2019       Order Dose: 1 tablet       Quantity: 10 tablet    Refills: 0     No discharge procedures on file      ED Provider  Electronically Signed by           Joshua Putnam MD  12/03/19 [id number]

## 2019-12-04 NOTE — DISCHARGE INSTRUCTIONS
Please follow-up with ENT for further care, if symptoms worsen please return to emergency department

## 2019-12-06 ENCOUNTER — VBI (OUTPATIENT)
Dept: ADMINISTRATIVE | Facility: OTHER | Age: 24
End: 2019-12-06

## 2019-12-06 NOTE — TELEPHONE ENCOUNTER
Mushtaq Farias    ED Visit Information     Ed visit date:12/3/2019 and  12/8/2019  Diagnosis Description:  Acute sinusitis and  for Panic attack; Medication side effect  In Network? Yes [address]  Discharge status: Home  Discharged with meds ? Yes  Number of ED visits to date: 5  ED Severity:n/a     Outreach Information    Outreach successful: Yes 2  Date letter mailed:n/a  Date Finalized:12/9/2019    Care Coordination    Follow up appointment with pcp: no No ED f/u appt scheduled  Transportation issues ? NA    [employer] Base Outreach    Outreach type:  7 Day Outreach  Emergent necessity warranted by diagnosis:  No  [facility] PCP:  Yes  Transportation:  Self Transport  12/06/2019 11:29 AM Phone (Dee Renteria) Jakob Cole (Self) [phone] (M)   Left Message  Unable to reach patient regarding recent ED visit on 12/3 for Acute sinusitis  Patient was discharged with amoxicillin pot-clavulanante and was advised to follow up with [facility] , ENT and given return precautions  2nd attempt will be made on 12/6/2019 12/09/2019 02:11 PM Phone (Deeherminia Renteria) Jakob Cole (Self) [phone] (M)   Left Message  Unable to reach patient regarding recent ED visit on 12/3 for Acute sinusitis  Patient was discharged with amoxicillin pot-clavulanante and was advised to follow up with [facility] , ENT and given return precautions  Letter generated and mailed

## 2019-12-06 NOTE — LETTER
[address] [facility]  [address] [facility] [address]  [facility] [address] [phone]    Date: 12/09/19  Asher Chamberlain  [address] [facility]    Dear Monie Ford: Thank you for choosing [facility] emergency department for care  As your primary care provider we want to make sure that your ongoing medical care is being addressed  If you require follow up care as a result of your emergency department visit, there are a few things we would like you to know  As part of our continuing commitment to caring for our patient, we have added more same day appointments and have extended our office hours to meet your medical needs  After hours, on-call physicians are available via our main office line  We encourage you to contact our office prior to seeking treatment to discuss your symptoms with our medical staff  Together, we can determine the correct course of action  A majority of non-emergent conditions such as: common cold, flu-like symptoms, fevers, strains/sprains, dislocations, minor burns, cuts and animal bites can be treated at [address] [place] [address] facilities  Diagnostic testing is available at some sites  Of course, if you are experiencing a life threatening medical emergency call 911 or proceed directly to the nearest emergency room      Your nearest [address] [place] [address] facility is conveniently located at:    [address] [place] [address] Manolo Hilaria [address], [address]  [phone]    SKIP THE WAIT  Conveniently offered at most [facility] your spot online at www [facility] [email or web address]-now/[email or web address] or on the [place] [address]    Sincerely,    [address] [facility]  Dept: [phone]

## 2019-12-08 ENCOUNTER — HOSPITAL ENCOUNTER (EMERGENCY)
Facility: HOSPITAL | Age: 24
Discharge: HOME | End: 2019-12-08
Attending: EMERGENCY MEDICINE | Admitting: EMERGENCY MEDICINE
Payer: COMMERCIAL

## 2019-12-08 VITALS
SYSTOLIC BLOOD PRESSURE: 129 MMHG | BODY MASS INDEX: 18.66 KG/M2 | DIASTOLIC BLOOD PRESSURE: 76 MMHG | RESPIRATION RATE: 20 BRPM | HEIGHT: 69 IN | WEIGHT: 126 LBS | HEART RATE: 110 BPM | OXYGEN SATURATION: 99 % | TEMPERATURE: 97.8 F

## 2019-12-08 DIAGNOSIS — F41.0 PANIC ATTACK: Primary | ICD-10-CM

## 2019-12-08 DIAGNOSIS — T88.7XXA MEDICATION SIDE EFFECTS: ICD-10-CM

## 2019-12-08 LAB
ALBUMIN SERPL BCP-MCNC: 3.6 G/DL (ref 3.5–5)
ALP SERPL-CCNC: 32 U/L (ref 46–116)
ALT SERPL W P-5'-P-CCNC: 18 U/L (ref 12–78)
ANION GAP SERPL CALCULATED.3IONS-SCNC: 10 MMOL/L (ref 4–13)
AST SERPL W P-5'-P-CCNC: 20 U/L (ref 5–45)
BASOPHILS # BLD AUTO: 0.02 THOUSANDS/ΜL (ref 0–0.1)
BASOPHILS NFR BLD AUTO: 0 % (ref 0–1)
BILIRUB SERPL-MCNC: 0.2 MG/DL (ref 0.2–1)
BUN SERPL-MCNC: 11 MG/DL (ref 5–25)
CALCIUM SERPL-MCNC: 8.8 MG/DL (ref 8.3–10.1)
CHLORIDE SERPL-SCNC: 104 MMOL/L (ref 100–108)
CO2 SERPL-SCNC: 24 MMOL/L (ref 21–32)
CREAT SERPL-MCNC: 0.75 MG/DL (ref 0.6–1.3)
EOSINOPHIL # BLD AUTO: 0.13 THOUSAND/ΜL (ref 0–0.61)
EOSINOPHIL NFR BLD AUTO: 2 % (ref 0–6)
ERYTHROCYTE [DISTWIDTH] IN BLOOD BY AUTOMATED COUNT: 13 % (ref 11.6–15.1)
GFR SERPL CREATININE-BSD FRML MDRD: 110 ML/MIN/1.73SQ M
GLUCOSE SERPL-MCNC: 99 MG/DL (ref 65–140)
HCT VFR BLD AUTO: 38.2 % (ref 34.8–46.1)
HGB BLD-MCNC: 12.4 G/DL (ref 11.5–15.4)
IMM GRANULOCYTES # BLD AUTO: 0.03 THOUSAND/UL (ref 0–0.2)
IMM GRANULOCYTES NFR BLD AUTO: 0 % (ref 0–2)
LYMPHOCYTES # BLD AUTO: 2.04 THOUSANDS/ΜL (ref 0.6–4.47)
LYMPHOCYTES NFR BLD AUTO: 25 % (ref 14–44)
MCH RBC QN AUTO: 30 PG (ref 26.8–34.3)
MCHC RBC AUTO-ENTMCNC: 32.5 G/DL (ref 31.4–37.4)
MCV RBC AUTO: 93 FL (ref 82–98)
MONOCYTES # BLD AUTO: 0.87 THOUSAND/ΜL (ref 0.17–1.22)
MONOCYTES NFR BLD AUTO: 11 % (ref 4–12)
NEUTROPHILS # BLD AUTO: 4.97 THOUSANDS/ΜL (ref 1.85–7.62)
NEUTS SEG NFR BLD AUTO: 62 % (ref 43–75)
PLATELET # BLD AUTO: 224 THOUSANDS/UL (ref 149–390)
PMV BLD AUTO: 9.3 FL (ref 8.9–12.7)
POTASSIUM SERPL-SCNC: 3.5 MMOL/L (ref 3.5–5.3)
PROT SERPL-MCNC: 7.1 G/DL (ref 6.4–8.2)
RBC # BLD AUTO: 4.13 MILLION/UL (ref 3.81–5.12)
SODIUM SERPL-SCNC: 138 MMOL/L (ref 136–145)
TSH SERPL DL<=0.05 MIU/L-ACNC: 11.04 UIU/ML (ref 0.36–3.74)
WBC # BLD AUTO: 8.06 THOUSAND/UL (ref 4.31–10.16)

## 2019-12-08 PROCEDURE — 99284 EMERGENCY DEPT VISIT MOD MDM: CPT

## 2019-12-08 PROCEDURE — 99284 EMERGENCY DEPT VISIT MOD MDM: CPT | Performed by: EMERGENCY MEDICINE

## 2019-12-08 PROCEDURE — 85025 COMPLETE CBC W/AUTO DIFF WBC: CPT | Performed by: EMERGENCY MEDICINE

## 2019-12-08 PROCEDURE — 84443 ASSAY THYROID STIM HORMONE: CPT | Performed by: EMERGENCY MEDICINE

## 2019-12-08 PROCEDURE — 80053 COMPREHEN METABOLIC PANEL: CPT | Performed by: EMERGENCY MEDICINE

## 2019-12-08 PROCEDURE — 96361 HYDRATE IV INFUSION ADD-ON: CPT

## 2019-12-08 PROCEDURE — 96374 THER/PROPH/DIAG INJ IV PUSH: CPT

## 2019-12-08 PROCEDURE — 36415 COLL VENOUS BLD VENIPUNCTURE: CPT | Performed by: EMERGENCY MEDICINE

## 2019-12-08 RX ORDER — LORAZEPAM 2 MG/ML
1 INJECTION INTRAMUSCULAR ONCE
Status: COMPLETED | OUTPATIENT
Start: 2019-12-08 | End: 2019-12-08

## 2019-12-08 RX ORDER — HYDROXYZINE HYDROCHLORIDE 25 MG/1
25 TABLET, FILM COATED ORAL EVERY 6 HOURS
Qty: 12 TABLET | Refills: 0 | Status: SHIPPED | OUTPATIENT
Start: 2019-12-08 | End: 2020-01-10 | Stop reason: ALTCHOICE

## 2019-12-08 RX ADMIN — SODIUM CHLORIDE 1000 ML: 0.9 INJECTION, SOLUTION INTRAVENOUS at 18:40

## 2019-12-08 RX ADMIN — LORAZEPAM 1 MG: 2 INJECTION INTRAMUSCULAR; INTRAVENOUS at 18:35

## 2019-12-09 NOTE — ED PROVIDER NOTES
History  Chief Complaint   Patient presents with    Dehydration     Pt reports feeling dehydrated and having dry mouth x 2 hours   Panic Attack     Pt with b/l hands and feet tingling  Pt reports feeling anxiety x 1 hour   Medication Management     Pt started taking lexapro 3 days ago, took xanax friday and saturday (mother gave to her)      30 yof with hx of anxiety presents for multiple complaints  Patient states just prior to arrival she started feeling out of her mind  Shaking and feeling dehydrated  Hands and feet were tingling  She states that this has happened before with her anxiety  She has recently started on Lexapro  Last dose was 5 hours ago  She has been with her three days  Denies chest pain shortness of breath or other associated symptoms  Denies current drug abuse although has a admitted to using meth and her wet in the past     Exam reveals tachycardia but well-appearing female  Normal exam   Normal reflexes  No clonus no hyperreflexia  Assessment plan:  Panic attack, will give Ativan advised the patient to stop taking Lexapro  She was also taking her mother's Xanax, also advised to not take medication that was not prescribed to her  Prior to Admission Medications   Prescriptions Last Dose Informant Patient Reported?  Taking?   amoxicillin-clavulanate (AUGMENTIN) 875-125 mg per tablet Not Taking at Unknown time  No No   Sig: Take 1 tablet by mouth every 12 (twelve) hours for 5 days   Patient not taking: Reported on 12/8/2019   escitalopram (LEXAPRO) 10 mg tablet 12/8/2019 at Unknown time  No Yes   Sig: Take 1 tablet (10 mg total) by mouth daily      Facility-Administered Medications: None       Past Medical History:   Diagnosis Date    Anxiety     Disease of thyroid gland     Eczema     Glomerulonephritis     Urinary tract infection        Past Surgical History:   Procedure Laterality Date    SKIN LESION EXCISION      destruction of benign lesion    TONSILLECTOMY      TONSILLECTOMY  2006    WISDOM TOOTH EXTRACTION      WISDOM TOOTH EXTRACTION         Family History   Problem Relation Age of Onset    Coronary artery disease Father     Alcohol abuse Father     Drug abuse Father     Anxiety disorder Mother     Anxiety disorder Brother     Post-traumatic stress disorder Brother     Drug abuse Brother     Lung cancer Maternal Grandfather     Breast cancer Maternal Aunt     Hypertension Family     Lung cancer Family     Lymphoma Family         pancreatic    Hyperlipidemia Family         pure     I have reviewed and agree with the history as documented  Social History     Tobacco Use    Smoking status: Former Smoker     Packs/day: 0 50     Years: 3 00     Pack years: 1 50     Types: Cigarettes     Last attempt to quit: 2018     Years since quittin 9    Smokeless tobacco: Never Used   Substance Use Topics    Alcohol use: Yes     Frequency: 2-4 times a month     Drinks per session: 1 or 2     Binge frequency: Never     Comment: socially    Drug use: No        Review of Systems   Constitutional: Negative for diaphoresis, fatigue and fever  HENT: Negative for facial swelling and nosebleeds  Eyes: Negative for pain and visual disturbance  Respiratory: Negative for apnea, cough, shortness of breath and wheezing  Cardiovascular: Negative for chest pain and leg swelling  Gastrointestinal: Negative for abdominal distention, abdominal pain, anal bleeding, blood in stool, nausea, rectal pain and vomiting  Genitourinary: Negative for difficulty urinating, dysuria and flank pain  Musculoskeletal: Negative for back pain, neck pain and neck stiffness  Neurological: Negative for dizziness, syncope, weakness, light-headedness and headaches  Psychiatric/Behavioral: Positive for agitation  The patient is nervous/anxious and is hyperactive  All other systems reviewed and are negative        Physical Exam  Physical Exam Constitutional: She is oriented to person, place, and time  She appears well-developed and well-nourished  No distress  HENT:   Head: Normocephalic and atraumatic  Nose: Nose normal    Eyes: Pupils are equal, round, and reactive to light  Conjunctivae and EOM are normal  No scleral icterus  Neck: Normal range of motion  Neck supple  No JVD present  No tracheal deviation present  No thyromegaly present  Cardiovascular: Normal rate, regular rhythm, normal heart sounds and intact distal pulses  Exam reveals no gallop and no friction rub  Pulmonary/Chest: Effort normal and breath sounds normal  No respiratory distress  She has no wheezes  She has no rales  She exhibits no tenderness  Abdominal: Soft  Bowel sounds are normal  She exhibits no distension and no mass  There is no tenderness  There is no rebound and no guarding  No hernia  Musculoskeletal: Normal range of motion  She exhibits no edema, tenderness or deformity  Neurological: She is alert and oriented to person, place, and time  She has normal reflexes  No cranial nerve deficit  Coordination normal    Skin: Skin is warm and dry  She is not diaphoretic  No erythema  Psychiatric: She has a normal mood and affect  Her behavior is normal    Nursing note and vitals reviewed        Vital Signs  ED Triage Vitals   Temperature Pulse Respirations Blood Pressure SpO2   12/08/19 1737 12/08/19 1739 12/08/19 1739 12/08/19 1739 12/08/19 1739   97 8 °F (36 6 °C) (!) 110 20 129/76 99 %      Temp Source Heart Rate Source Patient Position - Orthostatic VS BP Location FiO2 (%)   12/08/19 1737 12/08/19 1737 12/08/19 1739 12/08/19 1739 --   Temporal Monitor Sitting Left arm       Pain Score       12/08/19 1739       No Pain           Vitals:    12/08/19 1739   BP: 129/76   Pulse: (!) 110   Patient Position - Orthostatic VS: Sitting         Visual Acuity  Visual Acuity      Most Recent Value   L Pupil Size (mm)  3   R Pupil Size (mm)  3          ED Medications  Medications   sodium chloride 0 9 % bolus 1,000 mL (0 mL Intravenous Stopped 12/8/19 1939)   LORazepam (ATIVAN) 2 mg/mL injection 1 mg (1 mg Intravenous Given 12/8/19 1835)       Diagnostic Studies  Results Reviewed     Procedure Component Value Units Date/Time    TSH [[id number]]  (Abnormal) Collected:  12/08/19 1822    Lab Status:  Final result Specimen:  Blood from Arm, Right Updated:  12/08/19 1917     TSH 3RD GENERATON 11 035 uIU/mL     Narrative:       Patients undergoing fluorescein dye angiography may retain small amounts of fluorescein in the body for 48-72 hours post procedure  Samples containing fluorescein can produce falsely depressed TSH values  If the patient had this procedure,a specimen should be resubmitted post fluorescein clearance        Comprehensive metabolic panel [[id number]]  (Abnormal) Collected:  12/08/19 1822    Lab Status:  Final result Specimen:  Blood from Arm, Right Updated:  12/08/19 1902     Sodium 138 mmol/L      Potassium 3 5 mmol/L      Chloride 104 mmol/L      CO2 24 mmol/L      ANION GAP 10 mmol/L      BUN 11 mg/dL      Creatinine 0 75 mg/dL      Glucose 99 mg/dL      Calcium 8 8 mg/dL      AST 20 U/L      ALT 18 U/L      Alkaline Phosphatase 32 U/L      Total Protein 7 1 g/dL      Albumin 3 6 g/dL      Total Bilirubin 0 20 mg/dL      eGFR 110 ml/min/1 73sq m     Narrative:       [place] guidelines for Chronic Kidney Disease (CKD):     Stage 1 with normal or high GFR (GFR > 90 mL/min/1 73 square meters)    Stage 2 Mild CKD (GFR = 60-89 mL/min/1 73 square meters)    Stage 3A Moderate CKD (GFR = 45-59 mL/min/1 73 square meters)    Stage 3B Moderate CKD (GFR = 30-44 mL/min/1 73 square meters)    Stage 4 Severe CKD (GFR = 15-29 mL/min/1 73 square meters)    Stage 5 End Stage CKD (GFR <15 mL/min/1 73 square meters)  Note: GFR calculation is accurate only with a steady state creatinine    CBC and differential [[id number]]  (Normal) Collected:  12/08/19 1822    Lab Status:  Final result Specimen:  Blood from Arm, Right Updated:  12/08/19 1840     WBC 8 06 Thousand/uL      RBC 4 13 Million/uL      Hemoglobin 12 4 g/dL      Hematocrit 38 2 %      MCV 93 fL      MCH 30 0 pg      MCHC 32 5 g/dL      RDW 13 0 %      MPV 9 3 fL      Platelets 105 Thousands/uL      Neutrophils Relative 62 %      Immat GRANS % 0 %      Lymphocytes Relative 25 %      Monocytes Relative 11 %      Eosinophils Relative 2 %      Basophils Relative 0 %      Neutrophils Absolute 4 97 Thousands/µL      Immature Grans Absolute 0 03 Thousand/uL      Lymphocytes Absolute 2 04 Thousands/µL      Monocytes Absolute 0 87 Thousand/µL      Eosinophils Absolute 0 13 Thousand/µL      Basophils Absolute 0 02 Thousands/µL                  No orders to display              Procedures  Procedures         ED Course                               MDM  Number of Diagnoses or Management Options  Medication side effects: new and requires workup  Panic attack: new and requires workup  Diagnosis management comments: Patient feels much better, on re-evaluation patient was sleeping with her significant other in the hospital bed  She was discharged to follow up with her family doctor       Amount and/or Complexity of Data Reviewed  Clinical lab tests: reviewed and ordered  Tests in the medicine section of CPT®: reviewed and ordered    Patient Progress  Patient progress: stable        Disposition  Final diagnoses:   Panic attack   Medication side effects     Time reflects when diagnosis was documented in both MDM as applicable and the Disposition within this note     Time User Action Codes Description Comment    12/8/2019  7:19 PM Han Hernández [F41 0] Panic attack     12/8/2019  7:19 PM Martha Parra  7XXA] Medication side effects       ED Disposition     ED Disposition Condition Date/Time Comment    Discharge Stable Sun Dec 8, 2019  7:19 PM Royce Burgos discharge to home/self care  Follow-up Information     Follow up With Specialties Details Why [address] [place] [address], DO Family Medicine Schedule an appointment as soon as possible for a visit   [address]  [phone]            Discharge Medication List as of 12/8/2019  7:29 PM      START taking these medications    Details   hydrOXYzine HCL (ATARAX) 25 mg tablet Take 1 tablet (25 mg total) by mouth every 6 (six) hours, Starting Sun 12/8/2019, Normal         CONTINUE these medications which have NOT CHANGED    Details   escitalopram (LEXAPRO) 10 mg tablet Take 1 tablet (10 mg total) by mouth daily, Starting Wed 11/20/2019, Until Fri 12/20/2019, Normal      amoxicillin-clavulanate (AUGMENTIN) 875-125 mg per tablet Take 1 tablet by mouth every 12 (twelve) hours for 5 days, Starting Tue 12/3/2019, Until Sun 12/8/2019, Normal           No discharge procedures on file      ED Provider  Electronically Signed by           Greg Castillo DO  12/09/19 1525

## 2019-12-10 ENCOUNTER — TELEPHONE (OUTPATIENT)
Dept: ENDOCRINOLOGY | Facility: CLINIC | Age: 24
End: 2019-12-10

## 2019-12-10 DIAGNOSIS — E03.9 HYPOTHYROIDISM, UNSPECIFIED TYPE: Primary | ICD-10-CM

## 2019-12-10 RX ORDER — LEVOTHYROXINE SODIUM 13 UG/1
CAPSULE ORAL
Qty: 30 CAPSULE | Refills: 5 | Status: SHIPPED | OUTPATIENT
Start: 2019-12-10 | End: 2019-12-11 | Stop reason: ALTCHOICE

## 2019-12-10 NOTE — TELEPHONE ENCOUNTER
pt's mom Juan Miguel Robert would like you to call her ASAP she daughter is having major anxiety she thinks from synthroid she went off it and her numbers are higher, but she has been on synthroid and levothyroxine according to mom, there is a message she had sent to Dr Suresh Frias in her chart, she saw you last   Please call mom at [phone]

## 2019-12-10 NOTE — TELEPHONE ENCOUNTER
I did get and respond to the patient's mother's message in detail can you ask her to review that?   I started Tirosint 13mcg daily sent to pharmacy with repeat lab testing in 6 weeks

## 2019-12-11 ENCOUNTER — TELEPHONE (OUTPATIENT)
Dept: BEHAVIORAL/MENTAL HEALTH CLINIC | Facility: CLINIC | Age: 24
End: 2019-12-11

## 2019-12-11 ENCOUNTER — TELEPHONE (OUTPATIENT)
Dept: FAMILY MEDICINE CLINIC | Facility: CLINIC | Age: 24
End: 2019-12-11

## 2019-12-11 ENCOUNTER — OFFICE VISIT (OUTPATIENT)
Dept: FAMILY MEDICINE CLINIC | Facility: CLINIC | Age: 24
End: 2019-12-11
Payer: COMMERCIAL

## 2019-12-11 VITALS
DIASTOLIC BLOOD PRESSURE: 64 MMHG | BODY MASS INDEX: 18.78 KG/M2 | HEIGHT: 69 IN | SYSTOLIC BLOOD PRESSURE: 116 MMHG | TEMPERATURE: 98.2 F | HEART RATE: 88 BPM | OXYGEN SATURATION: 98 % | WEIGHT: 126.8 LBS

## 2019-12-11 DIAGNOSIS — F41.1 GENERALIZED ANXIETY DISORDER: ICD-10-CM

## 2019-12-11 DIAGNOSIS — R20.2 PARESTHESIA: ICD-10-CM

## 2019-12-11 DIAGNOSIS — R25.1 EPISODE OF SHAKING: ICD-10-CM

## 2019-12-11 DIAGNOSIS — R25.3 TONGUE FASCICULATION: Primary | ICD-10-CM

## 2019-12-11 DIAGNOSIS — E03.9 HYPOTHYROIDISM, UNSPECIFIED TYPE: ICD-10-CM

## 2019-12-11 DIAGNOSIS — F41.0 PANIC ATTACKS: ICD-10-CM

## 2019-12-11 PROCEDURE — 99214 OFFICE O/P EST MOD 30 MIN: CPT | Performed by: FAMILY MEDICINE

## 2019-12-11 PROCEDURE — 3008F BODY MASS INDEX DOCD: CPT | Performed by: FAMILY MEDICINE

## 2019-12-11 NOTE — TELEPHONE ENCOUNTER
Recommend patient then call her insurance company and see who she is able to see in her network and then we can re order referral

## 2019-12-11 NOTE — TELEPHONE ENCOUNTER
This writer outreached to Bertha Pino at the recommendation of Dr Vincenzo Marti, with a request to get Emelyngina Pino connected with therapy and psychiatry services, in network with her health insurance plan  This writer left a voice-mail for Bertha Pino communicating that with her health insurance of [place] [employer], medical assistance, her behavioral health coverage is through [facility]  Discussed that based upon her insurance and her demographic information, the closest place she can go to is [facility] for an in-take assessment for therapy and psychiatry that is in network with her insurance  This writer provided the contact information for both [facility] and [facility] as options for her  Requested a call back to discuss/ review further

## 2019-12-11 NOTE — PROGRESS NOTES
Stef Davis 1995 female MRN: [id number]    Family Medicine Acute Visit    ASSESSMENT/PLAN  Problem List Items Addressed This Visit        Endocrine    Hypothyroidism     Currently off medication  Follows with endocrine             Other    Generalized anxiety disorder    Panic attacks    Tongue fasciculation - Primary    Relevant Orders    Ambulatory referral to Neurology    MRI brain wo contrast    Episode of shaking    Relevant Orders    Ambulatory referral to Neurology    MRI brain wo contrast    Paresthesia    Relevant Orders    Ambulatory referral to Neurology    MRI brain wo contrast          Patient here for ER follow up for numerous complaints  She described vague neurologic complaints today such as tingling in her hands, various episodes of shaking and facial and tongue twitching  She does suffer from anxiety and panic disorder but has been unable to tolerate medication which was prescribed for her  At this time I will check imaging to evaluate for organic cause of her symptoms  Referral to neuro  I have also asked her to follow up with [facility] as she needs better management of her anxiety and panic and has not tolerated rx provided to her by me at this time  I advised patient to not take medication which was not prescribed for her (she had been trying her moms xanex and brothers clonopin)  She is currently using atarax as needed as prescribed by the ER      Future Appointments   Date Time Provider Ashley Patino   1/24/2020 11:00 AM Rena Workman, [facility]   3/2/2020 10:00 AM Mamadou Campos [facility]          SUBJECTIVE  CC: Anxiety (tongue has been twitching which causes panic attacks- patient has been going to the ER for panic attacks  )      HPI:  Stef Davis is a 30 y o  female who presents for numerous complaints  She has been in the ER multiple times  Having panic attacks, anxiety  She is having tongue twitching    She stopped her lexapro because she had side effects from it, thought she was having a stroke  She has numbness and tingling in her hands  She wants an MRI  Her mom gave her xanex (prescribed for her mom) at home to try when she had a mental breakdown   Also tried her brothers clonipin to see if that would help        Review of Systems   Constitutional: Negative for chills, fatigue and fever  HENT: Negative for congestion, postnasal drip, rhinorrhea and sinus pressure  Eyes: Negative for photophobia and visual disturbance  Respiratory: Negative for cough and shortness of breath  Cardiovascular: Negative for chest pain, palpitations and leg swelling  Gastrointestinal: Negative for abdominal pain, constipation, diarrhea, nausea and vomiting  Genitourinary: Negative for difficulty urinating and dysuria  Musculoskeletal: Negative for arthralgias and myalgias  Skin: Negative for color change and rash  Neurological: Positive for numbness  Negative for dizziness, weakness, light-headedness and headaches  Psychiatric/Behavioral: Positive for agitation and sleep disturbance         Historical Information   The patient history was reviewed as follows:  Past Medical History:   Diagnosis Date    Anxiety     Disease of thyroid gland     Eczema     Glomerulonephritis     Urinary tract infection          Past Surgical History:   Procedure Laterality Date    SKIN LESION EXCISION      destruction of benign lesion    TONSILLECTOMY      TONSILLECTOMY  2006    WISDOM TOOTH EXTRACTION      WISDOM TOOTH EXTRACTION  2011     Family History   Problem Relation Age of Onset    Coronary artery disease Father     Alcohol abuse Father     Drug abuse Father     Anxiety disorder Mother     Anxiety disorder Brother     Post-traumatic stress disorder Brother     Drug abuse Brother     Lung cancer Maternal Grandfather     Breast cancer Maternal Aunt     Hypertension Family     Lung cancer Family     Lymphoma Family         pancreatic    Hyperlipidemia Family         pure      Social History   Social History     Substance and Sexual Activity   Alcohol Use Yes    Frequency: 2-4 times a month    Drinks per session: 1 or 2    Binge frequency: Never    Comment: socially     Social History     Substance and Sexual Activity   Drug Use No     Social History     Tobacco Use   Smoking Status Former Smoker    Packs/day: 0 50    Years: 3 00    Pack years: 1 50    Types: Cigarettes    Last attempt to quit: 2018    Years since quittin 9   Smokeless Tobacco Never Used       Medications:     Current Outpatient Medications:     hydrOXYzine HCL (ATARAX) 25 mg tablet, Take 1 tablet (25 mg total) by mouth every 6 (six) hours, Disp: 12 tablet, Rfl: 0    Allergies   Allergen Reactions    Bactrim [Sulfamethoxazole-Trimethoprim] Headache     migrains    Lac Bovis Rash    Milk-Related Compounds Rash    Other Itching     Pet allergies       OBJECTIVE  Vitals:   Vitals:    19 1400   BP: 116/64   BP Location: Left arm   Patient Position: Sitting   Cuff Size: Standard   Pulse: 88   Temp: 98 2 °F (36 8 °C)   TempSrc: Tympanic   SpO2: 98%   Weight: 57 5 kg (126 lb 12 8 oz)   Height: 5' 9" (1 753 m)         Physical Exam   Constitutional: She is oriented to person, place, and time  She appears well-developed and well-nourished  HENT:   Head: Normocephalic and atraumatic  Mouth/Throat: Oropharynx is clear and moist    Eyes: Pupils are equal, round, and reactive to light  Neck: Normal range of motion  Neck supple  Cardiovascular: Normal rate, regular rhythm and normal heart sounds  Pulmonary/Chest: Effort normal and breath sounds normal  No respiratory distress  She has no wheezes  Abdominal: Soft  Bowel sounds are normal  She exhibits no distension  There is no tenderness  Musculoskeletal: Normal range of motion  She exhibits no edema or tenderness  Neurological: She is alert and oriented to person, place, and time     Skin: Skin is warm and dry    Psychiatric: She has a normal mood and affect   Her behavior is normal                   Micheline Cobian DO    12/11/2019

## 2019-12-17 ENCOUNTER — OFFICE VISIT (OUTPATIENT)
Dept: URGENT CARE | Facility: CLINIC | Age: 24
End: 2019-12-17
Payer: COMMERCIAL

## 2019-12-17 VITALS
HEIGHT: 69 IN | RESPIRATION RATE: 20 BRPM | DIASTOLIC BLOOD PRESSURE: 67 MMHG | WEIGHT: 128 LBS | SYSTOLIC BLOOD PRESSURE: 100 MMHG | HEART RATE: 91 BPM | BODY MASS INDEX: 18.96 KG/M2 | TEMPERATURE: 98 F

## 2019-12-17 DIAGNOSIS — R35.0 URINARY FREQUENCY: Primary | ICD-10-CM

## 2019-12-17 DIAGNOSIS — N30.01 ACUTE CYSTITIS WITH HEMATURIA: ICD-10-CM

## 2019-12-17 LAB
SL AMB  POCT GLUCOSE, UA: ABNORMAL
SL AMB LEUKOCYTE ESTERASE,UA: ABNORMAL
SL AMB POCT BILIRUBIN,UA: ABNORMAL
SL AMB POCT BLOOD,UA: ABNORMAL
SL AMB POCT CLARITY,UA: ABNORMAL
SL AMB POCT COLOR,UA: ABNORMAL
SL AMB POCT KETONES,UA: ABNORMAL
SL AMB POCT NITRITE,UA: ABNORMAL
SL AMB POCT PH,UA: 6.5
SL AMB POCT SPECIFIC GRAVITY,UA: 1.02
SL AMB POCT URINE PROTEIN: ABNORMAL
SL AMB POCT UROBILINOGEN: 0.2

## 2019-12-17 PROCEDURE — 99283 EMERGENCY DEPT VISIT LOW MDM: CPT | Performed by: FAMILY MEDICINE

## 2019-12-17 PROCEDURE — 81002 URINALYSIS NONAUTO W/O SCOPE: CPT | Performed by: FAMILY MEDICINE

## 2019-12-17 PROCEDURE — G0382 LEV 3 HOSP TYPE B ED VISIT: HCPCS | Performed by: FAMILY MEDICINE

## 2019-12-17 RX ORDER — NITROFURANTOIN 25; 75 MG/1; MG/1
100 CAPSULE ORAL 2 TIMES DAILY
Qty: 10 CAPSULE | Refills: 0 | Status: SHIPPED | OUTPATIENT
Start: 2019-12-17 | End: 2019-12-22

## 2019-12-17 RX ORDER — PHENAZOPYRIDINE HYDROCHLORIDE 200 MG/1
200 TABLET, FILM COATED ORAL
Qty: 10 TABLET | Refills: 0 | Status: SHIPPED | OUTPATIENT
Start: 2019-12-17 | End: 2020-01-10 | Stop reason: ALTCHOICE

## 2019-12-17 NOTE — PROGRESS NOTES
The Assessment/Plan:      Diagnoses and all orders for this visit:    Urinary frequency  -     POCT urine dip  -     Urine culture    Acute cystitis with hematuria  -     nitrofurantoin (MACROBID) 100 mg capsule; Take 1 capsule (100 mg total) by mouth 2 (two) times a day for 5 days  -     phenazopyridine (PYRIDIUM) 200 mg tablet; Take 1 tablet (200 mg total) by mouth 3 (three) times a day with meals          Subjective:     Patient ID: Day Crowell is a 30 y o  female  This 56-year-old female is very uncomfortable with increased frequency of urination and burning  I suspect that she is also experiencing bladder spasms  The urinalysis here today was positive for large leuks and large blood  Review of Systems   Constitutional: Negative  HENT: Negative  Respiratory: Negative  Genitourinary: Positive for dysuria, frequency, hematuria and urgency  Objective:     Physical Exam   Constitutional: She is oriented to person, place, and time  She appears well-developed and well-nourished  HENT:   Head: Normocephalic and atraumatic  Pulmonary/Chest: Effort normal    Neurological: She is alert and oriented to person, place, and time

## 2019-12-17 NOTE — PATIENT INSTRUCTIONS
We are treating this as a urinary tract infection  Increase fluids, and use medications as written  Use probiotics while on the antibiotic plus at least another week or 2  The urinary tract analgesic will discolor the urine  Call here in about 48 hours for the results of the urine culture if you are not improving

## 2019-12-18 ENCOUNTER — HOSPITAL ENCOUNTER (OUTPATIENT)
Dept: MRI IMAGING | Facility: HOSPITAL | Age: 24
Discharge: HOME | End: 2019-12-18
Payer: COMMERCIAL

## 2019-12-18 DIAGNOSIS — R25.1 EPISODE OF SHAKING: ICD-10-CM

## 2019-12-18 DIAGNOSIS — R25.3 TONGUE FASCICULATION: ICD-10-CM

## 2019-12-18 DIAGNOSIS — R20.2 PARESTHESIA: ICD-10-CM

## 2019-12-18 PROCEDURE — 70551 MRI BRAIN STEM W/O DYE: CPT

## 2019-12-20 LAB
BACTERIA UR CULT: ABNORMAL
Lab: ABNORMAL

## 2020-01-09 ENCOUNTER — APPOINTMENT (OUTPATIENT)
Dept: LAB | Facility: HOSPITAL | Age: 25
End: 2020-01-09
Payer: COMMERCIAL

## 2020-01-09 DIAGNOSIS — E03.9 MYXEDEMA HEART DISEASE: Primary | ICD-10-CM

## 2020-01-09 DIAGNOSIS — I51.9 MYXEDEMA HEART DISEASE: Primary | ICD-10-CM

## 2020-01-09 LAB
T4 FREE SERPL-MCNC: 0.77 NG/DL (ref 0.76–1.46)
TSH SERPL DL<=0.05 MIU/L-ACNC: 6.38 UIU/ML (ref 0.36–3.74)

## 2020-01-09 PROCEDURE — 86800 THYROGLOBULIN ANTIBODY: CPT

## 2020-01-09 PROCEDURE — 36415 COLL VENOUS BLD VENIPUNCTURE: CPT

## 2020-01-09 PROCEDURE — 84439 ASSAY OF FREE THYROXINE: CPT

## 2020-01-09 PROCEDURE — 86376 MICROSOMAL ANTIBODY EACH: CPT

## 2020-01-09 PROCEDURE — 84443 ASSAY THYROID STIM HORMONE: CPT

## 2020-01-10 ENCOUNTER — HOSPITAL ENCOUNTER (EMERGENCY)
Facility: HOSPITAL | Age: 25
Discharge: HOME | End: 2020-01-10
Attending: EMERGENCY MEDICINE | Admitting: EMERGENCY MEDICINE
Payer: COMMERCIAL

## 2020-01-10 VITALS
RESPIRATION RATE: 16 BRPM | TEMPERATURE: 97.7 F | HEART RATE: 87 BPM | SYSTOLIC BLOOD PRESSURE: 120 MMHG | WEIGHT: 128 LBS | DIASTOLIC BLOOD PRESSURE: 71 MMHG | OXYGEN SATURATION: 99 % | BODY MASS INDEX: 18.9 KG/M2

## 2020-01-10 DIAGNOSIS — F41.9 ANXIETY: Primary | ICD-10-CM

## 2020-01-10 LAB
GLUCOSE SERPL-MCNC: 91 MG/DL (ref 65–140)
THYROGLOB AB SERPL-ACNC: 74.5 IU/ML (ref 0–0.9)
THYROPEROXIDASE AB SERPL-ACNC: 577 IU/ML (ref 0–34)

## 2020-01-10 PROCEDURE — 99284 EMERGENCY DEPT VISIT MOD MDM: CPT | Performed by: EMERGENCY MEDICINE

## 2020-01-10 PROCEDURE — 82948 REAGENT STRIP/BLOOD GLUCOSE: CPT

## 2020-01-10 PROCEDURE — 99284 EMERGENCY DEPT VISIT MOD MDM: CPT

## 2020-01-10 NOTE — ED NOTES
Yolanda from crisis in the room to give pt  Resources for follow up care near where patient lives       Helen Dang RN  01/10/20 9217

## 2020-01-10 NOTE — ED PROVIDER NOTES
History  Chief Complaint   Patient presents with    Anxiety     States her synthroid was stopped three weeks ago by her PCP secondary to "really bad anxiety" which she thought was from the synthroid; presents tonight because she is still getting anxiety and wants to know if it is safe that it was stopped  61-year-old female with past medical history of anxiety, hyporthyroidism presents for evaluation of continued anxiety  Patient has been seen multiple times in the emergency department the continues to have symptoms  She previously has self-medicated with her mother Xanax and has been using Atarax that she was prescribed in the emergency department  She followed up with her primary care provider on the 11th of last month and at that time an MRI of the brain was ordered  She also stated that her Synthroid that she takes for her hypothyroidism has been making her more anxious and her primary care provider told her to stop it  Today she presents with continued anxiety, typical of her previous symptoms  She also is worried about her Synthroid being stops and wonders if it is safe  No current chest pain or shortness of breath no nausea vomiting or diarrhea              None       Past Medical History:   Diagnosis Date    Anxiety     Disease of thyroid gland     Eczema     Glomerulonephritis     Psychiatric disorder     anxiety, panic    Urinary tract infection        Past Surgical History:   Procedure Laterality Date    SKIN LESION EXCISION      destruction of benign lesion    TONSILLECTOMY      TONSILLECTOMY  2006    WISDOM TOOTH EXTRACTION      WISDOM TOOTH EXTRACTION  2011       Family History   Problem Relation Age of Onset    Coronary artery disease Father     Alcohol abuse Father     Drug abuse Father     Anxiety disorder Mother     Anxiety disorder Brother     Post-traumatic stress disorder Brother     Drug abuse Brother     Lung cancer Maternal Grandfather     Breast cancer Maternal Aunt     Hypertension Family     Lung cancer Family     Lymphoma Family         pancreatic    Hyperlipidemia Family         pure     I have reviewed and agree with the history as documented  Social History     Tobacco Use    Smoking status: Former Smoker     Packs/day: 0 50     Years: 3 00     Pack years: 1 50     Types: Cigarettes     Last attempt to quit: 2018     Years since quittin 0    Smokeless tobacco: Never Used   Substance Use Topics    Alcohol use: Yes     Frequency: 2-4 times a month     Drinks per session: 1 or 2     Binge frequency: Never     Comment: socially    Drug use: No        Review of Systems   Constitutional: Negative for appetite change and fever  HENT: Negative for rhinorrhea and sore throat  Eyes: Negative for photophobia and visual disturbance  Respiratory: Negative for cough, chest tightness and wheezing  Cardiovascular: Negative for chest pain, palpitations and leg swelling  Gastrointestinal: Negative for abdominal distention, abdominal pain, blood in stool, constipation and diarrhea  Genitourinary: Negative for dysuria, flank pain, frequency, hematuria and urgency  Musculoskeletal: Negative for back pain  Skin: Negative for rash  Neurological: Negative for dizziness, weakness and headaches  Psychiatric/Behavioral: The patient is nervous/anxious  All other systems reviewed and are negative  Physical Exam  Physical Exam   Constitutional: She is oriented to person, place, and time  She appears well-developed and well-nourished  HENT:   Head: Normocephalic and atraumatic  Eyes: Pupils are equal, round, and reactive to light  EOM are normal    Neck: Normal range of motion  Neck supple  Cardiovascular: Normal rate and regular rhythm  Exam reveals no gallop and no friction rub  No murmur heard  Pulmonary/Chest: Effort normal  She has no wheezes  She has no rales  She exhibits no tenderness  Abdominal: Soft   She exhibits no distension and no mass  There is no rebound and no guarding  Neurological: She is alert and oriented to person, place, and time  Skin: Skin is warm and dry  Psychiatric: She has a normal mood and affect  Nursing note and vitals reviewed  Vital Signs  ED Triage Vitals [01/10/20 0056]   Temperature Pulse Respirations Blood Pressure SpO2   97 7 °F (36 5 °C) 75 16 129/83 97 %      Temp src Heart Rate Source Patient Position - Orthostatic VS BP Location FiO2 (%)   -- Monitor -- -- --      Pain Score       No Pain           Vitals:    01/10/20 0056 01/10/20 0100 01/10/20 0115 01/10/20 0130   BP: 129/83 120/71     Pulse: 75 76 84 87         Visual Acuity      ED Medications  Medications - No data to display    Diagnostic Studies  Results Reviewed     Procedure Component Value Units Date/Time    Fingerstick Glucose (POCT) [[id number]]  (Normal) Collected:  01/10/20 0115    Lab Status:  Final result Updated:  01/10/20 0117     POC Glucose 91 mg/dl                  No orders to display              Procedures  Procedures         ED Course  ED Course as of To 10 0306   Chelsie Javed To 10, 2020   0108 Patient states the main reason she came today is that she was worried what her thyroid hormone levels, she had the blood work done today and it was making her very worried  She states that she does not feel like herself, states please check my temperature  She is anxious appearing vital signs within normal limits, will check Accu-Chek  Advise patient follow up with Psychiatry for further care                                  MDM  Number of Diagnoses or Management Options  Diagnosis management comments: 27-year-old female with longstanding anxiety presents for evaluation of continued anxiety, patient previously was on Synthroid, follows of endocrine, currently off medications, free T4 reviewed which was done yesterday is within normal limits    She also had an MRI brain done on 12/18 secondary to her multiple neurologic complaints such as random paresthesias of extremities, tongue fasciculations, MRI results reviewed and are negative Will advise patient to follow up with Psychiatry for further evaluation of anxiety as well as follow-up with neurology as requested by her primary care provider for further care  Disposition  Final diagnoses:   Anxiety     Time reflects when diagnosis was documented in both MDM as applicable and the Disposition within this note     Time User Action Codes Description Comment    1/10/2020  1:02 AM Kelsie Hernández [F41 [id number]] Anxiety       ED Disposition     ED Disposition Condition Date/Time Comment    Discharge Stable Fri Ot 10, 2020  1:02 AM Elma Temple discharge to home/self care  Follow-up Information     Follow up With Specialties Details Why Contact Info Additional [address] [facility],  Family Medicine Schedule an appointment as soon as possible for a visit   [address] [phone]        [address] Emergency Department Emergency Medicine  If symptoms worsen [address] [place], [address]  [phone]  ED, [address] [place], [facility], [address]          There are no discharge medications for this patient  No discharge procedures on file      ED Provider  Electronically Signed by           Simone Jamil MD  01/10/20 1817

## 2020-01-10 NOTE — DISCHARGE INSTRUCTIONS
Please follow-up with Psychiatry for further care, you also need to follow up with Neurology as requested by your primary care provider for further evaluation of your neurologic complaints    You developed new or worsening symptoms please return to the emergency department

## 2020-01-14 DIAGNOSIS — E06.3 HASHIMOTO'S THYROIDITIS: Primary | ICD-10-CM

## 2020-01-15 ENCOUNTER — OFFICE VISIT (OUTPATIENT)
Dept: FAMILY MEDICINE CLINIC | Facility: CLINIC | Age: 25
End: 2020-01-15
Payer: COMMERCIAL

## 2020-01-15 VITALS
DIASTOLIC BLOOD PRESSURE: 70 MMHG | SYSTOLIC BLOOD PRESSURE: 110 MMHG | HEART RATE: 86 BPM | OXYGEN SATURATION: 99 % | WEIGHT: 129 LBS | HEIGHT: 69 IN | BODY MASS INDEX: 19.11 KG/M2

## 2020-01-15 DIAGNOSIS — F41.0 ANXIETY DISORDER DUE TO GENERAL MEDICAL CONDITION WITH PANIC ATTACK: Primary | ICD-10-CM

## 2020-01-15 DIAGNOSIS — F06.4 ANXIETY DISORDER DUE TO GENERAL MEDICAL CONDITION WITH PANIC ATTACK: Primary | ICD-10-CM

## 2020-01-15 DIAGNOSIS — E03.9 HYPOTHYROIDISM, UNSPECIFIED TYPE: ICD-10-CM

## 2020-01-15 PROCEDURE — 3008F BODY MASS INDEX DOCD: CPT | Performed by: FAMILY MEDICINE

## 2020-01-15 PROCEDURE — 99213 OFFICE O/P EST LOW 20 MIN: CPT | Performed by: FAMILY MEDICINE

## 2020-01-15 RX ORDER — LORAZEPAM 0.5 MG/1
0.5 TABLET ORAL 2 TIMES DAILY
Qty: 60 TABLET | Refills: 0
Start: 2020-01-15 | End: 2020-02-04 | Stop reason: SDUPTHER

## 2020-01-15 NOTE — PROGRESS NOTES
Assessment/Plan:     Diagnoses and all orders for this visit:    Anxiety disorder due to general medical condition with panic attack    Hypothyroidism, unspecified type      patient will continue to follow with Endocrinology for her hyperthyroidism as she seems to be refractory to most of her usual thyroid medications  As far as her anxiety goes we reviewed all her list of medications that she has been on the past did not work for her  She states that Ativan works the best for her will do Ativan 0 5 mg b i d  As needed  She has an appoint with her LCSW in a month  If we get her sooner appointment we will try  Otherwise she is to follow up with me in 3 weeks or his sooner if needed      Subjective:     Chief Complaint   Patient presents with    Anxiety        Patient ID: Niall Larios is a 30 y o  female  Patient is here for for severe anxiety  She was in the ER last night for panic attack  She has had been to the ER multiple times for anxiety and panic attacks  She has been on multiple psychiatric medications in the past including SSRIs as and benzodiazepines  The only thing that states that helps her is Ativan  She is currently on Xanax which she does not feel is helping her  She is seeing Endocrinology for her thyroid  She states that this all started after she started levothyroxine      The following portions of the patient's history were reviewed and updated as appropriate: allergies, current medications, past family history, past medical history, past social history, past surgical history and problem list     Review of Systems   Constitutional: Negative  HENT: Negative  Eyes: Negative  Respiratory: Negative  Cardiovascular: Negative  Gastrointestinal: Negative  Endocrine: Negative  Genitourinary: Negative  Musculoskeletal: Negative  Skin: Negative  Allergic/Immunologic: Negative  Neurological: Negative  Hematological: Negative      Psychiatric/Behavioral: Positive for agitation, decreased concentration and dysphoric mood  The patient is nervous/anxious  All other systems reviewed and are negative  Objective: There were no vitals filed for this visit  Physical Exam   Constitutional: She is oriented to person, place, and time  She appears well-developed and well-nourished  HENT:   Head: Normocephalic and atraumatic  Right Ear: External ear normal    Left Ear: External ear normal    Mouth/Throat: Oropharynx is clear and moist    Eyes: Pupils are equal, round, and reactive to light  Conjunctivae and EOM are normal    Neck: Normal range of motion  Cardiovascular: Normal rate, regular rhythm and normal heart sounds  Pulmonary/Chest: Effort normal and breath sounds normal    Abdominal: Soft  Bowel sounds are normal    Musculoskeletal: Normal range of motion  Neurological: She is alert and oriented to person, place, and time  She has normal reflexes  Skin: Skin is warm and dry  Psychiatric: She has a normal mood and affect  Her behavior is normal  Judgment and thought content normal    Nursing note and vitals reviewed

## 2020-01-22 LAB
T4 FREE SERPL-MCNC: 1.08 NG/DL (ref 0.82–1.77)
TSH SERPL DL<=0.005 MIU/L-ACNC: 3.41 UIU/ML (ref 0.45–4.5)

## 2020-01-29 ENCOUNTER — TELEPHONE (OUTPATIENT)
Dept: FAMILY MEDICINE CLINIC | Facility: CLINIC | Age: 25
End: 2020-01-29

## 2020-01-29 NOTE — TELEPHONE ENCOUNTER
PT MOTHER CALLED BACK YOU CAN CALL BACK ON CELL [phone] ANY TIME WORK # [phone] THIS IS WORK # TILL 3:30 PM  MOTHER LEFT YOU A MESSAGE ON MY CHART

## 2020-02-04 ENCOUNTER — OFFICE VISIT (OUTPATIENT)
Dept: FAMILY MEDICINE CLINIC | Facility: CLINIC | Age: 25
End: 2020-02-04
Payer: COMMERCIAL

## 2020-02-04 VITALS
HEIGHT: 69 IN | BODY MASS INDEX: 18.84 KG/M2 | HEART RATE: 61 BPM | TEMPERATURE: 98.9 F | SYSTOLIC BLOOD PRESSURE: 120 MMHG | OXYGEN SATURATION: 99 % | DIASTOLIC BLOOD PRESSURE: 70 MMHG | WEIGHT: 127.2 LBS

## 2020-02-04 DIAGNOSIS — F06.4 ANXIETY DISORDER DUE TO GENERAL MEDICAL CONDITION WITH PANIC ATTACK: ICD-10-CM

## 2020-02-04 DIAGNOSIS — R00.2 PALPITATIONS: Primary | ICD-10-CM

## 2020-02-04 DIAGNOSIS — F41.1 GAD (GENERALIZED ANXIETY DISORDER): ICD-10-CM

## 2020-02-04 DIAGNOSIS — F41.0 PANIC ATTACKS: ICD-10-CM

## 2020-02-04 DIAGNOSIS — F41.0 ANXIETY DISORDER DUE TO GENERAL MEDICAL CONDITION WITH PANIC ATTACK: ICD-10-CM

## 2020-02-04 PROBLEM — R25.3 TONGUE FASCICULATION: Status: RESOLVED | Noted: 2019-12-11 | Resolved: 2020-02-04

## 2020-02-04 PROBLEM — J11.1 INFLUENZA-LIKE SYNDROME: Status: RESOLVED | Noted: 2019-10-25 | Resolved: 2020-02-04

## 2020-02-04 PROBLEM — S80.10XA SUPERFICIAL BRUISING OF LOWER LEG: Status: RESOLVED | Noted: 2019-03-15 | Resolved: 2020-02-04

## 2020-02-04 PROBLEM — R20.2 PARESTHESIA: Status: RESOLVED | Noted: 2019-12-11 | Resolved: 2020-02-04

## 2020-02-04 PROBLEM — R25.1 EPISODE OF SHAKING: Status: RESOLVED | Noted: 2019-12-11 | Resolved: 2020-02-04

## 2020-02-04 PROBLEM — L72.3 SEBACEOUS CYST: Status: RESOLVED | Noted: 2018-05-10 | Resolved: 2020-02-04

## 2020-02-04 PROBLEM — R11.2 NAUSEA & VOMITING: Status: RESOLVED | Noted: 2019-10-25 | Resolved: 2020-02-04

## 2020-02-04 PROCEDURE — 1036F TOBACCO NON-USER: CPT | Performed by: FAMILY MEDICINE

## 2020-02-04 PROCEDURE — 3008F BODY MASS INDEX DOCD: CPT | Performed by: FAMILY MEDICINE

## 2020-02-04 PROCEDURE — 99214 OFFICE O/P EST MOD 30 MIN: CPT | Performed by: FAMILY MEDICINE

## 2020-02-04 RX ORDER — LORAZEPAM 0.5 MG/1
0.5 TABLET ORAL 2 TIMES DAILY
Qty: 60 TABLET | Refills: 0 | Status: SHIPPED | OUTPATIENT
Start: 2020-02-04 | End: 2020-03-24 | Stop reason: SDUPTHER

## 2020-02-04 NOTE — PROGRESS NOTES
Assessment/Plan:     Diagnoses and all orders for this visit:    Palpitations  -     Holter monitor - 24 hour; Future    NORBERT (generalized anxiety disorder)    Panic attacks    Anxiety disorder due to general medical condition with panic attack  -     LORazepam (ATIVAN) 0 5 mg tablet; Take 1 tablet (0 5 mg total) by mouth 2 (two) times a day      overall patient doing much better on her lorazepam some days she does not even take it  We discussed her questions about her harder EKGs are normal  Hear no murmurs but she is concerned about palpitations so will order a Holter monitor  She can follow up in 3 months or sooner if needed      Subjective:     Chief Complaint   Patient presents with    Follow-up     Follow heart palpitations        Patient ID: Yancy Garcia is a 30 y o  female  Here for f/u of anxiety  States feeling much better with her lorazepam  somedays she does not take any at all  We discussed her heart questions  When she gets worked up she gets palpitations  EKg reviewed were normal except for one which was read as having a incomplete  Bundle branch block  The following portions of the patient's history were reviewed and updated as appropriate: allergies, current medications, past family history, past medical history, past social history, past surgical history and problem list     Review of Systems   Constitutional: Negative  HENT: Negative  Eyes: Negative  Respiratory: Negative  Cardiovascular: Negative  Gastrointestinal: Negative  Endocrine: Negative  Genitourinary: Negative  Musculoskeletal: Negative  Skin: Negative  Allergic/Immunologic: Negative  Neurological: Negative  Hematological: Negative  Psychiatric/Behavioral: The patient is nervous/anxious  All other systems reviewed and are negative          Objective:    Vitals:    02/04/20 1415   BP: 120/70   BP Location: Left arm   Patient Position: Sitting   Cuff Size: Standard   Pulse: 61   Temp: 98 9 °F (37 2 °C)   TempSrc: Tympanic   SpO2: 99%   Weight: 57 7 kg (127 lb 3 2 oz)   Height: 5' 9" (1 753 m)          Physical Exam   Constitutional: She is oriented to person, place, and time  She appears well-developed and well-nourished  HENT:   Head: Normocephalic and atraumatic  Right Ear: External ear normal    Left Ear: External ear normal    Mouth/Throat: Oropharynx is clear and moist    Eyes: Pupils are equal, round, and reactive to light  Conjunctivae and EOM are normal    Neck: Normal range of motion  Cardiovascular: Normal rate, regular rhythm and normal heart sounds  Pulmonary/Chest: Effort normal and breath sounds normal    Abdominal: Soft  Bowel sounds are normal    Musculoskeletal: Normal range of motion  Neurological: She is alert and oriented to person, place, and time  She has normal reflexes  Skin: Skin is warm and dry  Psychiatric: She has a normal mood and affect  Her behavior is normal  Judgment and thought content normal    Nursing note and vitals reviewed

## 2020-02-06 ENCOUNTER — HOSPITAL ENCOUNTER (OUTPATIENT)
Dept: NON INVASIVE DIAGNOSTICS | Facility: HOSPITAL | Age: 25
Discharge: HOME | End: 2020-02-06
Payer: COMMERCIAL

## 2020-02-06 DIAGNOSIS — R00.2 PALPITATIONS: ICD-10-CM

## 2020-02-06 PROCEDURE — 93226 XTRNL ECG REC<48 HR SCAN A/R: CPT

## 2020-02-06 PROCEDURE — 93225 XTRNL ECG REC<48 HRS REC: CPT

## 2020-02-10 ENCOUNTER — TELEPHONE (OUTPATIENT)
Dept: FAMILY MEDICINE CLINIC | Facility: CLINIC | Age: 25
End: 2020-02-10

## 2020-02-11 ENCOUNTER — TELEPHONE (OUTPATIENT)
Dept: FAMILY MEDICINE CLINIC | Facility: CLINIC | Age: 25
End: 2020-02-11

## 2020-02-11 PROCEDURE — 93227 XTRNL ECG REC<48 HR R&I: CPT | Performed by: INTERNAL MEDICINE

## 2020-02-11 NOTE — TELEPHONE ENCOUNTER
Patient is asking for the results of her holter monitor test, she had gotten a new phone number and can be reached at [phone]

## 2020-03-02 ENCOUNTER — SOCIAL WORK (OUTPATIENT)
Dept: BEHAVIORAL/MENTAL HEALTH CLINIC | Facility: CLINIC | Age: 25
End: 2020-03-02
Payer: COMMERCIAL

## 2020-03-02 DIAGNOSIS — F32.A MILD DEPRESSION: ICD-10-CM

## 2020-03-02 DIAGNOSIS — F41.0 SEVERE ANXIETY WITH PANIC: Primary | ICD-10-CM

## 2020-03-02 DIAGNOSIS — F43.10 PTSD (POST-TRAUMATIC STRESS DISORDER): ICD-10-CM

## 2020-03-02 PROCEDURE — 1036F TOBACCO NON-USER: CPT | Performed by: SOCIAL WORKER

## 2020-03-02 PROCEDURE — 90834 PSYTX W PT 45 MINUTES: CPT | Performed by: SOCIAL WORKER
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
## 2020-03-02 NOTE — PATIENT INSTRUCTIONS
Please schedule an in-take assessment at [facility] located @ [address] ([address]) [phone] for outpatient psychotherapy services, possibly EMDR, psychiatry services, and groups

## 2020-03-24 DIAGNOSIS — F41.0 ANXIETY DISORDER DUE TO GENERAL MEDICAL CONDITION WITH PANIC ATTACK: ICD-10-CM

## 2020-03-24 DIAGNOSIS — F06.4 ANXIETY DISORDER DUE TO GENERAL MEDICAL CONDITION WITH PANIC ATTACK: ICD-10-CM

## 2020-03-24 RX ORDER — LORAZEPAM 0.5 MG/1
0.5 TABLET ORAL 2 TIMES DAILY
Qty: 60 TABLET | Refills: 1 | Status: SHIPPED | OUTPATIENT
Start: 2020-03-24 | End: 2020-05-02 | Stop reason: SDUPTHER

## 2020-05-02 DIAGNOSIS — F41.0 ANXIETY DISORDER DUE TO GENERAL MEDICAL CONDITION WITH PANIC ATTACK: ICD-10-CM

## 2020-05-02 DIAGNOSIS — F06.4 ANXIETY DISORDER DUE TO GENERAL MEDICAL CONDITION WITH PANIC ATTACK: ICD-10-CM

## 2020-05-04 RX ORDER — LORAZEPAM 0.5 MG/1
0.5 TABLET ORAL 2 TIMES DAILY
Qty: 60 TABLET | Refills: 1 | Status: SHIPPED | OUTPATIENT
Start: 2020-05-04 | End: 2020-09-30 | Stop reason: SDUPTHER

## 2020-05-07 ENCOUNTER — TELEMEDICINE (OUTPATIENT)
Dept: FAMILY MEDICINE CLINIC | Facility: CLINIC | Age: 25
End: 2020-05-07
Payer: COMMERCIAL

## 2020-05-07 VITALS — WEIGHT: 128 LBS | HEIGHT: 69 IN | BODY MASS INDEX: 18.96 KG/M2

## 2020-05-07 DIAGNOSIS — F43.10 PTSD (POST-TRAUMATIC STRESS DISORDER): ICD-10-CM

## 2020-05-07 DIAGNOSIS — Z20.822 EXPOSURE TO COVID-19 VIRUS: ICD-10-CM

## 2020-05-07 DIAGNOSIS — E03.9 HYPOTHYROIDISM, UNSPECIFIED TYPE: Primary | ICD-10-CM

## 2020-05-07 PROCEDURE — 3008F BODY MASS INDEX DOCD: CPT | Performed by: FAMILY MEDICINE

## 2020-05-07 PROCEDURE — 1036F TOBACCO NON-USER: CPT | Performed by: FAMILY MEDICINE

## 2020-05-07 PROCEDURE — 99214 OFFICE O/P EST MOD 30 MIN: CPT | Performed by: FAMILY MEDICINE

## 2020-05-15 LAB
SARS-COV-2 IGG SERPL QL IA: NEGATIVE
T4 FREE SERPL-MCNC: 1.09 NG/DL (ref 0.82–1.77)
TSH SERPL DL<=0.005 MIU/L-ACNC: 4.78 UIU/ML (ref 0.45–4.5)

## 2020-07-01 ENCOUNTER — OFFICE VISIT (OUTPATIENT)
Dept: URGENT CARE | Facility: CLINIC | Age: 25
End: 2020-07-01
Payer: COMMERCIAL

## 2020-07-01 VITALS
SYSTOLIC BLOOD PRESSURE: 122 MMHG | DIASTOLIC BLOOD PRESSURE: 80 MMHG | WEIGHT: 126 LBS | BODY MASS INDEX: 18.66 KG/M2 | OXYGEN SATURATION: 99 % | TEMPERATURE: 99.2 F | RESPIRATION RATE: 16 BRPM | HEART RATE: 82 BPM | HEIGHT: 69 IN

## 2020-07-01 DIAGNOSIS — J02.9 SORE THROAT: Primary | ICD-10-CM

## 2020-07-01 LAB — S PYO AG THROAT QL: NEGATIVE

## 2020-07-01 PROCEDURE — U0003 INFECTIOUS AGENT DETECTION BY NUCLEIC ACID (DNA OR RNA); SEVERE ACUTE RESPIRATORY SYNDROME CORONAVIRUS 2 (SARS-COV-2) (CORONAVIRUS DISEASE [COVID-19]), AMPLIFIED PROBE TECHNIQUE, MAKING USE OF HIGH THROUGHPUT TECHNOLOGIES AS DESCRIBED BY CMS-2020-01-R: HCPCS | Performed by: PHYSICIAN ASSISTANT

## 2020-07-01 PROCEDURE — 87880 STREP A ASSAY W/OPTIC: CPT | Performed by: PHYSICIAN ASSISTANT

## 2020-07-01 PROCEDURE — G0382 LEV 3 HOSP TYPE B ED VISIT: HCPCS | Performed by: PHYSICIAN ASSISTANT

## 2020-07-01 PROCEDURE — 99283 EMERGENCY DEPT VISIT LOW MDM: CPT | Performed by: PHYSICIAN ASSISTANT

## 2020-07-01 NOTE — LETTER
July 1, 2020     Patient: Yinka Majano   YOB: 1995   Date of Visit: 7/1/2020       To Whom it May Concern:    Yinka Majano was seen in my clinic on 7/1/2020  She should not return to work until cleared by physician  If you have any questions or concerns, please don't hesitate to call           Sincerely,          Katya Pierre PA-C        CC: No Recipients

## 2020-07-01 NOTE — PROGRESS NOTES
NAME: Prudencio Redman is a 30 y o  female  : 1995    MRN: [id number]      Assessment and Plan   Sore throat [J02 9]  1  Sore throat  POCT rapid strepA    MISC COVID-19 TEST- Office Collection    Throat culture   Rapid strep  ordered to rule out strep  Rapid strep was negative  At this time will send for strep culture  Patient tested for COVID-19  Advise to Self quarantine until results are available  Increase fluids  Advise Pt to take OTC Tylenol  Or OTC Ibuprofen as needed  Patient will be notified of results  Patient provided with work note  If symptoms worsen advised patient to follow-up with PCP or go to ER  Notify ER prior to arrival   Patient understands and agrees with treatment plans  Sara Rodrigues was seen today for sore throat  Diagnoses and all orders for this visit:    Sore throat  -     POCT rapid strepA  -     MISC COVID-19 TEST- Office Collection  -     Throat culture        Patient Instructions   There are no Patient Instructions on file for this visit  Proceed to ER if symptoms worsen  Chief Complaint     Chief Complaint   Patient presents with    Sore Throat     x3days; pt reports pain in b/l cheeks and b/l ears; will need work note         History of Present Illness     30year old presents c/o S/T and ear pain x 3 day  Pt denies headache, fever, chills, fatigue, n/v/d/c, rhinorrhea, nasal congestion,  post-nasal drip, ear pressure, sinus pain/ pressure, SOB, chest pain, difficulty breathing  Patient denies palliative measures  No known exposure to COVID-19  Review of Systems   Review of Systems   Constitutional: Negative for chills, fatigue and fever  HENT: Positive for ear pain and sore throat  Negative for congestion, postnasal drip, rhinorrhea and sinus pressure  Respiratory: Negative for cough, chest tightness, shortness of breath and wheezing  Cardiovascular: Negative for chest pain and palpitations     Gastrointestinal: Negative for abdominal pain, constipation, diarrhea, nausea and vomiting  Current Medications       Current Outpatient Medications:     LORazepam (ATIVAN) 0 5 mg tablet, Take 1 tablet (0 5 mg total) by mouth 2 (two) times a day, Disp: 60 tablet, Rfl: 1    Current Allergies     Allergies as of 07/01/2020 - Reviewed 07/01/2020   Allergen Reaction Noted    Reglan [metoclopramide] Seizures 02/04/2020    Bactrim [sulfamethoxazole-trimethoprim] Headache 05/10/2018    Lac bovis Rash 10/02/2015    Milk-related compounds Rash 02/26/2019    Other Itching 11/14/2013              Past Medical History:   Diagnosis Date    Anxiety     Disease of thyroid gland     Eczema     Glomerulonephritis     Psychiatric disorder     anxiety, panic    Urinary tract infection        Past Surgical History:   Procedure Laterality Date    SKIN LESION EXCISION      destruction of benign lesion    TONSILLECTOMY      TONSILLECTOMY  2006    WISDOM TOOTH EXTRACTION      WISDOM TOOTH EXTRACTION  2011       Family History   Problem Relation Age of Onset    Coronary artery disease Father     Alcohol abuse Father     Drug abuse Father     Anxiety disorder Mother     Anxiety disorder Brother     Post-traumatic stress disorder Brother     Drug abuse Brother     Lung cancer Maternal Grandfather     Breast cancer Maternal Aunt     Hypertension Family     Lung cancer Family     Lymphoma Family         pancreatic    Hyperlipidemia Family         pure         Medications have been verified      The following portions of the patient's history were reviewed and updated as appropriate: allergies, current medications, past family history, past medical history, past social history, past surgical history and problem list     Objective   /80   Pulse 82   Temp 99 2 °F (37 3 °C) (Tympanic)   Resp 16   Ht 5' 9" (1 753 m)   Wt 57 2 kg (126 lb)   SpO2 99%   BMI 18 61 kg/m²      Physical Exam     Physical Exam   Constitutional: She appears well-developed and well-nourished  No distress  HENT:   Head: Normocephalic and atraumatic  Right Ear: Hearing, tympanic membrane, external ear and ear canal normal    Left Ear: Hearing, tympanic membrane, external ear and ear canal normal    Nose: Nose normal  Right sinus exhibits no maxillary sinus tenderness and no frontal sinus tenderness  Left sinus exhibits no maxillary sinus tenderness and no frontal sinus tenderness  Mouth/Throat: Uvula is midline, oropharynx is clear and moist and mucous membranes are normal  No tonsillar exudate  Cardiovascular: Normal rate, regular rhythm and normal heart sounds  Exam reveals no gallop and no friction rub  No murmur heard  Pulmonary/Chest: Effort normal and breath sounds normal  No stridor  She has no wheezes  She has no rales  Skin: She is not diaphoretic  Nursing note and vitals reviewed        Katya Pierre PA-C

## 2020-07-03 LAB — B-HEM STREP SPEC QL CULT: NEGATIVE

## 2020-07-06 LAB — SARS-COV-2 RNA SPEC QL NAA+PROBE: NOT DETECTED

## 2020-07-07 ENCOUNTER — HOSPITAL ENCOUNTER (EMERGENCY)
Facility: HOSPITAL | Age: 25
Discharge: HOME | End: 2020-07-07
Attending: EMERGENCY MEDICINE | Admitting: EMERGENCY MEDICINE
Payer: COMMERCIAL

## 2020-07-07 VITALS
TEMPERATURE: 98.5 F | HEART RATE: 76 BPM | DIASTOLIC BLOOD PRESSURE: 73 MMHG | OXYGEN SATURATION: 100 % | WEIGHT: 125 LBS | HEIGHT: 69 IN | SYSTOLIC BLOOD PRESSURE: 123 MMHG | BODY MASS INDEX: 18.51 KG/M2 | RESPIRATION RATE: 20 BRPM

## 2020-07-07 DIAGNOSIS — B34.9 VIRAL SYNDROME: Primary | ICD-10-CM

## 2020-07-07 LAB — SARS-COV-2 IGG+IGM SERPL QL IA: NORMAL

## 2020-07-07 PROCEDURE — 99283 EMERGENCY DEPT VISIT LOW MDM: CPT

## 2020-07-07 PROCEDURE — 86769 SARS-COV-2 COVID-19 ANTIBODY: CPT | Performed by: EMERGENCY MEDICINE

## 2020-07-07 PROCEDURE — 99284 EMERGENCY DEPT VISIT MOD MDM: CPT | Performed by: EMERGENCY MEDICINE

## 2020-07-07 PROCEDURE — 36415 COLL VENOUS BLD VENIPUNCTURE: CPT | Performed by: EMERGENCY MEDICINE

## 2020-07-07 NOTE — ED PROVIDER NOTES
History  Chief Complaint   Patient presents with    Cough     patient presents to the ED, states she was at urgent care a few days ago and tested negative for COVID, states she was told to get antibody testing done  states that all symptoms have improved and no longer has a fever buit was coughing last evening      HPI     78-year-old female presents for evaluation for COVID,  patient reports that she had fever chills nausea loss of smell and taste last week was tested negative for coronavirus  She was told to come back to work she needs to have antibody testing  She has had a fever for few days  Patient is very anxious and states that she does not want anything done here only blood work  She wants to leave as soon as possible  Denies any shortness of breath currently she has normal vital signs no other modifying factors or associated symptoms will check antibodies although inform the patient that I am not sure when they come back or if theyre even accurate    Prior to Admission Medications   Prescriptions Last Dose Informant Patient Reported? Taking?    LORazepam (ATIVAN) 0 5 mg tablet   No No   Sig: Take 1 tablet (0 5 mg total) by mouth 2 (two) times a day      Facility-Administered Medications: None       Past Medical History:   Diagnosis Date    Anxiety     Disease of thyroid gland     Eczema     Glomerulonephritis     Psychiatric disorder     anxiety, panic    Urinary tract infection        Past Surgical History:   Procedure Laterality Date    SKIN LESION EXCISION      destruction of benign lesion    TONSILLECTOMY      TONSILLECTOMY  2006    WISDOM TOOTH EXTRACTION      WISDOM TOOTH EXTRACTION  2011       Family History   Problem Relation Age of Onset    Coronary artery disease Father     Alcohol abuse Father     Drug abuse Father     Anxiety disorder Mother     Anxiety disorder Brother     Post-traumatic stress disorder Brother     Drug abuse Brother     Lung cancer Maternal Grandfather  Breast cancer Maternal Aunt     Hypertension Family     Lung cancer Family     Lymphoma Family         pancreatic    Hyperlipidemia Family         pure     I have reviewed and agree with the history as documented  E-Cigarette/Vaping    E-Cigarette Use Never User      E-Cigarette/Vaping Substances    Nicotine No     THC No     CBD No     Flavoring No     Other No     Unknown No      Social History     Tobacco Use    Smoking status: Former Smoker     Packs/day: 0 50     Years: 3 00     Pack years: 1 50     Types: Cigarettes     Last attempt to quit: 2018     Years since quittin 5    Smokeless tobacco: Never Used   Substance Use Topics    Alcohol use: Not Currently    Drug use: No       Review of Systems   Constitutional: Negative for chills, fatigue and fever  Eyes: Negative for photophobia and visual disturbance  Respiratory: Negative for cough and shortness of breath  Cardiovascular: Negative for chest pain, palpitations and leg swelling  Gastrointestinal: Negative for diarrhea, nausea and vomiting  Endocrine: Negative for polydipsia and polyuria  Genitourinary: Negative for decreased urine volume, difficulty urinating, dysuria and frequency  Musculoskeletal: Negative for back pain, neck pain and neck stiffness  Skin: Negative for color change and rash  Allergic/Immunologic: Negative for environmental allergies and immunocompromised state  Neurological: Negative for dizziness and headaches  Hematological: Negative for adenopathy  Does not bruise/bleed easily  Psychiatric/Behavioral: Negative for dysphoric mood  The patient is not nervous/anxious  Physical Exam  Physical Exam   Constitutional: She is oriented to person, place, and time  She appears well-developed and well-nourished  No distress  HENT:   Head: Normocephalic and atraumatic  Nose: Nose normal    Eyes: Pupils are equal, round, and reactive to light   Conjunctivae and EOM are normal  No scleral icterus  Neck: Normal range of motion  Neck supple  No JVD present  No tracheal deviation present  No thyromegaly present  Cardiovascular: Normal rate, regular rhythm, normal heart sounds and intact distal pulses  Exam reveals no gallop and no friction rub  Pulmonary/Chest: Effort normal and breath sounds normal  No respiratory distress  She has no wheezes  She has no rales  She exhibits no tenderness  Abdominal: Soft  Bowel sounds are normal  She exhibits no distension and no mass  There is no tenderness  There is no rebound and no guarding  No hernia  Musculoskeletal: Normal range of motion  She exhibits no edema, tenderness or deformity  Neurological: She is alert and oriented to person, place, and time  She has normal reflexes  No cranial nerve deficit  Coordination normal    Skin: Skin is warm and dry  She is not diaphoretic  No erythema  Psychiatric: She has a normal mood and affect  Her behavior is normal    Nursing note and vitals reviewed  Vital Signs  ED Triage Vitals [07/07/20 1503]   Temperature Pulse Respirations Blood Pressure SpO2   98 5 °F (36 9 °C) 76 20 123/73 100 %      Temp Source Heart Rate Source Patient Position - Orthostatic VS BP Location FiO2 (%)   Temporal Monitor Sitting Right arm --      Pain Score       --           Vitals:    07/07/20 1503   BP: 123/73   Pulse: 76   Patient Position - Orthostatic VS: Sitting         Visual Acuity      ED Medications  Medications - No data to display    Diagnostic Studies  Results Reviewed     Procedure Component Value Units Date/Time    SARS-CoV2 Antibody, Total (IgG, Zan Martino [[id number]] Collected:  07/07/20 1515    Lab Status: In process Specimen:  Blood from Arm, Left Updated:  07/07/20 1530                 No orders to display              Procedures  Procedures         ED Course       US AUDIT      Most Recent Value   Initial Alcohol Screen: US AUDIT-C    1   How often do you have a drink containing alcohol?  0 Filed at: 07/07/2020 1504   2  How many drinks containing alcohol do you have on a typical day you are drinking? 0 Filed at: 07/07/2020 1504   3a  Male UNDER 65: How often do you have five or more drinks on one occasion? 0 Filed at: 07/07/2020 1504   3b  FEMALE Any Age, or MALE 65+: How often do you have 4 or more drinks on one occassion? 0 Filed at: 07/07/2020 1504   Audit-C Score  0 Filed at: 07/07/2020 1504                  CORNEL/DAST-10      Most Recent Value   How many times in the past year have you    Used an illegal drug or used a prescription medication for non-medical reasons? Never Filed at: 07/07/2020 1504                                MDM  Number of Diagnoses or Management Options  Viral syndrome: new and requires workup     Amount and/or Complexity of Data Reviewed  Clinical lab tests: ordered    Patient Progress  Patient progress: stable        Disposition  Final diagnoses:   Viral syndrome     Time reflects when diagnosis was documented in both MDM as applicable and the Disposition within this note     Time User Action Codes Description Comment    7/7/2020  3:10 PM Lola Hernández [B34 9] Viral syndrome       ED Disposition     ED Disposition Condition Date/Time Comment    Discharge Stable Tu Jul 7, 2020  3:10 PM Yinka Majano discharge to home/self care  Follow-up Information     Follow up With Specialties Details Why Contact Tran Swenson DO Family Medicine Schedule an appointment as soon as possible for a visit  As needed [id number]  [phone]            Discharge Medication List as of 7/7/2020  3:19 PM      CONTINUE these medications which have NOT CHANGED    Details   LORazepam (ATIVAN) 0 5 mg tablet Take 1 tablet (0 5 mg total) by mouth 2 (two) times a day, Starting Mon 5/4/2020, Normal           No discharge procedures on file      PDMP Review       Value Time User    PDMP Reviewed  Yes 10/30/2019 11:15 AM Kem Jerez DO ED Provider  Electronically Signed by           Dionne Saldaña DO  07/07/20 [id number]

## 2020-07-08 ENCOUNTER — TELEPHONE (OUTPATIENT)
Dept: URGENT CARE | Facility: CLINIC | Age: 25
End: 2020-07-08

## 2020-07-08 NOTE — TELEPHONE ENCOUNTER
Called notified patient that [address] results are negative  According to medical records patient was seen in ED yesterday  Patient states that she is feeling better today  Patient was tested for antibodies in the ER which was also normal   Patient voiced understanding and is happy with call

## 2020-08-29 ENCOUNTER — OFFICE VISIT (OUTPATIENT)
Dept: URGENT CARE | Facility: CLINIC | Age: 25
End: 2020-08-29
Payer: COMMERCIAL

## 2020-08-29 VITALS
BODY MASS INDEX: 18.37 KG/M2 | HEIGHT: 69 IN | OXYGEN SATURATION: 99 % | SYSTOLIC BLOOD PRESSURE: 105 MMHG | WEIGHT: 124 LBS | DIASTOLIC BLOOD PRESSURE: 61 MMHG | RESPIRATION RATE: 16 BRPM | HEART RATE: 68 BPM

## 2020-08-29 DIAGNOSIS — L30.9 ECZEMA, UNSPECIFIED TYPE: ICD-10-CM

## 2020-08-29 DIAGNOSIS — R21 RASH: Primary | ICD-10-CM

## 2020-08-29 DIAGNOSIS — H10.33 ACUTE CONJUNCTIVITIS OF BOTH EYES, UNSPECIFIED ACUTE CONJUNCTIVITIS TYPE: ICD-10-CM

## 2020-08-29 PROCEDURE — G0382 LEV 3 HOSP TYPE B ED VISIT: HCPCS | Performed by: FAMILY MEDICINE

## 2020-08-29 PROCEDURE — 99283 EMERGENCY DEPT VISIT LOW MDM: CPT | Performed by: FAMILY MEDICINE

## 2020-08-29 RX ORDER — CIPROFLOXACIN HYDROCHLORIDE 3.5 MG/ML
SOLUTION/ DROPS TOPICAL
Qty: 5 ML | Refills: 0 | Status: SHIPPED | OUTPATIENT
Start: 2020-08-29 | End: 2020-09-30 | Stop reason: ALTCHOICE

## 2020-08-29 RX ORDER — KETOCONAZOLE 20 MG/ML
1 SHAMPOO TOPICAL 2 TIMES WEEKLY
Qty: 120 ML | Refills: 0 | Status: SHIPPED | OUTPATIENT
Start: 2020-08-31 | End: 2020-09-30 | Stop reason: ALTCHOICE

## 2020-08-29 RX ORDER — PREDNISONE 10 MG/1
TABLET ORAL
Qty: 21 TABLET | Refills: 0 | Status: SHIPPED | OUTPATIENT
Start: 2020-08-29 | End: 2020-09-30 | Stop reason: ALTCHOICE

## 2020-08-29 NOTE — PROGRESS NOTES
[facility]        NAME: Cristo Lombardo is a 30 y o  female  : 1995    MRN: [id number]  DATE: 2020  TIME: 4:31 PM    Assessment and Plan   Rash [R21]  1  Rash  predniSONE 10 mg tablet   2  Acute conjunctivitis of both eyes, unspecified acute conjunctivitis type  ciprofloxacin (CILOXAN) 0 3 % ophthalmic solution   3  Eczema, unspecified type  ketoconazole (NIZORAL) 2 % shampoo         Patient Instructions     Take prednisone as directed  Use eyedrops as directed  Use shampoo as directed  Encourage patient to follow-up with PCP and/or dermatologist  Follow up with PCP in 3-5 days  Proceed to  ER if symptoms worsen  Chief Complaint     Chief Complaint   Patient presents with    Rash     Pt has hx of excema  C/o rash on b/l medial canthus (patch under R eyelid, b/l yellow discharge), b/l nares, BLE, BUE with itchiness, vaginal external itchiness  Pt took zyrtec, excema cream, and vasoline  History of Present Illness       Patient presents with complaint of rash for the past week  She states that it is all over her body but primarily on her scalp  Patient states that she has had this before and has a history of eczema  She reports using a topical shampoo for the eczema on her scalp previously and states that it worked well for her in the past   She denies seeing a dermatologist recently  She denies fever, chills, night sweats, cough, shortness of breath, and other symptoms  She describes the rash as red and itchy and dry  Patient also reports that her eyes are crusted over when she wakes up and reports purulent discharge from both her eyes throughout the day  She denies changes in vision  Review of Systems   Review of Systems   Constitutional: Negative for chills, fatigue and fever  Eyes: Positive for discharge and redness  Respiratory: Negative for cough, chest tightness, shortness of breath and wheezing      Cardiovascular: Negative for chest pain and palpitations  Musculoskeletal: Negative for myalgias  Skin: Positive for rash  Negative for color change and wound  Neurological: Negative for headaches  All other systems reviewed and are negative          Current Medications       Current Outpatient Medications:     ciprofloxacin (CILOXAN) 0 3 % ophthalmic solution, Apply 1 drop every 2 hours in affected eyes while awake x 2 days, then apply 1 drop in eyes every 8 hours x 5 days for a total of 7 days, Disp: 5 mL, Rfl: 0    [START ON 8/31/2020] ketoconazole (NIZORAL) 2 % shampoo, Apply 1 application topically 2 (two) times a week, Disp: 120 mL, Rfl: 0    LORazepam (ATIVAN) 0 5 mg tablet, Take 1 tablet (0 5 mg total) by mouth 2 (two) times a day, Disp: 60 tablet, Rfl: 1    predniSONE 10 mg tablet, Take 6 tabs on day 1, 5 tabs on day 2, 4 tabs on day 3, 3 tabs on day 4, 2 tabs on day 5, and 1 tab on day 6, Disp: 21 tablet, Rfl: 0    Current Allergies     Allergies as of 08/29/2020 - Reviewed 08/29/2020   Allergen Reaction Noted    Reglan [metoclopramide] Seizures 02/04/2020    Bactrim [sulfamethoxazole-trimethoprim] Headache 05/10/2018    Lac bovis Rash 10/02/2015    Milk-related compounds Rash 02/26/2019    Other Itching 11/14/2013            The following portions of the patient's history were reviewed and updated as appropriate: allergies, current medications, past family history, past medical history, past social history, past surgical history and problem list      Past Medical History:   Diagnosis Date    Anxiety     Disease of thyroid gland     Eczema     Glomerulonephritis     Psychiatric disorder     anxiety, panic    Urinary tract infection        Past Surgical History:   Procedure Laterality Date    SKIN LESION EXCISION      destruction of benign lesion    TONSILLECTOMY      TONSILLECTOMY  2006    WISDOM TOOTH EXTRACTION      WISDOM TOOTH EXTRACTION  2011       Family History   Problem Relation Age of Onset    Coronary artery disease Father     Alcohol abuse Father     Drug abuse Father     Anxiety disorder Mother     Anxiety disorder Brother     Post-traumatic stress disorder Brother     Drug abuse Brother     Lung cancer Maternal Grandfather     Breast cancer Maternal Aunt     Hypertension Family     Lung cancer Family     Lymphoma Family         pancreatic    Hyperlipidemia Family         pure         Medications have been verified  Objective   /61   Pulse 68   Resp 16   Ht 5' 9" (1 753 m)   Wt 56 2 kg (124 lb)   SpO2 99%   BMI 18 31 kg/m²        Physical Exam     Physical Exam  Vitals signs and nursing note reviewed  Constitutional:       General: She is not in acute distress  Appearance: She is well-developed  She is not diaphoretic  HENT:      Head: Normocephalic and atraumatic  Eyes:      Extraocular Movements: Extraocular movements intact  Conjunctiva/sclera: Conjunctivae normal       Pupils: Pupils are equal, round, and reactive to light  Comments: No drainage, mild scleral injection bilaterally   Neck:      Musculoskeletal: Normal range of motion and neck supple  Cardiovascular:      Rate and Rhythm: Normal rate and regular rhythm  Heart sounds: Normal heart sounds  Pulmonary:      Effort: Pulmonary effort is normal  No respiratory distress  Breath sounds: Normal breath sounds  Lymphadenopathy:      Cervical: No cervical adenopathy  Skin:     General: Skin is warm and dry  Capillary Refill: Capillary refill takes less than 2 seconds  Findings: Rash present  Comments: Scaly, erythematous, maculopapular rash scattered across trunk, lower extremities, upper extremities, and left posterior aspect of scalp; no drainage or fluctuance; excoriations present   Neurological:      Mental Status: She is alert and oriented to person, place, and time  Cranial Nerves: No cranial nerve deficit  Sensory: No sensory deficit     Psychiatric: Behavior: Behavior normal          Thought Content:  Thought content normal

## 2020-09-08 ENCOUNTER — OFFICE VISIT (OUTPATIENT)
Dept: URGENT CARE | Facility: MEDICAL CENTER | Age: 25
End: 2020-09-08
Payer: COMMERCIAL

## 2020-09-08 VITALS
HEART RATE: 62 BPM | BODY MASS INDEX: 18.96 KG/M2 | RESPIRATION RATE: 16 BRPM | DIASTOLIC BLOOD PRESSURE: 62 MMHG | OXYGEN SATURATION: 99 % | WEIGHT: 128 LBS | HEIGHT: 69 IN | SYSTOLIC BLOOD PRESSURE: 115 MMHG | TEMPERATURE: 98.8 F

## 2020-09-08 DIAGNOSIS — Z76.0 MEDICATION REFILL: ICD-10-CM

## 2020-09-08 DIAGNOSIS — J06.9 UPPER RESPIRATORY TRACT INFECTION, UNSPECIFIED TYPE: Primary | ICD-10-CM

## 2020-09-08 LAB — S PYO AG THROAT QL: NEGATIVE

## 2020-09-08 PROCEDURE — G0382 LEV 3 HOSP TYPE B ED VISIT: HCPCS | Performed by: PHYSICIAN ASSISTANT

## 2020-09-08 PROCEDURE — 87880 STREP A ASSAY W/OPTIC: CPT | Performed by: PHYSICIAN ASSISTANT

## 2020-09-08 PROCEDURE — 99283 EMERGENCY DEPT VISIT LOW MDM: CPT | Performed by: PHYSICIAN ASSISTANT

## 2020-09-08 RX ORDER — FLUTICASONE PROPIONATE 50 MCG
2 SPRAY, SUSPENSION (ML) NASAL DAILY
Qty: 16 G | Refills: 0 | Status: SHIPPED | OUTPATIENT
Start: 2020-09-08 | End: 2020-09-30 | Stop reason: ALTCHOICE

## 2020-09-08 RX ORDER — CETIRIZINE HYDROCHLORIDE, PSEUDOEPHEDRINE HYDROCHLORIDE 5; 120 MG/1; MG/1
1 TABLET, FILM COATED, EXTENDED RELEASE ORAL 2 TIMES DAILY
Qty: 30 TABLET | Refills: 0 | Status: SHIPPED | OUTPATIENT
Start: 2020-09-08 | End: 2020-10-15

## 2020-09-08 RX ORDER — TRIAMCINOLONE ACETONIDE 1 MG/G
CREAM TOPICAL 2 TIMES DAILY
Qty: 30 G | Refills: 0 | Status: SHIPPED | OUTPATIENT
Start: 2020-09-08 | End: 2021-03-04 | Stop reason: ALTCHOICE

## 2020-09-08 RX ORDER — PREDNISONE 50 MG/1
50 TABLET ORAL DAILY
Qty: 5 TABLET | Refills: 0 | Status: SHIPPED | OUTPATIENT
Start: 2020-09-08 | End: 2020-09-13

## 2020-09-08 NOTE — LETTER
September 8, 2020     Patient: Solitario Borden   YOB: 1995   Date of Visit: 9/8/2020       To Whom it May Concern:    Solitario Borden was seen in my clinic on 9/8/2020  She may return to work on 09/09/2020  If you have any questions or concerns, please don't hesitate to call  Sincerely,          Tere Walters PA-C        CC: No Recipients

## 2020-09-08 NOTE — PROGRESS NOTES
[id number]        NAME: Isidro Jeff is a 30 y o  female  : 1995    MRN: [id number]  DATE: 2020  TIME: 9:10 AM    Assessment and Plan   Upper respiratory tract infection, unspecified type [J06 9]  1  Upper respiratory tract infection, unspecified type  POCT rapid strepA    Throat culture    predniSONE 50 mg tablet    cetirizine-pseudoephedrine (ZyrTEC-D) 5-120 MG per tablet    fluticasone (FLONASE) 50 mcg/act nasal spray   2  Medication refill  triamcinolone (KENALOG) 0 1 % cream         Patient Instructions      take prednisone as directed until completed   use additional medications as directed   continue home medications as prescribed  Follow up with PCP in 3-5 days  Proceed to  ER if symptoms worsen  Chief Complaint     Chief Complaint   Patient presents with    Cold Like Symptoms     Patient relates has been sick for 2 days with nasal congestion and sore throat  Denies fever and cough  No exposure to anyone + for COVID  C/O rash has hx  of eczema  Just finished course of Prednisone x2 days ago  History of Present Illness       57-year-old female presents with 2 complaints  First complaint is runny nose congestion and some sore throat  Symptoms on for the last 2 days  Denies any fevers or chills  No sick contacts reported  Denies any shortness of breath or cough  No ear pain reported  Has been taking some Zyrtec with minimal relief  Second complaint is medication refill for eczema  Patient typically uses triamcinolone cream on her X some a spots on her body  As of recent eczema has been more flared up  She attributes it that she may have been eating too much dairy products which she does have reaction to  URI    This is a new problem  The current episode started in the past 7 days  The problem has been waxing and waning  There has been no fever  Associated symptoms include congestion, a rash (History of eczema), rhinorrhea and a sore throat   Pertinent negatives include no abdominal pain, chest pain, coughing, diarrhea, dysuria, headaches, nausea, neck pain or wheezing  She has tried antihistamine for the symptoms  The treatment provided no relief  Medication Refill   This is a new problem  The current episode started in the past 7 days  The problem occurs constantly  The problem has been waxing and waning  Associated symptoms include congestion, a rash (History of eczema) and a sore throat  Pertinent negatives include no abdominal pain, chest pain, chills, coughing, fatigue, fever, headaches, nausea or neck pain  Exacerbated by: Dairy  She has tried nothing for the symptoms  The treatment provided no relief  Review of Systems   Review of Systems   Constitutional: Negative for chills, fatigue and fever  HENT: Positive for congestion, rhinorrhea and sore throat  Eyes: Negative  Respiratory: Negative  Negative for cough and wheezing  Cardiovascular: Negative  Negative for chest pain  Gastrointestinal: Negative  Negative for abdominal pain, diarrhea and nausea  Genitourinary: Negative for dysuria  Musculoskeletal: Negative  Negative for neck pain  Skin: Positive for rash (History of eczema)  Neurological: Negative  Negative for headaches           Current Medications       Current Outpatient Medications:     ketoconazole (NIZORAL) 2 % shampoo, Apply 1 application topically 2 (two) times a week, Disp: 120 mL, Rfl: 0    LORazepam (ATIVAN) 0 5 mg tablet, Take 1 tablet (0 5 mg total) by mouth 2 (two) times a day, Disp: 60 tablet, Rfl: 1    cetirizine-pseudoephedrine (ZyrTEC-D) 5-120 MG per tablet, Take 1 tablet by mouth 2 (two) times a day, Disp: 30 tablet, Rfl: 0    ciprofloxacin (CILOXAN) 0 3 % ophthalmic solution, Apply 1 drop every 2 hours in affected eyes while awake x 2 days, then apply 1 drop in eyes every 8 hours x 5 days for a total of 7 days (Patient not taking: Reported on 9/8/2020), Disp: 5 mL, Rfl: 0    fluticasone (FLONASE) 50 mcg/act nasal spray, 2 sprays into each nostril daily, Disp: 16 g, Rfl: 0    predniSONE 10 mg tablet, Take 6 tabs on day 1, 5 tabs on day 2, 4 tabs on day 3, 3 tabs on day 4, 2 tabs on day 5, and 1 tab on day 6 (Patient not taking: Reported on 9/8/2020), Disp: 21 tablet, Rfl: 0    predniSONE 50 mg tablet, Take 1 tablet (50 mg total) by mouth daily for 5 days, Disp: 5 tablet, Rfl: 0    triamcinolone (KENALOG) 0 1 % cream, Apply topically 2 (two) times a day, Disp: 30 g, Rfl: 0    Current Allergies     Allergies as of 09/08/2020 - Reviewed 09/08/2020   Allergen Reaction Noted    Reglan [metoclopramide] Seizures 02/04/2020    Bactrim [sulfamethoxazole-trimethoprim] Headache 05/10/2018    Lac bovis Rash 10/02/2015    Milk-related compounds Rash 02/26/2019    Other Itching 11/14/2013            The following portions of the patient's history were reviewed and updated as appropriate: allergies, current medications, past family history, past medical history, past social history, past surgical history and problem list      Past Medical History:   Diagnosis Date    Anxiety     Disease of thyroid gland     Eczema     Glomerulonephritis     Psychiatric disorder     anxiety, panic    Urinary tract infection        Past Surgical History:   Procedure Laterality Date    SKIN LESION EXCISION      destruction of benign lesion    TONSILLECTOMY      TONSILLECTOMY  2006    WISDOM TOOTH EXTRACTION      WISDOM TOOTH EXTRACTION  2011       Family History   Problem Relation Age of Onset    Coronary artery disease Father     Alcohol abuse Father     Drug abuse Father     Anxiety disorder Mother     Anxiety disorder Brother     Post-traumatic stress disorder Brother     Drug abuse Brother     Lung cancer Maternal Grandfather     Breast cancer Maternal Aunt     Hypertension Family     Lung cancer Family     Lymphoma Family         pancreatic    Hyperlipidemia Family         pure         Medications have been verified  Objective   /62   Pulse 62   Temp 98 8 °F (37 1 °C) (Tympanic)   Resp 16   Ht 5' 9" (1 753 m)   Wt 58 1 kg (128 lb)   LMP 08/20/2020   SpO2 99%   BMI 18 90 kg/m²        Physical Exam     Physical Exam  Vitals signs and nursing note reviewed  Constitutional:       General: She is not in acute distress  Appearance: She is well-developed  HENT:      Head: Normocephalic and atraumatic  Right Ear: Hearing, tympanic membrane, ear canal and external ear normal       Left Ear: Hearing, tympanic membrane, ear canal and external ear normal       Nose: Nose normal       Mouth/Throat:      Pharynx: Uvula midline  Posterior oropharyngeal erythema (Mild to moderate erythema with some cobblestoning noted ) present  No oropharyngeal exudate  Eyes:      General:         Right eye: No discharge  Left eye: No discharge  Extraocular Movements: Extraocular movements intact  Conjunctiva/sclera: Conjunctivae normal    Neck:      Musculoskeletal: Normal range of motion and neck supple  Cardiovascular:      Rate and Rhythm: Normal rate and regular rhythm  Heart sounds: Normal heart sounds  No murmur  Pulmonary:      Effort: Pulmonary effort is normal  No respiratory distress  Breath sounds: Normal breath sounds  No wheezing or rales  Abdominal:      General: Bowel sounds are normal       Palpations: Abdomen is soft  Tenderness: There is no abdominal tenderness  Musculoskeletal: Normal range of motion  Lymphadenopathy:      Cervical: No cervical adenopathy  Skin:     General: Skin is warm and dry  Findings: Rash ( exam medic rash noted to bilateral arms and on chest) present  Neurological:      Mental Status: She is alert and oriented to person, place, and time     Psychiatric:         Mood and Affect: Mood normal

## 2020-09-10 LAB — B-HEM STREP SPEC QL CULT: NEGATIVE

## 2020-09-17 ENCOUNTER — OFFICE VISIT (OUTPATIENT)
Dept: URGENT CARE | Facility: CLINIC | Age: 25
End: 2020-09-17
Payer: COMMERCIAL

## 2020-09-17 VITALS
HEIGHT: 69 IN | DIASTOLIC BLOOD PRESSURE: 68 MMHG | TEMPERATURE: 98.6 F | BODY MASS INDEX: 18.96 KG/M2 | HEART RATE: 90 BPM | RESPIRATION RATE: 16 BRPM | OXYGEN SATURATION: 99 % | SYSTOLIC BLOOD PRESSURE: 114 MMHG | WEIGHT: 128 LBS

## 2020-09-17 DIAGNOSIS — B37.3 VAGINAL YEAST INFECTION: Primary | ICD-10-CM

## 2020-09-17 PROCEDURE — 99283 EMERGENCY DEPT VISIT LOW MDM: CPT | Performed by: FAMILY MEDICINE

## 2020-09-17 PROCEDURE — G0382 LEV 3 HOSP TYPE B ED VISIT: HCPCS | Performed by: FAMILY MEDICINE

## 2020-09-17 RX ORDER — NYSTATIN 500000 [USP'U]/1
1 TABLET, COATED ORAL EVERY 8 HOURS SCHEDULED
Qty: 3 TABLET | Refills: 0 | Status: SHIPPED | OUTPATIENT
Start: 2020-09-17 | End: 2020-09-18

## 2020-09-30 ENCOUNTER — OFFICE VISIT (OUTPATIENT)
Dept: FAMILY MEDICINE CLINIC | Facility: CLINIC | Age: 25
End: 2020-09-30
Payer: COMMERCIAL

## 2020-09-30 VITALS
HEIGHT: 69 IN | OXYGEN SATURATION: 99 % | BODY MASS INDEX: 18.48 KG/M2 | WEIGHT: 124.8 LBS | DIASTOLIC BLOOD PRESSURE: 70 MMHG | TEMPERATURE: 98.9 F | HEART RATE: 77 BPM | SYSTOLIC BLOOD PRESSURE: 120 MMHG

## 2020-09-30 DIAGNOSIS — F06.4 ANXIETY DISORDER DUE TO GENERAL MEDICAL CONDITION WITH PANIC ATTACK: ICD-10-CM

## 2020-09-30 DIAGNOSIS — Z23 IMMUNIZATION DUE: ICD-10-CM

## 2020-09-30 DIAGNOSIS — F41.0 ANXIETY DISORDER DUE TO GENERAL MEDICAL CONDITION WITH PANIC ATTACK: ICD-10-CM

## 2020-09-30 DIAGNOSIS — H68.101 OBSTRUCTION OF RIGHT EUSTACHIAN TUBE: Primary | ICD-10-CM

## 2020-09-30 PROCEDURE — 90471 IMMUNIZATION ADMIN: CPT | Performed by: NURSE PRACTITIONER

## 2020-09-30 PROCEDURE — 90686 IIV4 VACC NO PRSV 0.5 ML IM: CPT | Performed by: NURSE PRACTITIONER

## 2020-09-30 PROCEDURE — 99213 OFFICE O/P EST LOW 20 MIN: CPT | Performed by: NURSE PRACTITIONER

## 2020-09-30 RX ORDER — METHYLPREDNISOLONE 4 MG/1
TABLET ORAL
Qty: 21 EACH | Refills: 0 | Status: SHIPPED | OUTPATIENT
Start: 2020-09-30 | End: 2020-10-15 | Stop reason: ALTCHOICE

## 2020-09-30 RX ORDER — LORAZEPAM 0.5 MG/1
0.5 TABLET ORAL 2 TIMES DAILY
Qty: 60 TABLET | Refills: 0 | Status: SHIPPED | OUTPATIENT
Start: 2020-09-30 | End: 2020-10-30 | Stop reason: SDUPTHER

## 2020-09-30 NOTE — PATIENT INSTRUCTIONS
Eustachian Tube Dysfunction   WHAT YOU NEED TO KNOW:   What is eustachian tube dysfunction? Eustachian tube dysfunction (ETD) is a condition that prevents your eustachian tubes from opening properly  It can also cause them to become blocked  Eustachian tubes connect your middle ear to the back of your nose and throat  These tubes open and allow air to flow in and out when you sneeze, swallow, or yawn  What causes or increases my risk of ETD? ETD may be caused by swelling or buildup of mucus in your eustachian tubes  Allergies, a cold, or sinus infection can increase your risk for ETD  Smoking also increases your risk for ETD  What are the signs and symptoms of ETD? · Fullness or pressure in your ears    · Muffled hearing    · Pain in one or both ears    · Ringing in your ears    · Popping or clicking feeling in your ears    · Trouble keeping your balance  How is ETD diagnosed? Your healthcare provider will ask about your symptoms  He will examine your ears, your nose, and the back of your throat  He may also do a hearing test    How is ETD treated? Your ETD may get better on its own without any treatment  You may need any of the following:  · Exercises  such as swallowing, yawning, or chewing gum may help to open your eustachian tubes  Your healthcare provider may also recommend that you take a deep breath and then blow with your mouth shut and your nostrils pinched closed  · Air pressure devices  push air into your nose and eustachian tubes to help relieve air pressure in your ear  · Treatment for allergies  such as decongestants, antihistamines, and nasal steroids may improve ETD  They may help decrease swelling of the eustachian tubes  · Ear tubes  may help to keep your middle ear open  During this procedure, your healthcare provider will cut a small hole in your eardrum  · A myringotomy  is procedure to make a small cut in your eardrum and suction out fluid from your middle ear  · Tuboplasty  is a procedure to widen your eustachian tubes  When should I contact my healthcare provider? · Your symptoms do not improve or get worse  · You have a fever  · You have any hearing loss  · You have questions or concerns about your condition or care  CARE AGREEMENT:   You have the right to help plan your care  Learn about your health condition and how it may be treated  Discuss treatment options with your caregivers to decide what care you want to receive  You always have the right to refuse treatment  The above information is an  only  It is not intended as medical advice for individual conditions or treatments  Talk to your doctor, nurse or pharmacist before following any medical regimen to see if it is safe and effective for you  © 2017 [employer]  Information is for End User's use only and may not be sold, redistributed or otherwise used for commercial purposes  All illustrations and images included in CareNotes® are the copyrighted property of [employer]  or Tobi Jaffe

## 2020-09-30 NOTE — PROGRESS NOTES
Assessment/Plan   Problem List Items Addressed This Visit        Other    Anxiety disorder due to general medical condition with panic attack    Relevant Medications    LORazepam (ATIVAN) 0 5 mg tablet      Other Visit Diagnoses     Obstruction of right eustachian tube    -  Primary    Relevant Medications    methylPREDNISolone 4 MG tablet therapy pack    Other Relevant Orders    Ambulatory Referral to Otolaryngology    Immunization due        Relevant Orders    influenza vaccine, quadrivalent, 0 5 mL, preservative-free, for adult and pediatric patients 6 mos+ (AFLURIA, FLUARIX, FLULAVAL, FLUZONE) (Completed)                Chief Complaint   Patient presents with    Ear Fullness     Ear feels full, dizziness, and pain       Subjective   Patient ID: Mike Saleem is a 30 y o  female  Vitals:    09/30/20 1325   BP: 120/70   Pulse: 77   Temp: 98 9 °F (37 2 °C)   SpO2: 99%     Ear Fullness    There is pain in both ears  This is a new problem  The current episode started in the past 7 days  The problem occurs constantly  The problem has been unchanged  There has been no fever  The pain is at a severity of 4/10  The pain is mild  Pertinent negatives include no coughing, diarrhea, headaches, hearing loss, rhinorrhea, sore throat or vomiting  Treatments tried: treated self with antibiotics from friend for sinus infection  The treatment provided no relief  There is no history of hearing loss or a tympanostomy tube  The following portions of the patient's history were reviewed and updated as appropriate: allergies, current medications, past family history, past social history, past surgical history and problem list     Review of Systems   Constitutional: Negative  HENT: Positive for ear pain  Negative for hearing loss, rhinorrhea and sore throat  Eyes: Negative  Respiratory: Negative  Negative for cough  Cardiovascular: Negative  Gastrointestinal: Negative  Negative for diarrhea and vomiting  Endocrine: Negative  Genitourinary: Negative  Musculoskeletal: Negative  Skin: Negative  Allergic/Immunologic: Negative  Neurological: Negative  Negative for headaches  Hematological: Negative  Psychiatric/Behavioral: Positive for decreased concentration  The patient is nervous/anxious  Objective     Physical Exam  Vitals signs and nursing note reviewed  Constitutional:       General: She is not in acute distress  Appearance: Normal appearance  She is not ill-appearing  HENT:      Head: Normocephalic and atraumatic  Right Ear: Decreased hearing noted  No tenderness  A middle ear effusion is present  There is no impacted cerumen  Tympanic membrane is scarred and bulging  Tympanic membrane is not erythematous  Left Ear: No decreased hearing noted  No tenderness  No middle ear effusion  There is no impacted cerumen  Tympanic membrane is scarred  Tympanic membrane is not erythematous or bulging  Nose: Nose normal  No congestion or rhinorrhea  Mouth/Throat:      Mouth: Mucous membranes are moist       Pharynx: No oropharyngeal exudate  Eyes:      General:         Right eye: No discharge  Left eye: No discharge  Conjunctiva/sclera: Conjunctivae normal       Pupils: Pupils are equal, round, and reactive to light  Neck:      Musculoskeletal: Normal range of motion and neck supple  Cardiovascular:      Rate and Rhythm: Normal rate and regular rhythm  Pulses: Normal pulses  Heart sounds: Normal heart sounds  Pulmonary:      Effort: Pulmonary effort is normal  No respiratory distress  Breath sounds: Normal breath sounds  Abdominal:      General: Abdomen is flat  Bowel sounds are normal       Palpations: Abdomen is soft  Musculoskeletal: Normal range of motion  General: No swelling  Right lower leg: No edema  Left lower leg: No edema  Skin:     General: Skin is warm and dry        Capillary Refill: Capillary refill takes less than 2 seconds  Neurological:      Mental Status: She is alert and oriented to person, place, and time  Cranial Nerves: No cranial nerve deficit  Gait: Gait normal    Psychiatric:         Mood and Affect: Mood normal          Behavior: Behavior normal          Thought Content:  Thought content normal          Judgment: Judgment normal        Allergies   Allergen Reactions    Reglan [Metoclopramide] Seizures    Bactrim [Sulfamethoxazole-Trimethoprim] Headache     migrains    Lac Bovis Rash    Milk-Related Compounds Rash    Other Itching     Pet allergies  Antidepresants

## 2020-10-02 ENCOUNTER — TELEPHONE (OUTPATIENT)
Dept: MULTI SPECIALTY CLINIC | Facility: CLINIC | Age: 25
End: 2020-10-02

## 2020-10-15 ENCOUNTER — OFFICE VISIT (OUTPATIENT)
Dept: FAMILY MEDICINE CLINIC | Facility: CLINIC | Age: 25
End: 2020-10-15
Payer: COMMERCIAL

## 2020-10-15 VITALS
WEIGHT: 124 LBS | HEIGHT: 69 IN | SYSTOLIC BLOOD PRESSURE: 116 MMHG | BODY MASS INDEX: 18.37 KG/M2 | DIASTOLIC BLOOD PRESSURE: 74 MMHG | TEMPERATURE: 97.4 F | HEART RATE: 74 BPM

## 2020-10-15 DIAGNOSIS — R63.6 UNDERWEIGHT: ICD-10-CM

## 2020-10-15 DIAGNOSIS — H69.83 EUSTACHIAN TUBE DYSFUNCTION, BILATERAL: Primary | ICD-10-CM

## 2020-10-15 PROCEDURE — 3725F SCREEN DEPRESSION PERFORMED: CPT | Performed by: FAMILY MEDICINE

## 2020-10-15 PROCEDURE — 99213 OFFICE O/P EST LOW 20 MIN: CPT | Performed by: FAMILY MEDICINE

## 2020-10-15 RX ORDER — PREDNISONE 10 MG/1
TABLET ORAL
Qty: 18 TABLET | Refills: 0 | Status: SHIPPED | OUTPATIENT
Start: 2020-10-15 | End: 2021-03-04 | Stop reason: ALTCHOICE

## 2020-10-15 RX ORDER — FLUTICASONE PROPIONATE 50 MCG
1 SPRAY, SUSPENSION (ML) NASAL DAILY
COMMUNITY
End: 2021-03-04 | Stop reason: ALTCHOICE

## 2020-10-15 RX ORDER — CETIRIZINE HYDROCHLORIDE 10 MG/1
10 TABLET ORAL DAILY
Qty: 90 TABLET | Refills: 1
Start: 2020-10-15 | End: 2021-03-04 | Stop reason: ALTCHOICE

## 2020-10-21 ENCOUNTER — OFFICE VISIT (OUTPATIENT)
Dept: FAMILY MEDICINE CLINIC | Facility: CLINIC | Age: 25
End: 2020-10-21
Payer: COMMERCIAL

## 2020-10-21 VITALS
HEIGHT: 69 IN | WEIGHT: 125 LBS | OXYGEN SATURATION: 99 % | TEMPERATURE: 97.4 F | HEART RATE: 83 BPM | BODY MASS INDEX: 18.51 KG/M2 | SYSTOLIC BLOOD PRESSURE: 122 MMHG | RESPIRATION RATE: 14 BRPM | DIASTOLIC BLOOD PRESSURE: 80 MMHG

## 2020-10-21 DIAGNOSIS — Z13.6 SCREENING FOR CARDIOVASCULAR CONDITION: ICD-10-CM

## 2020-10-21 DIAGNOSIS — Z00.00 ENCOUNTER FOR WELL ADULT EXAM WITHOUT ABNORMAL FINDINGS: Primary | ICD-10-CM

## 2020-10-21 DIAGNOSIS — E03.9 HYPOTHYROIDISM, UNSPECIFIED TYPE: ICD-10-CM

## 2020-10-21 PROCEDURE — 1036F TOBACCO NON-USER: CPT | Performed by: FAMILY MEDICINE

## 2020-10-21 PROCEDURE — 99395 PREV VISIT EST AGE 18-39: CPT | Performed by: FAMILY MEDICINE

## 2020-10-29 LAB
ALBUMIN SERPL-MCNC: 4.3 G/DL (ref 3.9–5)
ALBUMIN/GLOB SERPL: 1.7 {RATIO} (ref 1.2–2.2)
ALP SERPL-CCNC: 34 IU/L (ref 39–117)
ALT SERPL-CCNC: 14 IU/L (ref 0–32)
APPEARANCE UR: CLEAR
AST SERPL-CCNC: 17 IU/L (ref 0–40)
BACTERIA URNS QL MICRO: ABNORMAL
BILIRUB SERPL-MCNC: 0.4 MG/DL (ref 0–1.2)
BILIRUB UR QL STRIP: NEGATIVE
BUN SERPL-MCNC: 17 MG/DL (ref 6–20)
BUN/CREAT SERPL: 22 (ref 9–23)
CALCIUM SERPL-MCNC: 9.5 MG/DL (ref 8.7–10.2)
CHLORIDE SERPL-SCNC: 106 MMOL/L (ref 96–106)
CO2 SERPL-SCNC: 25 MMOL/L (ref 20–29)
COLOR UR: YELLOW
CREAT SERPL-MCNC: 0.76 MG/DL (ref 0.57–1)
EPI CELLS #/AREA URNS HPF: ABNORMAL /HPF (ref 0–10)
ERYTHROCYTE [DISTWIDTH] IN BLOOD BY AUTOMATED COUNT: 11.7 % (ref 11.7–15.4)
GLOBULIN SER-MCNC: 2.5 G/DL (ref 1.5–4.5)
GLUCOSE SERPL-MCNC: 70 MG/DL (ref 65–99)
GLUCOSE UR QL: NEGATIVE
HCT VFR BLD AUTO: 39.3 % (ref 34–46.6)
HGB BLD-MCNC: 13.1 G/DL (ref 11.1–15.9)
HGB UR QL STRIP: ABNORMAL
KETONES UR QL STRIP: NEGATIVE
LEUKOCYTE ESTERASE UR QL STRIP: NEGATIVE
MCH RBC QN AUTO: 30.8 PG (ref 26.6–33)
MCHC RBC AUTO-ENTMCNC: 33.3 G/DL (ref 31.5–35.7)
MCV RBC AUTO: 93 FL (ref 79–97)
MICRO URNS: ABNORMAL
MUCOUS THREADS URNS QL MICRO: PRESENT
NITRITE UR QL STRIP: NEGATIVE
PH UR STRIP: 6.5 [PH] (ref 5–7.5)
PLATELET # BLD AUTO: 221 X10E3/UL (ref 150–450)
POTASSIUM SERPL-SCNC: 3.8 MMOL/L (ref 3.5–5.2)
PROT SERPL-MCNC: 6.8 G/DL (ref 6–8.5)
PROT UR QL STRIP: ABNORMAL
RBC # BLD AUTO: 4.25 X10E6/UL (ref 3.77–5.28)
RBC #/AREA URNS HPF: ABNORMAL /HPF (ref 0–2)
SL AMB EGFR AFRICAN AMERICAN: 124 ML/MIN/1.73
SL AMB EGFR NON AFRICAN AMERICAN: 108 ML/MIN/1.73
SODIUM SERPL-SCNC: 140 MMOL/L (ref 134–144)
SP GR UR: 1.01 (ref 1–1.03)
TSH SERPL DL<=0.005 MIU/L-ACNC: 2.4 UIU/ML (ref 0.45–4.5)
UROBILINOGEN UR STRIP-ACNC: 0.2 MG/DL (ref 0.2–1)
WBC # BLD AUTO: 7.6 X10E3/UL (ref 3.4–10.8)
WBC #/AREA URNS HPF: ABNORMAL /HPF (ref 0–5)

## 2020-10-30 DIAGNOSIS — F06.4 ANXIETY DISORDER DUE TO GENERAL MEDICAL CONDITION WITH PANIC ATTACK: ICD-10-CM

## 2020-10-30 DIAGNOSIS — F41.0 ANXIETY DISORDER DUE TO GENERAL MEDICAL CONDITION WITH PANIC ATTACK: ICD-10-CM

## 2020-10-30 RX ORDER — LORAZEPAM 0.5 MG/1
0.5 TABLET ORAL 2 TIMES DAILY
Qty: 60 TABLET | Refills: 0 | Status: SHIPPED | OUTPATIENT
Start: 2020-10-30 | End: 2020-12-02 | Stop reason: SDUPTHER

## 2020-10-31 ENCOUNTER — OFFICE VISIT (OUTPATIENT)
Dept: URGENT CARE | Facility: CLINIC | Age: 25
End: 2020-10-31
Payer: COMMERCIAL

## 2020-10-31 VITALS
HEART RATE: 94 BPM | SYSTOLIC BLOOD PRESSURE: 107 MMHG | BODY MASS INDEX: 18.96 KG/M2 | TEMPERATURE: 98.1 F | WEIGHT: 128 LBS | HEIGHT: 69 IN | DIASTOLIC BLOOD PRESSURE: 72 MMHG

## 2020-10-31 DIAGNOSIS — R05.9 COUGH: Primary | ICD-10-CM

## 2020-10-31 DIAGNOSIS — Z11.59 SPECIAL SCREENING EXAMINATION FOR UNSPECIFIED VIRAL DISEASE: ICD-10-CM

## 2020-10-31 PROCEDURE — U0003 INFECTIOUS AGENT DETECTION BY NUCLEIC ACID (DNA OR RNA); SEVERE ACUTE RESPIRATORY SYNDROME CORONAVIRUS 2 (SARS-COV-2) (CORONAVIRUS DISEASE [COVID-19]), AMPLIFIED PROBE TECHNIQUE, MAKING USE OF HIGH THROUGHPUT TECHNOLOGIES AS DESCRIBED BY CMS-2020-01-R: HCPCS | Performed by: PHYSICIAN ASSISTANT

## 2020-10-31 PROCEDURE — 99283 EMERGENCY DEPT VISIT LOW MDM: CPT | Performed by: PHYSICIAN ASSISTANT

## 2020-10-31 PROCEDURE — G0382 LEV 3 HOSP TYPE B ED VISIT: HCPCS | Performed by: PHYSICIAN ASSISTANT

## 2020-11-03 LAB — SARS-COV-2 RNA SPEC QL NAA+PROBE: NOT DETECTED

## 2020-11-04 ENCOUNTER — TELEPHONE (OUTPATIENT)
Dept: URGENT CARE | Facility: CLINIC | Age: 25
End: 2020-11-04

## 2020-11-07 ENCOUNTER — OFFICE VISIT (OUTPATIENT)
Dept: URGENT CARE | Facility: CLINIC | Age: 25
End: 2020-11-07
Payer: COMMERCIAL

## 2020-11-07 VITALS
DIASTOLIC BLOOD PRESSURE: 79 MMHG | BODY MASS INDEX: 18.54 KG/M2 | HEIGHT: 69 IN | OXYGEN SATURATION: 100 % | HEART RATE: 71 BPM | TEMPERATURE: 98 F | SYSTOLIC BLOOD PRESSURE: 109 MMHG | RESPIRATION RATE: 16 BRPM | WEIGHT: 125.2 LBS

## 2020-11-07 DIAGNOSIS — H93.13 TINNITUS AURIUM, BILATERAL: Primary | ICD-10-CM

## 2020-11-07 PROCEDURE — G0382 LEV 3 HOSP TYPE B ED VISIT: HCPCS | Performed by: FAMILY MEDICINE

## 2020-11-07 PROCEDURE — 99283 EMERGENCY DEPT VISIT LOW MDM: CPT | Performed by: FAMILY MEDICINE

## 2020-11-08 ENCOUNTER — OFFICE VISIT (OUTPATIENT)
Dept: URGENT CARE | Facility: CLINIC | Age: 25
End: 2020-11-08
Payer: COMMERCIAL

## 2020-11-08 VITALS — HEART RATE: 100 BPM | OXYGEN SATURATION: 97 % | TEMPERATURE: 98.9 F | RESPIRATION RATE: 16 BRPM

## 2020-11-08 DIAGNOSIS — H66.92 LEFT OTITIS MEDIA, UNSPECIFIED OTITIS MEDIA TYPE: Primary | ICD-10-CM

## 2020-11-08 PROCEDURE — G0382 LEV 3 HOSP TYPE B ED VISIT: HCPCS | Performed by: PHYSICIAN ASSISTANT

## 2020-11-08 PROCEDURE — 99283 EMERGENCY DEPT VISIT LOW MDM: CPT | Performed by: PHYSICIAN ASSISTANT

## 2020-11-08 RX ORDER — AMOXICILLIN 500 MG/1
500 TABLET, FILM COATED ORAL 2 TIMES DAILY
Qty: 14 TABLET | Refills: 0 | Status: SHIPPED | OUTPATIENT
Start: 2020-11-08 | End: 2020-11-15

## 2020-11-09 ENCOUNTER — DOCUMENTATION (OUTPATIENT)
Dept: URGENT CARE | Facility: CLINIC | Age: 25
End: 2020-11-09

## 2020-11-17 ENCOUNTER — TRANSCRIBE ORDERS (OUTPATIENT)
Dept: INTERNAL MEDICINE CLINIC | Facility: CLINIC | Age: 25
End: 2020-11-17

## 2020-11-17 DIAGNOSIS — H68.101 UNSPECIFIED OBSTRUCTION OF EUSTACHIAN TUBE, RIGHT EAR: Primary | ICD-10-CM

## 2020-11-20 ENCOUNTER — OFFICE VISIT (OUTPATIENT)
Dept: MULTI SPECIALTY CLINIC | Facility: CLINIC | Age: 25
End: 2020-11-20

## 2020-11-20 VITALS
BODY MASS INDEX: 19.11 KG/M2 | WEIGHT: 129 LBS | TEMPERATURE: 97.5 F | SYSTOLIC BLOOD PRESSURE: 124 MMHG | HEIGHT: 69 IN | OXYGEN SATURATION: 98 % | DIASTOLIC BLOOD PRESSURE: 78 MMHG | HEART RATE: 95 BPM

## 2020-11-20 DIAGNOSIS — H69.81 DYSFUNCTION OF RIGHT EUSTACHIAN TUBE: Primary | ICD-10-CM

## 2020-11-20 DIAGNOSIS — H68.101 OBSTRUCTION OF RIGHT EUSTACHIAN TUBE: ICD-10-CM

## 2020-11-20 PROCEDURE — 99243 OFF/OP CNSLTJ NEW/EST LOW 30: CPT | Performed by: OTOLARYNGOLOGY

## 2020-11-20 PROCEDURE — 3008F BODY MASS INDEX DOCD: CPT | Performed by: FAMILY MEDICINE

## 2020-12-02 DIAGNOSIS — F41.0 ANXIETY DISORDER DUE TO GENERAL MEDICAL CONDITION WITH PANIC ATTACK: ICD-10-CM

## 2020-12-02 DIAGNOSIS — F06.4 ANXIETY DISORDER DUE TO GENERAL MEDICAL CONDITION WITH PANIC ATTACK: ICD-10-CM

## 2020-12-02 RX ORDER — LORAZEPAM 0.5 MG/1
0.5 TABLET ORAL 2 TIMES DAILY
Qty: 60 TABLET | Refills: 0 | Status: SHIPPED | OUTPATIENT
Start: 2020-12-02 | End: 2021-01-04 | Stop reason: SDUPTHER

## 2020-12-14 ENCOUNTER — OFFICE VISIT (OUTPATIENT)
Dept: AUDIOLOGY | Age: 25
End: 2020-12-14
Payer: COMMERCIAL

## 2020-12-14 DIAGNOSIS — H90.3 SENSORY HEARING LOSS, BILATERAL: Primary | ICD-10-CM

## 2020-12-14 PROCEDURE — 92567 TYMPANOMETRY: CPT | Performed by: AUDIOLOGIST

## 2020-12-14 PROCEDURE — 92557 COMPREHENSIVE HEARING TEST: CPT | Performed by: AUDIOLOGIST

## 2020-12-28 ENCOUNTER — OFFICE VISIT (OUTPATIENT)
Dept: URGENT CARE | Facility: CLINIC | Age: 25
End: 2020-12-28
Payer: COMMERCIAL

## 2020-12-28 VITALS
WEIGHT: 126.8 LBS | BODY MASS INDEX: 18.78 KG/M2 | RESPIRATION RATE: 16 BRPM | HEIGHT: 69 IN | HEART RATE: 66 BPM | OXYGEN SATURATION: 99 % | TEMPERATURE: 98.4 F

## 2020-12-28 DIAGNOSIS — J01.90 ACUTE SINUSITIS, RECURRENCE NOT SPECIFIED, UNSPECIFIED LOCATION: Primary | ICD-10-CM

## 2020-12-28 PROCEDURE — G0382 LEV 3 HOSP TYPE B ED VISIT: HCPCS | Performed by: PHYSICIAN ASSISTANT

## 2020-12-28 PROCEDURE — 99283 EMERGENCY DEPT VISIT LOW MDM: CPT | Performed by: PHYSICIAN ASSISTANT

## 2020-12-28 RX ORDER — CETIRIZINE HYDROCHLORIDE, PSEUDOEPHEDRINE HYDROCHLORIDE 5; 120 MG/1; MG/1
1 TABLET, FILM COATED, EXTENDED RELEASE ORAL 2 TIMES DAILY
COMMUNITY
End: 2021-03-04 | Stop reason: ALTCHOICE

## 2020-12-28 RX ORDER — AZITHROMYCIN 250 MG/1
TABLET, FILM COATED ORAL
Qty: 6 TABLET | Refills: 0 | Status: SHIPPED | OUTPATIENT
Start: 2020-12-28 | End: 2021-01-01

## 2020-12-28 NOTE — PATIENT INSTRUCTIONS
Rhinosinusitis   AMBULATORY CARE:   Rhinosinusitis (RS)  is inflammation of your nose and sinuses  It commonly begins as a virus, often as a common cold  Viruses usually last 7 to 10 days and do not need treatment  When the virus does not get better on its own, you may have bacterial RS  This means that bacteria have begun to grow inside your sinuses  Acute RS lasts less than 4 weeks  Chronic RS lasts 12 weeks or more  Recurrent RS is when you have 4 or more episodes of RS in one year  Your signs and symptoms  may be worse when you lie on your back or try to sleep  You may have any of the following:  · Stuffy nose and reduced sense of smell     · Runny nose with thick yellow or green mucus     · Pressure or pain on your face or a headache     · Pain in your teeth or bad breath     · Ear pain or pressure     · Fever or cough     · Tiredness    Seek care immediately if:   · You have double vision or you cannot see  · You have a stiff neck, a fever, or a bad headache  · Your eyeball bulges out or you cannot move your eye  · Your eye and eyelid are red, swollen, and painful  · You cannot open your eye  · You are more sleepy than normal, or you notice changes in your ability to think, move, or talk  · You have swelling of your forehead or scalp  Contact your healthcare provider if:   · Your symptoms are worse or do not improve after 3 to 5 days of treatment  · You have questions or concerns about your condition or care  Treatment for rhinosinusitis  may include any of the following:  · Acetaminophen  decreases pain and fever  It is available without a doctor's order  Ask how much to take and how often to take it  Follow directions  Acetaminophen can cause liver damage if not taken correctly  · NSAIDs , such as ibuprofen, help decrease swelling, pain, and fever  This medicine is available with or without a doctor's order   NSAIDs can cause stomach bleeding or kidney problems in certain people  If you take blood thinner medicine, always ask your healthcare provider if NSAIDs are safe for you  Always read the medicine label and follow directions  · Nasal steroid sprays  decrease inflammation in your nose and sinuses  · Decongestants  reduce swelling and drain mucus in the nose and sinuses  They may help you breathe easier  · Antihistamines  dry mucus in the nose and relieve sneezing  · Antibiotics  treat a bacterial infection and may be needed if your symptoms do not improve or they get worse  · Take your medicine as directed  Contact your healthcare provider if you think your medicine is not helping or if you have side effects  Tell him or her if you are allergic to any medicine  Keep a list of the medicines, vitamins, and herbs you take  Include the amounts, and when and why you take them  Bring the list or the pill bottles to follow-up visits  Carry your medicine list with you in case of an emergency  Self-care:   · Rinse your sinuses  Use a sinus rinse device to rinse your nasal passages with a saline (salt water) solution  This will help thin the mucus in your nose and rinse away pollen and dirt  It will also help reduce swelling so you can breathe normally  Ask your healthcare provider how often to do this  · Breathe in steam   Heat a bowl of water until you see steam  Lean over the bowl and make a tent over your head with a large towel  Breathe deeply for about 20 minutes  Be careful not to get too close to the steam or burn yourself  Do this 3 times a day  You can also breathe deeply when you take a hot shower  · Sleep with your head elevated  Place an extra pillow under your head before you go to sleep to help your sinuses drain  · Drink liquids as directed  Ask your healthcare provider how much liquid to drink each day and which liquids are best for you  Liquids will thin the mucus in your nose and help it drain   Avoid drinks that contain alcohol or caffeine  · Do not smoke, and avoid secondhand smoke  Nicotine and other chemicals in cigarettes and cigars can make your symptoms worse  Ask your healthcare provider for information if you currently smoke and need help to quit  E-cigarettes or smokeless tobacco still contain nicotine  Talk to your healthcare provider before you use these products  Follow up with your healthcare provider as directed: Follow up if your symptoms are worse or not better after 3 to 5 days of treatment  Write down your questions so you remember to ask them during your visits  © Copyright [address] Information is for End User's use only and may not be sold, redistributed or otherwise used for commercial purposes  All illustrations and images included in CareNotes® are the copyrighted property of [employer]  or [address] [place], [place] [address]   The above information is an  only  It is not intended as medical advice for individual conditions or treatments  Talk to your doctor, nurse or pharmacist before following any medical regimen to see if it is safe and effective for you

## 2021-01-04 DIAGNOSIS — F06.4 ANXIETY DISORDER DUE TO GENERAL MEDICAL CONDITION WITH PANIC ATTACK: ICD-10-CM

## 2021-01-04 DIAGNOSIS — F41.0 ANXIETY DISORDER DUE TO GENERAL MEDICAL CONDITION WITH PANIC ATTACK: ICD-10-CM

## 2021-01-04 RX ORDER — LORAZEPAM 0.5 MG/1
0.5 TABLET ORAL 2 TIMES DAILY
Qty: 60 TABLET | Refills: 0 | Status: SHIPPED | OUTPATIENT
Start: 2021-01-04 | End: 2021-03-02 | Stop reason: SDUPTHER

## 2021-01-04 NOTE — PROGRESS NOTES
[facility]        NAME: Stef Davis is a 30 y o  female  : 1995    MRN: [id number]  DATE: 2021  TIME: 4:15 AM    Assessment and Plan   Acute sinusitis, recurrence not specified, unspecified location [J01 90]  1  Acute sinusitis, recurrence not specified, unspecified location  azithromycin (ZITHROMAX) 250 mg tablet         Patient Instructions       Follow up with PCP in 3-5 days  Proceed to  ER if symptoms worsen  Chief Complaint     Chief Complaint   Patient presents with    Sinusitis     HX sinus infection  6 moths  Green nasal dishcarge, ears blocked, Runny nose, sinus  Clicking and crakling in ears         History of Present Illness       68-year-old female presents the clinic with green nasal discharge, blocked ears, runny nose, sinus pressure with popping/crackling sensation in ears  Patient states she has a history of sinus infections but has not seen an ENT for this  Patient denies fevers, chills, nausea, vomiting, cough, chest tightness, shortness of breath, wheezing, dizziness, lightheadedness  Review of Systems   Review of Systems   Constitutional: Negative for chills and fever  HENT: Positive for congestion, ear pain, hearing loss, rhinorrhea and sinus pressure  Negative for ear discharge and sore throat  Respiratory: Negative for cough  Gastrointestinal: Negative for abdominal pain, diarrhea, nausea and vomiting  Musculoskeletal: Negative for myalgias  Neurological: Negative for dizziness, light-headedness and headaches           Current Medications       Current Outpatient Medications:     cetirizine-pseudoephedrine (ZyrTEC-D) 5-120 MG per tablet, Take 1 tablet by mouth 2 (two) times a day, Disp: , Rfl:     fluticasone (FLONASE) 50 mcg/act nasal spray, 1 spray into each nostril daily, Disp: , Rfl:     LORazepam (ATIVAN) 0 5 mg tablet, Take 1 tablet (0 5 mg total) by mouth 2 (two) times a day, Disp: 60 tablet, Rfl: 0    cetirizine (ZyrTEC) 10 mg tablet, Take 1 tablet (10 mg total) by mouth daily (Patient not taking: Reported on 11/20/2020), Disp: 90 tablet, Rfl: 1    predniSONE 10 mg tablet, 3 tabs daily x 3 days, 2 tabs daily x 3, 1 tab daily x 3 days (Patient not taking: Reported on 10/21/2020), Disp: 18 tablet, Rfl: 0    triamcinolone (KENALOG) 0 1 % cream, Apply topically 2 (two) times a day (Patient not taking: Reported on 10/21/2020), Disp: 30 g, Rfl: 0    Current Allergies     Allergies as of 12/28/2020 - Reviewed 12/28/2020   Allergen Reaction Noted    Benadryl [diphenhydramine]  12/28/2020    Reglan [metoclopramide] Seizures 02/04/2020    Bactrim [sulfamethoxazole-trimethoprim] Headache 05/10/2018    Lac bovis Rash 10/02/2015    Milk-related compounds Rash 02/26/2019    Other Itching 11/14/2013            The following portions of the patient's history were reviewed and updated as appropriate: allergies, current medications, past family history, past medical history, past social history, past surgical history and problem list      Past Medical History:   Diagnosis Date    Anxiety     Disease of thyroid gland     Eczema     Glomerulonephritis     Psychiatric disorder     anxiety, panic    Urinary tract infection        Past Surgical History:   Procedure Laterality Date    SKIN LESION EXCISION      destruction of benign lesion    TONSILLECTOMY      TONSILLECTOMY  2006    WISDOM TOOTH EXTRACTION      WISDOM TOOTH EXTRACTION  2011       Family History   Problem Relation Age of Onset    Coronary artery disease Father     Alcohol abuse Father     Drug abuse Father     Anxiety disorder Mother     Anxiety disorder Brother     Post-traumatic stress disorder Brother     Drug abuse Brother     Lung cancer Maternal Grandfather     Breast cancer Maternal Aunt     Hypertension Family     Lung cancer Family     Lymphoma Family         pancreatic    Hyperlipidemia Family         pure         Medications have been verified  Objective   Pulse 66   Temp 98 4 °F (36 9 °C) (Temporal)   Resp 16   Ht 5' 9" (1 753 m)   Wt 57 5 kg (126 lb 12 8 oz)   SpO2 99%   BMI 18 73 kg/m²   No LMP recorded  Physical Exam     Physical Exam  Vitals signs and nursing note reviewed  Constitutional:       General: She is not in acute distress  Appearance: She is well-developed  She is not diaphoretic  HENT:      Head: Normocephalic and atraumatic  Right Ear: A middle ear effusion is present  Tympanic membrane is bulging  Tympanic membrane is not erythematous  Left Ear: A middle ear effusion is present  Tympanic membrane is bulging  Tympanic membrane is not erythematous  Nose: Mucosal edema, congestion and rhinorrhea present  Mouth/Throat:      Pharynx: Uvula midline  Posterior oropharyngeal erythema (PND) present  Cardiovascular:      Rate and Rhythm: Normal rate and regular rhythm  Pulmonary:      Effort: Pulmonary effort is normal       Breath sounds: Normal breath sounds  Musculoskeletal: Normal range of motion  Skin:     General: Skin is warm and dry  Findings: No rash  Neurological:      Mental Status: She is alert and oriented to person, place, and time

## 2021-02-12 ENCOUNTER — OFFICE VISIT (OUTPATIENT)
Dept: MULTI SPECIALTY CLINIC | Facility: CLINIC | Age: 26
End: 2021-02-12

## 2021-02-12 VITALS
HEIGHT: 69 IN | DIASTOLIC BLOOD PRESSURE: 80 MMHG | SYSTOLIC BLOOD PRESSURE: 118 MMHG | HEART RATE: 81 BPM | BODY MASS INDEX: 18.81 KG/M2 | WEIGHT: 127 LBS

## 2021-02-12 DIAGNOSIS — H69.81 DYSFUNCTION OF RIGHT EUSTACHIAN TUBE: Primary | ICD-10-CM

## 2021-02-12 DIAGNOSIS — Z01.10 NORMAL HEARING EXAM: ICD-10-CM

## 2021-02-12 DIAGNOSIS — H68.101 UNSPECIFIED OBSTRUCTION OF EUSTACHIAN TUBE, RIGHT EAR: ICD-10-CM

## 2021-02-12 PROCEDURE — 1036F TOBACCO NON-USER: CPT | Performed by: OTOLARYNGOLOGY

## 2021-02-12 PROCEDURE — 99213 OFFICE O/P EST LOW 20 MIN: CPT | Performed by: OTOLARYNGOLOGY

## 2021-02-12 NOTE — PROGRESS NOTES
Elma Temple is a 30 y  o female who presents for re-evaluation of right blocked ear  She had audiogram done 12/14/20 showing normal hearing bilaterally  She was seen at urgent care on 12/28 and given Z Gregorio for sinusitis  She feels that her blocked ear resolved shortly after this  Has not happened for the past month  Past Medical History:   Diagnosis Date    Anxiety     Disease of thyroid gland     Eczema     Glomerulonephritis     Psychiatric disorder     anxiety, panic    Urinary tract infection        /80 (BP Location: Right arm, Patient Position: Sitting, Cuff Size: Adult)   Pulse 81   Ht 5' 9" (1 753 m)   Wt 57 6 kg (127 lb)   BMI 18 75 kg/m²       Physical Exam   Constitutional: Oriented to person, place, and time  Well-developed and well-nourished, no apparent distress, non-toxic appearance  Cooperative, able to hear and answer questions without difficulty  Voice: Normal voice quality  Head: Normocephalic, atraumatic  No scars, masses or lesions  Face: Symmetric, no edema, no sinus tenderness  Eyes: Vision grossly intact, extra-ocular movement intact  Ears: External ear normal   Bilateral tympanic membranes are intact with intact normal landmarks  No post-auricular erythema or tenderness  Nose: Septum midline, nares clear  Mucosa moist, turbinates well appearing  No crusting, polyps or discharge evident  Oral cavity: Dentition intact  Mucosa moist, lips normal   Tongue mobile, floor of mouth normal   Hard palate unremarkable  No masses or lesions  Oropharynx: Uvula is midline, soft palate normal   Unremarkable oropharyngeal inlet  Tonsils unremarkable  Posterior pharyngeal wall clear  No masses or lesions  Salivary glands:  Parotid glands and submandibular glands symmetric, no enlargement or tenderness  Neck: Normal laryngeal elevation with swallow  Trachea midline  No masses or lesions  No palpable adenopathy    Thyroid: normal in size, unremarkable without tenderness or palpable nodules  Pulmonary/Chest: Normal effort and rate  No respiratory distress  Musculoskeletal: Normal range of motion  Neurological: Cranial nerves 2-12 intact  Skin: Skin is warm and dry  Psychiatric: Normal mood and affect  A/P: Suspect her intermittent blocked right ear sensation was due to ETD possibly secondary to sinusitis  Fortunately this has resolved and prior audiogram was normal      Follow up prn

## 2021-03-01 DIAGNOSIS — F41.0 ANXIETY DISORDER DUE TO GENERAL MEDICAL CONDITION WITH PANIC ATTACK: ICD-10-CM

## 2021-03-01 DIAGNOSIS — F06.4 ANXIETY DISORDER DUE TO GENERAL MEDICAL CONDITION WITH PANIC ATTACK: ICD-10-CM

## 2021-03-01 RX ORDER — LORAZEPAM 0.5 MG/1
0.5 TABLET ORAL 2 TIMES DAILY
Qty: 60 TABLET | Refills: 0 | Status: CANCELLED | OUTPATIENT
Start: 2021-03-01

## 2021-03-02 DIAGNOSIS — F06.4 ANXIETY DISORDER DUE TO GENERAL MEDICAL CONDITION WITH PANIC ATTACK: ICD-10-CM

## 2021-03-02 DIAGNOSIS — F41.0 ANXIETY DISORDER DUE TO GENERAL MEDICAL CONDITION WITH PANIC ATTACK: ICD-10-CM

## 2021-03-02 DIAGNOSIS — R42 DIZZINESS: Primary | ICD-10-CM

## 2021-03-02 RX ORDER — LORAZEPAM 0.5 MG/1
0.5 TABLET ORAL 2 TIMES DAILY
Qty: 60 TABLET | Refills: 0 | Status: SHIPPED | OUTPATIENT
Start: 2021-03-02 | End: 2021-04-29 | Stop reason: SDUPTHER

## 2021-03-04 LAB
ALBUMIN SERPL-MCNC: 4.1 G/DL (ref 3.9–5)
ALBUMIN/GLOB SERPL: 1.4 {RATIO} (ref 1.2–2.2)
ALP SERPL-CCNC: 33 IU/L (ref 39–117)
ALT SERPL-CCNC: 15 IU/L (ref 0–32)
APPEARANCE UR: CLEAR
AST SERPL-CCNC: 15 IU/L (ref 0–40)
BACTERIA URNS QL MICRO: ABNORMAL
BASOPHILS # BLD AUTO: 0 X10E3/UL (ref 0–0.2)
BASOPHILS NFR BLD AUTO: 1 %
BILIRUB SERPL-MCNC: 0.3 MG/DL (ref 0–1.2)
BILIRUB UR QL STRIP: NEGATIVE
BUN SERPL-MCNC: 17 MG/DL (ref 6–20)
BUN/CREAT SERPL: 22 (ref 9–23)
CALCIUM SERPL-MCNC: 9.4 MG/DL (ref 8.7–10.2)
CHLORIDE SERPL-SCNC: 105 MMOL/L (ref 96–106)
CO2 SERPL-SCNC: 25 MMOL/L (ref 20–29)
COLOR UR: YELLOW
CREAT SERPL-MCNC: 0.79 MG/DL (ref 0.57–1)
EOSINOPHIL # BLD AUTO: 0.1 X10E3/UL (ref 0–0.4)
EOSINOPHIL NFR BLD AUTO: 1 %
EPI CELLS #/AREA URNS HPF: ABNORMAL /HPF (ref 0–10)
ERYTHROCYTE [DISTWIDTH] IN BLOOD BY AUTOMATED COUNT: 11.9 % (ref 11.7–15.4)
ERYTHROCYTE [SEDIMENTATION RATE] IN BLOOD BY WESTERGREN METHOD: 2 MM/HR (ref 0–32)
GLOBULIN SER-MCNC: 2.9 G/DL (ref 1.5–4.5)
GLUCOSE SERPL-MCNC: 83 MG/DL (ref 65–99)
GLUCOSE UR QL: NEGATIVE
HCT VFR BLD AUTO: 37.4 % (ref 34–46.6)
HGB BLD-MCNC: 12.7 G/DL (ref 11.1–15.9)
HGB UR QL STRIP: ABNORMAL
IMM GRANULOCYTES # BLD: 0 X10E3/UL (ref 0–0.1)
IMM GRANULOCYTES NFR BLD: 0 %
KETONES UR QL STRIP: NEGATIVE
LEUKOCYTE ESTERASE UR QL STRIP: NEGATIVE
LYMPHOCYTES # BLD AUTO: 1.5 X10E3/UL (ref 0.7–3.1)
LYMPHOCYTES NFR BLD AUTO: 25 %
MCH RBC QN AUTO: 31.2 PG (ref 26.6–33)
MCHC RBC AUTO-ENTMCNC: 34 G/DL (ref 31.5–35.7)
MCV RBC AUTO: 92 FL (ref 79–97)
MICRO URNS: ABNORMAL
MONOCYTES # BLD AUTO: 0.7 X10E3/UL (ref 0.1–0.9)
MONOCYTES NFR BLD AUTO: 11 %
MUCOUS THREADS URNS QL MICRO: PRESENT
NEUTROPHILS # BLD AUTO: 3.9 X10E3/UL (ref 1.4–7)
NEUTROPHILS NFR BLD AUTO: 62 %
NITRITE UR QL STRIP: NEGATIVE
PH UR STRIP: 6 [PH] (ref 5–7.5)
PLATELET # BLD AUTO: 212 X10E3/UL (ref 150–450)
POTASSIUM SERPL-SCNC: 4.1 MMOL/L (ref 3.5–5.2)
PROT SERPL-MCNC: 7 G/DL (ref 6–8.5)
PROT UR QL STRIP: ABNORMAL
RBC # BLD AUTO: 4.07 X10E6/UL (ref 3.77–5.28)
RBC #/AREA URNS HPF: ABNORMAL /HPF (ref 0–2)
SARS-COV-2 IGG SERPL QL IA: NEGATIVE
SL AMB EGFR AFRICAN AMERICAN: 119 ML/MIN/1.73
SL AMB EGFR NON AFRICAN AMERICAN: 103 ML/MIN/1.73
SODIUM SERPL-SCNC: 141 MMOL/L (ref 134–144)
SP GR UR: 1.03 (ref 1–1.03)
UROBILINOGEN UR STRIP-ACNC: 0.2 MG/DL (ref 0.2–1)
WBC # BLD AUTO: 6.2 X10E3/UL (ref 3.4–10.8)
WBC #/AREA URNS HPF: ABNORMAL /HPF (ref 0–5)

## 2021-03-05 ENCOUNTER — OFFICE VISIT (OUTPATIENT)
Dept: FAMILY MEDICINE CLINIC | Facility: CLINIC | Age: 26
End: 2021-03-05
Payer: COMMERCIAL

## 2021-03-05 VITALS
HEIGHT: 69 IN | DIASTOLIC BLOOD PRESSURE: 64 MMHG | OXYGEN SATURATION: 99 % | HEART RATE: 72 BPM | BODY MASS INDEX: 18.43 KG/M2 | TEMPERATURE: 99.1 F | WEIGHT: 124.4 LBS | SYSTOLIC BLOOD PRESSURE: 116 MMHG | RESPIRATION RATE: 18 BRPM

## 2021-03-05 DIAGNOSIS — H69.81 DYSFUNCTION OF RIGHT EUSTACHIAN TUBE: Primary | ICD-10-CM

## 2021-03-05 PROCEDURE — 3008F BODY MASS INDEX DOCD: CPT | Performed by: OTOLARYNGOLOGY

## 2021-03-05 PROCEDURE — 99213 OFFICE O/P EST LOW 20 MIN: CPT | Performed by: FAMILY MEDICINE

## 2021-03-05 PROCEDURE — 3008F BODY MASS INDEX DOCD: CPT | Performed by: FAMILY MEDICINE

## 2021-03-05 PROCEDURE — 1036F TOBACCO NON-USER: CPT | Performed by: FAMILY MEDICINE

## 2021-03-05 PROCEDURE — 3725F SCREEN DEPRESSION PERFORMED: CPT | Performed by: FAMILY MEDICINE

## 2021-03-05 NOTE — PROGRESS NOTES
Assessment/Plan:     Diagnoses and all orders for this visit:    Dysfunction of right eustachian tube       Continue her allergy meds   Will follow-up as needed  No signs of infection      Subjective:     Chief Complaint   Patient presents with    Earache     right ear, ongoing for 5 months, intermittent pain for 3 weeks, saw ENT 3 weeks ago, everything was fine    Tinnitus        Patient ID: Astrid Omalley is a 30 y o  female  Patient has intermittent pain in fullness in her right ear  Symptoms been going on for past few weeks that is intermittent  No other complaints today      The following portions of the patient's history were reviewed and updated as appropriate: allergies, current medications, past family history, past medical history, past social history, past surgical history and problem list     Review of Systems   Constitutional: Negative  HENT: Positive for ear pain  Eyes: Negative  Respiratory: Negative  Cardiovascular: Negative  Gastrointestinal: Negative  Endocrine: Negative  Genitourinary: Negative  Musculoskeletal: Negative  Skin: Negative  Allergic/Immunologic: Negative  Neurological: Negative  Hematological: Negative  Psychiatric/Behavioral: Negative  All other systems reviewed and are negative  Objective:    Vitals:    03/05/21 1339   BP: 116/64   BP Location: Left arm   Patient Position: Sitting   Cuff Size: Standard   Pulse: 72   Resp: 18   Temp: 99 1 °F (37 3 °C)   TempSrc: Tympanic   SpO2: 99%   Weight: 56 4 kg (124 lb 6 4 oz)   Height: 5' 9" (1 753 m)          Physical Exam  Vitals signs and nursing note reviewed  Constitutional:       Appearance: She is well-developed  HENT:      Head: Normocephalic and atraumatic        Right Ear: External ear normal       Left Ear: External ear normal       Ears:      Comments:  Significant amount of fluid behind right TM  Eyes:      Conjunctiva/sclera: Conjunctivae normal       Pupils: Pupils are equal, round, and reactive to light  Neck:      Musculoskeletal: Normal range of motion  Cardiovascular:      Rate and Rhythm: Normal rate and regular rhythm  Heart sounds: Normal heart sounds  Pulmonary:      Effort: Pulmonary effort is normal       Breath sounds: Normal breath sounds  Abdominal:      General: Bowel sounds are normal       Palpations: Abdomen is soft  Musculoskeletal: Normal range of motion  Skin:     General: Skin is warm and dry  Neurological:      Mental Status: She is alert and oriented to person, place, and time  Deep Tendon Reflexes: Reflexes are normal and symmetric  Psychiatric:         Behavior: Behavior normal          Thought Content:  Thought content normal          Judgment: Judgment normal

## 2021-04-13 DIAGNOSIS — Z23 ENCOUNTER FOR IMMUNIZATION: ICD-10-CM

## 2021-04-29 ENCOUNTER — OFFICE VISIT (OUTPATIENT)
Dept: FAMILY MEDICINE CLINIC | Facility: CLINIC | Age: 26
End: 2021-04-29
Payer: COMMERCIAL

## 2021-04-29 VITALS
BODY MASS INDEX: 18.66 KG/M2 | SYSTOLIC BLOOD PRESSURE: 102 MMHG | TEMPERATURE: 97.7 F | HEIGHT: 69 IN | WEIGHT: 126 LBS | RESPIRATION RATE: 14 BRPM | DIASTOLIC BLOOD PRESSURE: 62 MMHG | HEART RATE: 76 BPM | OXYGEN SATURATION: 98 %

## 2021-04-29 DIAGNOSIS — J30.2 SEASONAL ALLERGIC RHINITIS, UNSPECIFIED TRIGGER: ICD-10-CM

## 2021-04-29 DIAGNOSIS — F41.1 GENERALIZED ANXIETY DISORDER: Primary | ICD-10-CM

## 2021-04-29 DIAGNOSIS — F41.0 PANIC ATTACKS: ICD-10-CM

## 2021-04-29 DIAGNOSIS — F06.4 ANXIETY DISORDER DUE TO GENERAL MEDICAL CONDITION WITH PANIC ATTACK: ICD-10-CM

## 2021-04-29 DIAGNOSIS — F41.0 ANXIETY DISORDER DUE TO GENERAL MEDICAL CONDITION WITH PANIC ATTACK: ICD-10-CM

## 2021-04-29 PROBLEM — F32.A MILD DEPRESSION: Status: RESOLVED | Noted: 2020-03-02 | Resolved: 2021-04-29

## 2021-04-29 PROCEDURE — 1036F TOBACCO NON-USER: CPT | Performed by: FAMILY MEDICINE

## 2021-04-29 PROCEDURE — 3725F SCREEN DEPRESSION PERFORMED: CPT | Performed by: FAMILY MEDICINE

## 2021-04-29 PROCEDURE — 3008F BODY MASS INDEX DOCD: CPT | Performed by: FAMILY MEDICINE

## 2021-04-29 PROCEDURE — 99214 OFFICE O/P EST MOD 30 MIN: CPT | Performed by: FAMILY MEDICINE

## 2021-04-29 RX ORDER — LORAZEPAM 0.5 MG/1
0.5 TABLET ORAL 2 TIMES DAILY
Qty: 60 TABLET | Refills: 0 | Status: SHIPPED | OUTPATIENT
Start: 2021-04-29 | End: 2021-07-04

## 2021-04-29 RX ORDER — CETIRIZINE HYDROCHLORIDE 10 MG/1
10 TABLET ORAL DAILY
Qty: 90 TABLET
Start: 2021-04-29 | End: 2021-07-04

## 2021-04-29 NOTE — PROGRESS NOTES
Assessment/Plan:     Diagnoses and all orders for this visit:    Generalized anxiety disorder    Panic attacks    Anxiety disorder due to general medical condition with panic attack  -     LORazepam (ATIVAN) 0 5 mg tablet; Take 1 tablet (0 5 mg total) by mouth 2 (two) times a day    Seasonal allergic rhinitis, unspecified trigger  -     cetirizine (ZyrTEC) 10 mg tablet; Take 1 tablet (10 mg total) by mouth daily       Overall patient is doing very well  She is using her lorazepam less and less we did refill for today  She does have allergy issues she is taking Zyrtec daily  Recommended a nose spray if she needs it   She can follow up in 6 months or sooner if needed      Subjective:     Chief Complaint   Patient presents with    Follow-up     6 month checkup ---- check ears        Patient ID: Rosalba Crane is a 27 y o  female  Patient presents today for six-month check   She states her  Her anxiety has been under control she still using lorazepam but using it less often  She has no acute complaints today other than some fullness in her ears from her chronic allergies      The following portions of the patient's history were reviewed and updated as appropriate: allergies, current medications, past family history, past medical history, past social history, past surgical history and problem list     Review of Systems   Constitutional: Negative  HENT: Negative  Eyes: Negative  Respiratory: Negative  Cardiovascular: Negative  Gastrointestinal: Negative  Endocrine: Negative  Genitourinary: Negative  Musculoskeletal: Negative  Skin: Negative  Allergic/Immunologic: Negative  Neurological: Negative  Hematological: Negative  Psychiatric/Behavioral: Negative  All other systems reviewed and are negative          Objective:    Vitals:    04/29/21 1356   BP: 102/62   BP Location: Left arm   Patient Position: Sitting   Cuff Size: Standard   Pulse: 76   Resp: 14   Temp: 97 7 °F (36 5 °C)   TempSrc: Tympanic   SpO2: 98%   Weight: 57 2 kg (126 lb)   Height: 5' 9" (1 753 m)          Physical Exam  Vitals signs and nursing note reviewed  Constitutional:       Appearance: She is well-developed  HENT:      Head: Normocephalic and atraumatic  Right Ear: External ear normal       Left Ear: External ear normal    Eyes:      Conjunctiva/sclera: Conjunctivae normal       Pupils: Pupils are equal, round, and reactive to light  Neck:      Musculoskeletal: Normal range of motion  Cardiovascular:      Rate and Rhythm: Normal rate and regular rhythm  Heart sounds: Normal heart sounds  Pulmonary:      Effort: Pulmonary effort is normal       Breath sounds: Normal breath sounds  Abdominal:      General: Bowel sounds are normal       Palpations: Abdomen is soft  Musculoskeletal: Normal range of motion  Skin:     General: Skin is warm and dry  Neurological:      Mental Status: She is alert and oriented to person, place, and time  Deep Tendon Reflexes: Reflexes are normal and symmetric  Psychiatric:         Behavior: Behavior normal          Thought Content:  Thought content normal          Judgment: Judgment normal

## 2021-05-18 ENCOUNTER — OFFICE VISIT (OUTPATIENT)
Dept: URGENT CARE | Facility: CLINIC | Age: 26
End: 2021-05-18
Payer: COMMERCIAL

## 2021-05-18 VITALS — WEIGHT: 127 LBS | HEIGHT: 69 IN | BODY MASS INDEX: 18.81 KG/M2

## 2021-05-18 DIAGNOSIS — J02.9 SORE THROAT: ICD-10-CM

## 2021-05-18 DIAGNOSIS — J01.10 ACUTE FRONTAL SINUSITIS, RECURRENCE NOT SPECIFIED: Primary | ICD-10-CM

## 2021-05-18 LAB — S PYO AG THROAT QL: NEGATIVE

## 2021-05-18 PROCEDURE — G0382 LEV 3 HOSP TYPE B ED VISIT: HCPCS | Performed by: PHYSICIAN ASSISTANT

## 2021-05-18 PROCEDURE — 99283 EMERGENCY DEPT VISIT LOW MDM: CPT | Performed by: PHYSICIAN ASSISTANT

## 2021-05-18 PROCEDURE — 87880 STREP A ASSAY W/OPTIC: CPT | Performed by: PHYSICIAN ASSISTANT

## 2021-05-18 RX ORDER — AZITHROMYCIN 250 MG/1
TABLET, FILM COATED ORAL
Qty: 6 TABLET | Refills: 0 | Status: SHIPPED | OUTPATIENT
Start: 2021-05-18 | End: 2021-05-22

## 2021-05-18 NOTE — PATIENT INSTRUCTIONS

## 2021-05-18 NOTE — PROGRESS NOTES
[facility]        NAME: Rosalba Crane is a 27 y o  female  : 1995    MRN: [id number]  DATE: 2021  TIME: 6:09 AM    Assessment and Plan   Acute frontal sinusitis, recurrence not specified [J01 10]  1  Acute frontal sinusitis, recurrence not specified  azithromycin (ZITHROMAX) 250 mg tablet   2  Sore throat  POCT rapid strepA    Throat culture     Pt does not want to be swabbed for covid and denies any Covid contact    Patient Instructions       Follow up with PCP in 3-5 days  Proceed to  ER if symptoms worsen  Chief Complaint     Chief Complaint   Patient presents with    Sore Throat     onset three days- feeling of "strep" sore throat, nasal drainage-- green, pressure in ears  Taking ibuprofen, dimatapp for children and Zyrtec  History of Present Illness         53-year-old female presents to the clinic with sore throat pain that started 3 days ago  Patient has also had nasal congestion, rhinorrhea, bilateral ear pressure  Patient states that she has taken ibuprofen, Children's Dimetapp and Zyrtec without improvement in symptoms  Review of Systems   Review of Systems   Constitutional: Negative for chills and fever  HENT: Positive for congestion, ear pain, sinus pressure and sore throat  Negative for rhinorrhea and sinus pain  Respiratory: Negative for cough  Gastrointestinal: Negative for abdominal pain, diarrhea, nausea and vomiting  Musculoskeletal: Negative for myalgias  Neurological: Positive for headaches  Negative for dizziness and light-headedness           Current Medications       Current Outpatient Medications:     cetirizine (ZyrTEC) 10 mg tablet, Take 1 tablet (10 mg total) by mouth daily, Disp: 90 tablet, Rfl:     LORazepam (ATIVAN) 0 5 mg tablet, Take 1 tablet (0 5 mg total) by mouth 2 (two) times a day, Disp: 60 tablet, Rfl: 0    Current Allergies     Allergies as of 2021 - Reviewed 2021   Allergen Reaction Noted    Benadryl [diphenhydramine]  12/28/2020    Reglan [metoclopramide] Seizures 02/04/2020    Bactrim [sulfamethoxazole-trimethoprim] Headache 05/10/2018    Milk-related compounds - food allergy Rash 02/26/2019            The following portions of the patient's history were reviewed and updated as appropriate: allergies, current medications, past family history, past medical history, past social history, past surgical history and problem list      Past Medical History:   Diagnosis Date    Anxiety     Disease of thyroid gland     Eczema     Glomerulonephritis     Psychiatric disorder     anxiety, panic    Urinary tract infection        Past Surgical History:   Procedure Laterality Date    SKIN LESION EXCISION      destruction of benign lesion    TONSILLECTOMY      TONSILLECTOMY  2006    WISDOM TOOTH EXTRACTION      WISDOM TOOTH EXTRACTION  2011       Family History   Problem Relation Age of Onset    Coronary artery disease Father     Alcohol abuse Father     Drug abuse Father     Anxiety disorder Mother     Anxiety disorder Brother     Post-traumatic stress disorder Brother     Drug abuse Brother     Lung cancer Maternal Grandfather     Breast cancer Maternal Aunt     Hypertension Family     Lung cancer Family     Lymphoma Family         pancreatic    Hyperlipidemia Family         pure         Medications have been verified  Objective   Ht 5' 9" (1 753 m)   Wt 57 6 kg (127 lb)   BMI 18 75 kg/m²   No LMP recorded  Physical Exam     Physical Exam  Vitals signs and nursing note reviewed  Constitutional:       General: She is not in acute distress  Appearance: She is well-developed  She is not diaphoretic  HENT:      Head: Normocephalic and atraumatic  Right Ear: Tympanic membrane normal       Left Ear: Tympanic membrane normal       Nose: Mucosal edema and congestion present  Right Sinus: Frontal sinus tenderness present  Left Sinus: Frontal sinus tenderness present  Mouth/Throat:      Pharynx: Uvula midline  Posterior oropharyngeal erythema (PND) present  Cardiovascular:      Rate and Rhythm: Normal rate and regular rhythm  Pulmonary:      Effort: Pulmonary effort is normal       Breath sounds: Normal breath sounds  Musculoskeletal: Normal range of motion  Skin:     General: Skin is warm and dry  Findings: No rash  Neurological:      Mental Status: She is alert and oriented to person, place, and time

## 2021-05-21 LAB — B-HEM STREP SPEC QL CULT: NEGATIVE

## 2021-06-02 ENCOUNTER — APPOINTMENT (EMERGENCY)
Dept: CT IMAGING | Facility: HOSPITAL | Age: 26
End: 2021-06-02
Payer: COMMERCIAL

## 2021-06-02 ENCOUNTER — HOSPITAL ENCOUNTER (EMERGENCY)
Facility: HOSPITAL | Age: 26
Discharge: HOME | End: 2021-06-02
Attending: EMERGENCY MEDICINE
Payer: COMMERCIAL

## 2021-06-02 VITALS
WEIGHT: 127 LBS | HEIGHT: 69 IN | RESPIRATION RATE: 19 BRPM | HEART RATE: 59 BPM | DIASTOLIC BLOOD PRESSURE: 84 MMHG | TEMPERATURE: 97.8 F | BODY MASS INDEX: 18.81 KG/M2 | OXYGEN SATURATION: 90 % | SYSTOLIC BLOOD PRESSURE: 123 MMHG
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
## 2021-06-02 DIAGNOSIS — R07.89 ACUTE CHEST WALL PAIN: ICD-10-CM

## 2021-06-02 LAB
ATRIAL RATE: 61 BPM
EXT PREG TEST URINE: NEGATIVE
EXT. CONTROL ED NAV: NORMAL
P AXIS: 74 DEGREES
PR INTERVAL: 138 MS
QRS AXIS: 78 DEGREES
QRSD INTERVAL: 90 MS
QT INTERVAL: 408 MS
QTC INTERVAL: 410 MS
T WAVE AXIS: 78 DEGREES
VENTRICULAR RATE: 61 BPM

## 2021-06-02 PROCEDURE — 99284 EMERGENCY DEPT VISIT MOD MDM: CPT | Performed by: EMERGENCY MEDICINE

## 2021-06-02 PROCEDURE — 93010 ELECTROCARDIOGRAM REPORT: CPT | Performed by: INTERNAL MEDICINE

## 2021-06-02 PROCEDURE — 93005 ELECTROCARDIOGRAM TRACING: CPT

## 2021-06-02 PROCEDURE — 71250 CT THORAX DX C-: CPT

## 2021-06-02 PROCEDURE — 81025 URINE PREGNANCY TEST: CPT | Performed by: EMERGENCY MEDICINE

## 2021-06-02 PROCEDURE — 99284 EMERGENCY DEPT VISIT MOD MDM: CPT

## 2021-06-02 RX ORDER — LIDOCAINE 50 MG/G
1 PATCH TOPICAL ONCE
Status: DISCONTINUED | OUTPATIENT
Start: 2021-06-02 | End: 2021-06-02

## 2021-06-02 RX ORDER — METHOCARBAMOL 500 MG/1
500 TABLET, FILM COATED ORAL ONCE
Status: DISCONTINUED | OUTPATIENT
Start: 2021-06-02 | End: 2021-06-02

## 2021-06-02 RX ORDER — LIDOCAINE 50 MG/G
1 PATCH TOPICAL ONCE
Status: DISCONTINUED | OUTPATIENT
Start: 2021-06-02 | End: 2021-06-02 | Stop reason: HOSPADM

## 2021-06-02 RX ORDER — METHOCARBAMOL 500 MG/1
500 TABLET, FILM COATED ORAL 2 TIMES DAILY
Qty: 14 TABLET | Refills: 0 | Status: SHIPPED | OUTPATIENT
Start: 2021-06-02 | End: 2021-07-04

## 2021-06-02 RX ORDER — IBUPROFEN 600 MG/1
600 TABLET ORAL ONCE
Status: DISCONTINUED | OUTPATIENT
Start: 2021-06-02 | End: 2021-06-02

## 2021-06-02 RX ORDER — KETOROLAC TROMETHAMINE 30 MG/ML
30 INJECTION, SOLUTION INTRAMUSCULAR; INTRAVENOUS ONCE
Status: DISCONTINUED | OUTPATIENT
Start: 2021-06-02 | End: 2021-06-02

## 2021-06-02 RX ADMIN — LIDOCAINE 1 PATCH: 50 PATCH TOPICAL at 03:50
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
## 2021-07-04 ENCOUNTER — HOSPITAL ENCOUNTER (EMERGENCY)
Facility: HOSPITAL | Age: 26
Discharge: HOME | End: 2021-07-04
Attending: EMERGENCY MEDICINE
Payer: COMMERCIAL

## 2021-07-04 VITALS
TEMPERATURE: 98.1 F | HEIGHT: 69 IN | WEIGHT: 128 LBS | RESPIRATION RATE: 16 BRPM | OXYGEN SATURATION: 97 % | HEART RATE: 69 BPM | SYSTOLIC BLOOD PRESSURE: 112 MMHG | DIASTOLIC BLOOD PRESSURE: 76 MMHG | BODY MASS INDEX: 18.96 KG/M2

## 2021-07-04 DIAGNOSIS — L30.9 ECZEMA: Primary | ICD-10-CM

## 2021-07-04 PROCEDURE — 99284 EMERGENCY DEPT VISIT MOD MDM: CPT | Performed by: EMERGENCY MEDICINE

## 2021-07-04 PROCEDURE — 99282 EMERGENCY DEPT VISIT SF MDM: CPT

## 2021-07-04 NOTE — ED PROVIDER NOTES
History  Chief Complaint   Patient presents with    Rash     patient presents for eczema flare up on bilateral arms, nipples, and eyes  also reports right thumb twitching     Patient is a 49-year-old female with a past medical history significant for eczema who presents with worsening eczema since running out of her triamcinolone  She states that she has eczema patches on both antecubital fossa, the tops of her eyelids and her nipples which is where she typically gets the flares  The skin is dry and itchy  Denies any redness, fevers, changes in vision, neck pain or stiffness  Patient tried calling her primary care doctor for a refill, but they are close secondary to July 4th weekend  None       Past Medical History:   Diagnosis Date    Anxiety     Disease of thyroid gland     Eczema     Glomerulonephritis     Psychiatric disorder     anxiety, panic    Urinary tract infection        Past Surgical History:   Procedure Laterality Date    SKIN LESION EXCISION      destruction of benign lesion    TONSILLECTOMY      TONSILLECTOMY  2006    WISDOM TOOTH EXTRACTION      WISDOM TOOTH EXTRACTION  2011       Family History   Problem Relation Age of Onset    Coronary artery disease Father     Alcohol abuse Father     Drug abuse Father     Anxiety disorder Mother     Anxiety disorder Brother     Post-traumatic stress disorder Brother     Drug abuse Brother     Lung cancer Maternal Grandfather     Breast cancer Maternal Aunt     Hypertension Family     Lung cancer Family     Lymphoma Family         pancreatic    Hyperlipidemia Family         pure     I have reviewed and agree with the history as documented      E-Cigarette/Vaping    E-Cigarette Use Never User      E-Cigarette/Vaping Substances    Nicotine No     THC No     CBD No     Flavoring No     Other No     Unknown No      Social History     Tobacco Use    Smoking status: Former Smoker     Packs/day: 0 50     Years: 3 00 Pack years: 1 50     Types: Cigarettes     Quit date: 2018     Years since quitting: 3 5    Smokeless tobacco: Never Used   Vaping Use    Vaping Use: Never used   Substance Use Topics    Alcohol use: Not Currently    Drug use: Not Currently       Review of Systems   Constitutional: Negative for chills and fever  Musculoskeletal: Negative for neck pain and neck stiffness  Skin: Positive for rash  Negative for color change and wound  Physical Exam  Physical Exam  Vitals and nursing note reviewed  Constitutional:       General: She is not in acute distress  Appearance: Normal appearance  She is not ill-appearing, toxic-appearing or diaphoretic  HENT:      Head: Normocephalic and atraumatic  Mouth/Throat:      Mouth: Mucous membranes are moist    Eyes:      Conjunctiva/sclera: Conjunctivae normal       Pupils: Pupils are equal, round, and reactive to light  Cardiovascular:      Rate and Rhythm: Normal rate and regular rhythm  Pulses: Normal pulses  Heart sounds: Normal heart sounds  No murmur heard  Pulmonary:      Effort: Pulmonary effort is normal  No respiratory distress  Breath sounds: Normal breath sounds  No stridor  No wheezing, rhonchi or rales  Chest:      Chest wall: No tenderness  Abdominal:      General: Bowel sounds are normal  There is no distension  Palpations: Abdomen is soft  Tenderness: There is no abdominal tenderness  There is no guarding or rebound  Musculoskeletal:      Cervical back: Neck supple  Right lower leg: No edema  Left lower leg: No edema  Skin:     General: Skin is warm and dry  Neurological:      General: No focal deficit present  Mental Status: She is alert and oriented to person, place, and time  Mental status is at baseline     Psychiatric:         Mood and Affect: Mood normal          Behavior: Behavior normal          Vital Signs  ED Triage Vitals [07/04/21 1239]   Temperature Pulse Respirations Blood Pressure SpO2   98 1 °F (36 7 °C) 69 16 112/76 97 %      Temp Source Heart Rate Source Patient Position - Orthostatic VS BP Location FiO2 (%)   Temporal Monitor -- -- --      Pain Score       --           Vitals:    07/04/21 1239   BP: 112/76   Pulse: 69         Visual Acuity      ED Medications  Medications - No data to display    Diagnostic Studies  Results Reviewed     None                 No orders to display              Procedures  Procedures         ED Course                                           MDM  Number of Diagnoses or Management Options  Eczema  Diagnosis management comments: Assessment and plan:  25-year-old female presenting with eczema  Will prescribe Triamcinolone for symptomatic relief  Disposition  Final diagnoses:   Eczema     Time reflects when diagnosis was documented in both MDM as applicable and the Disposition within this note     Time User Action Codes Description Comment    7/4/2021  1:06 PM Clare Kennedy Add [L30 9] Eczema       ED Disposition     ED Disposition Condition Date/Time Comment    Discharge Stable Sun Jul 4, 2021  1:06 PM Darien Ring discharge to home/self care  Follow-up Information    None         Discharge Medication List as of 7/4/2021  1:09 PM      START taking these medications    Details   triamcinolone (KENALOG) 0 1 % ointment Apply topically 2 (two) times a day, Starting Sun 7/4/2021, Normal           No discharge procedures on file      PDMP Review       Value Time User    PDMP Reviewed  Yes 9/30/2020  1:57 PM Aleida Sue          ED Provider  Electronically Signed by           Elif Ferguson DO  07/04/21 1841

## 2021-07-05 ENCOUNTER — TELEPHONE (OUTPATIENT)
Dept: OTHER | Facility: HOSPITAL | Age: 26
End: 2021-07-05

## 2021-07-05 NOTE — TELEPHONE ENCOUNTER
Patient calling to make an appointment for her right hand/thumb and whole right side of hand has been having a muscle spasms for about a week  Palm has become swollen

## 2021-07-09 ENCOUNTER — OFFICE VISIT (OUTPATIENT)
Dept: FAMILY MEDICINE CLINIC | Facility: CLINIC | Age: 26
End: 2021-07-09
Payer: COMMERCIAL

## 2021-07-09 VITALS
WEIGHT: 126.8 LBS | HEART RATE: 71 BPM | RESPIRATION RATE: 18 BRPM | SYSTOLIC BLOOD PRESSURE: 106 MMHG | TEMPERATURE: 97.6 F | OXYGEN SATURATION: 97 % | BODY MASS INDEX: 18.78 KG/M2 | DIASTOLIC BLOOD PRESSURE: 66 MMHG | HEIGHT: 69 IN

## 2021-07-09 DIAGNOSIS — F41.9 ANXIETY: ICD-10-CM

## 2021-07-09 DIAGNOSIS — R25.3 MUSCLE TWITCHING: Primary | ICD-10-CM

## 2021-07-09 PROCEDURE — 3725F SCREEN DEPRESSION PERFORMED: CPT | Performed by: NURSE PRACTITIONER

## 2021-07-09 PROCEDURE — 3008F BODY MASS INDEX DOCD: CPT | Performed by: NURSE PRACTITIONER

## 2021-07-09 PROCEDURE — 99214 OFFICE O/P EST MOD 30 MIN: CPT | Performed by: NURSE PRACTITIONER

## 2021-07-09 PROCEDURE — 1036F TOBACCO NON-USER: CPT | Performed by: NURSE PRACTITIONER

## 2021-07-09 RX ORDER — METHOCARBAMOL 750 MG/1
750 TABLET, FILM COATED ORAL EVERY 6 HOURS PRN
Qty: 30 TABLET | Refills: 0 | Status: SHIPPED | OUTPATIENT
Start: 2021-07-09 | End: 2021-08-22

## 2021-07-09 RX ORDER — LORAZEPAM 0.5 MG/1
TABLET ORAL EVERY 8 HOURS PRN
COMMUNITY
End: 2021-07-09 | Stop reason: SDUPTHER

## 2021-07-09 RX ORDER — LORAZEPAM 0.5 MG/1
0.5 TABLET ORAL EVERY 8 HOURS PRN
Qty: 30 TABLET | Refills: 0 | Status: SHIPPED | OUTPATIENT
Start: 2021-07-09 | End: 2021-08-22

## 2021-07-09 NOTE — PROGRESS NOTES
Assessment/Plan   Problem List Items Addressed This Visit     None      Visit Diagnoses     Muscle twitching    -  Primary    Relevant Medications    methocarbamol (ROBAXIN) 750 mg tablet    Other Relevant Orders    Vitamin B12    TSH, 3rd generation    T4, free    T3, free    Thyroid Antibodies Panel    Vitamin D 25 hydroxy    Comprehensive metabolic panel    Cyclic citrul peptide antibody, IgG    C-reactive protein    EMG 2 Limb Upper Extremity    Anxiety        Relevant Medications    LORazepam (ATIVAN) 0 5 mg tablet    Other Relevant Orders    Ambulatory referral to [facility]                Chief Complaint   Patient presents with    Thumb Injury     Right thumb twitching for 8 days  Constant, slightly tender to touch, no injury known, weaning off Ativan  Subjective   Patient ID: Kalin Gauthier is a 27 y o  female  Vitals:    07/09/21 1012   BP: 106/66   Pulse: 71   Resp: 18   Temp: 97 6 °F (36 4 °C)   SpO2: 97%     Here today for right thumb twitching that began approximately 2 weeks ago, denies pain but admits "it's beginning to tired" Admits to increased stress in life due to family, was attempting to not use her ativan- the only medication that she can take for anxiety and panic attacks, but has been using less and less, will consider Buspar for anxiety in future if the twitching is r/t stress and anxiety  She is right handed  Denies any other symptoms, will obtain labs and schedule EMG to begin evaluation of this twitching      The following portions of the patient's history were reviewed and updated as appropriate: allergies, past family history, past medical history, past social history, past surgical history and problem list     Review of Systems   Constitutional: Negative  HENT: Negative  Eyes: Negative  Respiratory: Negative  Cardiovascular: Negative  Gastrointestinal: Negative  Endocrine: Negative  Genitourinary: Negative  Musculoskeletal: Negative    Negative for myalgias, neck pain and neck stiffness  Skin: Negative  Allergic/Immunologic: Negative  Neurological: Negative  Seizures: twitching of right thumb  Hematological: Negative  Psychiatric/Behavioral: Positive for decreased concentration and sleep disturbance  Negative for suicidal ideas  The patient is nervous/anxious  Objective     Physical Exam  Vitals and nursing note reviewed  Constitutional:       General: She is not in acute distress  Appearance: Normal appearance  She is not ill-appearing  HENT:      Head: Normocephalic and atraumatic  Right Ear: There is no impacted cerumen  Nose: Nose normal  No congestion  Eyes:      General:         Right eye: No discharge  Left eye: No discharge  Extraocular Movements: Extraocular movements intact  Conjunctiva/sclera: Conjunctivae normal       Pupils: Pupils are equal, round, and reactive to light  Cardiovascular:      Rate and Rhythm: Normal rate and regular rhythm  Pulses: Normal pulses  Heart sounds: Normal heart sounds  No murmur heard  Pulmonary:      Effort: Pulmonary effort is normal       Breath sounds: Normal breath sounds  Abdominal:      General: Abdomen is flat  Musculoskeletal:         General: No swelling, tenderness or deformity  Normal range of motion  Cervical back: Normal range of motion  No rigidity or tenderness  Right lower leg: No edema  Left lower leg: No edema  Comments: Right thumb twitching at base    Lymphadenopathy:      Cervical: No cervical adenopathy  Skin:     General: Skin is warm and dry  Capillary Refill: Capillary refill takes less than 2 seconds  Neurological:      Mental Status: She is alert and oriented to person, place, and time  Psychiatric:         Behavior: Behavior normal          Thought Content:  Thought content normal          Judgment: Judgment normal       Comments: Anxious mood

## 2021-07-13 LAB
25(OH)D3+25(OH)D2 SERPL-MCNC: 40.3 NG/ML (ref 30–100)
ALBUMIN SERPL-MCNC: 4.1 G/DL (ref 3.9–5)
ALBUMIN/GLOB SERPL: 1.7 {RATIO} (ref 1.2–2.2)
ALP SERPL-CCNC: 33 IU/L (ref 48–121)
ALT SERPL-CCNC: 7 IU/L (ref 0–32)
AST SERPL-CCNC: 12 IU/L (ref 0–40)
BILIRUB SERPL-MCNC: 0.3 MG/DL (ref 0–1.2)
BUN SERPL-MCNC: 15 MG/DL (ref 6–20)
BUN/CREAT SERPL: 20 (ref 9–23)
CALCIUM SERPL-MCNC: 9.3 MG/DL (ref 8.7–10.2)
CCP IGA+IGG SERPL IA-ACNC: 7 UNITS (ref 0–19)
CHLORIDE SERPL-SCNC: 107 MMOL/L (ref 96–106)
CO2 SERPL-SCNC: 23 MMOL/L (ref 20–29)
CREAT SERPL-MCNC: 0.74 MG/DL (ref 0.57–1)
CRP SERPL-MCNC: <1 MG/L (ref 0–10)
GLOBULIN SER-MCNC: 2.4 G/DL (ref 1.5–4.5)
GLUCOSE SERPL-MCNC: 119 MG/DL (ref 65–99)
POTASSIUM SERPL-SCNC: 4.1 MMOL/L (ref 3.5–5.2)
PROT SERPL-MCNC: 6.5 G/DL (ref 6–8.5)
SL AMB EGFR AFRICAN AMERICAN: 127 ML/MIN/1.73
SL AMB EGFR NON AFRICAN AMERICAN: 111 ML/MIN/1.73
SODIUM SERPL-SCNC: 142 MMOL/L (ref 134–144)
T3FREE SERPL-MCNC: 2.6 PG/ML (ref 2–4.4)
T4 FREE SERPL-MCNC: 1.04 NG/DL (ref 0.82–1.77)
THYROGLOB AB SERPL-ACNC: 128.5 IU/ML (ref 0–0.9)
THYROPEROXIDASE AB SERPL-ACNC: 567 IU/ML (ref 0–34)
TSH SERPL DL<=0.005 MIU/L-ACNC: 4.09 UIU/ML (ref 0.45–4.5)
VIT B12 SERPL-MCNC: 304 PG/ML (ref 232–1245)

## 2021-07-15 ENCOUNTER — OFFICE VISIT (OUTPATIENT)
Dept: URGENT CARE | Facility: CLINIC | Age: 26
End: 2021-07-15
Payer: COMMERCIAL

## 2021-07-15 VITALS — HEART RATE: 62 BPM | TEMPERATURE: 98.1 F | OXYGEN SATURATION: 98 % | RESPIRATION RATE: 16 BRPM

## 2021-07-15 DIAGNOSIS — H01.139 ECZEMA OF EYELID, UNSPECIFIED LATERALITY: Primary | ICD-10-CM

## 2021-07-15 PROCEDURE — 99283 EMERGENCY DEPT VISIT LOW MDM: CPT | Performed by: PHYSICIAN ASSISTANT

## 2021-07-15 PROCEDURE — G0382 LEV 3 HOSP TYPE B ED VISIT: HCPCS | Performed by: PHYSICIAN ASSISTANT

## 2021-07-15 RX ORDER — AZELASTINE HYDROCHLORIDE 0.5 MG/ML
1 SOLUTION/ DROPS OPHTHALMIC 2 TIMES DAILY PRN
Qty: 6 ML | Refills: 0 | Status: SHIPPED | OUTPATIENT
Start: 2021-07-15 | End: 2021-08-22

## 2021-07-15 RX ORDER — METHYLPREDNISOLONE 4 MG/1
TABLET ORAL
Qty: 21 EACH | Refills: 0 | Status: SHIPPED | OUTPATIENT
Start: 2021-07-15 | End: 2021-07-21

## 2021-07-15 NOTE — PATIENT INSTRUCTIONS
Eczema   WHAT YOU NEED TO KNOW:   Eczema, or atopic dermatitis, is an itchy, red skin rash  It is a long-term condition that may cause flare-ups for the rest of your life  DISCHARGE INSTRUCTIONS:   Return to the emergency department if:   · You develop a fever or have red streaks going up your arm or leg  · Your rash gets more swollen, red, or hot    Contact your healthcare provider if:   · Most of your skin is red, swollen, painful, and covered with scales  · You develop bloody, red, painful crusts  · Your skin blisters and oozes white or yellow pus  · You have questions about your condition or care  Medicines:   · Medicines , such as immunosuppressants, help reduce itching, redness, pain, and swelling  They may be given as a cream or pill  You may also receive antihistamines to reduce itching, or antibiotics if you have a skin infection  · Take your medicine as directed  Contact your healthcare provider if you think your medicine is not helping or if you have side effects  Tell him of her if you are allergic to any medicine  Keep a list of the medicines, vitamins, and herbs you take  Include the amounts, and when and why you take them  Bring the list or the pill bottles to follow-up visits  Carry your medicine list with you in case of an emergency  Manage eczema:   · Do not scratch  Pat or press on your skin for relief from itching  Your symptoms will get worse if you scratch  Keep your fingernails short so you do not tear your skin if you do scratch  · Keep your skin moist   Rub lotion, cream or ointment into your skin right after a bath or shower when your skin is still damp  Ask your healthcare provider what to use and how often to use it  · Take baths or showers  with warm water for 10 minutes or less  Use mild bar soap  Ask your healthcare provider for the best soap for you to use  · Wear cotton clothes  Wear loose-fitting clothes made from cotton or cotton blends   Avoid wool      · Use a humidifier  to add moisture to the air in your home  · Avoid changes in temperature , especially activities that cause you to sweat a lot because this can cause itching  Remove blankets from your bed if you get hot while you sleep  · Avoid allergens, dust, and skin irritants  Do not let pets inside your home  Do not use perfume, fabric softener, or makeup that burns or itches  Follow up with your healthcare provider as directed:  Write down your questions so you remember to ask them during your visits  © Copyright [address] Information is for End User's use only and may not be sold, redistributed or otherwise used for commercial purposes  All illustrations and images included in CareNotes® are the copyrighted property of [employer]  or [facility] Julia Pollack   The above information is an  only  It is not intended as medical advice for individual conditions or treatments  Talk to your doctor, nurse or pharmacist before following any medical regimen to see if it is safe and effective for you

## 2021-07-26 NOTE — PROGRESS NOTES
[facility]        NAME: Grace Workman is a 27 y o  female  : 1995    MRN: [id number]  DATE: July 15, 2021  TIME: 10:01 AM    Assessment and Plan   Eczema of eyelid, unspecified laterality [H01 139]  1  Eczema of eyelid, unspecified laterality  methylPREDNISolone 4 MG tablet therapy pack    azelastine (OPTIVAR) 0 05 % ophthalmic solution         Patient Instructions     Discussed condition with patient  She has bilateral eyelid eczema and eye itching  I will prescribe her combination of a Medrol Dosepak as well as Optivar eye drops and recommended emollient  moisturizer  She should avoid use of topical steroids on the face  Should be re-evaluated if condition persists or worsens  Follow up with PCP in 3-5 days  Proceed to  ER if symptoms worsen  Chief Complaint     Chief Complaint   Patient presents with    Eye Swelling     b/l eyes swelling and redness  pt repots hx of eczema         History of Present Illness         Patient presents with bilateral redness, swelling, itching, and rash on her upper eyelids  She reports she has history of eczema and believes that is the cause  She has tried with over-the-counter topicals without significant relief  She also reports itching in the eyes but denies any eye redness or any other significant symptoms  She has not recently changed any personal skin care products  Review of Systems   Review of Systems   Constitutional: Negative  Eyes: Positive for itching  Respiratory: Negative  Cardiovascular: Negative  Gastrointestinal: Negative  Genitourinary: Negative  Skin: Positive for rash (  Bilateral upper eyelids)           Current Medications       Current Outpatient Medications:     LORazepam (ATIVAN) 0 5 mg tablet, Take 1 tablet (0 5 mg total) by mouth every 8 (eight) hours as needed for anxiety, Disp: 30 tablet, Rfl: 0    methocarbamol (ROBAXIN) 750 mg tablet, Take 1 tablet (750 mg total) by mouth every 6 (six) hours as needed for muscle spasms, Disp: 30 tablet, Rfl: 0    triamcinolone (KENALOG) 0 1 % ointment, Apply topically 2 (two) times a day, Disp: 30 g, Rfl: 0    azelastine (OPTIVAR) 0 05 % ophthalmic solution, Administer 1 drop to both eyes 2 (two) times a day as needed (itchy eyes), Disp: 6 mL, Rfl: 0    Current Allergies     Allergies as of 07/15/2021 - Reviewed 07/15/2021   Allergen Reaction Noted    Benadryl [diphenhydramine]  12/28/2020    Reglan [metoclopramide] Seizures 02/04/2020    Bactrim [sulfamethoxazole-trimethoprim] Headache 05/10/2018    Milk-related compounds - food allergy Rash 02/26/2019            The following portions of the patient's history were reviewed and updated as appropriate: allergies, current medications, past family history, past medical history, past social history, past surgical history and problem list      Past Medical History:   Diagnosis Date    Anxiety     Disease of thyroid gland     Eczema     Glomerulonephritis     Psychiatric disorder     anxiety, panic    Urinary tract infection        Past Surgical History:   Procedure Laterality Date    SKIN LESION EXCISION      destruction of benign lesion    TONSILLECTOMY      TONSILLECTOMY  2006    WISDOM TOOTH EXTRACTION      WISDOM TOOTH EXTRACTION  2011       Family History   Problem Relation Age of Onset    Coronary artery disease Father     Alcohol abuse Father     Drug abuse Father     Anxiety disorder Mother     Anxiety disorder Brother     Post-traumatic stress disorder Brother     Drug abuse Brother     Lung cancer Maternal Grandfather     Breast cancer Maternal Aunt     Hypertension Family     Lung cancer Family     Lymphoma Family         pancreatic    Hyperlipidemia Family         pure         Medications have been verified  Objective   Pulse 62   Temp 98 1 °F (36 7 °C)   Resp 16   LMP  (LMP Unknown)   SpO2 98%   No LMP recorded (lmp unknown)         Physical Exam     Physical Exam  Vitals reviewed  Constitutional:       General: She is not in acute distress  Appearance: She is well-developed  Eyes:      Conjunctiva/sclera: Conjunctivae normal    Skin:     Findings: Rash (Bilateral upper eyelids with localized erythematous, dry macular rash resembling eczema  No soft tissue swelling, active bleeding or discharge) present  Neurological:      Mental Status: She is alert and oriented to person, place, and time

## 2021-08-22 ENCOUNTER — HOSPITAL ENCOUNTER (EMERGENCY)
Facility: HOSPITAL | Age: 26
Discharge: HOME | End: 2021-08-22
Attending: EMERGENCY MEDICINE | Admitting: EMERGENCY MEDICINE
Payer: COMMERCIAL

## 2021-08-22 VITALS
DIASTOLIC BLOOD PRESSURE: 76 MMHG | RESPIRATION RATE: 16 BRPM | SYSTOLIC BLOOD PRESSURE: 114 MMHG | BODY MASS INDEX: 18.96 KG/M2 | OXYGEN SATURATION: 99 % | HEART RATE: 75 BPM | TEMPERATURE: 97.3 F | WEIGHT: 128 LBS | HEIGHT: 69 IN

## 2021-08-22 DIAGNOSIS — Z20.822 ENCOUNTER FOR LABORATORY TESTING FOR COVID-19 VIRUS: Primary | ICD-10-CM

## 2021-08-22 LAB — SARS-COV-2 RNA RESP QL NAA+PROBE: NEGATIVE

## 2021-08-22 PROCEDURE — U0005 INFEC AGEN DETEC AMPLI PROBE: HCPCS | Performed by: EMERGENCY MEDICINE

## 2021-08-22 PROCEDURE — 99284 EMERGENCY DEPT VISIT MOD MDM: CPT

## 2021-08-22 PROCEDURE — U0003 INFECTIOUS AGENT DETECTION BY NUCLEIC ACID (DNA OR RNA); SEVERE ACUTE RESPIRATORY SYNDROME CORONAVIRUS 2 (SARS-COV-2) (CORONAVIRUS DISEASE [COVID-19]), AMPLIFIED PROBE TECHNIQUE, MAKING USE OF HIGH THROUGHPUT TECHNOLOGIES AS DESCRIBED BY CMS-2020-01-R: HCPCS | Performed by: EMERGENCY MEDICINE

## 2021-08-22 PROCEDURE — 99284 EMERGENCY DEPT VISIT MOD MDM: CPT | Performed by: EMERGENCY MEDICINE

## 2021-08-22 RX ORDER — LORAZEPAM 0.5 MG/1
0.5 TABLET ORAL EVERY 8 HOURS PRN
COMMUNITY
End: 2021-10-12 | Stop reason: SDUPTHER

## 2021-08-22 NOTE — Clinical Note
Grace Workman was seen and treated in our emergency department on 8/22/2021  Diagnosis:     Pura Brewster  may return to work on return date  She may return on this date: 08/29/2021    Can return to work 1 week from symptom onset, but can return earlier if COVID testing comes back negative     If you have any questions or concerns, please don't hesitate to call        Dulce Prasad, DO    ______________________________           _______________          _______________  Hospital Representative                              Date                                Time

## 2021-08-22 NOTE — ED PROVIDER NOTES
History  Chief Complaint   Patient presents with    Shortness of Breath     patient reports slight SOB, sore throat, loss of taste/smell, fatigue  exposed to COVID  ambulating pulse ox 97-99%  HR 75-80     Patient is a 23-year-old female with a past medical history significant for eczema and anxiety who presents with sore throat, dry cough, fatigue, loss of taste and smell, and after coughing feeling slight shortness of breath that is transient that started yesterday  Patient reports that she was exposed to Jenifer as she is in close contact with her sister-in-law who recently tested positive  Prior to Admission Medications   Prescriptions Last Dose Informant Patient Reported? Taking? LORazepam (ATIVAN) 0 5 mg tablet   Yes Yes   Sig: Take 0 5 mg by mouth every 8 (eight) hours as needed for anxiety      Facility-Administered Medications: None       Past Medical History:   Diagnosis Date    Anxiety     Disease of thyroid gland     Eczema     Glomerulonephritis     Psychiatric disorder     anxiety, panic    Urinary tract infection        Past Surgical History:   Procedure Laterality Date    SKIN LESION EXCISION      destruction of benign lesion    TONSILLECTOMY      TONSILLECTOMY  2006    WISDOM TOOTH EXTRACTION      WISDOM TOOTH EXTRACTION  2011       Family History   Problem Relation Age of Onset    Coronary artery disease Father     Alcohol abuse Father     Drug abuse Father     Anxiety disorder Mother     Anxiety disorder Brother     Post-traumatic stress disorder Brother     Drug abuse Brother     Lung cancer Maternal Grandfather     Breast cancer Maternal Aunt     Hypertension Family     Lung cancer Family     Lymphoma Family         pancreatic    Hyperlipidemia Family         pure     I have reviewed and agree with the history as documented      E-Cigarette/Vaping    E-Cigarette Use Former User      E-Cigarette/Vaping Substances    Nicotine No     THC No     CBD No     Flavoring No     Other No     Unknown No      Social History     Tobacco Use    Smoking status: Current Every Day Smoker     Packs/day: 0 50     Years: 3 00     Pack years: 1 50     Types: Cigarettes     Start date: 2014     Last attempt to quit: 2018     Years since quitting: 3 6    Smokeless tobacco: Never Used   Vaping Use    Vaping Use: Former   Substance Use Topics    Alcohol use: Not Currently    Drug use: Not Currently       Review of Systems   Constitutional: Positive for fatigue  Negative for chills and fever  HENT: Positive for congestion and sore throat  Negative for rhinorrhea, trouble swallowing and voice change  Eyes: Negative for photophobia and visual disturbance  Respiratory: Positive for cough and shortness of breath  Cardiovascular: Negative for chest pain and palpitations  Gastrointestinal: Negative for abdominal pain, constipation, diarrhea, nausea and vomiting  Genitourinary: Negative for dysuria, flank pain and hematuria  Musculoskeletal: Negative for back pain and neck pain  Skin: Negative for color change and pallor  Neurological: Negative for dizziness, weakness, light-headedness, numbness and headaches  Physical Exam  Physical Exam  Vitals and nursing note reviewed  Constitutional:       General: She is not in acute distress  Appearance: Normal appearance  She is not ill-appearing, toxic-appearing or diaphoretic  HENT:      Head: Normocephalic and atraumatic  Mouth/Throat:      Mouth: Mucous membranes are moist       Pharynx: Oropharynx is clear  Uvula midline  No pharyngeal swelling, oropharyngeal exudate, posterior oropharyngeal erythema or uvula swelling  Eyes:      Conjunctiva/sclera: Conjunctivae normal       Pupils: Pupils are equal, round, and reactive to light  Cardiovascular:      Rate and Rhythm: Normal rate and regular rhythm  Pulses: Normal pulses  Heart sounds: Normal heart sounds  No murmur heard       Pulmonary: Effort: Pulmonary effort is normal  No tachypnea, accessory muscle usage or respiratory distress  Breath sounds: Normal breath sounds and air entry  No stridor  No decreased breath sounds, wheezing, rhonchi or rales  Chest:      Chest wall: No tenderness  Abdominal:      General: Bowel sounds are normal  There is no distension  Palpations: Abdomen is soft  Tenderness: There is no abdominal tenderness  There is no guarding or rebound  Musculoskeletal:      Cervical back: Neck supple  Right lower leg: No edema  Left lower leg: No edema  Skin:     General: Skin is warm and dry  Neurological:      General: No focal deficit present  Mental Status: She is alert and oriented to person, place, and time  Mental status is at baseline  Psychiatric:         Mood and Affect: Mood normal          Behavior: Behavior normal          Vital Signs  ED Triage Vitals [08/22/21 0832]   Temperature Pulse Respirations Blood Pressure SpO2   (!) 97 3 °F (36 3 °C) 75 16 114/76 99 %      Temp Source Heart Rate Source Patient Position - Orthostatic VS BP Location FiO2 (%)   Temporal Monitor -- -- --      Pain Score       No Pain           Vitals:    08/22/21 0832   BP: 114/76   Pulse: 75         Visual Acuity      ED Medications  Medications - No data to display    Diagnostic Studies  Results Reviewed     Procedure Component Value Units Date/Time    Novel Coronavirus (Covid-19),PCR [facility] - 24 Hour Routine [[id number]] Collected: 08/22/21 0839    Lab Status: In process Specimen: Nares from Nasopharyngeal Swab Updated: 08/22/21 0848                 No orders to display              Procedures  Procedures         ED Course                                           MDM  Number of Diagnoses or Management Options  Encounter for laboratory testing for COVID-19 virus  Diagnosis management comments: Assessment and plan:  58-year-old female presenting with viral URI symptoms  Well appearing, in no acute distress  Ambulatory pulse ox 97-99%  Will get lab testing for COVID as patient has significant exposure  Disposition  Final diagnoses:   Encounter for laboratory testing for COVID-19 virus     Time reflects when diagnosis was documented in both MDM as applicable and the Disposition within this note     Time User Action Codes Description Comment    8/22/2021  8:41 AM Yvonna Romberg Add [[id number] [id number]] Encounter for laboratory testing for COVID-19 virus       ED Disposition     ED Disposition Condition Date/Time Comment    Discharge Stable Sun Aug 22, 2021  8:41 AM Yashrentato Donate discharge to home/self care  Follow-up Information     Follow up With Specialties Details Why Contact Info Additional Information    Harry Cabrales DO Family Medicine Schedule an appointment as soon as possible for a visit in 2 days for re-evaluation [address] [facility] [address]  [facility] [phone]        [address] Emergency Department Emergency Medicine Go to  As needed, If symptoms worsen, for re-evaluation [address] [place], [phone]  [address] [facility] Emergency Department, [address] [place] [address] [address]          Patient's Medications   Discharge Prescriptions    No medications on file     No discharge procedures on file      PDMP Review       Value Time User    PDMP Reviewed  Yes 7/9/2021 10:29 AM Patel Bo          ED Provider  Electronically Signed by           Claudene Foreman, DO  08/22/21 9881

## 2021-08-26 ENCOUNTER — TELEPHONE (OUTPATIENT)
Dept: BEHAVIORAL/MENTAL HEALTH CLINIC | Facility: CLINIC | Age: 26
End: 2021-08-26

## 2021-08-26 NOTE — TELEPHONE ENCOUNTER
This writer outreached to Jade Matthews and left a message requesting that she e-mail this writer back to clarify whether or not she prefers that this appointment be in person or virtual

## 2021-08-27 ENCOUNTER — TELEPHONE (OUTPATIENT)
Dept: BEHAVIORAL/MENTAL HEALTH CLINIC | Facility: CLINIC | Age: 26
End: 2021-08-27

## 2021-08-27 NOTE — TELEPHONE ENCOUNTER
Sent Adam Graf an [employer] link to her phone for her scheduled [address]; however, she did not log into her session, and did not return this writer's call from yesterday

## 2021-09-23 ENCOUNTER — OFFICE VISIT (OUTPATIENT)
Dept: URGENT CARE | Age: 26
End: 2021-09-23
Payer: COMMERCIAL

## 2021-09-23 VITALS
BODY MASS INDEX: 18.66 KG/M2 | OXYGEN SATURATION: 98 % | HEIGHT: 69 IN | TEMPERATURE: 96.9 F | WEIGHT: 126 LBS | HEART RATE: 67 BPM

## 2021-09-23 DIAGNOSIS — B34.9 ACUTE VIRAL SYNDROME: Primary | ICD-10-CM

## 2021-09-23 DIAGNOSIS — Z11.59 SPECIAL SCREENING EXAMINATION FOR UNSPECIFIED VIRAL DISEASE: ICD-10-CM

## 2021-09-23 PROCEDURE — 99283 EMERGENCY DEPT VISIT LOW MDM: CPT | Performed by: PHYSICIAN ASSISTANT

## 2021-09-23 PROCEDURE — U0005 INFEC AGEN DETEC AMPLI PROBE: HCPCS | Performed by: PHYSICIAN ASSISTANT

## 2021-09-23 PROCEDURE — G0382 LEV 3 HOSP TYPE B ED VISIT: HCPCS | Performed by: PHYSICIAN ASSISTANT

## 2021-09-23 PROCEDURE — U0003 INFECTIOUS AGENT DETECTION BY NUCLEIC ACID (DNA OR RNA); SEVERE ACUTE RESPIRATORY SYNDROME CORONAVIRUS 2 (SARS-COV-2) (CORONAVIRUS DISEASE [COVID-19]), AMPLIFIED PROBE TECHNIQUE, MAKING USE OF HIGH THROUGHPUT TECHNOLOGIES AS DESCRIBED BY CMS-2020-01-R: HCPCS | Performed by: PHYSICIAN ASSISTANT

## 2021-09-23 NOTE — LETTER
September 23, 2021     Patient: Darien Ring   YOB: 1995   Date of Visit: 9/23/2021       To Whom It May Concern: It is my medical opinion that Darien Ring should remain out of work until negative test result  Pt may work from home if remote work is available       If you have any questions or concerns, please don't hesitate to call           Sincerely,        Shin Duncan PA-C    CC: No Recipients

## 2021-09-23 NOTE — PATIENT INSTRUCTIONS
If any medications are prescribed for you   take them as directed and prescribed  Ensure you go through the drive-through to pick them up or send someone who is not ill to pick them up  Check or sign up for [facility] my Chart to view your results  We do not call patient's with negative results  Go directly home after today's visit, quarantine until you receive a negative result  If you have a positive result you need to quarantine at home for a minimum of 10 days from the date of testing  You may end your quarantine when you are symptoms free without medications to reduce fever (e g  acetaminophen/Tylenol) for 24 hours  Anyone whom you have been in close regular contact (unmasked > 15 minute intervals) since you began having symptoms should be tested within 3-5 days of your positive test if vaccinated or 5-7 days if unvaccinated  Recommend over the counter antihistamines such as Claratin, Allegra, Zyrtec, or Benadryl for congestion  You may also use over the counter nasal sprays such as Flonase for this  Over the counter lozenges such as Cepacol, Ricola, Halls, or Chloroseptic can be used for sore throat symptoms  Recommend Vitamin C 1,000 mg twice daily, Vitamin D3 2000 IU daily, multivitamin and Zinc for immune support  If your symptoms worsen or you develop shortness of breath report to the nearest emergency room  Check cdc gov for most current guidelines as guidelines are subject to change as we learn more about the virus  [address]    Your healthcare provider and/or public health staff have evaluated you and have determined that you do not need to remain in the hospital at this time  At this time you can be isolated at home where you will be monitored by staff from your local or state health department   You should carefully follow the prevention and isolation steps below until a healthcare provider or local or state health department says that you can return to your normal activities  Stay home except to get medical care    People who are mildly ill with COVID-19 are able to isolate at home during their illness  You should restrict activities outside your home, except for getting medical care  Do not go to work, school, or public areas  Avoid using public transportation, ride-sharing, or taxis  Separate yourself from other people and animals in your home    People: As much as possible, you should stay in a specific room and away from other people in your home  Also, you should use a separate bathroom, if available  Animals: You should restrict contact with pets and other animals while you are sick with COVID-19, just like you would around other people  Although there have not been reports of pets or other animals becoming sick with COVID-19, it is still recommended that people sick with COVID-19 limit contact with animals until more information is known about the virus  When possible, have another member of your household care for your animals while you are sick  If you are sick with COVID-19, avoid contact with your pet, including petting, snuggling, being kissed or licked, and sharing food  If you must care for your pet or be around animals while you are sick, wash your hands before and after you interact with pets and wear a facemask  See COVID-19 and Animals for more information  Call ahead before visiting your doctor    If you have a medical appointment, call the healthcare provider and tell them that you have or may have COVID-19  This will help the healthcare providers office take steps to keep other people from getting infected or exposed  Wear a facemask    You should wear a facemask when you are around other people (e g , sharing a room or vehicle) or pets and before you enter a healthcare providers office   If you are not able to wear a facemask (for example, because it causes trouble breathing), then people who live with you should not stay in the same room with you, or they should wear a facemask if they enter your room  Cover your coughs and sneezes    Cover your mouth and nose with a tissue when you cough or sneeze  Throw used tissues in a lined trash can  Immediately wash your hands with soap and water for at least 20 seconds or, if soap and water are not available, clean your hands with an alcohol-based hand  that contains at least 60% alcohol  Clean your hands often    Wash your hands often with soap and water for at least 20 seconds, especially after blowing your nose, coughing, or sneezing; going to the bathroom; and before eating or preparing food  If soap and water are not readily available, use an alcohol-based hand  with at least 60% alcohol, covering all surfaces of your hands and rubbing them together until they feel dry  Soap and water are the best option if hands are visibly dirty  Avoid touching your eyes, nose, and mouth with unwashed hands  Avoid sharing personal household items    You should not share dishes, drinking glasses, cups, eating utensils, towels, or bedding with other people or pets in your home  After using these items, they should be washed thoroughly with soap and water  Clean all high-touch surfaces everyday    High touch surfaces include counters, tabletops, doorknobs, bathroom fixtures, toilets, phones, keyboards, tablets, and bedside tables  Also, clean any surfaces that may have blood, stool, or body fluids on them  Use a household cleaning spray or wipe, according to the label instructions  Labels contain instructions for safe and effective use of the cleaning product including precautions you should take when applying the product, such as wearing gloves and making sure you have good ventilation during use of the product  Monitor your symptoms    Seek prompt medical attention if your illness is worsening (e g , difficulty breathing)   Before seeking care, call your healthcare provider and tell them that you have, or are being evaluated for, COVID-19  Put on a facemask before you enter the facility  These steps will help the healthcare providers office to keep other people in the office or waiting room from getting infected or exposed  Ask your healthcare provider to call the local or [facility] health department  Persons who are placed under active monitoring or facilitated self-monitoring should follow instructions provided by their local health department or occupational health professionals, as appropriate  If you have a medical emergency and need to call 911, notify the dispatch personnel that you have, or are being evaluated for COVID-19  If possible, put on a facemask before emergency medical services arrive  Discontinuing home isolation    Patients with confirmed COVID-19 should remain under home isolation precautions until the following conditions are met:   - They have had no fever for at least 24 hours (that is one full day of no fever without the use medicine that reduces fevers)  AND  - other symptoms have improved (for example, when their cough or shortness of breath have improved)  AND  - If had mild or moderate illness, at least 10 days have passed since their symptoms first appeared or if severe illness (needed oxygen) or immunosuppressed, at least 20 days have passed since symptoms first appeared  Patients with confirmed COVID-19 should also notify close contacts (including their workplace) and ask that they self-quarantine  Currently, close contact is defined as being within 6 feet for 15 minutes or more from the period 24 hours starting 48 hours before symptom onset to the time at which the patient went into isolation  Close contacts of patients diagnosed with COVID-19 should be instructed by the patient to self-quarantine for 14 days from the last time of their last contact with the patient       Source: [employer]

## 2021-09-23 NOTE — PROGRESS NOTES
[address]        NAME: Lexi Ko is a 27 y o  female  : 1995    MRN: [id number]  DATE: 2021  TIME: 1:04 PM    Assessment and Plan   Acute viral syndrome [B34 9]  1  Acute viral syndrome  Novel Coronavirus (Covid-19),PCR [facility] - Office Collection   2  Special screening examination for unspecified viral disease     Pt presents with symptoms consistent with possible COVID 19 infection  Pt will be tested in accordance with CDC guidelines and recommendations for symptomatic individuals regardless of vaccination status  We discussed quarantine protocols and symptomatic treatments  The pt may follow-up with their PCP if symptoms are not improved in 2-3 days and COVID test is negative  Pt will report to the emergency department if symptoms worsen  Patient Instructions   Patient Instructions   If any medications are prescribed for you   take them as directed and prescribed  Ensure you go through the drive-through to pick them up or send someone who is not ill to pick them up  Check or sign up for [facility]  Chart to view your results  We do not call patient's with negative results  Go directly home after today's visit, quarantine until you receive a negative result  If you have a positive result you need to quarantine at home for a minimum of 10 days from the date of testing  You may end your quarantine when you are symptoms free without medications to reduce fever (e g  acetaminophen/Tylenol) for 24 hours  Anyone whom you have been in close regular contact (unmasked > 15 minute intervals) since you began having symptoms should be tested within 3-5 days of your positive test if vaccinated or 5-7 days if unvaccinated  Recommend over the counter antihistamines such as Claratin, Allegra, Zyrtec, or Benadryl for congestion  You may also use over the counter nasal sprays such as Flonase for this     Over the counter lozenges such as Cepacol, Ricola, Halls, or Chloroseptic can be used for sore throat symptoms  Recommend Vitamin C 1,000 mg twice daily, Vitamin D3 2000 IU daily, multivitamin and Zinc for immune support  If your symptoms worsen or you develop shortness of breath report to the nearest emergency room  Check cdc gov for most current guidelines as guidelines are subject to change as we learn more about the virus  [address]    Your healthcare provider and/or public health staff have evaluated you and have determined that you do not need to remain in the hospital at this time  At this time you can be isolated at home where you will be monitored by staff from your local or state health department  You should carefully follow the prevention and isolation steps below until a healthcare provider or local or state health department says that you can return to your normal activities  Stay home except to get medical care    People who are mildly ill with COVID-19 are able to isolate at home during their illness  You should restrict activities outside your home, except for getting medical care  Do not go to work, school, or public areas  Avoid using public transportation, ride-sharing, or taxis  Separate yourself from other people and animals in your home    People: As much as possible, you should stay in a specific room and away from other people in your home  Also, you should use a separate bathroom, if available  Animals: You should restrict contact with pets and other animals while you are sick with COVID-19, just like you would around other people  Although there have not been reports of pets or other animals becoming sick with COVID-19, it is still recommended that people sick with COVID-19 limit contact with animals until more information is known about the virus  When possible, have another member of your household care for your animals while you are sick   If you are sick with COVID-19, avoid contact with your pet, including petting, snuggling, being kissed or licked, and sharing food  If you must care for your pet or be around animals while you are sick, wash your hands before and after you interact with pets and wear a facemask  See COVID-19 and Animals for more information  Call ahead before visiting your doctor    If you have a medical appointment, call the healthcare provider and tell them that you have or may have COVID-19  This will help the healthcare providers office take steps to keep other people from getting infected or exposed  Wear a facemask    You should wear a facemask when you are around other people (e g , sharing a room or vehicle) or pets and before you enter a healthcare providers office  If you are not able to wear a facemask (for example, because it causes trouble breathing), then people who live with you should not stay in the same room with you, or they should wear a facemask if they enter your room  Cover your coughs and sneezes    Cover your mouth and nose with a tissue when you cough or sneeze  Throw used tissues in a lined trash can  Immediately wash your hands with soap and water for at least 20 seconds or, if soap and water are not available, clean your hands with an alcohol-based hand  that contains at least 60% alcohol  Clean your hands often    Wash your hands often with soap and water for at least 20 seconds, especially after blowing your nose, coughing, or sneezing; going to the bathroom; and before eating or preparing food  If soap and water are not readily available, use an alcohol-based hand  with at least 60% alcohol, covering all surfaces of your hands and rubbing them together until they feel dry  Soap and water are the best option if hands are visibly dirty  Avoid touching your eyes, nose, and mouth with unwashed hands      Avoid sharing personal household items    You should not share dishes, drinking glasses, cups, eating utensils, towels, or bedding with other people or pets in your home  After using these items, they should be washed thoroughly with soap and water  Clean all high-touch surfaces everyday    High touch surfaces include counters, tabletops, doorknobs, bathroom fixtures, toilets, phones, keyboards, tablets, and bedside tables  Also, clean any surfaces that may have blood, stool, or body fluids on them  Use a household cleaning spray or wipe, according to the label instructions  Labels contain instructions for safe and effective use of the cleaning product including precautions you should take when applying the product, such as wearing gloves and making sure you have good ventilation during use of the product  Monitor your symptoms    Seek prompt medical attention if your illness is worsening (e g , difficulty breathing)  Before seeking care, call your healthcare provider and tell them that you have, or are being evaluated for, COVID-19  Put on a facemask before you enter the facility  These steps will help the healthcare providers office to keep other people in the office or waiting room from getting infected or exposed  Ask your healthcare provider to call the local or [facility] health department  Persons who are placed under active monitoring or facilitated self-monitoring should follow instructions provided by their local health department or occupational health professionals, as appropriate  If you have a medical emergency and need to call 911, notify the dispatch personnel that you have, or are being evaluated for COVID-19  If possible, put on a facemask before emergency medical services arrive      Discontinuing home isolation    Patients with confirmed COVID-19 should remain under home isolation precautions until the following conditions are met:   - They have had no fever for at least 24 hours (that is one full day of no fever without the use medicine that reduces fevers)  AND  - other symptoms have improved (for example, when their cough or shortness of breath have improved)  AND  - If had mild or moderate illness, at least 10 days have passed since their symptoms first appeared or if severe illness (needed oxygen) or immunosuppressed, at least 20 days have passed since symptoms first appeared  Patients with confirmed COVID-19 should also notify close contacts (including their workplace) and ask that they self-quarantine  Currently, close contact is defined as being within 6 feet for 15 minutes or more from the period 24 hours starting 48 hours before symptom onset to the time at which the patient went into isolation  Close contacts of patients diagnosed with COVID-19 should be instructed by the patient to self-quarantine for 14 days from the last time of their last contact with the patient  Source: [employer]           Follow up with PCP in 3-5 days  Proceed to  ER if symptoms worsen  Chief Complaint     Chief Complaint   Patient presents with    Cold Like Symptoms     Patient reports fatigue, sore throat and ear pain x three days  Unsure of fevers  Denies exposure, denies vaccination  Taking Advil  History of Present Illness       78-year-old female presents with complaints of multiple different COVID symptoms that have been present on and off in the last 3 days  Patient currently reports that she is feeling very fatigued having chills and has a headache has been nauseous  She also notes some bilateral ear pain  Patient denies cough, vomiting, diarrhea, loss of taste, loss of smell, congestion and runny nose at this time  She is not currently having any body aches  She denies shortness of breath or chest pain  Patient has not been exposed to anyone who has tested positive for COVID in the last 2 weeks to her knowledge  She has not traveled outside state last 2 weeks  Patient denies history of asthma she does not smoke or vape  Patient has not been vaccinated for COVID  Review of Systems   Review of Systems   Constitutional: Positive for chills and fatigue  Negative for fever  HENT: Negative for congestion, postnasal drip, rhinorrhea and sore throat  Respiratory: Negative for cough and shortness of breath  Gastrointestinal: Positive for nausea  Negative for diarrhea and vomiting  Musculoskeletal: Negative for myalgias  Neurological: Positive for headaches           Current Medications       Current Outpatient Medications:     LORazepam (ATIVAN) 0 5 mg tablet, Take 0 5 mg by mouth every 8 (eight) hours as needed for anxiety (Patient not taking: Reported on 9/23/2021), Disp: , Rfl:     Current Allergies     Allergies as of 09/23/2021 - Reviewed 09/23/2021   Allergen Reaction Noted    Benadryl [diphenhydramine]  12/28/2020    Reglan [metoclopramide] Seizures 02/04/2020    Bactrim [sulfamethoxazole-trimethoprim] Headache 05/10/2018    Milk-related compounds - food allergy Rash 02/26/2019            The following portions of the patient's history were reviewed and updated as appropriate: allergies, current medications, past family history, past medical history, past social history, past surgical history and problem list      Past Medical History:   Diagnosis Date    Anxiety     Disease of thyroid gland     Eczema     Glomerulonephritis     Psychiatric disorder     anxiety, panic    Urinary tract infection        Past Surgical History:   Procedure Laterality Date    SKIN LESION EXCISION      destruction of benign lesion    TONSILLECTOMY      TONSILLECTOMY  2006    WISDOM TOOTH EXTRACTION      WISDOM TOOTH EXTRACTION  2011       Family History   Problem Relation Age of Onset    Coronary artery disease Father     Alcohol abuse Father     Drug abuse Father     Anxiety disorder Mother     Anxiety disorder Brother     Post-traumatic stress disorder Brother     Drug abuse Brother     Lung cancer Maternal Grandfather     Breast cancer Maternal Aunt     Hypertension Family     Lung cancer Family     Lymphoma Family         pancreatic    Hyperlipidemia Family         pure         Medications have been verified  Objective   Pulse 67   Temp (!) 96 9 °F (36 1 °C)   Ht 5' 9" (1 753 m)   Wt 57 2 kg (126 lb)   LMP 09/14/2021 (Exact Date)   SpO2 98%   Breastfeeding Yes   BMI 18 61 kg/m²   Patient's last menstrual period was 09/14/2021 (exact date)  Physical Exam     Physical Exam  Vitals and nursing note reviewed  Constitutional:       General: She is awake  She is not in acute distress  Appearance: Normal appearance  She is well-developed and well-groomed  She is not ill-appearing, toxic-appearing or diaphoretic  HENT:      Head: Normocephalic and atraumatic  Right Ear: Hearing, tympanic membrane, ear canal and external ear normal  There is no impacted cerumen  No foreign body  Left Ear: Hearing, tympanic membrane, ear canal and external ear normal  There is no impacted cerumen  No foreign body  Nose: Rhinorrhea present  No mucosal edema or congestion  Rhinorrhea is clear  Right Nostril: No foreign body, epistaxis or occlusion  Left Nostril: No foreign body, epistaxis or occlusion  Right Turbinates: Not enlarged, swollen or pale  Left Turbinates: Not enlarged, swollen or pale  Mouth/Throat:      Lips: Pink  No lesions  Mouth: Mucous membranes are moist  No injury, oral lesions or angioedema  Dentition: Normal dentition  Tongue: No lesions  Tongue does not deviate from midline  Palate: No mass and lesions  Pharynx: Uvula midline  No pharyngeal swelling, oropharyngeal exudate, posterior oropharyngeal erythema or uvula swelling  Tonsils: No tonsillar exudate or tonsillar abscesses  Eyes:      General: Lids are normal  Vision grossly intact  Gaze aligned appropriately  Extraocular Movements: Extraocular movements intact     Cardiovascular:      Rate and Rhythm: Normal rate    Pulmonary:      Effort: Pulmonary effort is normal       Comments: Patient is speaking in full sentences with no increased respiratory effort  No audible wheezing or stridor  Musculoskeletal:      Cervical back: Normal range of motion and neck supple  Lymphadenopathy:      Cervical: Cervical adenopathy present  Right cervical: Superficial cervical adenopathy present  No deep or posterior cervical adenopathy  Left cervical: Superficial cervical adenopathy present  No deep or posterior cervical adenopathy  Skin:     General: Skin is warm and dry  Neurological:      Mental Status: She is alert and oriented to person, place, and time  Coordination: Coordination is intact  Gait: Gait is intact  Psychiatric:         Attention and Perception: Attention and perception normal          Mood and Affect: Mood and affect normal          Speech: Speech normal          Behavior: Behavior normal  Behavior is cooperative  Note: Portions of this record may have been created with voice recognition software  Occasional wrong word or "sound a like" substitutions may have occurred due to the inherent limitations of voice recognition software  Please read the chart carefully and recognize, using context, where substitutions have occurred  *

## 2021-09-24 LAB — SARS-COV-2 RNA RESP QL NAA+PROBE: NEGATIVE

## 2021-09-24 PROCEDURE — 99283 EMERGENCY DEPT VISIT LOW MDM: CPT

## 2021-09-25 ENCOUNTER — HOSPITAL ENCOUNTER (EMERGENCY)
Facility: HOSPITAL | Age: 26
Discharge: HOME | End: 2021-09-25
Attending: EMERGENCY MEDICINE | Admitting: EMERGENCY MEDICINE
Payer: COMMERCIAL

## 2021-09-25 VITALS
DIASTOLIC BLOOD PRESSURE: 61 MMHG | OXYGEN SATURATION: 99 % | RESPIRATION RATE: 18 BRPM | SYSTOLIC BLOOD PRESSURE: 109 MMHG | HEART RATE: 58 BPM | TEMPERATURE: 97.7 F

## 2021-09-25 DIAGNOSIS — H92.01 RIGHT EAR PAIN: Primary | ICD-10-CM

## 2021-09-25 DIAGNOSIS — H72.91 PERFORATION OF RIGHT TYMPANIC MEMBRANE: ICD-10-CM

## 2021-09-25 PROCEDURE — 99282 EMERGENCY DEPT VISIT SF MDM: CPT | Performed by: EMERGENCY MEDICINE

## 2021-09-29 ENCOUNTER — VBI (OUTPATIENT)
Dept: FAMILY MEDICINE CLINIC | Facility: CLINIC | Age: 26
End: 2021-09-29

## 2021-09-29 NOTE — TELEPHONE ENCOUNTER
Michelle Fierro    ED Visit Information     Ed visit date: 9/24/2021  Diagnosis Description: Right ear pain; Perforation of right tympanic membrane  In Network? [address]  Discharge status: Home  Discharged with meds ? No  Number of ED visits to date: 4  ED Severity:NA     Outreach Information    Outreach successful: Yes 2  Date letter mailed:9/30/2021  Date Finalized:9/30/2021    Care Coordination    Follow up appointment with pcp: no None scheduled  Transportation issues ? NA    [employer] Outreach    Outreach type: 7 Day Outreach  Emergent necessity warranted by diagnosis: No  [facility] PCP: Yes  09/29/2021 03:23 PM Phone (Emperatriz Shukla) Miguel A Lei (Self) [phone] (H)   Left Message  Unable to reach patient regarding their recent ED visit  09/30/2021 12:04 PM Phone (Emperatriz Shukla) Miguel A Lei (Self) [phone] (H)   Left Message  Unable to reach patient regarding their recent ED visit  ED follow up letter sent via My chart

## 2021-09-29 NOTE — LETTER
[facility]  [address] [facility] [address]  [phone]    Date: 09/29/21  Maico Boss  [address] [place] [id number]    Dear Ana Paula Garcia: Thank you for choosing [facility] emergency department for care  As your primary care provider we want to make sure that your ongoing medical care is being addressed  If you require follow up care as a result of your emergency department visit, there are a few things we would like you to know  As part of our continuing commitment to caring for our patient, we have added more same day appointments and have extended our office hours to meet your medical needs  After hours, on-call physicians are available via our main office line  We encourage you to contact our office prior to seeking treatment to discuss your symptoms with our medical staff  Together, we can determine the correct course of action  A majority of non-emergent conditions such as: common cold, flu-like symptoms, fevers, strains/sprains, dislocations, minor burns, cuts and animal bites can be treated at [address] [place] [address] facilities  Diagnostic testing is available at some sites  Of course, if you are experiencing a life threatening medical emergency call 911 or proceed directly to the nearest emergency room      Your nearest [address] [place] [address] facility is conveniently located at:    [address] [place] [address], [address]  [phone]  SKIP THE WAIT  Conveniently offered at most [facility] locations  [facility] your spot online at www [email or web address] or on the [place] [address]    Sincerely,    [address] [facility] [place] [place]  Dept: [phone]

## 2021-10-05 ENCOUNTER — APPOINTMENT (OUTPATIENT)
Dept: LAB | Facility: HOSPITAL | Age: 26
End: 2021-10-05
Payer: COMMERCIAL

## 2021-10-05 ENCOUNTER — TELEPHONE (OUTPATIENT)
Dept: FAMILY MEDICINE CLINIC | Facility: CLINIC | Age: 26
End: 2021-10-05

## 2021-10-05 ENCOUNTER — CLINICAL SUPPORT (OUTPATIENT)
Dept: FAMILY MEDICINE CLINIC | Facility: CLINIC | Age: 26
End: 2021-10-05

## 2021-10-05 DIAGNOSIS — J02.9 SORE THROAT: ICD-10-CM

## 2021-10-05 DIAGNOSIS — R05.9 COUGH: Primary | ICD-10-CM

## 2021-10-05 PROCEDURE — 0241U HB NFCT DS VIR RESP RNA 4 TRGT: CPT

## 2021-10-07 ENCOUNTER — TELEMEDICINE (OUTPATIENT)
Dept: FAMILY MEDICINE CLINIC | Facility: CLINIC | Age: 26
End: 2021-10-07
Payer: COMMERCIAL

## 2021-10-07 VITALS — HEIGHT: 69 IN | WEIGHT: 126 LBS | BODY MASS INDEX: 18.66 KG/M2

## 2021-10-07 DIAGNOSIS — U07.1 COVID-19: Primary | ICD-10-CM

## 2021-10-07 LAB
FLUAV RNA RESP QL NAA+PROBE: NEGATIVE
FLUBV RNA RESP QL NAA+PROBE: NEGATIVE
RSV RNA RESP QL NAA+PROBE: NEGATIVE
SARS-COV-2 RNA RESP QL NAA+PROBE: POSITIVE

## 2021-10-07 PROCEDURE — 3008F BODY MASS INDEX DOCD: CPT | Performed by: FAMILY MEDICINE

## 2021-10-07 PROCEDURE — 99214 OFFICE O/P EST MOD 30 MIN: CPT | Performed by: FAMILY MEDICINE

## 2021-10-07 PROCEDURE — 3725F SCREEN DEPRESSION PERFORMED: CPT | Performed by: FAMILY MEDICINE

## 2021-10-07 PROCEDURE — 1036F TOBACCO NON-USER: CPT | Performed by: FAMILY MEDICINE

## 2021-10-08 ENCOUNTER — HOSPITAL ENCOUNTER (EMERGENCY)
Facility: HOSPITAL | Age: 26
Discharge: HOME | End: 2021-10-08
Attending: EMERGENCY MEDICINE
Payer: COMMERCIAL

## 2021-10-08 VITALS
HEART RATE: 89 BPM | OXYGEN SATURATION: 100 % | TEMPERATURE: 99.5 F | HEIGHT: 69 IN | RESPIRATION RATE: 21 BRPM | DIASTOLIC BLOOD PRESSURE: 75 MMHG | WEIGHT: 128 LBS | BODY MASS INDEX: 18.96 KG/M2 | SYSTOLIC BLOOD PRESSURE: 121 MMHG

## 2021-10-08 DIAGNOSIS — A08.39 GASTROENTERITIS DUE TO COVID-19 VIRUS: Primary | ICD-10-CM

## 2021-10-08 DIAGNOSIS — U07.1 GASTROENTERITIS DUE TO COVID-19 VIRUS: Primary | ICD-10-CM

## 2021-10-08 DIAGNOSIS — F41.9 ANXIETY: ICD-10-CM

## 2021-10-08 LAB
ALBUMIN SERPL BCP-MCNC: 3.4 G/DL (ref 3.5–5)
ALP SERPL-CCNC: 32 U/L (ref 46–116)
ALT SERPL W P-5'-P-CCNC: 13 U/L (ref 12–78)
ANION GAP SERPL CALCULATED.3IONS-SCNC: 8 MMOL/L (ref 4–13)
AST SERPL W P-5'-P-CCNC: 8 U/L (ref 5–45)
BACTERIA UR QL AUTO: NORMAL /HPF
BASOPHILS # BLD AUTO: 0 THOUSANDS/ΜL (ref 0–0.1)
BASOPHILS NFR BLD AUTO: 0 % (ref 0–1)
BILIRUB SERPL-MCNC: 0.1 MG/DL (ref 0.2–1)
BILIRUB UR QL STRIP: NEGATIVE
BUN SERPL-MCNC: 7 MG/DL (ref 5–25)
CALCIUM ALBUM COR SERPL-MCNC: 9.1 MG/DL (ref 8.3–10.1)
CALCIUM SERPL-MCNC: 8.6 MG/DL (ref 8.3–10.1)
CHLORIDE SERPL-SCNC: 107 MMOL/L (ref 100–108)
CLARITY UR: CLEAR
CO2 SERPL-SCNC: 27 MMOL/L (ref 21–32)
COLOR UR: COLORLESS
CREAT SERPL-MCNC: 0.65 MG/DL (ref 0.6–1.3)
EOSINOPHIL # BLD AUTO: 0.01 THOUSAND/ΜL (ref 0–0.61)
EOSINOPHIL NFR BLD AUTO: 0 % (ref 0–6)
ERYTHROCYTE [DISTWIDTH] IN BLOOD BY AUTOMATED COUNT: 12.7 % (ref 11.6–15.1)
EXT PREG TEST URINE: NEGATIVE
EXT. CONTROL ED NAV: NORMAL
GFR SERPL CREATININE-BSD FRML MDRD: 121 ML/MIN/1.73SQ M
GLUCOSE SERPL-MCNC: 86 MG/DL (ref 65–140)
GLUCOSE UR STRIP-MCNC: NEGATIVE MG/DL
HCT VFR BLD AUTO: 39.1 % (ref 34.8–46.1)
HGB BLD-MCNC: 12.2 G/DL (ref 11.5–15.4)
HGB UR QL STRIP.AUTO: ABNORMAL
IMM GRANULOCYTES # BLD AUTO: 0.01 THOUSAND/UL (ref 0–0.2)
IMM GRANULOCYTES NFR BLD AUTO: 0 % (ref 0–2)
KETONES UR STRIP-MCNC: NEGATIVE MG/DL
LEUKOCYTE ESTERASE UR QL STRIP: ABNORMAL
LYMPHOCYTES # BLD AUTO: 0.95 THOUSANDS/ΜL (ref 0.6–4.47)
LYMPHOCYTES NFR BLD AUTO: 35 % (ref 14–44)
MCH RBC QN AUTO: 29.8 PG (ref 26.8–34.3)
MCHC RBC AUTO-ENTMCNC: 31.2 G/DL (ref 31.4–37.4)
MCV RBC AUTO: 95 FL (ref 82–98)
MONOCYTES # BLD AUTO: 0.42 THOUSAND/ΜL (ref 0.17–1.22)
MONOCYTES NFR BLD AUTO: 16 % (ref 4–12)
NEUTROPHILS # BLD AUTO: 1.32 THOUSANDS/ΜL (ref 1.85–7.62)
NEUTS SEG NFR BLD AUTO: 49 % (ref 43–75)
NITRITE UR QL STRIP: NEGATIVE
NON-SQ EPI CELLS URNS QL MICRO: NORMAL /HPF
NRBC BLD AUTO-RTO: 0 /100 WBCS
PH UR STRIP.AUTO: 6.5 [PH]
PLATELET # BLD AUTO: 144 THOUSANDS/UL (ref 149–390)
PMV BLD AUTO: 9.6 FL (ref 8.9–12.7)
POTASSIUM SERPL-SCNC: 3.9 MMOL/L (ref 3.5–5.3)
PROT SERPL-MCNC: 6.9 G/DL (ref 6.4–8.2)
PROT UR STRIP-MCNC: NEGATIVE MG/DL
RBC # BLD AUTO: 4.1 MILLION/UL (ref 3.81–5.12)
RBC #/AREA URNS AUTO: NORMAL /HPF
SODIUM SERPL-SCNC: 142 MMOL/L (ref 136–145)
SP GR UR STRIP.AUTO: <=1.005 (ref 1–1.03)
UROBILINOGEN UR QL STRIP.AUTO: 0.2 E.U./DL
WBC # BLD AUTO: 2.71 THOUSAND/UL (ref 4.31–10.16)
WBC #/AREA URNS AUTO: NORMAL /HPF

## 2021-10-08 PROCEDURE — 36415 COLL VENOUS BLD VENIPUNCTURE: CPT | Performed by: EMERGENCY MEDICINE

## 2021-10-08 PROCEDURE — 85025 COMPLETE CBC W/AUTO DIFF WBC: CPT | Performed by: EMERGENCY MEDICINE

## 2021-10-08 PROCEDURE — 99284 EMERGENCY DEPT VISIT MOD MDM: CPT

## 2021-10-08 PROCEDURE — 93005 ELECTROCARDIOGRAM TRACING: CPT

## 2021-10-08 PROCEDURE — 81025 URINE PREGNANCY TEST: CPT | Performed by: EMERGENCY MEDICINE

## 2021-10-08 PROCEDURE — 99284 EMERGENCY DEPT VISIT MOD MDM: CPT | Performed by: EMERGENCY MEDICINE

## 2021-10-08 PROCEDURE — 96360 HYDRATION IV INFUSION INIT: CPT

## 2021-10-08 PROCEDURE — 81001 URINALYSIS AUTO W/SCOPE: CPT | Performed by: EMERGENCY MEDICINE

## 2021-10-08 PROCEDURE — 80053 COMPREHEN METABOLIC PANEL: CPT | Performed by: EMERGENCY MEDICINE

## 2021-10-08 RX ADMIN — SODIUM CHLORIDE 1000 ML: 0.9 INJECTION, SOLUTION INTRAVENOUS at 20:44

## 2021-10-09 LAB
ATRIAL RATE: 79 BPM
P AXIS: 68 DEGREES
PR INTERVAL: 144 MS
QRS AXIS: 75 DEGREES
QRSD INTERVAL: 90 MS
QT INTERVAL: 374 MS
QTC INTERVAL: 428 MS
T WAVE AXIS: 64 DEGREES
VENTRICULAR RATE: 79 BPM

## 2021-10-09 PROCEDURE — 93010 ELECTROCARDIOGRAM REPORT: CPT | Performed by: INTERNAL MEDICINE

## 2021-10-12 DIAGNOSIS — U07.1 COVID-19 VIRUS INFECTION: Primary | ICD-10-CM

## 2021-10-12 DIAGNOSIS — F06.4 ANXIETY DISORDER DUE TO GENERAL MEDICAL CONDITION WITH PANIC ATTACK: Primary | ICD-10-CM

## 2021-10-12 DIAGNOSIS — F41.0 ANXIETY DISORDER DUE TO GENERAL MEDICAL CONDITION WITH PANIC ATTACK: Primary | ICD-10-CM

## 2021-10-12 RX ORDER — ALBUTEROL SULFATE 90 UG/1
2 AEROSOL, METERED RESPIRATORY (INHALATION) EVERY 6 HOURS PRN
Qty: 18 G | Refills: 0 | Status: SHIPPED | OUTPATIENT
Start: 2021-10-12 | End: 2021-11-05 | Stop reason: SDUPTHER

## 2021-10-12 RX ORDER — LORAZEPAM 0.5 MG/1
0.5 TABLET ORAL EVERY 8 HOURS PRN
Qty: 60 TABLET | Refills: 0 | Status: SHIPPED | OUTPATIENT
Start: 2021-10-12 | End: 2021-12-03 | Stop reason: SDUPTHER

## 2021-10-13 DIAGNOSIS — U07.1 COVID-19 VIRUS INFECTION: Primary | ICD-10-CM

## 2021-10-13 RX ORDER — PREDNISONE 10 MG/1
TABLET ORAL
Qty: 21 TABLET | Refills: 0 | Status: SHIPPED | OUTPATIENT
Start: 2021-10-13 | End: 2022-03-07 | Stop reason: ALTCHOICE

## 2021-11-05 DIAGNOSIS — U07.1 COVID-19 VIRUS INFECTION: ICD-10-CM

## 2021-11-05 RX ORDER — ALBUTEROL SULFATE 90 UG/1
2 AEROSOL, METERED RESPIRATORY (INHALATION) EVERY 6 HOURS PRN
Qty: 18 G | Refills: 0 | Status: SHIPPED | OUTPATIENT
Start: 2021-11-05 | End: 2022-03-07 | Stop reason: ALTCHOICE

## 2021-11-09 ENCOUNTER — OFFICE VISIT (OUTPATIENT)
Dept: FAMILY MEDICINE CLINIC | Facility: CLINIC | Age: 26
End: 2021-11-09
Payer: COMMERCIAL

## 2021-11-09 VITALS
OXYGEN SATURATION: 99 % | TEMPERATURE: 97.5 F | HEIGHT: 69 IN | DIASTOLIC BLOOD PRESSURE: 74 MMHG | BODY MASS INDEX: 18.87 KG/M2 | HEART RATE: 90 BPM | WEIGHT: 127.4 LBS | SYSTOLIC BLOOD PRESSURE: 112 MMHG | RESPIRATION RATE: 18 BRPM

## 2021-11-09 DIAGNOSIS — F06.4 ANXIETY DISORDER DUE TO GENERAL MEDICAL CONDITION WITH PANIC ATTACK: ICD-10-CM

## 2021-11-09 DIAGNOSIS — R05.3 POST-COVID CHRONIC COUGH: Primary | ICD-10-CM

## 2021-11-09 DIAGNOSIS — U09.9 POST-COVID CHRONIC COUGH: Primary | ICD-10-CM

## 2021-11-09 DIAGNOSIS — U09.9 POST-COVID SYNDROME: ICD-10-CM

## 2021-11-09 DIAGNOSIS — F41.0 ANXIETY DISORDER DUE TO GENERAL MEDICAL CONDITION WITH PANIC ATTACK: ICD-10-CM

## 2021-11-09 PROCEDURE — 3725F SCREEN DEPRESSION PERFORMED: CPT | Performed by: FAMILY MEDICINE

## 2021-11-09 PROCEDURE — 99214 OFFICE O/P EST MOD 30 MIN: CPT | Performed by: FAMILY MEDICINE

## 2021-11-09 PROCEDURE — 1036F TOBACCO NON-USER: CPT | Performed by: FAMILY MEDICINE

## 2021-11-09 PROCEDURE — 3008F BODY MASS INDEX DOCD: CPT | Performed by: FAMILY MEDICINE

## 2021-11-09 RX ORDER — DEXAMETHASONE 4 MG/1
2 TABLET ORAL 2 TIMES DAILY
Qty: 12 G | Refills: 1 | Status: SHIPPED | OUTPATIENT
Start: 2021-11-09 | End: 2022-03-07 | Stop reason: ALTCHOICE

## 2021-11-09 RX ORDER — DEXAMETHASONE 4 MG/1
2 TABLET ORAL 2 TIMES DAILY
Qty: 12 G | Refills: 1 | Status: SHIPPED | OUTPATIENT
Start: 2021-11-09 | End: 2021-11-09 | Stop reason: SDUPTHER

## 2021-12-03 DIAGNOSIS — F41.0 ANXIETY DISORDER DUE TO GENERAL MEDICAL CONDITION WITH PANIC ATTACK: ICD-10-CM

## 2021-12-03 DIAGNOSIS — F06.4 ANXIETY DISORDER DUE TO GENERAL MEDICAL CONDITION WITH PANIC ATTACK: ICD-10-CM

## 2021-12-03 RX ORDER — LORAZEPAM 0.5 MG/1
0.5 TABLET ORAL EVERY 8 HOURS PRN
Qty: 30 TABLET | Refills: 0 | Status: SHIPPED | OUTPATIENT
Start: 2021-12-03 | End: 2022-03-07 | Stop reason: SDUPTHER

## 2021-12-27 DIAGNOSIS — R25.3 FASCICULATIONS: Primary | ICD-10-CM

## 2021-12-27 RX ORDER — DIAZEPAM 5 MG/1
TABLET ORAL
Qty: 3 TABLET | Refills: 0 | Status: SHIPPED | OUTPATIENT
Start: 2021-12-27 | End: 2022-04-18 | Stop reason: ALTCHOICE

## 2022-03-07 ENCOUNTER — OFFICE VISIT (OUTPATIENT)
Dept: FAMILY MEDICINE CLINIC | Facility: CLINIC | Age: 27
End: 2022-03-07
Payer: COMMERCIAL

## 2022-03-07 VITALS
OXYGEN SATURATION: 97 % | SYSTOLIC BLOOD PRESSURE: 116 MMHG | DIASTOLIC BLOOD PRESSURE: 66 MMHG | BODY MASS INDEX: 19.2 KG/M2 | TEMPERATURE: 97.6 F | HEART RATE: 89 BPM | WEIGHT: 129.6 LBS | HEIGHT: 69 IN

## 2022-03-07 DIAGNOSIS — F41.0 ANXIETY DISORDER DUE TO GENERAL MEDICAL CONDITION WITH PANIC ATTACK: ICD-10-CM

## 2022-03-07 DIAGNOSIS — F43.10 PTSD (POST-TRAUMATIC STRESS DISORDER): ICD-10-CM

## 2022-03-07 DIAGNOSIS — Z00.00 HEALTH MAINTENANCE EXAMINATION: Primary | ICD-10-CM

## 2022-03-07 DIAGNOSIS — R29.90 POST-COVID CHRONIC NEUROLOGIC SYMPTOMS: ICD-10-CM

## 2022-03-07 DIAGNOSIS — F41.1 GENERALIZED ANXIETY DISORDER: ICD-10-CM

## 2022-03-07 DIAGNOSIS — F06.4 ANXIETY DISORDER DUE TO GENERAL MEDICAL CONDITION WITH PANIC ATTACK: ICD-10-CM

## 2022-03-07 DIAGNOSIS — F51.04 PSYCHOPHYSIOLOGICAL INSOMNIA: ICD-10-CM

## 2022-03-07 DIAGNOSIS — R25.3 FASCICULATIONS: ICD-10-CM

## 2022-03-07 DIAGNOSIS — U09.9 POST-COVID CHRONIC NEUROLOGIC SYMPTOMS: ICD-10-CM

## 2022-03-07 PROBLEM — I45.10 RBBB: Status: RESOLVED | Noted: 2019-08-23 | Resolved: 2022-03-07

## 2022-03-07 PROBLEM — Z23 NEED FOR TDAP VACCINATION: Status: RESOLVED | Noted: 2019-08-23 | Resolved: 2022-03-07

## 2022-03-07 PROBLEM — Z82.49 FAMILY HISTORY OF CARDIAC DISORDER: Status: RESOLVED | Noted: 2019-08-23 | Resolved: 2022-03-07

## 2022-03-07 PROBLEM — G47.00 INSOMNIA: Status: ACTIVE | Noted: 2022-03-07

## 2022-03-07 PROBLEM — H93.13 TINNITUS AURIUM, BILATERAL: Status: RESOLVED | Noted: 2020-11-07 | Resolved: 2022-03-07

## 2022-03-07 PROBLEM — Z12.4 CERVICAL CANCER SCREENING: Status: RESOLVED | Noted: 2019-08-23 | Resolved: 2022-03-07

## 2022-03-07 PROCEDURE — 99395 PREV VISIT EST AGE 18-39: CPT | Performed by: FAMILY MEDICINE

## 2022-03-07 RX ORDER — LORAZEPAM 0.5 MG/1
0.5 TABLET ORAL 2 TIMES DAILY PRN
Qty: 60 TABLET | Refills: 0 | Status: SHIPPED | OUTPATIENT
Start: 2022-03-07 | End: 2022-04-18 | Stop reason: SDUPTHER

## 2022-03-07 RX ORDER — GABAPENTIN 100 MG/1
CAPSULE ORAL
Qty: 90 CAPSULE | Refills: 2 | Status: SHIPPED | OUTPATIENT
Start: 2022-03-07 | End: 2022-04-18 | Stop reason: ALTCHOICE

## 2022-03-07 NOTE — PATIENT INSTRUCTIONS
Health maintenance exam is performed  Current problems include anxiety, insomnia, and her tongue fasciculations  Lorazepam can be increased to 0 5 mg twice daily as needed which may help her tongue  She was reluctant to try clonazepam because she has become attached to lorazepam   After long discussion, trial of gabapentin 100 mg at bedtime which might help for anxiety, sleep, and her tongue  If no side effects, she can increase every 5-7 days until taking 2 or 3 capsules at bedtime  Which is still at very low dose  Some discussion about former boyfriend's that drink too much  Recheck in 1 month  Wellness Visit for Adults   AMBULATORY CARE:   A wellness visit  is when you see your healthcare provider to get screened for health problems  Your healthcare provider will also give you advice on how to stay healthy  Write down your questions so you remember to ask them  Ask your healthcare provider how often you should have a wellness visit  What happens at a wellness visit:  Your healthcare provider will ask about your health, and your family history of health problems  This includes high blood pressure, heart disease, and cancer  He or she will ask if you have symptoms that concern you, if you smoke, and about your mood  You may also be asked about your intake of medicines, supplements, food, and alcohol  Any of the following may be done:  · Your weight  will be checked  Your height may also be checked so your body mass index (BMI) can be calculated  Your BMI shows if you are at a healthy weight  · Your blood pressure  and heart rate will be checked  Your temperature may also be checked  · Blood and urine tests  may be done  Blood tests may be done to check your cholesterol levels  Abnormal cholesterol levels increase your risk for heart disease and stroke  You may also need a blood or urine test to check for diabetes if you are at increased risk   Urine tests may be done to look for signs of an infection or kidney disease  · A physical exam  includes checking your heartbeat and lungs with a stethoscope  Your healthcare provider may also check your skin to look for sun damage  · Screening tests  may be recommended  A screening test is done to check for diseases that may not cause symptoms  The screening tests you may need depend on your age, gender, family history, and lifestyle habits  For example, colorectal screening may be recommended if you are 48years old or older  Screening tests you need if you are a woman:   · A Pap smear  is used to screen for cervical cancer  Pap smears are usually done every 3 to 5 years depending on your age  You may need them more often if you have had abnormal Pap smear test results in the past  Ask your healthcare provider how often you should have a Pap smear  · A mammogram  is an x-ray of your breasts to screen for breast cancer  Experts recommend mammograms every 2 years starting at age 48 years  You may need a mammogram at age 52 years or younger if you have an increased risk for breast cancer  Talk to your healthcare provider about when you should start having mammograms and how often you need them  Vaccines you may need:   · Get an influenza vaccine  every year  The influenza vaccine protects you from the flu  Several types of viruses cause the flu  The viruses change over time, so new vaccines are made each year  · Get a tetanus-diphtheria (Td) booster vaccine  every 10 years  This vaccine protects you against tetanus and diphtheria  Tetanus is a severe infection that may cause painful muscle spasms and lockjaw  Diphtheria is a severe bacterial infection that causes a thick covering in the back of your mouth and throat  · Get a human papillomavirus (HPV) vaccine  if you are female and aged 23 to 32 or male 23 to 24 and never received it  This vaccine protects you from HPV infection  HPV is the most common infection spread by sexual contact   HPV may also cause vaginal, penile, and anal cancers  · Get a pneumococcal vaccine  if you are aged 72 years or older  The pneumococcal vaccine is an injection given to protect you from pneumococcal disease  Pneumococcal disease is an infection caused by pneumococcal bacteria  The infection may cause pneumonia, meningitis, or an ear infection  · Get a shingles vaccine  if you are 60 or older, even if you have had shingles before  The shingles vaccine is an injection to protect you from the varicella-zoster virus  This is the same virus that causes chickenpox  Shingles is a painful rash that develops in people who had chickenpox or have been exposed to the virus  How to eat healthy:  My Plate is a model for planning healthy meals  It shows the types and amounts of foods that should go on your plate  Fruits and vegetables make up about half of your plate, and grains and protein make up the other half  A serving of dairy is included on the side of your plate  The amount of calories and serving sizes you need depends on your age, gender, weight, and height  Examples of healthy foods are listed below:  · Eat a variety of vegetables  such as dark green, red, and orange vegetables  You can also include canned vegetables low in sodium (salt) and frozen vegetables without added butter or sauces  · Eat a variety of fresh fruits , canned fruit in 100% juice, frozen fruit, and dried fruit  · Include whole grains  At least half of the grains you eat should be whole grains  Examples include whole-wheat bread, wheat pasta, brown rice, and whole-grain cereals such as oatmeal     · Eat a variety of protein foods such as seafood (fish and shellfish), lean meat, and poultry without skin (turkey and chicken)  Examples of lean meats include pork leg, shoulder, or tenderloin, and beef round, sirloin, tenderloin, and extra lean ground beef   Other protein foods include eggs and egg substitutes, beans, peas, soy products, nuts, and seeds  · Choose low-fat dairy products such as skim or 1% milk or low-fat yogurt, cheese, and cottage cheese  · Limit unhealthy fats  such as butter, hard margarine, and shortening  Exercise:  Exercise at least 30 minutes per day on most days of the week  Some examples of exercise include walking, biking, dancing, and swimming  You can also fit in more physical activity by taking the stairs instead of the elevator or parking farther away from stores  Include muscle strengthening activities 2 days each week  Regular exercise provides many health benefits  It helps you manage your weight, and decreases your risk for type 2 diabetes, heart disease, stroke, and high blood pressure  Exercise can also help improve your mood  Ask your healthcare provider about the best exercise plan for you  General health and safety guidelines:   · Do not smoke  Nicotine and other chemicals in cigarettes and cigars can cause lung damage  Ask your healthcare provider for information if you currently smoke and need help to quit  E-cigarettes or smokeless tobacco still contain nicotine  Talk to your healthcare provider before you use these products  · Limit alcohol  A drink of alcohol is 12 ounces of beer, 5 ounces of wine, or 1½ ounces of liquor  · Lose weight, if needed  Being overweight increases your risk of certain health conditions  These include heart disease, high blood pressure, type 2 diabetes, and certain types of cancer  · Protect your skin  Do not sunbathe or use tanning beds  Use sunscreen with a SPF 15 or higher  Apply sunscreen at least 15 minutes before you go outside  Reapply sunscreen every 2 hours  Wear protective clothing, hats, and sunglasses when you are outside  · Drive safely  Always wear your seatbelt  Make sure everyone in your car wears a seatbelt  A seatbelt can save your life if you are in an accident  Do not use your cell phone when you are driving   This could distract you and cause an accident  Pull over if you need to make a call or send a text message  · Practice safe sex  Use latex condoms if are sexually active and have more than one partner  Your healthcare provider may recommend screening tests for sexually transmitted infections (STIs)  · Wear helmets, lifejackets, and protective gear  Always wear a helmet when you ride a bike or motorcycle, go skiing, or play sports that could cause a head injury  Wear protective equipment when you play sports  Wear a lifejacket when you are on a boat or doing water sports  © Copyright [employer] 2022 Information is for End User's use only and may not be sold, redistributed or otherwise used for commercial purposes  All illustrations and images included in CareNotes® are the copyrighted property of [employer]  or [employer]  The above information is an  only  It is not intended as medical advice for individual conditions or treatments  Talk to your doctor, nurse or pharmacist before following any medical regimen to see if it is safe and effective for you
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
## 2022-03-25 ENCOUNTER — OFFICE VISIT (OUTPATIENT)
Dept: URGENT CARE | Facility: CLINIC | Age: 27
End: 2022-03-25
Payer: COMMERCIAL

## 2022-03-25 VITALS
WEIGHT: 128 LBS | BODY MASS INDEX: 18.96 KG/M2 | OXYGEN SATURATION: 99 % | RESPIRATION RATE: 20 BRPM | TEMPERATURE: 97.2 F | HEIGHT: 69 IN | HEART RATE: 87 BPM

## 2022-03-25 DIAGNOSIS — Z11.59 SPECIAL SCREENING EXAMINATION FOR VIRAL DISEASE: ICD-10-CM

## 2022-03-25 DIAGNOSIS — J01.11 ACUTE RECURRENT FRONTAL SINUSITIS: Primary | ICD-10-CM

## 2022-03-25 PROCEDURE — 99213 OFFICE O/P EST LOW 20 MIN: CPT | Performed by: FAMILY MEDICINE

## 2022-03-25 PROCEDURE — U0005 INFEC AGEN DETEC AMPLI PROBE: HCPCS | Performed by: FAMILY MEDICINE

## 2022-03-25 PROCEDURE — U0003 INFECTIOUS AGENT DETECTION BY NUCLEIC ACID (DNA OR RNA); SEVERE ACUTE RESPIRATORY SYNDROME CORONAVIRUS 2 (SARS-COV-2) (CORONAVIRUS DISEASE [COVID-19]), AMPLIFIED PROBE TECHNIQUE, MAKING USE OF HIGH THROUGHPUT TECHNOLOGIES AS DESCRIBED BY CMS-2020-01-R: HCPCS | Performed by: FAMILY MEDICINE

## 2022-03-25 RX ORDER — AMOXICILLIN 500 MG/1
500 CAPSULE ORAL EVERY 12 HOURS SCHEDULED
Qty: 14 CAPSULE | Refills: 0 | Status: SHIPPED | OUTPATIENT
Start: 2022-03-25 | End: 2022-04-01
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
## 2022-03-26 LAB — SARS-COV-2 RNA RESP QL NAA+PROBE: NEGATIVE

## 2022-04-08 ENCOUNTER — OFFICE VISIT (OUTPATIENT)
Dept: URGENT CARE | Facility: CLINIC | Age: 27
End: 2022-04-08
Payer: COMMERCIAL

## 2022-04-08 VITALS — TEMPERATURE: 98.6 F | HEART RATE: 102 BPM | RESPIRATION RATE: 16 BRPM | OXYGEN SATURATION: 98 %

## 2022-04-08 DIAGNOSIS — B37.0 ORAL THRUSH: Primary | ICD-10-CM

## 2022-04-08 PROCEDURE — 99213 OFFICE O/P EST LOW 20 MIN: CPT | Performed by: PHYSICIAN ASSISTANT

## 2022-04-09 NOTE — PATIENT INSTRUCTIONS
Oral Candidiasis   AMBULATORY CARE:   Oral candidiasis , or thrush, is a fungal infection that affects the inside of your mouth  Seek care immediately if:   · You have trouble swallowing and your jaw and neck are stiff  · You are dizzy, thirsty, or have a dry mouth  · You are urinating little or not at all  · You cannot eat or drink because of the pain  Contact your healthcare provider if:   · You have a fever  · You have nausea, vomiting, or diarrhea  · Your signs and symptoms get worse, even after treatment  · You have questions or concerns about your condition or care  Common symptoms include the following:   · White or whitish-yellow patches in the mouth that look like milk curds    · Redness or bleeding under the patches    · Sore and painful mouth, with cracking or tearing on the corners    · Bright red tongue that may feel like it is burning    · Trouble swallowing and tasting    · Swelling under dentures    Treatment for oral candidiasis  includes antifungal medicine that helps kill the fungus that caused your oral candidiasis  This medicine may be a pill or a solution that you gargle  Remove dentures before you gargle  Prevent oral candidiasis:  Brush your teeth, gums, and tongue after you eat and before you go to sleep  Use a toothbrush with soft bristles  See your dentist for regular exams  Remove your dentures when you sleep, or at least 6 hours each day  Clean your dentures and soak them in denture   Let them air dry after soaking  Follow up with your doctor as directed:  Write down your questions so you remember to ask them during your visits  © Copyright [employer] 2022 Information is for End User's use only and may not be sold, redistributed or otherwise used for commercial purposes  All illustrations and images included in CareNotes® are the copyrighted property of [employer]  or [employer]   The above information is an  only   It is not intended as medical advice for individual conditions or treatments  Talk to your doctor, nurse or pharmacist before following any medical regimen to see if it is safe and effective for you
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
## 2022-04-13 ENCOUNTER — OFFICE VISIT (OUTPATIENT)
Dept: URGENT CARE | Facility: CLINIC | Age: 27
End: 2022-04-13
Payer: COMMERCIAL

## 2022-04-13 VITALS
OXYGEN SATURATION: 100 % | BODY MASS INDEX: 19.11 KG/M2 | HEIGHT: 69 IN | HEART RATE: 98 BPM | RESPIRATION RATE: 16 BRPM | SYSTOLIC BLOOD PRESSURE: 114 MMHG | WEIGHT: 129 LBS | DIASTOLIC BLOOD PRESSURE: 62 MMHG | TEMPERATURE: 98.4 F

## 2022-04-13 DIAGNOSIS — B37.0 ORAL THRUSH: Primary | ICD-10-CM

## 2022-04-13 PROCEDURE — 99213 OFFICE O/P EST LOW 20 MIN: CPT | Performed by: PHYSICIAN ASSISTANT

## 2022-04-13 RX ORDER — FLUCONAZOLE 150 MG/1
TABLET ORAL
COMMUNITY
Start: 2022-03-28

## 2022-04-13 RX ORDER — CLOTRIMAZOLE 10 MG/1
10 LOZENGE ORAL; TOPICAL 4 TIMES DAILY
Qty: 28 TABLET | Refills: 0 | Status: SHIPPED | OUTPATIENT
Start: 2022-04-13 | End: 2022-04-20
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
## 2022-04-13 NOTE — PATIENT INSTRUCTIONS
Clotrimazole troches as directed   Over-the-counter ibuprofen Tylenol   If no improvement or anything worsens follow-up with PCP

## 2022-04-18 ENCOUNTER — OFFICE VISIT (OUTPATIENT)
Dept: FAMILY MEDICINE CLINIC | Facility: CLINIC | Age: 27
End: 2022-04-18
Payer: COMMERCIAL

## 2022-04-18 VITALS
HEIGHT: 69 IN | WEIGHT: 129.8 LBS | SYSTOLIC BLOOD PRESSURE: 112 MMHG | OXYGEN SATURATION: 98 % | BODY MASS INDEX: 19.23 KG/M2 | DIASTOLIC BLOOD PRESSURE: 70 MMHG | HEART RATE: 80 BPM | TEMPERATURE: 98 F

## 2022-04-18 DIAGNOSIS — F41.0 ANXIETY DISORDER DUE TO GENERAL MEDICAL CONDITION WITH PANIC ATTACK: ICD-10-CM

## 2022-04-18 DIAGNOSIS — R06.02 SHORTNESS OF BREATH: ICD-10-CM

## 2022-04-18 DIAGNOSIS — F06.4 ANXIETY DISORDER DUE TO GENERAL MEDICAL CONDITION WITH PANIC ATTACK: ICD-10-CM

## 2022-04-18 DIAGNOSIS — R25.3 FASCICULATIONS: ICD-10-CM

## 2022-04-18 DIAGNOSIS — K14.0 GLOSSITIS: Primary | ICD-10-CM

## 2022-04-18 LAB
SARS-COV-2 AG UPPER RESP QL IA: NEGATIVE
VALID CONTROL: NORMAL

## 2022-04-18 PROCEDURE — 99214 OFFICE O/P EST MOD 30 MIN: CPT | Performed by: FAMILY MEDICINE

## 2022-04-18 PROCEDURE — 87811 SARS-COV-2 COVID19 W/OPTIC: CPT | Performed by: FAMILY MEDICINE

## 2022-04-18 RX ORDER — LORAZEPAM 0.5 MG/1
0.5 TABLET ORAL 2 TIMES DAILY PRN
Qty: 60 TABLET | Refills: 0 | Status: SHIPPED | OUTPATIENT
Start: 2022-04-18 | End: 2022-08-08 | Stop reason: SDUPTHER
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
## 2022-04-18 NOTE — PATIENT INSTRUCTIONS
Suspect that she never had a yeast infection in her mouth  tongue sometimes get inflamed and respond to gentle brushing  No other treatment or diagnostics is needed  Covid test is negative  lungs are clear  Although she disagrees, there seems to be a component of anxiety present  Return as needed  addendum:  Suggest getting the Covid vaccine  Cold symptoms are not a reason not to get the vaccine  Vaccine can be obtained at any drugstore

## 2022-05-18 ENCOUNTER — PATIENT MESSAGE (OUTPATIENT)
Dept: FAMILY MEDICINE CLINIC | Facility: CLINIC | Age: 27
End: 2022-05-18

## 2022-05-23 ENCOUNTER — TELEPHONE (OUTPATIENT)
Dept: FAMILY MEDICINE CLINIC | Facility: CLINIC | Age: 27
End: 2022-05-23

## 2022-05-23 NOTE — LETTER
Date: 5/23/2022    To whom it may concern: This is to certify that Adonica Mortimer has been under my care for the following diagnosis: F41 1 Anxiety Disorder and PTSD F43 10  I feel that Adonica Mortimer is unable to serve on [address] [place] [address] Duty at this time for the above mentioned medical reasons                    Sincerely,  Abigail Martinez MD

## 2022-05-23 NOTE — TELEPHONE ENCOUNTER
Patient is requesting corrected letter excusing her from jury duty  States the courthouse will not accept a letter without a signature on  Please correct diagnosis and reason she is unable to serve jury duty and sign letter

## 2022-07-05 DIAGNOSIS — E03.9 HYPOTHYROIDISM, UNSPECIFIED TYPE: Primary | ICD-10-CM

## 2022-07-08 ENCOUNTER — LAB (OUTPATIENT)
Dept: LAB | Facility: HOSPITAL | Age: 27
End: 2022-07-08
Payer: COMMERCIAL

## 2022-07-08 DIAGNOSIS — I51.9 MYXEDEMA HEART DISEASE: ICD-10-CM

## 2022-07-08 DIAGNOSIS — E03.9 MYXEDEMA HEART DISEASE: ICD-10-CM

## 2022-07-08 LAB
T4 FREE SERPL-MCNC: 0.82 NG/DL (ref 0.76–1.46)
TSH SERPL DL<=0.05 MIU/L-ACNC: 4.17 UIU/ML (ref 0.45–4.5)

## 2022-07-08 PROCEDURE — 36415 COLL VENOUS BLD VENIPUNCTURE: CPT

## 2022-07-08 PROCEDURE — 84443 ASSAY THYROID STIM HORMONE: CPT

## 2022-07-08 PROCEDURE — 84439 ASSAY OF FREE THYROXINE: CPT

## 2022-07-13 ENCOUNTER — VBI (OUTPATIENT)
Dept: ADMINISTRATIVE | Facility: OTHER | Age: 27
End: 2022-07-13

## 2022-08-07 ENCOUNTER — PATIENT MESSAGE (OUTPATIENT)
Dept: FAMILY MEDICINE CLINIC | Facility: CLINIC | Age: 27
End: 2022-08-07

## 2022-08-07 DIAGNOSIS — L21.0 SEBORRHEA CAPITIS: Primary | ICD-10-CM

## 2022-08-08 DIAGNOSIS — F41.0 ANXIETY DISORDER DUE TO GENERAL MEDICAL CONDITION WITH PANIC ATTACK: ICD-10-CM

## 2022-08-08 DIAGNOSIS — F06.4 ANXIETY DISORDER DUE TO GENERAL MEDICAL CONDITION WITH PANIC ATTACK: ICD-10-CM

## 2022-08-08 RX ORDER — KETOCONAZOLE 20 MG/ML
1 SHAMPOO TOPICAL 2 TIMES WEEKLY
Qty: 120 ML | Refills: 5 | Status: SHIPPED | OUTPATIENT
Start: 2022-08-08

## 2022-08-08 RX ORDER — LORAZEPAM 0.5 MG/1
0.5 TABLET ORAL 2 TIMES DAILY PRN
Qty: 60 TABLET | Refills: 0 | Status: SHIPPED | OUTPATIENT
Start: 2022-08-08

## 2022-08-19 ENCOUNTER — OFFICE VISIT (OUTPATIENT)
Dept: URGENT CARE | Age: 27
End: 2022-08-19
Payer: COMMERCIAL

## 2022-08-19 VITALS
HEIGHT: 69 IN | RESPIRATION RATE: 20 BRPM | WEIGHT: 126.8 LBS | OXYGEN SATURATION: 97 % | SYSTOLIC BLOOD PRESSURE: 110 MMHG | TEMPERATURE: 97.6 F | DIASTOLIC BLOOD PRESSURE: 76 MMHG | HEART RATE: 86 BPM | BODY MASS INDEX: 18.78 KG/M2

## 2022-08-19 DIAGNOSIS — Z20.828 EXPOSURE TO SARS-ASSOCIATED CORONAVIRUS: Primary | ICD-10-CM

## 2022-08-19 DIAGNOSIS — J06.9 ACUTE URI: ICD-10-CM

## 2022-08-19 LAB
SARS-COV-2 AG UPPER RESP QL IA: NEGATIVE
VALID CONTROL: NORMAL

## 2022-08-19 PROCEDURE — 87811 SARS-COV-2 COVID19 W/OPTIC: CPT

## 2022-08-19 PROCEDURE — 99213 OFFICE O/P EST LOW 20 MIN: CPT
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
## 2022-10-30 ENCOUNTER — OFFICE VISIT (OUTPATIENT)
Dept: URGENT CARE | Facility: CLINIC | Age: 27
End: 2022-10-30

## 2022-10-30 VITALS
DIASTOLIC BLOOD PRESSURE: 69 MMHG | TEMPERATURE: 97.7 F | OXYGEN SATURATION: 99 % | SYSTOLIC BLOOD PRESSURE: 101 MMHG | HEART RATE: 91 BPM | RESPIRATION RATE: 16 BRPM

## 2022-10-30 DIAGNOSIS — R11.2 NAUSEA AND VOMITING, UNSPECIFIED VOMITING TYPE: Primary | ICD-10-CM

## 2022-10-30 RX ORDER — ONDANSETRON 4 MG/1
4 TABLET, ORALLY DISINTEGRATING ORAL EVERY 8 HOURS PRN
Qty: 15 TABLET | Refills: 0 | Status: SHIPPED | OUTPATIENT
Start: 2022-10-30

## 2022-10-30 NOTE — PATIENT INSTRUCTIONS
Acute Nausea and Vomiting   WHAT YOU NEED TO KNOW:   Acute nausea and vomiting start suddenly, worsen quickly, and last a short time  DISCHARGE INSTRUCTIONS:   Return to the emergency department if:   You see blood in your vomit or your bowel movements  You have sudden, severe pain in your chest and upper abdomen after hard vomiting or retching  You have swelling in your neck and chest      You are dizzy, cold, and thirsty and your eyes and mouth are dry  You are urinating very little or not at all  You have muscle weakness, leg cramps, and trouble breathing  Your heart is beating much faster than normal      You continue to vomit for more than 48 hours  Contact your healthcare provider if:   You have frequent dry heaves (vomiting but nothing comes out)  Your nausea and vomiting does not get better or go away after you use medicine  You have questions or concerns about your condition or treatment  Medicines: You may need any of the following:  Medicines  may be given to calm your stomach and stop your vomiting  You may also need medicines to help you feel more relaxed or to stop nausea and vomiting caused by motion sickness  Gastrointestinal stimulants  are used to help empty your stomach and bowels  This may help decrease nausea and vomiting  Take your medicine as directed  Contact your healthcare provider if you think your medicine is not helping or if you have side effects  Tell him or her if you are allergic to any medicine  Keep a list of the medicines, vitamins, and herbs you take  Include the amounts, and when and why you take them  Bring the list or the pill bottles to follow-up visits  Carry your medicine list with you in case of an emergency  Prevent or manage acute nausea and vomiting:   Do not drink alcohol  Alcohol may upset or irritate your stomach  Too much alcohol can also cause acute nausea and vomiting  Control stress    Headaches due to stress may cause nausea and vomiting  Find ways to relax and manage your stress  Get more rest and sleep  Drink more liquids as directed  Vomiting can lead to dehydration  It is important to drink more liquids to help replace lost body fluids  Ask your healthcare provider how much liquid to drink each day and which liquids are best for you  Your provider may recommend that you drink an oral rehydration solution (ORS)  ORS contains water, salts, and sugar that are needed to replace the lost body fluids  Ask what kind of ORS to use, how much to drink, and where to get it  Eat smaller meals, more often  Eat small amounts of food every 2 to 3 hours, even if you are not hungry  Food in your stomach may decrease your nausea  Talk to your healthcare provider before you take over-the-counter (OTC) medicines  These medicines can cause serious problems if you use certain other medicines, or you have a medical condition  You may have problems if you use too much or use them for longer than the label says  Follow directions on the label carefully  Follow up with your healthcare provider as directed:  Write down your questions so you remember to ask them during your follow-up visits  © Copyright [employer] 2022 Information is for End User's use only and may not be sold, redistributed or otherwise used for commercial purposes  All illustrations and images included in CareNotes® are the copyrighted property of [employer]  or [employer]  The above information is an  only  It is not intended as medical advice for individual conditions or treatments  Talk to your doctor, nurse or pharmacist before following any medical regimen to see if it is safe and effective for you

## 2022-10-30 NOTE — LETTER
October 30, 2022     Patient: Claude Brandon   YOB: 1995   Date of Visit: 10/30/2022       To Whom it May Concern:    Claude Brandon was seen in my clinic on 10/30/2022  She may return to work on 11/1/2022  If you have any questions or concerns, please don't hesitate to call           Sincerely,          Tyler Kelly PA-C        CC: No Recipients
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
## 2022-11-10 ENCOUNTER — TELEMEDICINE (OUTPATIENT)
Dept: FAMILY MEDICINE CLINIC | Facility: CLINIC | Age: 27
End: 2022-11-10

## 2022-11-10 ENCOUNTER — TELEPHONE (OUTPATIENT)
Dept: FAMILY MEDICINE CLINIC | Facility: CLINIC | Age: 27
End: 2022-11-10

## 2022-11-10 VITALS — OXYGEN SATURATION: 97 % | HEART RATE: 69 BPM

## 2022-11-10 DIAGNOSIS — U07.1 COVID-19: Primary | ICD-10-CM

## 2022-11-10 RX ORDER — ALBUTEROL SULFATE 90 UG/1
2 AEROSOL, METERED RESPIRATORY (INHALATION) EVERY 6 HOURS PRN
Qty: 18 G | Refills: 0 | Status: SHIPPED | OUTPATIENT
Start: 2022-11-10

## 2022-11-10 NOTE — LETTER
[facility]  [address]  [address] [facility]  [phone]  Dept: [phone]    November 10, 2022    Patient: Charlette Coats  YOB: 1995    Charlette Coats is under my professional care and was seen today for a telemedicine visit  She has tested positive for covid-19 and she may return to work 11/15/2022  If you have any questions or concerns please do not hesitate to contact me      Sincerely,    Bao Burgos MD

## 2022-11-10 NOTE — LETTER
November 10, 2022     Patient: Armaan Patel  YOB: 1995  Date: 11/10/2022      To Whom it May Concern:    Armaan Patel is under my professional care  Armaan Patel has been under my care for the following diagnosis: Anxiety Disorder F41 1 and PTSD F43 10  I feel that Armaan Patel is unable to serve on [employer] Duty at this time for the above mentioned medical reasons  If you have any questions or concerns, please don't hesitate to call           Sincerely,          Jose Manuel Reyez MD

## 2022-11-10 NOTE — PROGRESS NOTES
COVID-19 Outpatient Progress Note    Assessment/Plan:    Problem List Items Addressed This Visit        Other    COVID-19 - Primary    Relevant Medications    albuterol (PROVENTIL HFA,VENTOLIN HFA) 90 mcg/act inhaler         Disposition:     Patient has asymptomatic or mild COVID-19 infection  Based off CDC guidelines, they were recommended to isolate for 5 days  If they are asymptomatic or symptoms are improving with no fevers in the past 24 hours, isolation may be ended followed by 5 days of wearing a mask when around othes to minimize risk of infecting others  If still have a fever or other symptoms have not improved, continue to isolate until they improve  Regardless of when they end isolation, avoid being around people who are more likely to get very sick from COVID-19 until at least day 11  Discussed symptom directed medication options with patient  Discussed vitamin D, vitamin C, and/or zinc supplementation with patient  Discussed risks, benefits, and side effects of inhaled corticosteroids and they agree to treatment  I have spent 10 minutes directly with the patient  Greater than 50% of this time was spent in counseling/coordination of care regarding: instructions for management and patient and family education  Encounter provider: Mary Nation MD     Provider located at: [address] [place] [address]  [address] [place] [address]  [address] [address] [address] [phone]     Recent Visits  No visits were found meeting these conditions  Showing recent visits within past 7 days and meeting all other requirements  Today's Visits  Date Type Provider Dept   11/10/22 Yancy Gastelum MD [facility]   Showing today's visits and meeting all other requirements  Future Appointments  No visits were found meeting these conditions    Showing future appointments within next 150 days and meeting all other requirements     This virtual check-in was done via Epic Embedded and patient was informed that this is a secure, HIPAA-compliant platform  She agrees to proceed  Patient agrees to participate in a virtual check in via telephone or video visit instead of presenting to the office to address urgent/immediate medical needs  Patient is aware this is a billable service  She acknowledged consent and understanding of privacy and security of the video platform  The patient has agreed to participate and understands they can discontinue the visit at any time  After connecting through [facility], the patient was identified by name and date of birth  Karen Wilson was informed that this was a telemedicine visit and that the exam was being conducted confidentially over secure lines  My office door was closed  No one else was in the room  Karen Wilson acknowledged consent and understanding of privacy and security of the telemedicine visit  I informed the patient that I have reviewed her record in Epic and presented the opportunity for her to ask any questions regarding the visit today  The patient agreed to participate  Verification of patient location:  Patient is located in the following state in which I hold an active license: [place]    Subjective: Karen Wilson is a 32 y o  female who has been screened for COVID-19  Symptom change since last report: improving  Patient's symptoms include fever (Resolved), nasal congestion and chest tightness  Patient denies chills, fatigue, malaise, rhinorrhea, sore throat, anosmia, loss of taste, cough, shortness of breath, abdominal pain, nausea, vomiting, diarrhea, myalgias and headaches  - Date of symptom onset: 11/7/2022  - Date of positive COVID-19 test: 11/8/2022  Type of test: Home antigen  Home antigen result verified by provider  Will get picture of test uploaded/scanned into patient's chart  COVID-19 vaccination status: Not vaccinated    Unique Collazo has been staying home and has isolated themselves in her home   She is taking care to not share personal items and is cleaning all surfaces that are touched often, like counters, tabletops, and doorknobs using household cleaning sprays or wipes  She is wearing a mask when she leaves her room  Lab Results   Component Value Date    SARSCOV2 Negative 03/25/2022    SARSCOV2 Not Detected 10/31/2020    [address] Negative 04/18/2021    SARSCOVAG Negative 08/19/2022       Review of Systems   Constitutional: Positive for fever (Resolved)  Negative for chills and fatigue  HENT: Positive for congestion  Negative for rhinorrhea and sore throat  Respiratory: Positive for chest tightness  Negative for cough and shortness of breath  Gastrointestinal: Negative for abdominal pain, diarrhea, nausea and vomiting  Musculoskeletal: Negative for myalgias  Neurological: Negative for headaches  Current Outpatient Medications on File Prior to Visit   Medication Sig   • ketoconazole (NIZORAL) 2 % shampoo Apply 1 application topically 2 (two) times a week   • fluconazole (DIFLUCAN) 150 mg tablet TAKE 1 TABLET BY MOUTH ONCE  REPEAT IN 3 DAYS UNTIL FINISHED   • LORazepam (ATIVAN) 0 5 mg tablet Take 1 tablet (0 5 mg total) by mouth 2 (two) times a day as needed for anxiety (Patient not taking: No sig reported)   • ondansetron (Zofran ODT) 4 mg disintegrating tablet Take 1 tablet (4 mg total) by mouth every 8 (eight) hours as needed for nausea or vomiting (Patient not taking: No sig reported)   • [DISCONTINUED] albuterol (PROVENTIL HFA,VENTOLIN HFA) 90 mcg/act inhaler TAKE 2 PUFFS BY MOUTH EVERY 6 HOURS AS NEEDED FOR WHEEZE (Patient not taking: No sig reported)       Objective:    Pulse 69   SpO2 97%      Physical Exam  Constitutional:       General: She is not in acute distress  Appearance: She is not ill-appearing  HENT:      Head: Normocephalic and atraumatic  Eyes:      General:         Right eye: No discharge  Left eye: No discharge  Extraocular Movements: Extraocular movements intact  Pulmonary:      Effort: No respiratory distress  Neurological:      General: No focal deficit present  Mental Status: She is alert     Psychiatric:         Mood and Affect: Mood normal          Behavior: Behavior normal        Mary Nation MD

## 2022-11-10 NOTE — TELEPHONE ENCOUNTER
Patient needs a jury duty letter with todays date I will update the note for the patient she had one previously on 05/2022

## 2023-01-26 ENCOUNTER — VBI (OUTPATIENT)
Dept: ADMINISTRATIVE | Facility: OTHER | Age: 28
End: 2023-01-26

## 2023-02-05 ENCOUNTER — OFFICE VISIT (OUTPATIENT)
Dept: URGENT CARE | Age: 28
End: 2023-02-05

## 2023-02-05 VITALS
OXYGEN SATURATION: 99 % | TEMPERATURE: 98.3 F | SYSTOLIC BLOOD PRESSURE: 116 MMHG | HEIGHT: 69 IN | BODY MASS INDEX: 18.96 KG/M2 | HEART RATE: 72 BPM | WEIGHT: 128 LBS | RESPIRATION RATE: 20 BRPM | DIASTOLIC BLOOD PRESSURE: 62 MMHG

## 2023-02-05 DIAGNOSIS — R51.9 ACUTE INTRACTABLE HEADACHE, UNSPECIFIED HEADACHE TYPE: Primary | ICD-10-CM
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
## 2023-04-07 ENCOUNTER — OFFICE VISIT (OUTPATIENT)
Dept: URGENT CARE | Age: 28
End: 2023-04-07

## 2023-04-07 VITALS
DIASTOLIC BLOOD PRESSURE: 74 MMHG | HEART RATE: 103 BPM | TEMPERATURE: 98.1 F | RESPIRATION RATE: 18 BRPM | SYSTOLIC BLOOD PRESSURE: 120 MMHG | OXYGEN SATURATION: 99 %

## 2023-04-07 DIAGNOSIS — R09.81 NASAL CONGESTION: ICD-10-CM

## 2023-04-07 DIAGNOSIS — J02.9 SORE THROAT: Primary | ICD-10-CM

## 2023-04-07 DIAGNOSIS — R05.1 ACUTE COUGH: ICD-10-CM

## 2023-04-07 LAB
S PYO AG THROAT QL: NEGATIVE
SARS-COV-2 AG UPPER RESP QL IA: NEGATIVE
VALID CONTROL: NORMAL

## 2023-04-07 RX ORDER — FLUTICASONE PROPIONATE 50 MCG
2 SPRAY, SUSPENSION (ML) NASAL DAILY
Qty: 15.8 ML | Refills: 0 | Status: SHIPPED | OUTPATIENT
Start: 2023-04-07

## 2023-04-07 RX ORDER — BENZONATATE 100 MG/1
100 CAPSULE ORAL 3 TIMES DAILY PRN
Qty: 20 CAPSULE | Refills: 0 | Status: SHIPPED | OUTPATIENT
Start: 2023-04-07

## 2023-04-07 NOTE — LETTER
[facility] [place] [address]  [address] [address]  Dept: [phone]    April 7, 2023    Patient: Dedrick Velásquez  YOB: 1995    Dedrick Velásquez was seen and evaluated at our [facility]  Please note if Covid and Flu tests are negative, they may return to work when fever free for 24 hours without the use of a fever reducing agent  If Covid or Flu test is positive, they may return to work on 4/10/23, as this is 6 days from the onset of symptoms  Upon return, they must then adhere to strict masking for an additional 4 days      Sincerely,    Ovidio Hendricks
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
## 2023-04-07 NOTE — PATIENT INSTRUCTIONS
Use benzonatate as directed as needed for cough  Use flonase as directed  Recommend throat lozenges, anesthetic spray such as chloraseptic, and gargling warm salt water for throat irritation  Hydration and rest   Acetaminophen and ibuprofen for pain relief and fever reduction  COVID/influenza testing  Throat culture sent  [facility]  PCP follow up in 3-5 days  Go to an emergency department if difficulty breathing occurs or if symptoms worsen      Recommended supplements for potential COVID-19 is the following: Vitamin D3 2000 IU  daily ,  Vitamin C 1g  every 12 hours , Multivitamin Daily

## 2023-04-08 LAB
FLUAV RNA RESP QL NAA+PROBE: NEGATIVE
FLUBV RNA RESP QL NAA+PROBE: NEGATIVE
SARS-COV-2 RNA RESP QL NAA+PROBE: NEGATIVE

## 2023-04-09 LAB — BACTERIA THROAT CULT: NORMAL

## 2023-05-24 NOTE — ED PROVIDER NOTES
History  Chief Complaint   Patient presents with    Panic Attack     Pt states that she had a panic attack today  Pt also sttaes she is constipated from quiting smoking but she had a bowel movemnt before she came here, pt is also worried she may be pregnant, pt is also worried about her thyroid levels  This 12-year-old female with history of Hashimoto's disease and generalized anxiety disorder states she has been anorexic all week  She is eating less than usual   She  had no bowel movement for several days until this evening  She feels her panic attacks are returning  She drank a lot of coffee this evening and felt lightheaded, nauseous and dizzy  Felt like her throat was closing  These symptoms are typical for her panic attacks  She feels better now  She is not sure if she may be pregnant  Patient denies fever, headache, vertigo cough dyspnea, abdominal pain and focal neurologic symptoms          Prior to Admission Medications   Prescriptions Last Dose Informant Patient Reported?  Taking?   acyclovir (ZOVIRAX) 5 % ointment Not Taking at Unknown time Self No No   Sig: Apply 4 times a day as needed   Patient not taking: Reported on 4/3/2019   hydrOXYzine HCL (ATARAX) 10 mg tablet Not Taking at Unknown time Self No No   Sig: Take 2 5 tablets (25 mg total) by mouth every 6 (six) hours as needed for itching   Patient not taking: Reported on 4/3/2019   hydrOXYzine HCL (ATARAX) 10 mg tablet Not Taking at Unknown time Self No No   Sig: Take 1 tablet (10 mg total) by mouth every 6 (six) hours as needed for itching   Patient not taking: Reported on 2019   ketoconazole (NIZORAL) 2 % shampoo Not Taking at Unknown time Self No No   Sig: Apply 1 application topically 2 (two) times a week   Patient not taking: Reported on 2019    levothyroxine 25 mcg tablet Not Taking at Unknown time Self No No   Si/2 tab daily   Patient not taking: Reported on 2019   naproxen (NAPROSYN) 500 mg tablet   No No   Sig: Take 1 tablet (500 mg total) by mouth 2 (two) times a day with meals for 7 days   predniSONE 10 mg tablet Not Taking at Unknown time  No No   Sig: Take 6 tablets today and decrease by one tablet daily until gone   Patient not taking: Reported on 7/31/2019   triamcinolone (KENALOG) 0 5 % ointment Not Taking at Unknown time Self No No   Sig: Apply topically 2 (two) times a day   Patient not taking: Reported on 4/3/2019      Facility-Administered Medications: None       Past Medical History:   Diagnosis Date    Anxiety     Disease of thyroid gland     Eczema     Glomerulonephritis     Urinary tract infection        Past Surgical History:   Procedure Laterality Date    SKIN LESION EXCISION      destruction of benign lesion    TONSILLECTOMY      TONSILLECTOMY  2006    WISDOM TOOTH EXTRACTION      WISDOM TOOTH EXTRACTION  2011       Family History   Problem Relation Age of Onset    Coronary artery disease Father     Alcohol abuse Father     Drug abuse Father     Anxiety disorder Mother     Anxiety disorder Brother     Post-traumatic stress disorder Brother     Drug abuse Brother     Lung cancer Maternal Grandfather     Breast cancer Maternal Aunt     Hypertension Family     Lung cancer Family     Lymphoma Family         pancreatic    Hyperlipidemia Family         pure     I have reviewed and agree with the history as documented  Social History     Tobacco Use    Smoking status: Current Every Day Smoker     Packs/day: 0 50     Years: 3 00     Pack years: 1 50     Types: Cigarettes    Smokeless tobacco: Never Used   Substance Use Topics    Alcohol use: Yes     Comment: socially    Drug use: No        Review of Systems   Constitutional: Positive for appetite change  Negative for chills, diaphoresis, fatigue, fever and unexpected weight change  HENT: Negative  Eyes: Negative  Respiratory: Negative  Cardiovascular: Negative  Gastrointestinal: Positive for constipation   Negative for abdominal distention, abdominal pain, diarrhea and vomiting  Endocrine: Negative  Genitourinary: Negative  Musculoskeletal: Negative  Skin: Negative  Allergic/Immunologic: Negative  Neurological: Negative  Hematological: Negative  Psychiatric/Behavioral: Positive for dysphoric mood  Negative for hallucinations, self-injury, sleep disturbance and suicidal ideas  The patient is nervous/anxious  All other systems reviewed and are negative  Physical Exam  Physical Exam   Constitutional: She is oriented to person, place, and time  She appears well-developed and well-nourished  No distress  HENT:   Head: Normocephalic and atraumatic  Mouth/Throat: Oropharynx is clear and moist    Eyes: Pupils are equal, round, and reactive to light  Conjunctivae and EOM are normal    Neck: Normal range of motion  Neck supple  No JVD present  No tracheal deviation present  No thyromegaly present  Cardiovascular: Normal rate, regular rhythm and intact distal pulses  No murmur heard  Pulmonary/Chest: Effort normal and breath sounds normal  No respiratory distress  Abdominal: Soft  Bowel sounds are normal  She exhibits no distension  There is no tenderness  Musculoskeletal: Normal range of motion  She exhibits no edema  Neurological: She is alert and oriented to person, place, and time  She has normal reflexes  No cranial nerve deficit  Coordination normal    Skin: Skin is warm and dry  Capillary refill takes less than 2 seconds  No rash noted  She is not diaphoretic  Psychiatric: She has a normal mood and affect  Nursing note and vitals reviewed        Vital Signs  ED Triage Vitals [07/31/19 2240]   Temperature Pulse Respirations Blood Pressure SpO2   97 9 °F (36 6 °C) 77 18 125/73 98 %      Temp Source Heart Rate Source Patient Position - Orthostatic VS BP Location FiO2 (%)   Temporal Monitor Lying Right arm --      Pain Score       No Pain           Vitals:    07/31/19 2240 07/31/19 2330 BP: 125/73 111/77   Pulse: 77 86   Patient Position - Orthostatic VS: Lying          Visual Acuity      ED Medications  Medications   sodium chloride 0 9 % bolus 1,000 mL (1,000 mL Intravenous New Bag 7/31/19 2305)       Diagnostic Studies  Results Reviewed     Procedure Component Value Units Date/Time    TSH [[id number]]  (Abnormal) Collected:  07/31/19 2305    Lab Status:  Final result Specimen:  Blood from Line, Venous Updated:  07/31/19 2338     TSH 3RD GENERATON 5 636 uIU/mL     Narrative:       Patients undergoing fluorescein dye angiography may retain small amounts of fluorescein in the body for 48-72 hours post procedure  Samples containing fluorescein can produce falsely depressed TSH values  If the patient had this procedure,a specimen should be resubmitted post fluorescein clearance        Lipase [[id number]]  (Normal) Collected:  07/31/19 2305    Lab Status:  Final result Specimen:  Blood from Line, Venous Updated:  07/31/19 2338     Lipase 170 u/L     Comprehensive metabolic panel [[id number]]  (Abnormal) Collected:  07/31/19 2305    Lab Status:  Final result Specimen:  Blood from Line, Venous Updated:  07/31/19 2330     Sodium 136 mmol/L      Potassium 4 4 mmol/L      Chloride 104 mmol/L      CO2 26 mmol/L      ANION GAP 6 mmol/L      BUN 16 mg/dL      Creatinine 0 81 mg/dL      Glucose 93 mg/dL      Calcium 8 9 mg/dL      AST 15 U/L      ALT 14 U/L      Alkaline Phosphatase 32 U/L      Total Protein 7 1 g/dL      Albumin 3 5 g/dL      Total Bilirubin 0 30 mg/dL      eGFR 101 ml/min/1 73sq m     Narrative:       [facility] guidelines for Chronic Kidney Disease (CKD):     Stage 1 with normal or high GFR (GFR > 90 mL/min/1 73 square meters)    Stage 2 Mild CKD (GFR = 60-89 mL/min/1 73 square meters)    Stage 3A Moderate CKD (GFR = 45-59 mL/min/1 73 square meters)    Stage 3B Moderate CKD (GFR = 30-44 mL/min/1 73 square meters)    Stage 4 Severe CKD (GFR = 15-29 mL/min/1 73 square meters)    Stage 5 End Stage CKD (GFR <15 mL/min/1 73 square meters)  Note: GFR calculation is accurate only with a steady state creatinine    POCT urinalysis dipstick [[id number]]  (Normal) Resulted:  07/31/19 2322    Lab Status:  Final result Specimen:  Urine Updated:  07/31/19 2323     Color, UA yellow     Clarity, UA clear     Glucose, UA (Ref: Negative) neg     Bilirubin, UA (Ref: Negative) neg     Ketones, UA (Ref: Negative) neg     Spec Grav, UA (Ref:1 003-1 030) 1 005     Blood, UA (Ref: Negative) moderate     pH, UA (Ref: 4 5-8 0) 6 5     Protein, UA (Ref: Negative) trace     Urobilinogen, UA (Ref: 0 2- 1 0) neg      Leukocytes, UA (Ref: Negative) neg     Nitrite, UA (Ref: Negative) neg    POCT pregnancy, urine [[id number]]  (Normal) Resulted:  07/31/19 2322    Lab Status:  Final result Updated:  07/31/19 2322     EXT PREG TEST UR (Ref: Negative) negative     Control valid    CBC and differential [[id number]] Collected:  07/31/19 2305    Lab Status:  Final result Specimen:  Blood from Line, Venous Updated:  07/31/19 2312     WBC 7 67 Thousand/uL      RBC 3 99 Million/uL      Hemoglobin 12 1 g/dL      Hematocrit 37 4 %      MCV 94 fL      MCH 30 3 pg      MCHC 32 4 g/dL      RDW 12 2 %      MPV 9 4 fL      Platelets 764 Thousands/uL      nRBC 0 /100 WBCs      Neutrophils Relative 61 %      Immat GRANS % 0 %      Lymphocytes Relative 27 %      Monocytes Relative 11 %      Eosinophils Relative 1 %      Basophils Relative 0 %      Neutrophils Absolute 4 59 Thousands/µL      Immature Grans Absolute 0 02 Thousand/uL      Lymphocytes Absolute 2 10 Thousands/µL      Monocytes Absolute 0 82 Thousand/µL      Eosinophils Absolute 0 11 Thousand/µL      Basophils Absolute 0 03 Thousands/µL     T4, free [[id number]] Collected:  07/31/19 2305    Lab Status:   In process Specimen:  Blood from Line, Venous Updated:  07/31/19 2310                 No orders to display              Procedures  Procedures       ED Course MDM  Number of Diagnoses or Management Options  Hypothyroidism: established and worsening  Panic attack: established and worsening  Diagnosis management comments: Stable for discharge  Instructed to follow up with her family doctor and endocrinologist       Amount and/or Complexity of Data Reviewed  Clinical lab tests: ordered and reviewed  Review and summarize past medical records: yes  Independent visualization of images, tracings, or specimens: yes        Disposition  Final diagnoses:   Panic attack   Hypothyroidism     Time reflects when diagnosis was documented in both MDM as applicable and the Disposition within this note     Time User Action Codes Description Comment    7/31/2019 11:10 PM Eli Hernández [K59 00] Constipation, unspecified constipation type     7/31/2019 11:15 PM Dwain Ward ALVIN Remove [K59 00] Constipation, unspecified constipation type     7/31/2019 11:54 PM Eli Hernández [F41 0] Panic attack     7/31/2019 11:55 PM Eli Hernández [E03 9] Hypothyroidism       ED Disposition     ED Disposition Condition Date/Time Comment    Discharge Stable Wed Jul 31, 2019 11:54 PM Usha Hahn discharge to home/self care  Follow-up Information     Follow up With Specialties Details Why José Luis Rodriguez MD Internal Medicine Call in 1 day For follow-up [address]  [phone]      Your endocrinologist  Call in 1 day For follow-up           Patient's Medications   Discharge Prescriptions    No medications on file     No discharge procedures on file      ED Provider  Electronically Signed by           Eloisa Jerez DO  07/31/19 5249 No

## 2023-06-16 ENCOUNTER — HOSPITAL ENCOUNTER (EMERGENCY)
Facility: HOSPITAL | Age: 28
Discharge: HOME | End: 2023-06-17
Attending: EMERGENCY MEDICINE | Admitting: EMERGENCY MEDICINE
Payer: COMMERCIAL

## 2023-06-16 DIAGNOSIS — T39.311A: Primary | ICD-10-CM

## 2023-06-16 LAB
APTT PPP: 27 SECONDS (ref 23–37)
BASOPHILS # BLD AUTO: 0.03 THOUSANDS/ÂΜL (ref 0–0.1)
BASOPHILS NFR BLD AUTO: 0 % (ref 0–1)
EOSINOPHIL # BLD AUTO: 0.06 THOUSAND/ÂΜL (ref 0–0.61)
EOSINOPHIL NFR BLD AUTO: 0 % (ref 0–6)
ERYTHROCYTE [DISTWIDTH] IN BLOOD BY AUTOMATED COUNT: 13.3 % (ref 11.6–15.1)
HCT VFR BLD AUTO: 38.4 % (ref 34.8–46.1)
HGB BLD-MCNC: 12.7 G/DL (ref 11.5–15.4)
IMM GRANULOCYTES # BLD AUTO: 0.09 THOUSAND/UL (ref 0–0.2)
IMM GRANULOCYTES NFR BLD AUTO: 1 % (ref 0–2)
INR PPP: 1.09 (ref 0.84–1.19)
LYMPHOCYTES # BLD AUTO: 1.24 THOUSANDS/ÂΜL (ref 0.6–4.47)
LYMPHOCYTES NFR BLD AUTO: 9 % (ref 14–44)
MCH RBC QN AUTO: 30.8 PG (ref 26.8–34.3)
MCHC RBC AUTO-ENTMCNC: 33.1 G/DL (ref 31.4–37.4)
MCV RBC AUTO: 93 FL (ref 82–98)
MONOCYTES # BLD AUTO: 1.22 THOUSAND/ÂΜL (ref 0.17–1.22)
MONOCYTES NFR BLD AUTO: 9 % (ref 4–12)
NEUTROPHILS # BLD AUTO: 11.44 THOUSANDS/ÂΜL (ref 1.85–7.62)
NEUTS SEG NFR BLD AUTO: 81 % (ref 43–75)
NRBC BLD AUTO-RTO: 0 /100 WBCS
PLATELET # BLD AUTO: 190 THOUSANDS/UL (ref 149–390)
PMV BLD AUTO: 9.3 FL (ref 8.9–12.7)
PROTHROMBIN TIME: 14.2 SECONDS (ref 11.6–14.5)
RBC # BLD AUTO: 4.12 MILLION/UL (ref 3.81–5.12)
WBC # BLD AUTO: 14.08 THOUSAND/UL (ref 4.31–10.16)

## 2023-06-16 PROCEDURE — 85025 COMPLETE CBC W/AUTO DIFF WBC: CPT | Performed by: EMERGENCY MEDICINE

## 2023-06-16 PROCEDURE — 36415 COLL VENOUS BLD VENIPUNCTURE: CPT | Performed by: EMERGENCY MEDICINE

## 2023-06-16 PROCEDURE — 81025 URINE PREGNANCY TEST: CPT | Performed by: EMERGENCY MEDICINE

## 2023-06-16 PROCEDURE — 80053 COMPREHEN METABOLIC PANEL: CPT | Performed by: EMERGENCY MEDICINE

## 2023-06-16 PROCEDURE — 93005 ELECTROCARDIOGRAM TRACING: CPT

## 2023-06-16 PROCEDURE — 85730 THROMBOPLASTIN TIME PARTIAL: CPT | Performed by: EMERGENCY MEDICINE

## 2023-06-16 PROCEDURE — 96360 HYDRATION IV INFUSION INIT: CPT

## 2023-06-16 PROCEDURE — 84443 ASSAY THYROID STIM HORMONE: CPT | Performed by: EMERGENCY MEDICINE

## 2023-06-16 PROCEDURE — 99284 EMERGENCY DEPT VISIT MOD MDM: CPT

## 2023-06-16 PROCEDURE — 80143 DRUG ASSAY ACETAMINOPHEN: CPT | Performed by: EMERGENCY MEDICINE

## 2023-06-16 PROCEDURE — 85610 PROTHROMBIN TIME: CPT | Performed by: EMERGENCY MEDICINE

## 2023-06-16 PROCEDURE — 80179 DRUG ASSAY SALICYLATE: CPT | Performed by: EMERGENCY MEDICINE

## 2023-06-16 PROCEDURE — 82077 ASSAY SPEC XCP UR&BREATH IA: CPT | Performed by: EMERGENCY MEDICINE

## 2023-06-16 RX ADMIN — SODIUM CHLORIDE 1000 ML: 0.9 INJECTION, SOLUTION INTRAVENOUS at 23:40

## 2023-06-16 NOTE — Clinical Note
Denita Oshea was seen and treated in our emergency department on 6/16/2023  Diagnosis:     Leopoldo Jacobson    She may return on this date: 06/20/2023         If you have any questions or concerns, please don't hesitate to call        Katlin Sheikh RN    ______________________________           _______________          _______________  [facility]                              Date                                Time

## 2023-06-17 VITALS
RESPIRATION RATE: 20 BRPM | BODY MASS INDEX: 18.9 KG/M2 | SYSTOLIC BLOOD PRESSURE: 108 MMHG | WEIGHT: 128 LBS | OXYGEN SATURATION: 97 % | TEMPERATURE: 98.7 F | DIASTOLIC BLOOD PRESSURE: 68 MMHG | HEART RATE: 93 BPM

## 2023-06-17 LAB
ALBUMIN SERPL BCP-MCNC: 3.8 G/DL (ref 3.5–5)
ALP SERPL-CCNC: 38 U/L (ref 34–104)
ALT SERPL W P-5'-P-CCNC: 12 U/L (ref 7–52)
AMPHETAMINES SERPL QL SCN: NEGATIVE
ANION GAP SERPL CALCULATED.3IONS-SCNC: 9 MMOL/L (ref 4–13)
APAP SERPL-MCNC: <10 UG/ML (ref 10–20)
AST SERPL W P-5'-P-CCNC: 16 U/L (ref 13–39)
BARBITURATES UR QL: NEGATIVE
BENZODIAZ UR QL: NEGATIVE
BILIRUB SERPL-MCNC: 0.3 MG/DL (ref 0.2–1)
BUN SERPL-MCNC: 14 MG/DL (ref 5–25)
CALCIUM SERPL-MCNC: 8.7 MG/DL (ref 8.4–10.2)
CHLORIDE SERPL-SCNC: 103 MMOL/L (ref 96–108)
CO2 SERPL-SCNC: 23 MMOL/L (ref 21–32)
COCAINE UR QL: NEGATIVE
CREAT SERPL-MCNC: 0.75 MG/DL (ref 0.6–1.3)
ETHANOL SERPL-MCNC: <10 MG/DL
EXT PREGNANCY TEST URINE: NEGATIVE
EXT. CONTROL: NORMAL
GFR SERPL CREATININE-BSD FRML MDRD: 107 ML/MIN/1.73SQ M
GLUCOSE SERPL-MCNC: 120 MG/DL (ref 65–140)
METHADONE UR QL: NEGATIVE
OPIATES UR QL SCN: NEGATIVE
OXYCODONE+OXYMORPHONE UR QL SCN: NEGATIVE
PCP UR QL: NEGATIVE
POTASSIUM SERPL-SCNC: 3.4 MMOL/L (ref 3.5–5.3)
PROT SERPL-MCNC: 6.7 G/DL (ref 6.4–8.4)
SALICYLATES SERPL-MCNC: <5 MG/DL (ref 3–20)
SODIUM SERPL-SCNC: 135 MMOL/L (ref 135–147)
THC UR QL: NEGATIVE
TSH SERPL DL<=0.05 MIU/L-ACNC: 2.85 UIU/ML (ref 0.45–4.5)

## 2023-06-17 PROCEDURE — 96361 HYDRATE IV INFUSION ADD-ON: CPT

## 2023-06-17 PROCEDURE — 80307 DRUG TEST PRSMV CHEM ANLYZR: CPT | Performed by: EMERGENCY MEDICINE

## 2023-06-17 RX ADMIN — SODIUM CHLORIDE 1000 ML: 0.9 INJECTION, SOLUTION INTRAVENOUS at 00:54

## 2023-06-17 NOTE — ED NOTES
Received pt, resting in bed, appears in no distress  Co of decrease hearing  VS WNL  Report no other symptoms  Continue to monitor        Adarsh Best RN  06/17/23 [id number]

## 2023-06-17 NOTE — ED NOTES
Reported that her hearing slightly better than previously  Denies any other symptoms  Ambulates to bathroom steady gait        Ravindra Harmon RN  06/17/23 7711

## 2023-06-17 NOTE — DISCHARGE INSTRUCTIONS
Don't take any more Naproxen or Aspirin or ibuprofen  Your tests and vital signs were ok at this time  Your symptoms (the decreased hearing) will get better  I did review your case with poison control
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
## 2023-06-18 LAB
ATRIAL RATE: 83 BPM
P AXIS: 73 DEGREES
PR INTERVAL: 162 MS
QRS AXIS: 65 DEGREES
QRSD INTERVAL: 92 MS
QT INTERVAL: 372 MS
QTC INTERVAL: 437 MS
T WAVE AXIS: 52 DEGREES
VENTRICULAR RATE: 83 BPM

## 2023-06-18 PROCEDURE — 93010 ELECTROCARDIOGRAM REPORT: CPT | Performed by: INTERNAL MEDICINE

## 2023-08-29 ENCOUNTER — OFFICE VISIT (OUTPATIENT)
Dept: URGENT CARE | Facility: CLINIC | Age: 28
End: 2023-08-29
Payer: COMMERCIAL

## 2023-08-29 VITALS
SYSTOLIC BLOOD PRESSURE: 104 MMHG | WEIGHT: 135.5 LBS | RESPIRATION RATE: 18 BRPM | HEART RATE: 78 BPM | BODY MASS INDEX: 20.07 KG/M2 | TEMPERATURE: 97.9 F | HEIGHT: 69 IN | DIASTOLIC BLOOD PRESSURE: 68 MMHG | OXYGEN SATURATION: 98 %

## 2023-08-29 DIAGNOSIS — R30.0 DYSURIA: Primary | ICD-10-CM

## 2023-08-29 DIAGNOSIS — N39.0 URINARY TRACT INFECTION WITH HEMATURIA, SITE UNSPECIFIED: ICD-10-CM

## 2023-08-29 DIAGNOSIS — R31.9 URINARY TRACT INFECTION WITH HEMATURIA, SITE UNSPECIFIED: ICD-10-CM

## 2023-08-29 LAB
SL AMB  POCT GLUCOSE, UA: NEGATIVE
SL AMB LEUKOCYTE ESTERASE,UA: ABNORMAL
SL AMB POCT BILIRUBIN,UA: NEGATIVE
SL AMB POCT BLOOD,UA: ABNORMAL
SL AMB POCT CLARITY,UA: ABNORMAL
SL AMB POCT COLOR,UA: ABNORMAL
SL AMB POCT KETONES,UA: NEGATIVE
SL AMB POCT NITRITE,UA: NEGATIVE
SL AMB POCT PH,UA: 6
SL AMB POCT SPECIFIC GRAVITY,UA: 1.02
SL AMB POCT URINE PROTEIN: 300
SL AMB POCT UROBILINOGEN: 0.2

## 2023-08-29 PROCEDURE — 81002 URINALYSIS NONAUTO W/O SCOPE: CPT | Performed by: PHYSICIAN ASSISTANT

## 2023-08-29 PROCEDURE — 99213 OFFICE O/P EST LOW 20 MIN: CPT | Performed by: PHYSICIAN ASSISTANT

## 2023-08-29 RX ORDER — CETIRIZINE HYDROCHLORIDE 10 MG/1
10 TABLET ORAL DAILY PRN
COMMUNITY

## 2023-08-29 RX ORDER — CEPHALEXIN 500 MG/1
500 CAPSULE ORAL EVERY 12 HOURS SCHEDULED
Qty: 14 CAPSULE | Refills: 0 | Status: SHIPPED | OUTPATIENT
Start: 2023-08-29 | End: 2023-09-05
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
## 2023-08-29 NOTE — PATIENT INSTRUCTIONS
Your urine dip came back positive. I will send for culture and follow up if the antibiotic needs to be changed. I recommend Pyridium over the counter for discomfort. You can take it for 2 days. I recommend you drink plenty of fluids. Monitor for any worsening symptoms. If you develop worse abdominal pain, back pain, fever/chills, inability to drink liquids or have a decrease in the amount of urine you make go to the Emergency room.

## 2023-08-30 ENCOUNTER — VBI (OUTPATIENT)
Dept: ADMINISTRATIVE | Facility: OTHER | Age: 28
End: 2023-08-30

## 2023-09-02 LAB
BACTERIA UR CULT: ABNORMAL
Lab: ABNORMAL
SL AMB ANTIMICROBIAL SUSCEPTIBILITY: ABNORMAL

## 2023-10-02 ENCOUNTER — TELEPHONE (OUTPATIENT)
Dept: FAMILY MEDICINE CLINIC | Facility: CLINIC | Age: 28
End: 2023-10-02

## 2023-10-02 NOTE — LETTER
Date: 10/3/2023    To whom it may concern: This is to certify that Michelle Jolley has been under my care for the following diagnosis: Generalized anxiety disorder and PTSD  [facility] [place] -[id number]    I feel that Michelle Jolley is unable to serve on [address] [facility] [address] Duty at this time for the above mentioned medical reasons.                   Sincerely,  Tor Buenrostro MD

## 2023-10-02 NOTE — TELEPHONE ENCOUNTER
Patient left a message that she needs a jury duty letter, I called back to the number that she left on the voicemail and the number was incorrect. Then I called patients phone number in the chart and left a voicemail for her to call us back because in order to create the jury duty note we need the jury duty number in the bottom of the page and also the fax where we should send it too.

## 2023-10-03 NOTE — TELEPHONE ENCOUNTER
Juror ID: [id number]  Fax: [phone]  [facility]  Dx: Anxiety and PTSD  Patient asked for a copy to be sent to her in [address] [place] [address].

## 2023-10-22 ENCOUNTER — OFFICE VISIT (OUTPATIENT)
Dept: URGENT CARE | Facility: CLINIC | Age: 28
End: 2023-10-22
Payer: COMMERCIAL

## 2023-10-22 VITALS
RESPIRATION RATE: 17 BRPM | SYSTOLIC BLOOD PRESSURE: 125 MMHG | HEIGHT: 69 IN | BODY MASS INDEX: 19.91 KG/M2 | DIASTOLIC BLOOD PRESSURE: 79 MMHG | TEMPERATURE: 97.8 F | WEIGHT: 134.4 LBS | OXYGEN SATURATION: 99 % | HEART RATE: 82 BPM

## 2023-10-22 DIAGNOSIS — N39.0 URINARY TRACT INFECTION WITHOUT HEMATURIA, SITE UNSPECIFIED: Primary | ICD-10-CM

## 2023-10-22 LAB
SL AMB  POCT GLUCOSE, UA: ABNORMAL
SL AMB LEUKOCYTE ESTERASE,UA: ABNORMAL
SL AMB POCT BILIRUBIN,UA: ABNORMAL
SL AMB POCT BLOOD,UA: ABNORMAL
SL AMB POCT CLARITY,UA: CLEAR
SL AMB POCT COLOR,UA: ABNORMAL
SL AMB POCT KETONES,UA: 15
SL AMB POCT NITRITE,UA: ABNORMAL
SL AMB POCT PH,UA: 6
SL AMB POCT SPECIFIC GRAVITY,UA: 1.01
SL AMB POCT URINE PROTEIN: 100
SL AMB POCT UROBILINOGEN: 0.2

## 2023-10-22 PROCEDURE — 99213 OFFICE O/P EST LOW 20 MIN: CPT | Performed by: PHYSICIAN ASSISTANT

## 2023-10-22 PROCEDURE — 81002 URINALYSIS NONAUTO W/O SCOPE: CPT | Performed by: PHYSICIAN ASSISTANT

## 2023-10-22 RX ORDER — CEPHALEXIN 500 MG/1
500 CAPSULE ORAL EVERY 12 HOURS SCHEDULED
Qty: 14 CAPSULE | Refills: 0 | Status: SHIPPED | OUTPATIENT
Start: 2023-10-22 | End: 2023-10-29
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
## 2023-10-22 NOTE — PATIENT INSTRUCTIONS
Dysuria:   -The patients hx and sx are most consistent with an acute uncomplicated UTI. Will treat with keflex taken as prescribed. Take with food and a probiotic. She has no fever, chills, body aches. No flank pain or CVA tenderness as per patient. Last urine culture showed e.coli.   -urine cx ordered today. Call in 48 hours for your results. -Warm heating pad on the abdomen or sitz bath for comfort  -Avoid bubble bath/bath oils. Caffeine and alcohol may irritate the bladder.   -Empty bladder immediately before and following intercourse. Avoid "holding urine. "  -AZO/Uricalm for pain control, do not take for more than 48 hours as this can mask worsening symptoms.   -Stay VERY well hydrated and push fluids. You want your urine clear.   -Prevention and precautions discussed  -If your sx worsen or persists, see your PCP or OBGYN immediately as discussed. Red flag signs discussed in depth.

## 2023-10-24 LAB
BACTERIA UR CULT: NORMAL
Lab: NORMAL

## 2023-10-25 ENCOUNTER — TELEPHONE (OUTPATIENT)
Dept: URGENT CARE | Facility: CLINIC | Age: 28
End: 2023-10-25

## 2023-10-25 NOTE — TELEPHONE ENCOUNTER
Pt called to discuss urine culture results - mixed bettye and educated on negative urine culture. Stop antibiotics at this time with mainly resolved symptoms. Educated to follow up with OBGYN if symptoms persist to consider STD testing/vaginitis panel.

## 2023-10-28 ENCOUNTER — OFFICE VISIT (OUTPATIENT)
Dept: URGENT CARE | Age: 28
End: 2023-10-28
Payer: COMMERCIAL

## 2023-10-28 VITALS
DIASTOLIC BLOOD PRESSURE: 86 MMHG | SYSTOLIC BLOOD PRESSURE: 144 MMHG | WEIGHT: 135 LBS | RESPIRATION RATE: 18 BRPM | HEIGHT: 69 IN | HEART RATE: 93 BPM | TEMPERATURE: 97.8 F | OXYGEN SATURATION: 97 % | BODY MASS INDEX: 19.99 KG/M2

## 2023-10-28 DIAGNOSIS — R68.89 FLU-LIKE SYMPTOMS: Primary | ICD-10-CM

## 2023-10-28 DIAGNOSIS — R19.7 DIARRHEA, UNSPECIFIED TYPE: ICD-10-CM

## 2023-10-28 DIAGNOSIS — R09.A2 GLOBUS SENSATION: ICD-10-CM

## 2023-10-28 LAB
SARS-COV-2 AG UPPER RESP QL IA: NEGATIVE
VALID CONTROL: NORMAL

## 2023-10-28 PROCEDURE — 87811 SARS-COV-2 COVID19 W/OPTIC: CPT | Performed by: STUDENT IN AN ORGANIZED HEALTH CARE EDUCATION/TRAINING PROGRAM

## 2023-10-28 PROCEDURE — 99213 OFFICE O/P EST LOW 20 MIN: CPT | Performed by: EMERGENCY MEDICINE
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
## 2024-01-09 ENCOUNTER — TELEPHONE (OUTPATIENT)
Age: 29
End: 2024-01-09

## 2024-01-09 NOTE — TELEPHONE ENCOUNTER
UTI sx stared yesterday - burning and uncomfortable foul smelling urine. Has yeast infection 2 days ago, stopped meds due to abdominal cramps. Has not started pyridium, dud use 1 day for a seven day course of monistat. Has itchy red rash on shoulder and face, took zyrtec but no relief. Patient work schedule does not align with available appointments this week. Offered several openings but not available. Recommended she go to [facility] to be seen for her sx. Scheduled follow up visit for her rash.

## 2024-01-10 ENCOUNTER — OFFICE VISIT (OUTPATIENT)
Dept: URGENT CARE | Facility: CLINIC | Age: 29
End: 2024-01-10
Payer: COMMERCIAL

## 2024-01-10 VITALS
WEIGHT: 137.8 LBS | BODY MASS INDEX: 20.41 KG/M2 | HEART RATE: 84 BPM | HEIGHT: 69 IN | OXYGEN SATURATION: 97 % | SYSTOLIC BLOOD PRESSURE: 108 MMHG | RESPIRATION RATE: 18 BRPM | DIASTOLIC BLOOD PRESSURE: 62 MMHG | TEMPERATURE: 98.9 F

## 2024-01-10 DIAGNOSIS — B37.31 VAGINAL CANDIDIASIS: ICD-10-CM

## 2024-01-10 DIAGNOSIS — R39.9 UTI SYMPTOMS: Primary | ICD-10-CM

## 2024-01-10 DIAGNOSIS — R31.9 HEMATURIA, UNSPECIFIED TYPE: ICD-10-CM

## 2024-01-10 DIAGNOSIS — R80.9 PROTEINURIA, UNSPECIFIED TYPE: ICD-10-CM

## 2024-01-10 LAB
SL AMB  POCT GLUCOSE, UA: ABNORMAL
SL AMB LEUKOCYTE ESTERASE,UA: ABNORMAL
SL AMB POCT BILIRUBIN,UA: ABNORMAL
SL AMB POCT BLOOD,UA: ABNORMAL
SL AMB POCT CLARITY,UA: ABNORMAL
SL AMB POCT COLOR,UA: YELLOW
SL AMB POCT KETONES,UA: ABNORMAL
SL AMB POCT NITRITE,UA: ABNORMAL
SL AMB POCT PH,UA: 5
SL AMB POCT SPECIFIC GRAVITY,UA: 1.03
SL AMB POCT URINE PROTEIN: 2000
SL AMB POCT UROBILINOGEN: 0.2

## 2024-01-10 PROCEDURE — 81002 URINALYSIS NONAUTO W/O SCOPE: CPT | Performed by: FAMILY MEDICINE

## 2024-01-10 PROCEDURE — 99213 OFFICE O/P EST LOW 20 MIN: CPT | Performed by: FAMILY MEDICINE

## 2024-01-10 RX ORDER — CEPHALEXIN 250 MG/5ML
500 POWDER, FOR SUSPENSION ORAL EVERY 12 HOURS SCHEDULED
Qty: 140 ML | Refills: 0 | Status: SHIPPED | OUTPATIENT
Start: 2024-01-10 | End: 2024-01-17
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
## 2024-01-10 NOTE — PATIENT INSTRUCTIONS
- urine dip test performed in office shows large blood, large protein, positive nitrites, and trace leukocytes   - urine sample sent for culture testing, patient has been instructed to call the office in 2-3 days to follow up culture results.   - Keflex x 7 days prescribed, complete as directed   - take Tylenol or Motrin as needed   - drink plenty of fluids  - follow up w/ pcp office for re-check in 3-5 days (patient has appt scheduled for next week)  - if symptoms persist despite treatment, worsen, or any new symptoms present, patient is to be seen in the ER   - vaginal yeast infection symptoms are possibly resolved at this time, patient declined treatment w/ oral Fluconazole, patient is to follow up w/ her PCP office as scheduled for next week, and she has an appt scheduled to be seen w/ her GYN office later this month.   - UA performed in office today shows large blood and protein, while these findings may be related to a UTI, due to the patient's history of glomerulonephritis, I have advised the patient to follow up w/ her PCP office for further evaluation including repeat urine testing and referral to nephrology if indicated.

## 2024-01-11 NOTE — TELEPHONE ENCOUNTER
Confirmed via [facility] patient went to [facility] as advised. No further follow up required. Please advise is PCP would like annual or follow up visit with patient.

## 2024-01-13 LAB
BACTERIA UR CULT: ABNORMAL
Lab: ABNORMAL
SL AMB ANTIMICROBIAL SUSCEPTIBILITY: ABNORMAL

## 2024-01-18 ENCOUNTER — APPOINTMENT (OUTPATIENT)
Dept: LAB | Facility: CLINIC | Age: 29
End: 2024-01-18
Payer: COMMERCIAL

## 2024-01-18 ENCOUNTER — OFFICE VISIT (OUTPATIENT)
Dept: FAMILY MEDICINE CLINIC | Facility: CLINIC | Age: 29
End: 2024-01-18
Payer: COMMERCIAL

## 2024-01-18 VITALS
WEIGHT: 137.8 LBS | HEIGHT: 69 IN | SYSTOLIC BLOOD PRESSURE: 104 MMHG | OXYGEN SATURATION: 98 % | HEART RATE: 59 BPM | TEMPERATURE: 98.7 F | DIASTOLIC BLOOD PRESSURE: 62 MMHG | BODY MASS INDEX: 20.41 KG/M2

## 2024-01-18 DIAGNOSIS — R53.83 OTHER FATIGUE: ICD-10-CM

## 2024-01-18 DIAGNOSIS — R39.9 UTI SYMPTOMS: Primary | ICD-10-CM

## 2024-01-18 DIAGNOSIS — Z11.59 SCREENING FOR VIRAL DISEASE: ICD-10-CM

## 2024-01-18 DIAGNOSIS — N89.8 VAGINAL DISCHARGE: ICD-10-CM

## 2024-01-18 LAB
ALBUMIN SERPL BCP-MCNC: 3.9 G/DL (ref 3.5–5)
ALP SERPL-CCNC: 28 U/L (ref 34–104)
ALT SERPL W P-5'-P-CCNC: 10 U/L (ref 7–52)
ANION GAP SERPL CALCULATED.3IONS-SCNC: 7 MMOL/L
AST SERPL W P-5'-P-CCNC: 16 U/L (ref 13–39)
BASOPHILS # BLD AUTO: 0.03 THOUSANDS/ÂΜL (ref 0–0.1)
BASOPHILS NFR BLD AUTO: 1 % (ref 0–1)
BILIRUB SERPL-MCNC: 0.4 MG/DL (ref 0.2–1)
BUN SERPL-MCNC: 11 MG/DL (ref 5–25)
CALCIUM SERPL-MCNC: 9.3 MG/DL (ref 8.4–10.2)
CHLORIDE SERPL-SCNC: 105 MMOL/L (ref 96–108)
CO2 SERPL-SCNC: 27 MMOL/L (ref 21–32)
CREAT SERPL-MCNC: 0.65 MG/DL (ref 0.6–1.3)
EOSINOPHIL # BLD AUTO: 0.11 THOUSAND/ÂΜL (ref 0–0.61)
EOSINOPHIL NFR BLD AUTO: 2 % (ref 0–6)
ERYTHROCYTE [DISTWIDTH] IN BLOOD BY AUTOMATED COUNT: 13.2 % (ref 11.6–15.1)
GFR SERPL CREATININE-BSD FRML MDRD: 119 ML/MIN/1.73SQ M
GLUCOSE SERPL-MCNC: 82 MG/DL (ref 65–140)
HCT VFR BLD AUTO: 39 % (ref 34.8–46.1)
HGB BLD-MCNC: 12.7 G/DL (ref 11.5–15.4)
IMM GRANULOCYTES # BLD AUTO: 0.03 THOUSAND/UL (ref 0–0.2)
IMM GRANULOCYTES NFR BLD AUTO: 1 % (ref 0–2)
LYMPHOCYTES # BLD AUTO: 2.19 THOUSANDS/ÂΜL (ref 0.6–4.47)
LYMPHOCYTES NFR BLD AUTO: 37 % (ref 14–44)
MCH RBC QN AUTO: 30.1 PG (ref 26.8–34.3)
MCHC RBC AUTO-ENTMCNC: 32.6 G/DL (ref 31.4–37.4)
MCV RBC AUTO: 92 FL (ref 82–98)
MONOCYTES # BLD AUTO: 0.63 THOUSAND/ÂΜL (ref 0.17–1.22)
MONOCYTES NFR BLD AUTO: 11 % (ref 4–12)
NEUTROPHILS # BLD AUTO: 3 THOUSANDS/ÂΜL (ref 1.85–7.62)
NEUTS SEG NFR BLD AUTO: 48 % (ref 43–75)
NRBC BLD AUTO-RTO: 0 /100 WBCS
PLATELET # BLD AUTO: 218 THOUSANDS/UL (ref 149–390)
PMV BLD AUTO: 9.7 FL (ref 8.9–12.7)
POTASSIUM SERPL-SCNC: 4.1 MMOL/L (ref 3.5–5.3)
PROT SERPL-MCNC: 6.2 G/DL (ref 6.4–8.4)
RBC # BLD AUTO: 4.22 MILLION/UL (ref 3.81–5.12)
SARS-COV-2 AG UPPER RESP QL IA: NEGATIVE
SL AMB  POCT GLUCOSE, UA: NEGATIVE
SL AMB LEUKOCYTE ESTERASE,UA: NEGATIVE
SL AMB POCT BILIRUBIN,UA: NEGATIVE
SL AMB POCT BLOOD,UA: ABNORMAL
SL AMB POCT CLARITY,UA: CLEAR
SL AMB POCT COLOR,UA: ABNORMAL
SL AMB POCT KETONES,UA: NEGATIVE
SL AMB POCT NITRITE,UA: NEGATIVE
SL AMB POCT PH,UA: 5
SL AMB POCT RAPID FLU A: NEGATIVE
SL AMB POCT RAPID FLU B: NEGATIVE
SL AMB POCT SPECIFIC GRAVITY,UA: 1.02
SL AMB POCT URINE PROTEIN: ABNORMAL
SL AMB POCT UROBILINOGEN: 0.2
SODIUM SERPL-SCNC: 139 MMOL/L (ref 135–147)
TSH SERPL DL<=0.05 MIU/L-ACNC: 3.59 UIU/ML (ref 0.45–4.5)
VALID CONTROL: NORMAL
WBC # BLD AUTO: 5.99 THOUSAND/UL (ref 4.31–10.16)

## 2024-01-18 PROCEDURE — 87510 GARDNER VAG DNA DIR PROBE: CPT | Performed by: FAMILY MEDICINE

## 2024-01-18 PROCEDURE — 81002 URINALYSIS NONAUTO W/O SCOPE: CPT | Performed by: FAMILY MEDICINE

## 2024-01-18 PROCEDURE — 85025 COMPLETE CBC W/AUTO DIFF WBC: CPT

## 2024-01-18 PROCEDURE — 81001 URINALYSIS AUTO W/SCOPE: CPT | Performed by: FAMILY MEDICINE

## 2024-01-18 PROCEDURE — 99214 OFFICE O/P EST MOD 30 MIN: CPT | Performed by: FAMILY MEDICINE

## 2024-01-18 PROCEDURE — 80053 COMPREHEN METABOLIC PANEL: CPT

## 2024-01-18 PROCEDURE — 87811 SARS-COV-2 COVID19 W/OPTIC: CPT | Performed by: FAMILY MEDICINE

## 2024-01-18 PROCEDURE — 87804 INFLUENZA ASSAY W/OPTIC: CPT | Performed by: FAMILY MEDICINE

## 2024-01-18 PROCEDURE — 36415 COLL VENOUS BLD VENIPUNCTURE: CPT

## 2024-01-18 PROCEDURE — 87660 TRICHOMONAS VAGIN DIR PROBE: CPT | Performed by: FAMILY MEDICINE

## 2024-01-18 PROCEDURE — 87480 CANDIDA DNA DIR PROBE: CPT | Performed by: FAMILY MEDICINE

## 2024-01-18 PROCEDURE — 84443 ASSAY THYROID STIM HORMONE: CPT

## 2024-01-18 RX ORDER — NITROFURANTOIN 25; 75 MG/1; MG/1
100 CAPSULE ORAL 2 TIMES DAILY
Qty: 10 CAPSULE | Refills: 0 | Status: SHIPPED | OUTPATIENT
Start: 2024-01-18 | End: 2024-01-23

## 2024-01-18 NOTE — PROGRESS NOTES
Assessment/Plan:    - POCT urine dip positive for blood and protein. Patient does have a history of glomerulonephritis 10 years ago and states that she has blood and protein in the urine at baseline. Per chart review she was seen by nephrology in 2019 but has not been seen by them since. Urine culture from 1/10/24 reviewed; since patient is still having symptoms will treat with macrobid 100mg BID for 5 days. Will also await results of vaginosis probe  - POCT rapid covid and flu testing negative. Recommend supportive care at this time with plenty of fluids, mucinex/delsym for cough and congestion, salt water gargles for sore throat, honey for cough etc.   - Patient with fatigue following viral infection, will also order CBC, CMP and TSH to rule out other causes of fatigue.        Diagnoses and all orders for this visit:    UTI symptoms  -     Urinalysis with microscopic  -     POCT urine dip  -     nitrofurantoin (MACROBID) 100 mg capsule; Take 1 capsule (100 mg total) by mouth 2 (two) times a day for 5 days    Vaginal discharge  -     Vaginosis DNA Probe    Other fatigue  -     CBC and differential; Future  -     Comprehensive metabolic panel; Future  -     TSH, 3rd generation; Future    Screening for viral disease  -     POCT Rapid Covid Ag  -     POCT rapid flu A and B          Subjective:      Patient ID: Qian Tierney is a 28 y.o. female.    HPI    Qian Tierney is a very pleasant 28 year old female who presents today for a follow up after being seen at the urgent care for a UTI. Patient was subsequently treated with a course of Keflex but reports that she is still having some burning on urination and pressure. She was also experiencing white clumpy discharge for which she took a dose of over the counter monistat 7 but stopped after it started causing irritation. She also reports that she is currently sick with cold symptoms which started a few days ago however for the past month she reports that she has been feeling  "a bit \"off\" with fatigue and brain fog. She states that prior to this she was sick with a viral illness.     The following portions of the patient's history were reviewed and updated as appropriate: allergies, current medications, past family history, past medical history, past social history, past surgical history, and problem list.    Review of Systems   Constitutional:  Positive for fatigue.   HENT:  Positive for congestion and sore throat.    Eyes: Negative.    Respiratory:  Positive for cough.    Cardiovascular: Negative.    Gastrointestinal: Negative.    Genitourinary:  Positive for dysuria, pelvic pain and vaginal discharge.   Musculoskeletal: Negative.    Skin: Negative.    Neurological: Negative.    Psychiatric/Behavioral: Negative.           Objective:      /62 (BP Location: Left arm, Patient Position: Sitting, Cuff Size: Standard)   Pulse 59   Temp 98.7 °F (37.1 °C) (Temporal)   Ht 5' 9\" (1.753 m)   Wt 62.5 kg (137 lb 12.8 oz)   SpO2 98%   BMI 20.35 kg/m²          Physical Exam  Exam conducted with a chaperone present.   Constitutional:       General: She is not in acute distress.     Appearance: She is not ill-appearing.   HENT:      Head: Normocephalic and atraumatic.   Eyes:      General:         Right eye: No discharge.         Left eye: No discharge.      Extraocular Movements: Extraocular movements intact.   Cardiovascular:      Rate and Rhythm: Normal rate.   Pulmonary:      Effort: Pulmonary effort is normal. No respiratory distress.      Breath sounds: No wheezing.   Abdominal:      General: Bowel sounds are normal. There is no distension.      Palpations: Abdomen is soft.      Tenderness: There is no abdominal tenderness. There is no right CVA tenderness or left CVA tenderness.   Genitourinary:     Vagina: No erythema or lesions.      Comments: Scant vaginal discharge   Neurological:      General: No focal deficit present.      Mental Status: She is alert.   Psychiatric:         Mood " and Affect: Mood normal.         Behavior: Behavior normal.

## 2024-01-19 ENCOUNTER — TELEPHONE (OUTPATIENT)
Dept: FAMILY MEDICINE CLINIC | Facility: CLINIC | Age: 29
End: 2024-01-19

## 2024-01-19 DIAGNOSIS — N76.0 BACTERIAL VAGINOSIS: ICD-10-CM

## 2024-01-19 DIAGNOSIS — B96.89 BACTERIAL VAGINOSIS: ICD-10-CM

## 2024-01-19 DIAGNOSIS — R80.9 PROTEINURIA, UNSPECIFIED TYPE: Primary | ICD-10-CM

## 2024-01-19 LAB
BACTERIA UR QL AUTO: ABNORMAL /HPF
BILIRUB UR QL STRIP: NEGATIVE
CANDIDA RRNA VAG QL PROBE: NOT DETECTED
CLARITY UR: CLEAR
COLOR UR: ABNORMAL
G VAGINALIS RRNA GENITAL QL PROBE: DETECTED
GLUCOSE UR STRIP-MCNC: NEGATIVE MG/DL
HGB UR QL STRIP.AUTO: ABNORMAL
KETONES UR STRIP-MCNC: NEGATIVE MG/DL
LEUKOCYTE ESTERASE UR QL STRIP: NEGATIVE
MUCOUS THREADS UR QL AUTO: ABNORMAL
NITRITE UR QL STRIP: NEGATIVE
NON-SQ EPI CELLS URNS QL MICRO: ABNORMAL /HPF
PH UR STRIP.AUTO: 6 [PH]
PROT UR STRIP-MCNC: ABNORMAL MG/DL
RBC #/AREA URNS AUTO: ABNORMAL /HPF
SP GR UR STRIP.AUTO: 1.01 (ref 1–1.03)
T VAGINALIS RRNA GENITAL QL PROBE: NOT DETECTED
UROBILINOGEN UR STRIP-ACNC: <2 MG/DL
WBC #/AREA URNS AUTO: ABNORMAL /HPF

## 2024-01-19 RX ORDER — METRONIDAZOLE 500 MG/1
500 TABLET ORAL EVERY 12 HOURS SCHEDULED
Qty: 14 TABLET | Refills: 0 | Status: SHIPPED | OUTPATIENT
Start: 2024-01-19 | End: 2024-01-26

## 2024-01-19 NOTE — TELEPHONE ENCOUNTER
----- Message from Cheyanne Roland MD sent at 1/19/2024  2:03 PM EST -----  Vaginosis probe shows that patient has bacterial vaginosis which is very common. I have sent a prescription for flagyl which is to be taken twice a day for 7 days. Please ask patient to not drink alcohol whilst on this medication and for 3 days afterwards. Lab testing showed that blood count, TSH, liver and kidney function within normal limits. She does have protein on urine microscopy so I will order a urine microalbumin/creatinine ratio for further evaluation. She can go to any [facility]'s lab to have this evaluated. Based on results she may need to see Nephrology again.

## 2024-01-21 DIAGNOSIS — R80.9 PROTEINURIA, UNSPECIFIED TYPE: Primary | ICD-10-CM

## 2024-01-22 ENCOUNTER — TELEPHONE (OUTPATIENT)
Dept: NEPHROLOGY | Facility: CLINIC | Age: 29
End: 2024-01-22

## 2024-01-22 DIAGNOSIS — R80.9 PROTEINURIA, UNSPECIFIED TYPE: Primary | ICD-10-CM

## 2024-01-22 NOTE — TELEPHONE ENCOUNTER
Pt called to schedule an appt. Pt was last seen by Dr Mccain in 2019, so she would need a new consult appt. I let her know the next open date for a consult (March) and she stated she would call back, as she was hoping for a sooner date and would like to check other nephrology offices.

## 2024-01-22 NOTE — TELEPHONE ENCOUNTER
New Patient Intake Form   Patient Details   Qian Tierney     1995     [id number]     Insurance Information   Name of Insurance Company De Correspondent    Does the patient need an insurance referral? no   If patient has VA insurance, please ask if they will be using their VA insurance.   Appointment Information   Who is calling to schedule?  If not patient, what is callers name? Patient   Referring Provider  Cheyanne Roland   Reason for Appt (Diagnosis) Proteinuria    Does Patient have labs/urine done at [facility]?  If not, where do they go?  List the date of last lab / urine  *Please try to get labs 2 years back if not at  yes   Has patient been hospitalized recently?  If yes, list name and location of hospital they were in no   Has patient been seen by a Nephrologist before?  If yes, list name, location and phone number yes   Has the patient had renal imaging done?  If so, list the most recent date and type of imaging yes   Kidney U/S 1/26/24   Does patient have a history of Kidney Stones? no   Appointment Details   Is there a referral on file? yes    Appointment Date 3/22/2024    Location  [facility]   Miscellaneous   Seen back in 2019

## 2024-01-26 ENCOUNTER — LAB (OUTPATIENT)
Dept: LAB | Facility: HOSPITAL | Age: 29
End: 2024-01-26
Payer: COMMERCIAL

## 2024-01-26 ENCOUNTER — HOSPITAL ENCOUNTER (OUTPATIENT)
Dept: ULTRASOUND IMAGING | Facility: HOSPITAL | Age: 29
Discharge: HOME | End: 2024-01-26
Attending: FAMILY MEDICINE
Payer: COMMERCIAL

## 2024-01-26 ENCOUNTER — OFFICE VISIT (OUTPATIENT)
Dept: OBGYN CLINIC | Facility: MEDICAL CENTER | Age: 29
End: 2024-01-26
Payer: COMMERCIAL

## 2024-01-26 VITALS — HEIGHT: 69 IN | BODY MASS INDEX: 20.11 KG/M2 | WEIGHT: 135.8 LBS

## 2024-01-26 DIAGNOSIS — R39.9 UTI SYMPTOMS: ICD-10-CM

## 2024-01-26 DIAGNOSIS — G89.29 CHRONIC RLQ PAIN: ICD-10-CM

## 2024-01-26 DIAGNOSIS — R80.9 PROTEINURIA, UNSPECIFIED TYPE: ICD-10-CM

## 2024-01-26 DIAGNOSIS — N89.8 VAGINAL DISCHARGE: ICD-10-CM

## 2024-01-26 DIAGNOSIS — R10.31 CHRONIC RLQ PAIN: ICD-10-CM

## 2024-01-26 DIAGNOSIS — Z12.4 ENCOUNTER FOR SCREENING FOR CERVICAL CANCER: Primary | ICD-10-CM

## 2024-01-26 LAB
CREAT UR-MCNC: 121.8 MG/DL
MICROALBUMIN UR-MCNC: 930.1 MG/L
MICROALBUMIN/CREAT 24H UR: 764 MG/G CREATININE (ref 0–30)
SL AMB  POCT GLUCOSE, UA: NORMAL
SL AMB LEUKOCYTE ESTERASE,UA: NORMAL
SL AMB POCT BILIRUBIN,UA: NORMAL
SL AMB POCT CLARITY,UA: NORMAL
SL AMB POCT COLOR,UA: NORMAL
SL AMB POCT KETONES,UA: NORMAL
SL AMB POCT NITRITE,UA: NORMAL
SL AMB POCT PH,UA: 6
SL AMB POCT SPECIFIC GRAVITY,UA: 1.02
SL AMB POCT URINE PROTEIN: 2000
SL AMB POCT UROBILINOGEN: NORMAL

## 2024-01-26 PROCEDURE — 81002 URINALYSIS NONAUTO W/O SCOPE: CPT | Performed by: OBSTETRICS & GYNECOLOGY

## 2024-01-26 PROCEDURE — 86038 ANTINUCLEAR ANTIBODIES: CPT

## 2024-01-26 PROCEDURE — G0145 SCR C/V CYTO,THINLAYER,RESCR: HCPCS | Performed by: OBSTETRICS & GYNECOLOGY

## 2024-01-26 PROCEDURE — 81514 NFCT DS BV&VAGINITIS DNA ALG: CPT | Performed by: OBSTETRICS & GYNECOLOGY

## 2024-01-26 PROCEDURE — G0476 HPV COMBO ASSAY CA SCREEN: HCPCS | Performed by: OBSTETRICS & GYNECOLOGY

## 2024-01-26 PROCEDURE — 36415 COLL VENOUS BLD VENIPUNCTURE: CPT

## 2024-01-26 PROCEDURE — 82570 ASSAY OF URINE CREATININE: CPT

## 2024-01-26 PROCEDURE — 76775 US EXAM ABDO BACK WALL LIM: CPT

## 2024-01-26 PROCEDURE — 82043 UR ALBUMIN QUANTITATIVE: CPT

## 2024-01-26 PROCEDURE — 99385 PREV VISIT NEW AGE 18-39: CPT | Performed by: OBSTETRICS & GYNECOLOGY

## 2024-01-26 NOTE — PROGRESS NOTES
A/P    1.  Annual exam    Last PAP - 9/19/2019- neg   Next due today with co testing    Scheduling of pap discussed in detail       2.  Discharge -  She had white thick discharge - tried one dose of monistat and got cramps.    She stopped it   Her cycle came then and now she had smell      Vaginal culture sent today     3.  Urine has a very foul odor in the am she states that her partner smells it also no pain    She thinks she is always having UTI -    Last culture was 1/10- + e Coli    4.  Lump in the vagina   Does not hurt and gets bigger with sex   Not in the vagina and does not drain or smell     5.  Infertility    Tired with the last partner x 8 years   Nothing    Then tried with this partner x 1 year    She states that the cycles are regular and get ovulation mucus   Unsure about the father.       Sonogram             28 y.o.,Patient's last menstrual period was 01/21/2024 (exact date).  C/O many gyn concerns as detailed as above       Past medical / social / surgical / family history reviewed and updated   Medication and allergies discussed in detail and updated     Review of Systems - History obtained from chart review and the patient  General ROS: negative  Psychological ROS: negative  Ophthalmic ROS: negative  ENT ROS: negative  Allergy and Immunology ROS: negative  Hematological and Lymphatic ROS: negative  Endocrine ROS: negative  Breast ROS: negative for breast lumps  Respiratory ROS: no cough, shortness of breath, or wheezing  Cardiovascular ROS: no chest pain or dyspnea on exertion  Gastrointestinal ROS: no abdominal pain, change in bowel habits, or black or bloody stools  Genito-Urinary ROS: no dysuria, trouble voiding, or hematuria  Odor in urine, lump in the vagina, infertility   Musculoskeletal ROS: negative  Neurological ROS: no TIA or stroke symptoms  Dermatological ROS: negative        Physical Exam  Vitals reviewed. Exam conducted with a chaperone present.   Constitutional:       Appearance:  She is well-developed.   Neck:      Thyroid: No thyromegaly.   Cardiovascular:      Rate and Rhythm: Normal rate.      Heart sounds: Normal heart sounds.   Pulmonary:      Effort: Pulmonary effort is normal. No accessory muscle usage or respiratory distress.      Breath sounds: Normal air entry.   Chest:      Chest wall: No tenderness.   Breasts:     Breasts are symmetrical.      Right: No inverted nipple, mass or tenderness.      Left: No inverted nipple, mass or tenderness.   Abdominal:      General: There is no distension.      Palpations: Abdomen is soft.      Tenderness: There is no abdominal tenderness. There is no right CVA tenderness, left CVA tenderness, guarding or rebound.   Genitourinary:     General: Normal vulva.      Exam position: Lithotomy position.      Labia:         Right: No rash, tenderness, lesion or injury.         Left: No rash, tenderness, lesion or injury.       Vagina: Normal. No foreign body. No vaginal discharge or bleeding.      Cervix: Normal.      Uterus: Normal. Not enlarged and not fixed.       Adnexa: Right adnexa normal and left adnexa normal.        Right: No mass, tenderness or fullness.          Left: No mass, tenderness or fullness.        Rectum: No external hemorrhoid.          Comments: Bump in the labia     No discharge or odor   Musculoskeletal:      Cervical back: Normal range of motion.   Lymphadenopathy:      Cervical: No cervical adenopathy.      Upper Body:      Right upper body: No supraclavicular adenopathy.      Left upper body: No supraclavicular adenopathy.   Neurological:      Mental Status: She is alert and oriented to person, place, and time.   Psychiatric:         Speech: Speech normal.         Behavior: Behavior normal.         Thought Content: Thought content normal.         Judgment: Judgment normal.

## 2024-01-27 LAB
ANA SER QL IA: NEGATIVE
C GLABRATA DNA VAG QL NAA+PROBE: NEGATIVE
C KRUSEI DNA VAG QL NAA+PROBE: NEGATIVE
CANDIDA SP 6 PNL VAG NAA+PROBE: NEGATIVE
T VAGINALIS DNA VAG QL NAA+PROBE: POSITIVE
VAGINOSIS/ITIS DNA PNL VAG PROBE+SIG AMP: POSITIVE

## 2024-01-29 LAB
HPV HR 12 DNA CVX QL NAA+PROBE: NEGATIVE
HPV16 DNA CVX QL NAA+PROBE: NEGATIVE
HPV18 DNA CVX QL NAA+PROBE: NEGATIVE

## 2024-01-31 DIAGNOSIS — A59.9 TRICHOMONAL INFECTION: Primary | ICD-10-CM

## 2024-01-31 LAB
LAB AP GYN PRIMARY INTERPRETATION: NORMAL
Lab: NORMAL

## 2024-01-31 RX ORDER — METRONIDAZOLE 500 MG/1
2000 TABLET ORAL ONCE
Qty: 4 TABLET | Refills: 0 | Status: SHIPPED | OUTPATIENT
Start: 2024-01-31 | End: 2024-01-31

## 2024-02-07 ENCOUNTER — APPOINTMENT (OUTPATIENT)
Dept: LAB | Facility: MEDICAL CENTER | Age: 29
End: 2024-02-07
Payer: COMMERCIAL

## 2024-02-07 ENCOUNTER — OFFICE VISIT (OUTPATIENT)
Dept: FAMILY MEDICINE CLINIC | Facility: CLINIC | Age: 29
End: 2024-02-07
Payer: COMMERCIAL

## 2024-02-07 VITALS
SYSTOLIC BLOOD PRESSURE: 118 MMHG | OXYGEN SATURATION: 98 % | HEIGHT: 69 IN | DIASTOLIC BLOOD PRESSURE: 66 MMHG | HEART RATE: 77 BPM | TEMPERATURE: 97.7 F | BODY MASS INDEX: 20.08 KG/M2 | WEIGHT: 135.6 LBS

## 2024-02-07 DIAGNOSIS — J30.81 ALLERGIC RHINITIS DUE TO ANIMAL HAIR AND DANDER: ICD-10-CM

## 2024-02-07 DIAGNOSIS — R80.9 PROTEINURIA, UNSPECIFIED TYPE: ICD-10-CM

## 2024-02-07 DIAGNOSIS — L20.82 FLEXURAL ECZEMA: Primary | ICD-10-CM

## 2024-02-07 PROBLEM — U07.1 COVID-19: Status: RESOLVED | Noted: 2021-10-07 | Resolved: 2024-02-07

## 2024-02-07 LAB
C3 SERPL-MCNC: 86 MG/DL (ref 87–200)
C4 SERPL-MCNC: 21 MG/DL (ref 19–52)
CRP SERPL QL: <1 MG/L

## 2024-02-07 PROCEDURE — 86225 DNA ANTIBODY NATIVE: CPT

## 2024-02-07 PROCEDURE — 86160 COMPLEMENT ANTIGEN: CPT

## 2024-02-07 PROCEDURE — 86140 C-REACTIVE PROTEIN: CPT

## 2024-02-07 PROCEDURE — 99214 OFFICE O/P EST MOD 30 MIN: CPT | Performed by: FAMILY MEDICINE

## 2024-02-07 PROCEDURE — 87086 URINE CULTURE/COLONY COUNT: CPT | Performed by: OBSTETRICS & GYNECOLOGY

## 2024-02-07 PROCEDURE — 36415 COLL VENOUS BLD VENIPUNCTURE: CPT

## 2024-02-07 RX ORDER — AZELASTINE HYDROCHLORIDE 0.5 MG/ML
1 SOLUTION/ DROPS OPHTHALMIC 2 TIMES DAILY
Qty: 6 ML | Refills: 0 | Status: SHIPPED | OUTPATIENT
Start: 2024-02-07

## 2024-02-07 RX ORDER — TRIAMCINOLONE ACETONIDE 1 MG/G
OINTMENT TOPICAL 2 TIMES DAILY
Qty: 30 G | Refills: 2 | Status: SHIPPED | OUTPATIENT
Start: 2024-02-07

## 2024-02-07 NOTE — PROGRESS NOTES
Assessment/Plan:    No problem-specific Assessment & Plan notes found for this encounter.       Diagnoses and all orders for this visit:    Flexural eczema  -     triamcinolone (KENALOG) 0.1 % ointment; Apply topically 2 (two) times a day    Allergic rhinitis due to animal hair and dander  -     azelastine (OPTIVAR) 0.05 % ophthalmic solution; Administer 1 drop to both eyes 2 (two) times a day        - Discussed the importance of skin hydration with thick creams and emollients (e.g. Vaseline, Aquaphor)  and avoiding fragranced emollients. Will also prescribe triamcinolone ointment to apply to the elbows twice a day.   - Continue claritin for allergic rhinitis, will also prescribe azelastine eye drops as well.    Subjective:      Patient ID: Qian Tierney is a 28 y.o. female.    HPI    Qian Tierney is a very pleasant 28 year old female who presents today with a chief complaint of a rash. Patient states that she developed a rash on her back which she thinks was from the chlorine in the pool at her gym. She states that this rash has improved. She does however have a rash on the inside of her elbows which she has had before and thinks it secondary to her eczema. In the past she was on triamcinolone which helped but does not have that any more. She states that she also has a rash on her face as well and around her eyes. She reports that she has been using bath and body works soaps for her body and face. She has been using vaseline. 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 She states that she has been having to take her claritin every day since working there but is also requesting eye drops.     The following portions of the patient's history were reviewed and updated as appropriate: allergies, current medications, past family history, past medical history, past social history, past surgical history, and problem  "list.    Review of Systems   Constitutional: Negative.    HENT: Negative.     Eyes:  Positive for redness and itching.   Respiratory: Negative.     Cardiovascular: Negative.    Gastrointestinal: Negative.    Genitourinary: Negative.    Musculoskeletal: Negative.    Skin:  Positive for rash.   Neurological: Negative.    Psychiatric/Behavioral: Negative.           Objective:      /66 (BP Location: Left arm, Patient Position: Sitting, Cuff Size: Standard)   Pulse 77   Temp 97.7 °F (36.5 °C) (Temporal)   Ht 5' 9\" (1.753 m)   Wt 61.5 kg (135 lb 9.6 oz)   LMP 01/21/2024 (Exact Date)   SpO2 98%   BMI 20.02 kg/m²          Physical Exam  Constitutional:       General: She is not in acute distress.     Appearance: She is not ill-appearing.   HENT:      Head: Normocephalic and atraumatic.   Eyes:      General:         Right eye: No discharge.         Left eye: No discharge.      Extraocular Movements: Extraocular movements intact.   Cardiovascular:      Rate and Rhythm: Normal rate.   Pulmonary:      Effort: Pulmonary effort is normal. No respiratory distress.      Breath sounds: No wheezing.   Skin:     Findings: Rash present.      Comments: Erythematous scaly rash noted on the inner elbows   Neurological:      General: No focal deficit present.      Mental Status: She is alert.   Psychiatric:         Mood and Affect: Mood normal.         Behavior: Behavior normal.           "

## 2024-02-08 LAB — DSDNA AB SER-ACNC: <1 IU/ML (ref 0–9)

## 2024-02-09 LAB
BACTERIA UR CULT: ABNORMAL
BACTERIA UR CULT: ABNORMAL

## 2024-02-24 ENCOUNTER — NURSE TRIAGE (OUTPATIENT)
Dept: OTHER | Facility: OTHER | Age: 29
End: 2024-02-24

## 2024-02-24 NOTE — TELEPHONE ENCOUNTER
"Reason for Disposition  • Requesting regular office appointment    Answer Assessment - Initial Assessment Questions  1. REASON FOR CALL or QUESTION: \"What is your reason for calling today?\" or \"How can I best help you?\" or \"What question do you have that I can help answer?\"      Can I make an appointment for OBGYN.    Protocols used: Information Only Call - No Triage-ADULT-[facility]    "

## 2024-02-24 NOTE — TELEPHONE ENCOUNTER
Regarding: same day appointment  ----- Message from Jolie Santos sent at 2/24/2024  1:44 PM EST -----  Patient would like to make same day appointment with OB/GYN

## 2024-02-24 NOTE — TELEPHONE ENCOUNTER
Pt called to schedule office visit for OBGYN, advised patient we are unable to make appts for speciality offices and to call for appt Monday morning. Pt verbalized understanding

## 2024-02-27 ENCOUNTER — NURSE TRIAGE (OUTPATIENT)
Age: 29
End: 2024-02-27

## 2024-02-27 NOTE — TELEPHONE ENCOUNTER
Pt calling with concerns for symptoms of Yeast infection; Discussed treatment options with pt. Pt opting to use OTC vaginal cream that she has used before. RN advised to call back if symptoms do not clear up or symptoms worsen. Pt agreeable to plan.

## 2024-02-27 NOTE — TELEPHONE ENCOUNTER
"Reason for Disposition   Symptoms of a vaginal yeast infection (i.e., white, thick, cottage-cheese-like, itchy, not bad smelling discharge)    Answer Assessment - Initial Assessment Questions  1. DISCHARGE: \"Describe the discharge.\" (e.g., white, yellow, green, gray, foamy, cottage cheese-like)      White, clumpy discharge; vaginal dryness as well.   2. ODOR: \"Is there a bad odor?\"      Denies  3. ONSET: \"When did the discharge begin?\"      Started on last day of treatment for Trich - 2 weeks or so.   4. RASH: \"Is there a rash in that area?\" If Yes, ask: \"Describe it.\" (e.g., redness, blisters, sores, bumps)      Denies  5. ABDOMINAL PAIN: \"Are you having any abdominal pain?\" If Yes, ask: \"What does it feel like? \" (e.g., crampy, dull, intermittent, constant)       Denies  6. ABDOMINAL PAIN SEVERITY: If present, ask: \"How bad is it?\"  (e.g., mild, moderate, severe)   - MILD - doesn't interfere with normal activities    - MODERATE - interferes with normal activities or awakens from sleep    - SEVERE - patient doesn't want to move (R/O peritonitis)       Denies  7. CAUSE: \"What do you think is causing the discharge?\" \"Have you had the same problem before? What happened then?\"      Thinks Yeast  8. OTHER SYMPTOMS: \"Do you have any other symptoms?\" (e.g., fever, itching, vaginal bleeding, pain with urination, injury to genital area, vaginal foreign body)      Denies  9. PREGNANCY: \"Is there any chance you are pregnant?\" \"When was your last menstrual period?\"      Denies    Protocols used: Vaginal Discharge-ADULT-[place]    "

## 2024-03-21 ENCOUNTER — TELEPHONE (OUTPATIENT)
Age: 29
End: 2024-03-21

## 2024-03-21 ENCOUNTER — HOSPITAL ENCOUNTER (OUTPATIENT)
Dept: RADIOLOGY | Age: 29
Discharge: HOME | End: 2024-03-21
Attending: OBSTETRICS & GYNECOLOGY
Payer: COMMERCIAL

## 2024-03-21 DIAGNOSIS — R10.31 CHRONIC RLQ PAIN: ICD-10-CM

## 2024-03-21 DIAGNOSIS — G89.29 CHRONIC RLQ PAIN: ICD-10-CM

## 2024-03-21 PROCEDURE — 76830 TRANSVAGINAL US NON-OB: CPT

## 2024-03-21 PROCEDURE — 76856 US EXAM PELVIC COMPLETE: CPT

## 2024-03-21 NOTE — TELEPHONE ENCOUNTER
Last visit 02/09/2024    Patient stated that she is having a withdrawal allergic reaction to triamcinolone ointment for her eczema. Patient explained that the ointment was not really working so she stopped using it and is now having a reaction on both of her eyelids and inside her elbows.   The patient is asking for a referral to dermatology. I tried making an appt, however there was nothing for today or tomorrow. Patient decided not to visit with a PCP, but to get a referral instead. Please advise.

## 2024-03-22 ENCOUNTER — CONSULT (OUTPATIENT)
Dept: NEPHROLOGY | Facility: CLINIC | Age: 29
End: 2024-03-22
Payer: COMMERCIAL

## 2024-03-22 VITALS
WEIGHT: 134 LBS | HEIGHT: 69 IN | DIASTOLIC BLOOD PRESSURE: 68 MMHG | SYSTOLIC BLOOD PRESSURE: 108 MMHG | BODY MASS INDEX: 19.85 KG/M2

## 2024-03-22 DIAGNOSIS — R31.9 HEMATURIA, UNSPECIFIED TYPE: ICD-10-CM

## 2024-03-22 DIAGNOSIS — R80.9 PROTEINURIA, UNSPECIFIED TYPE: Primary | ICD-10-CM

## 2024-03-22 DIAGNOSIS — N18.1 CKD (CHRONIC KIDNEY DISEASE) STAGE 1, GFR 90 ML/MIN OR GREATER: ICD-10-CM

## 2024-03-22 DIAGNOSIS — B37.31 YEAST VAGINITIS: ICD-10-CM

## 2024-03-22 PROCEDURE — 99244 OFF/OP CNSLTJ NEW/EST MOD 40: CPT | Performed by: INTERNAL MEDICINE

## 2024-03-22 RX ORDER — FLUCONAZOLE 150 MG/1
150 TABLET ORAL ONCE
Qty: 1 TABLET | Refills: 0 | Status: SHIPPED | OUTPATIENT
Start: 2024-03-22 | End: 2024-03-22
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
## 2024-03-22 NOTE — PATIENT INSTRUCTIONS
- get blood work in a week  - take diflucan x 1     - Please call me in 10 days after having your blood work done to review the results if you do not hear back from me or my office, as I may have not received the results.  - please remember to perform blood work prior to the next visit.  - Please call if the blood pressure top number is greater than 150 or less than 110 consistently.  - Please call if you are gaining more than 2lbs in 2 days for adjustment of water pills.

## 2024-03-30 ENCOUNTER — OFFICE VISIT (OUTPATIENT)
Dept: URGENT CARE | Age: 29
End: 2024-03-30
Payer: COMMERCIAL

## 2024-03-30 VITALS
RESPIRATION RATE: 18 BRPM | DIASTOLIC BLOOD PRESSURE: 60 MMHG | BODY MASS INDEX: 19.77 KG/M2 | WEIGHT: 133.9 LBS | OXYGEN SATURATION: 99 % | SYSTOLIC BLOOD PRESSURE: 98 MMHG | TEMPERATURE: 98.7 F | HEART RATE: 57 BPM

## 2024-03-30 DIAGNOSIS — L30.9 ECZEMA, UNSPECIFIED TYPE: Primary | ICD-10-CM

## 2024-03-30 PROCEDURE — 99213 OFFICE O/P EST LOW 20 MIN: CPT | Performed by: STUDENT IN AN ORGANIZED HEALTH CARE EDUCATION/TRAINING PROGRAM

## 2024-03-30 RX ORDER — HYDROXYZINE HYDROCHLORIDE 25 MG/1
25 TABLET, FILM COATED ORAL 3 TIMES DAILY PRN
Qty: 30 TABLET | Refills: 0 | Status: SHIPPED | OUTPATIENT
Start: 2024-03-30

## 2024-03-30 RX ORDER — METHYLPREDNISOLONE 4 MG/1
TABLET ORAL
Qty: 21 EACH | Refills: 0 | Status: SHIPPED | OUTPATIENT
Start: 2024-03-30
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
## 2024-03-30 NOTE — PATIENT INSTRUCTIONS
Use emollient moisturizer, take steroid as prescribed.  You may use atarax for itching.  Follow up with PCP if not resolving. I am also giving you a dermatology referral.    I am providing a dermatology referral  Please call central scheduling for an appointment:  [phone] or toll free [phone]

## 2024-04-05 ENCOUNTER — TELEPHONE (OUTPATIENT)
Age: 29
End: 2024-04-05

## 2024-04-05 ENCOUNTER — NURSE TRIAGE (OUTPATIENT)
Age: 29
End: 2024-04-05

## 2024-04-05 NOTE — TELEPHONE ENCOUNTER
Patient called stating that she was seen at urgent care on 03/30 and a steroid pack was ordered for eczema on her eyes and was told to follow up with pcp if continues. Patient stated that she was told if needed, she could use the steroid pack again . States that it did work for her but then today is itching again and believes she could use another round of the steroid . Patient is currently [place] of state until Tuesday and it is itching bad . Please advise .

## 2024-04-05 NOTE — TELEPHONE ENCOUNTER
Regarding: pt wants to swabbed for bv and yeast and has sharp abdominal pain  ----- Message from Marie Sampson sent at 4/5/2024  8:48 AM EDT -----  Patient called and she is out of town and not back in the area until Tuesday.  I did schedule her on Tuesday and she wants to get swabbed for bv and yeast infection. She said she gets sharp abdominal pain at times and already had an US.  If you think she needs any more advice she can be reached at [phone]. Thank you

## 2024-04-05 NOTE — TELEPHONE ENCOUNTER
"Spoke with Qian, she feels either the trich or yeast infection have not cleared. Intermittent sharp pelvic pain generally when she is sitting.  Currently scheduled for Tuesday 4/10 when she returns to [facility]. Recommended warm compress, tylenol/advil.  If develops severe pain, fever, n/v associated with pain to present to closest ED.  Patient in agreement  Reason for Disposition   Pelvic pain is a chronic symptom (recurrent or ongoing and present > 4 weeks)    Answer Assessment - Initial Assessment Questions  1. LOCATION: \"Where does it hurt?\"       Pelvic pain  2. RADIATION: \"Does the pain shoot anywhere else?\" (e.g., lower back, groin, thighs)      Denies   3. ONSET: \"When did the pain begin?\" (e.g., minutes, hours or days ago)       One month ago  4. SUDDEN: \"Gradual or sudden onset?\"      suddenly  5. PATTERN \"Does the pain come and go, or is it constant?\"     - If constant: \"Is it getting better, staying the same, or worsening?\"       (Note: Constant means the pain never goes away completely; most serious pain is constant and gets worse over time)      - If intermittent: \"How long does it last?\" \"Do you have pain now?\"      (Note: Intermittent means the pain goes away completely between bouts)      Intermittent sharp pain when sitting  6. SEVERITY: \"How bad is the pain?\"  (e.g., Scale 1-10; mild, moderate, or severe)    - MILD (1-3): doesn't interfere with normal activities, area soft and not tender to touch     - MODERATE (4-7): interferes with normal activities or awakens from sleep, tender to touch     - SEVERE (8-10): excruciating pain, doubled over, unable to do any normal activities       Pain level 6/10 when occurs  7. RECURRENT SYMPTOM: \"Have you ever had this type of pelvic pain before?\" If Yes, ask: \"When was the last time?\" and \"What happened that time?\"       Has occurred in past when has vaginal infection  8. CAUSE: \"What do you think is causing the pelvic pain?\"      Vaginal/pelvic infection  9. " "RELIEVING/AGGRAVATING FACTORS: \"What makes it better or worse?\" (e.g., activity/rest, sexual intercourse, voiding, passing stool)      standing  10. OTHER SYMPTOMS: \"Has there been any other symptoms?\" (e.g., fever, vaginal bleeding, vaginal discharge, diarrhea, constipation, or voiding problems?\"        Intermittent odor with urine, denies pain/frequency or burning. Slight pelvic pressure  11. PREGNANCY: \"Is there any chance you are pregnant?\" \"When was your last menstrual period?\"        LMP 3/9/2024-not using birth control-denies chance of pregnancy-states she doesn't think she can get pregnant-does not feel needs to check    Protocols used: Pelvic Pain - Female-ADULT-[place]    "

## 2024-04-06 ENCOUNTER — OFFICE VISIT (OUTPATIENT)
Dept: URGENT CARE | Age: 29
End: 2024-04-06
Payer: COMMERCIAL

## 2024-04-06 VITALS
WEIGHT: 132.3 LBS | HEART RATE: 74 BPM | DIASTOLIC BLOOD PRESSURE: 58 MMHG | BODY MASS INDEX: 19.54 KG/M2 | RESPIRATION RATE: 18 BRPM | TEMPERATURE: 97.5 F | SYSTOLIC BLOOD PRESSURE: 110 MMHG | OXYGEN SATURATION: 99 %

## 2024-04-06 DIAGNOSIS — H01.131 ECZEMATOUS DERMATITIS OF UPPER AND LOWER EYELIDS OF BOTH EYES: Primary | ICD-10-CM

## 2024-04-06 DIAGNOSIS — H01.135 ECZEMATOUS DERMATITIS OF UPPER AND LOWER EYELIDS OF BOTH EYES: Primary | ICD-10-CM

## 2024-04-06 DIAGNOSIS — H01.134 ECZEMATOUS DERMATITIS OF UPPER AND LOWER EYELIDS OF BOTH EYES: Primary | ICD-10-CM

## 2024-04-06 DIAGNOSIS — H01.132 ECZEMATOUS DERMATITIS OF UPPER AND LOWER EYELIDS OF BOTH EYES: Primary | ICD-10-CM

## 2024-04-06 PROCEDURE — 99213 OFFICE O/P EST LOW 20 MIN: CPT | Performed by: STUDENT IN AN ORGANIZED HEALTH CARE EDUCATION/TRAINING PROGRAM

## 2024-04-06 RX ORDER — PREDNISONE 10 MG/1
TABLET ORAL DAILY
Qty: 30 TABLET | Refills: 0 | Status: SHIPPED | OUTPATIENT
Start: 2024-04-06 | End: 2024-04-18

## 2024-04-06 NOTE — PATIENT INSTRUCTIONS
Take steroid (prednisone) as prescribed.  Avoid any new lotions or make-up's.  Only use hypoallergenic familiar products.  On Monday, please call to make a follow-up appointment in person with your PCP for the week after you complete this steroid taper.  On Monday, please also call again to make an appointment with dermatology for after you complete the steroid taper.
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
## 2024-04-06 NOTE — TELEPHONE ENCOUNTER
Advise her that she should use over-the-counter hydrocortisone cream to her eyelids, the triamcinolone ointment on her extremities, and suggest making an appointment with dermatology.

## 2024-04-09 ENCOUNTER — OFFICE VISIT (OUTPATIENT)
Dept: OBGYN CLINIC | Facility: MEDICAL CENTER | Age: 29
End: 2024-04-09
Payer: COMMERCIAL

## 2024-04-09 VITALS
SYSTOLIC BLOOD PRESSURE: 105 MMHG | WEIGHT: 135.3 LBS | BODY MASS INDEX: 20.04 KG/M2 | HEIGHT: 69 IN | DIASTOLIC BLOOD PRESSURE: 60 MMHG

## 2024-04-09 DIAGNOSIS — N89.8 VAGINAL DISCHARGE: ICD-10-CM

## 2024-04-09 DIAGNOSIS — R10.9 ABDOMINAL PAIN, UNSPECIFIED ABDOMINAL LOCATION: Primary | ICD-10-CM

## 2024-04-09 PROCEDURE — 99213 OFFICE O/P EST LOW 20 MIN: CPT | Performed by: OBSTETRICS & GYNECOLOGY

## 2024-04-09 PROCEDURE — 87660 TRICHOMONAS VAGIN DIR PROBE: CPT | Performed by: OBSTETRICS & GYNECOLOGY

## 2024-04-09 PROCEDURE — 87480 CANDIDA DNA DIR PROBE: CPT | Performed by: OBSTETRICS & GYNECOLOGY

## 2024-04-09 PROCEDURE — 87510 GARDNER VAG DNA DIR PROBE: CPT | Performed by: OBSTETRICS & GYNECOLOGY

## 2024-04-09 RX ORDER — FLUCONAZOLE 150 MG/1
150 TABLET ORAL ONCE
COMMUNITY
Start: 2024-03-22
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
## 2024-04-10 LAB
CANDIDA RRNA VAG QL PROBE: NOT DETECTED
G VAGINALIS RRNA GENITAL QL PROBE: NOT DETECTED
T VAGINALIS RRNA GENITAL QL PROBE: NOT DETECTED

## 2024-06-19 ENCOUNTER — TELEPHONE (OUTPATIENT)
Age: 29
End: 2024-06-19

## 2024-06-19 NOTE — TELEPHONE ENCOUNTER
Patient contacted the office this afternoon stated that she is currently in [place] will be back and forth from there and [place], staying in a hotel. Was seen at [facility] for her eczema flaring up (thought it flared up due to being sick the week prior), was prescribed Prednisone and Azithromycin gel for pink eye. She states that she was sick 2 weeks ago with what she thought was the flu, sore throat, blowing green mucus, fever. She feels the same way again while taking the Prednisone and is worried due to weakening immune system that the symptoms she is having now will get worse. She states she was told by [facility] not to go there for her everyday medical issues and to go to pcp. I asked if there was an UC she could go to but states with her insurance it is the same distance as it would be to go to pcp. I did state I would forward message to provider, does have an appt scheduled in the office this Friday 06/21/24 to be further evaluated. Asking for further recommendation at this time.

## 2024-07-01 ENCOUNTER — OFFICE VISIT (OUTPATIENT)
Dept: FAMILY MEDICINE CLINIC | Facility: CLINIC | Age: 29
End: 2024-07-01
Payer: COMMERCIAL

## 2024-07-01 VITALS
SYSTOLIC BLOOD PRESSURE: 104 MMHG | TEMPERATURE: 98.4 F | WEIGHT: 133 LBS | HEIGHT: 69 IN | HEART RATE: 83 BPM | OXYGEN SATURATION: 98 % | DIASTOLIC BLOOD PRESSURE: 62 MMHG | BODY MASS INDEX: 19.7 KG/M2

## 2024-07-01 DIAGNOSIS — L30.9 ECZEMA, UNSPECIFIED TYPE: Primary | ICD-10-CM

## 2024-07-01 DIAGNOSIS — L20.82 FLEXURAL ECZEMA: Primary | ICD-10-CM

## 2024-07-01 PROCEDURE — 99213 OFFICE O/P EST LOW 20 MIN: CPT | Performed by: FAMILY MEDICINE

## 2024-07-01 RX ORDER — PIMECROLIMUS 10 MG/G
CREAM TOPICAL 2 TIMES DAILY
Qty: 100 G | Refills: 4 | Status: SHIPPED | OUTPATIENT
Start: 2024-07-01

## 2024-07-01 RX ORDER — KETOCONAZOLE 20 MG/ML
1 SHAMPOO TOPICAL 2 TIMES WEEKLY
Qty: 120 ML | Refills: 5 | Status: SHIPPED | OUTPATIENT
Start: 2024-07-01

## 2024-07-01 RX ORDER — KETOCONAZOLE 20 MG/ML
SHAMPOO TOPICAL
COMMUNITY
Start: 2024-06-12 | End: 2024-07-01 | Stop reason: SDUPTHER
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
## 2024-07-01 NOTE — PATIENT INSTRUCTIONS
Trial of Elidel cream apply to affected areas twice daily.  Refill for ketoconazole shampoo.  She may want to see dermatology and consider one of the injectables that prevent eczema.  Referral given for dermatology.  Follow-up as needed.

## 2024-07-05 ENCOUNTER — PATIENT MESSAGE (OUTPATIENT)
Dept: FAMILY MEDICINE CLINIC | Facility: CLINIC | Age: 29
End: 2024-07-05

## 2024-07-05 DIAGNOSIS — L30.9 ECZEMA, UNSPECIFIED TYPE: Primary | ICD-10-CM

## 2024-07-08 ENCOUNTER — TELEPHONE (OUTPATIENT)
Age: 29
End: 2024-07-08

## 2024-07-08 NOTE — TELEPHONE ENCOUNTER
Patient called looking for an appointment for eczema present for 4 months on arms and vaginal area ,  has been trying neosporin , Vaseline , honey lotion , otc cortisone , aquaphor , her pcp prescribed triamcinolone  and nothing helps .  Offered next available on 11/11 , added to the cancellation list and advised to see her pcp or urgent care in the mean time .

## 2024-07-08 NOTE — TELEPHONE ENCOUNTER
Patient is requesting an insurance referral for the following specialty:      Test Name / Order Name:  New Patient- Dermatology    DX Code:     Date Of Service:     Location/Facility Name/Address/Phone #:   [facility]  [address] [place] [address]  [place] [address]  P: [phone]  F: [phone]  Edis Sullivan MD    Location / Facility NPI:     Best Phone # To Reach The Patient:    [phone]

## 2024-07-10 ENCOUNTER — LAB (OUTPATIENT)
Dept: LAB | Age: 29
End: 2024-07-10
Payer: COMMERCIAL

## 2024-07-10 DIAGNOSIS — N18.1 CHRONIC KIDNEY DISEASE, STAGE I: ICD-10-CM

## 2024-07-10 DIAGNOSIS — R31.9 HEMATURIA SYNDROME: ICD-10-CM

## 2024-07-10 DIAGNOSIS — L30.9 ECZEMA, UNSPECIFIED TYPE: ICD-10-CM

## 2024-07-10 DIAGNOSIS — R80.9 PROTEINURIA, UNSPECIFIED TYPE: ICD-10-CM

## 2024-07-10 LAB
25(OH)D3 SERPL-MCNC: 34.9 NG/ML (ref 30–100)
BACTERIA UR QL AUTO: ABNORMAL /HPF
BILIRUB UR QL STRIP: NEGATIVE
CLARITY UR: CLEAR
COLOR UR: COLORLESS
GLUCOSE UR STRIP-MCNC: NEGATIVE MG/DL
HGB UR QL STRIP.AUTO: ABNORMAL
KETONES UR STRIP-MCNC: NEGATIVE MG/DL
LEUKOCYTE ESTERASE UR QL STRIP: NEGATIVE
NITRITE UR QL STRIP: NEGATIVE
NON-SQ EPI CELLS URNS QL MICRO: ABNORMAL /HPF
PH UR STRIP.AUTO: 6.5 [PH]
PROT UR STRIP-MCNC: ABNORMAL MG/DL
RBC #/AREA URNS AUTO: ABNORMAL /HPF
SP GR UR STRIP.AUTO: 1.01 (ref 1–1.03)
UROBILINOGEN UR STRIP-ACNC: <2 MG/DL
WBC #/AREA URNS AUTO: ABNORMAL /HPF

## 2024-07-10 PROCEDURE — 86037 ANCA TITER EACH ANTIBODY: CPT

## 2024-07-10 PROCEDURE — 83521 IG LIGHT CHAINS FREE EACH: CPT

## 2024-07-10 PROCEDURE — 36415 COLL VENOUS BLD VENIPUNCTURE: CPT

## 2024-07-10 PROCEDURE — 82785 ASSAY OF IGE: CPT

## 2024-07-10 PROCEDURE — 82043 UR ALBUMIN QUANTITATIVE: CPT

## 2024-07-10 PROCEDURE — 86225 DNA ANTIBODY NATIVE: CPT

## 2024-07-10 PROCEDURE — 86160 COMPLEMENT ANTIGEN: CPT

## 2024-07-10 PROCEDURE — 81001 URINALYSIS AUTO W/SCOPE: CPT

## 2024-07-10 PROCEDURE — 80069 RENAL FUNCTION PANEL: CPT

## 2024-07-10 PROCEDURE — 83516 IMMUNOASSAY NONANTIBODY: CPT

## 2024-07-10 PROCEDURE — 86258 DGP ANTIBODY EACH IG CLASS: CPT

## 2024-07-10 PROCEDURE — 84166 PROTEIN E-PHORESIS/URINE/CSF: CPT

## 2024-07-10 PROCEDURE — 86008 ALLG SPEC IGE RECOMB EA: CPT

## 2024-07-10 PROCEDURE — 84156 ASSAY OF PROTEIN URINE: CPT

## 2024-07-10 PROCEDURE — 86706 HEP B SURFACE ANTIBODY: CPT

## 2024-07-10 PROCEDURE — 86364 TISS TRNSGLTMNASE EA IG CLAS: CPT

## 2024-07-10 PROCEDURE — 82570 ASSAY OF URINE CREATININE: CPT

## 2024-07-10 PROCEDURE — 86803 HEPATITIS C AB TEST: CPT

## 2024-07-10 PROCEDURE — 86215 DEOXYRIBONUCLEASE ANTIBODY: CPT

## 2024-07-10 PROCEDURE — 84165 PROTEIN E-PHORESIS SERUM: CPT

## 2024-07-10 PROCEDURE — 86003 ALLG SPEC IGE CRUDE XTRC EA: CPT

## 2024-07-10 PROCEDURE — 83520 IMMUNOASSAY QUANT NOS NONAB: CPT

## 2024-07-10 PROCEDURE — 82306 VITAMIN D 25 HYDROXY: CPT

## 2024-07-10 PROCEDURE — 82784 ASSAY IGA/IGD/IGG/IGM EACH: CPT

## 2024-07-11 ENCOUNTER — OFFICE VISIT (OUTPATIENT)
Dept: URGENT CARE | Facility: MEDICAL CENTER | Age: 29
End: 2024-07-11
Payer: COMMERCIAL

## 2024-07-11 ENCOUNTER — TELEPHONE (OUTPATIENT)
Dept: FAMILY MEDICINE CLINIC | Facility: CLINIC | Age: 29
End: 2024-07-11

## 2024-07-11 ENCOUNTER — TELEPHONE (OUTPATIENT)
Age: 29
End: 2024-07-11

## 2024-07-11 VITALS
HEART RATE: 78 BPM | DIASTOLIC BLOOD PRESSURE: 59 MMHG | OXYGEN SATURATION: 94 % | SYSTOLIC BLOOD PRESSURE: 116 MMHG | WEIGHT: 133 LBS | RESPIRATION RATE: 18 BRPM | HEIGHT: 69 IN | TEMPERATURE: 98.8 F | BODY MASS INDEX: 19.7 KG/M2

## 2024-07-11 DIAGNOSIS — L30.9 ECZEMA, UNSPECIFIED TYPE: Primary | ICD-10-CM

## 2024-07-11 LAB
ALBUMIN SERPL BCG-MCNC: 3.8 G/DL (ref 3.5–5)
ALBUMIN SERPL ELPH-MCNC: 3.63 G/DL (ref 3.2–5.1)
ALBUMIN SERPL ELPH-MCNC: 54.2 % (ref 48–70)
ALMOND IGE QN: 0.45 KUA/I
ALPHA1 GLOB SERPL ELPH-MCNC: 0.31 G/DL (ref 0.15–0.47)
ALPHA1 GLOB SERPL ELPH-MCNC: 4.7 % (ref 1.8–7)
ALPHA2 GLOB SERPL ELPH-MCNC: 0.71 G/DL (ref 0.42–1.04)
ALPHA2 GLOB SERPL ELPH-MCNC: 10.6 % (ref 5.9–14.9)
ANION GAP SERPL CALCULATED.3IONS-SCNC: 16 MMOL/L (ref 4–13)
ARA H6 PEANUT: <0.1 KUA/I
BETA GLOB ABNORMAL SERPL ELPH-MCNC: 0.43 G/DL (ref 0.31–0.57)
BETA1 GLOB SERPL ELPH-MCNC: 6.4 % (ref 4.7–7.7)
BETA2 GLOB SERPL ELPH-MCNC: 4.9 % (ref 3.1–7.9)
BETA2+GAMMA GLOB SERPL ELPH-MCNC: 0.33 G/DL (ref 0.2–0.58)
BUN SERPL-MCNC: 13 MG/DL (ref 5–25)
C3 SERPL-MCNC: 110 MG/DL (ref 87–200)
C4 SERPL-MCNC: 41 MG/DL (ref 19–52)
CALCIUM SERPL-MCNC: 9.2 MG/DL (ref 8.4–10.2)
CASHEW NUT IGE QN: <0.1 KUA/I
CHLORIDE SERPL-SCNC: 105 MMOL/L (ref 96–108)
CO2 SERPL-SCNC: 23 MMOL/L (ref 21–32)
CODFISH IGE QN: <0.1 KUA/I
CREAT SERPL-MCNC: 0.67 MG/DL (ref 0.6–1.3)
CREAT UR-MCNC: 31.8 MG/DL
CREAT UR-MCNC: 31.8 MG/DL
EGG WHITE IGE QN: <0.1 KUA/I
GAMMA GLOB ABNORMAL SERPL ELPH-MCNC: 1.29 G/DL (ref 0.4–1.66)
GAMMA GLOB SERPL ELPH-MCNC: 19.2 % (ref 6.9–22.3)
GFR SERPL CREATININE-BSD FRML MDRD: 117 ML/MIN/1.73SQ M
GLIADIN PEPTIDE IGA SER-ACNC: 0.3 U/ML
GLIADIN PEPTIDE IGA SER-ACNC: NEGATIVE
GLIADIN PEPTIDE IGG SER-ACNC: <0.4 U/ML
GLIADIN PEPTIDE IGG SER-ACNC: NEGATIVE
GLUCOSE SERPL-MCNC: 86 MG/DL (ref 65–140)
GLUTEN IGE QN: <0.1 KUA/I
HAZELNUT IGE QN: 18.1 KUA/L
HBV SURFACE AB SER-ACNC: 497 MIU/ML
HCV AB SER QL: NORMAL
IGA SERPL-MCNC: 177 MG/DL (ref 66–433)
IGG/ALB SER: 1.18 {RATIO} (ref 1.1–1.8)
MICROALBUMIN UR-MCNC: 150.7 MG/L
MICROALBUMIN/CREAT 24H UR: 474 MG/G CREATININE (ref 0–30)
MILK IGE QN: <0.1 KUA/I
PEANUT (RARA H) 1 IGE QN: <0.1 KUA/I
PEANUT (RARA H) 2 IGE QN: <0.1 KUA/I
PEANUT (RARA H) 3 IGE QN: <0.1 KUA/I
PEANUT (RARA H) 8 IGE QN: 1.05 KUA/I
PEANUT (RARA H) 9 IGE QN: 2.47 KUA/I
PEANUT IGE QN: 1.44 KUA/I
PHOSPHATE SERPL-MCNC: 2.8 MG/DL (ref 2.7–4.5)
POTASSIUM SERPL-SCNC: 3.5 MMOL/L (ref 3.5–5.3)
PROT PATTERN SERPL ELPH-IMP: NORMAL
PROT SERPL-MCNC: 6.7 G/DL (ref 6.4–8.2)
PROT UR-MCNC: 23 MG/DL
PROT/CREAT UR: 0.72 MG/G{CREAT} (ref 0–0.1)
SALMON IGE QN: <0.1 KUA/I
SCALLOP IGE QN: <0.1 KUA/L
SESAME SEED IGE QN: 0.56 KUA/I
SHRIMP IGE QN: <0.1 KUA/L
SODIUM SERPL-SCNC: 144 MMOL/L (ref 135–147)
SOYBEAN IGE QN: 0.37 KUA/I
TOTAL IGE SMQN RAST: 330 KU/L (ref 0–113)
TTG IGA SER-ACNC: <0.5 U/ML
TTG IGA SER-ACNC: NEGATIVE
TTG IGG SER-ACNC: <0.8 U/ML
TTG IGG SER-ACNC: NEGATIVE
TUNA IGE QN: <0.1 KUA/I
WALNUT IGE QN: 1.23 KUA/I
WHEAT IGE QN: 0.34 KUA/I

## 2024-07-11 PROCEDURE — 99213 OFFICE O/P EST LOW 20 MIN: CPT | Performed by: FAMILY MEDICINE

## 2024-07-11 PROCEDURE — 84165 PROTEIN E-PHORESIS SERUM: CPT | Performed by: STUDENT IN AN ORGANIZED HEALTH CARE EDUCATION/TRAINING PROGRAM

## 2024-07-11 RX ORDER — IBUPROFEN 200 MG
400 TABLET ORAL EVERY 6 HOURS PRN
COMMUNITY

## 2024-07-11 RX ORDER — ACETAMINOPHEN 500 MG
1000 TABLET ORAL EVERY 6 HOURS PRN
COMMUNITY

## 2024-07-11 NOTE — TELEPHONE ENCOUNTER
Patient called in stating the ELIDEL 1% cream needs a prior authorization. Message sent to prior [facility] team
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
## 2024-07-11 NOTE — TELEPHONE ENCOUNTER
Pt called has been dealing with eczema jesus to face, jaw, breast, and vagina for 4 months. Has been on a few rounds of steroids, which help until she stops taking them. Pt last dose of steroid was 3 weeks ago. Pt saw Dr Burgos for the same and was prescribed pimecrolimus (ELIDEL) 1 % cream , but needs a Prior Authorization. Pt reports that she cannot wait for authorization for current symptoms as rash is severe, itchy and oozing. Pt considered going to ED last night as she could not sleep due to the same.     Offered to make same day appt. Pt declined same day with current PCP requests to see Dr Valladares as new PCP. No available appointments with Dr Valladares today. Advised Pt to go to Urgent Care. Pt is agreeable with plan, will go to [facility] now today.     PA process for pimecrolimus (ELIDEL) 1 % cream started.

## 2024-07-11 NOTE — PATIENT INSTRUCTIONS
"Diffuse eczema.  Patient is not septic.  Patient was given referral to [facility], telephone number [phone] to schedule appointment.    Patient Education     Eczema (atopic dermatitis)   The Basics   Written by the doctors and editors at [facility]   What is eczema? -- Eczema is a skin condition that makes your skin itchy and flaky. Doctors do not know what causes it. Eczema often happens in people who have allergies. It can also run in families. Another term for eczema is \"atopic dermatitis.\"  What are the symptoms of eczema? -- The symptoms of eczema can include:   Intense itching   Color changes - In people with light skin, areas with eczema might look red or pink. In people with dark skin, they might appear dark brown, purple, or gray. Sometimes, there is a patch of skin that looks lighter than the skin around it.   Small bumps - These might look like dots or goosebumps (picture 1).   Skin that flakes off or forms scales (picture 2)  Most people with eczema have their first symptoms before they turn 5. But eczema can look different in people of different ages:   In babies and children younger than 2 years old, eczema tends to affect the front of the arms and legs, cheeks, or scalp (picture 3). (The diaper area is not usually affected.)   In older children andadults, eczema often affects the sides of the neck, the elbow creases, and the backs of the knees (picture 4). Adults can also get it on their wrists, hands, forearms, and face (picture 5).   In older children and adults, the skin can become thicker over time (picture 6), and can even form scars from too much scratching.  Is there a test for eczema? -- No, there is no test. But doctors and nurses can tell if you have eczema by looking at your skin and by asking you questions.  What can I do to reduce my symptoms? -- You can use unscented, thick moisturizing creams and ointments to keep the skin from getting too dry.  If possible, try to avoid or " "limit things that can make eczema worse. These include:   Being too hot or sweating too much   Being in very dry air   Stress or worry   Sudden temperature changes   Harsh soaps or cleaning products   Perfumes   Wool or synthetic fabrics (like polyester)  How is eczema treated? -- There are treatments that can relieve the symptoms of eczema. But the condition cannot be cured. Even so, about half of children with eczema grow out of it by the time they become adults. Treatments for eczema include:   Moisturizing creams or ointments - These products help keep your skin moist. In some cases, your doctor or nurse might suggest using a moist dressing over special creams or medicines. It helps to put on your cream or ointment right after a bath or shower. Some people also try products that you put in the bathtub, such as oil or oatmeal. But these have been found not to help with eczema symptoms.   Steroid creams and ointments - These can help with itching and swelling. In severe cases, you might need steroids in pills. But your doctor or nurse will want you to stop taking steroid pills as soon as possible. Even though these medicines help, they can also cause problems of their own.   Antihistamine pills - Antihistamines are medicines that people often take for allergies. Some people with eczema find that antihistamines relieve itching. Others do not think that the medicines help with itching. Many people with eczema find that itching is worst at night. That can make it hard to sleep. If you have this problem, talk with your doctor or nurse about it. They might recommend an antihistamine that can also help you sleep.   Light therapy - Another treatment option is something called \"light therapy,\" but doctors do not use it much. During light therapy, your skin is exposed to a special kind of light called ultraviolet light. This therapy is usually done in a doctor's office.  Doctors usually recommend light therapy for people " "who do not get better with other treatments.   Medicines that change the way that the immune system works - These medicines are only for people who do not get better with moisturizers and steroid creams or ointments.  Can eczema be prevented? -- Experts don't know if there is a way to prevent eczema. Babies who have a parent or sibling with eczema have a higher risk of getting it. For these babies, good skin care might be helpful, especially in cold or dry areas. Good skin care includes using moisturizing creams or ointments. But doctors don't yet know if this actually helps prevent eczema from happening later.  If you use cream or ointment on your , wash your hands first. This helps lower the risk of getting germs on the baby's skin that could cause infection. Try to use products that come in a tube instead of a jar that you dip your fingers in.  When should I call the doctor? -- Call for emergency help right away (in the [place] and [place], call ) if:   You have signs of a very bad allergic reaction called \"anaphylaxis.\" These include:   Hives - Raised, inflamed, red patches of skin that are very itchy.   Angioedema - A condition that causes puffiness, usually of the face, eyelids, ears, mouth, hands, or feet.   Redness or itching of the skin on most of the body (without hives)   Swelling or itching of the eyes   Runny nose or swelling of the tongue   Trouble breathing, wheezing, or voice changes   Vomiting or diarrhea   Feeling dizzy or passing out  Call for advice if:   You have signs of an infection - These include a fever of 100.4°F (38°C) or higher, or chills.   Your eczema is making you feel anxious or depressed - There are treatments that can help with this.   You have trouble sleeping because you are itching.   Your eczema:   Has pus or yellow scabs on it   Gets worse or is covering most of your body   Is on your eyes or lips, or if you notice a rash or blisters in your mouth   Gets worse after you " were around someone with cold sores or fever blisters  All topics are updated as new evidence becomes available and our peer review process is complete.  This topic retrieved from [employer] on: Feb 26, 2024.  Topic 83947 Version 19.0  Release: 32.2.4 - C32.56  © 2024 UpToDate, Inc. and/or its affiliates. All rights reserved.  picture 1: Eczema     Eczema sometimes looks like scaly bumps on the skin.  Graphic 635984 Version 1.0  picture 2: Dry skin in eczema     This child has dry skin from eczema on their chest and arms.  Graphic 700175 Version 1.0  picture 3: Baby with eczema     This picture shows a baby with eczema on the cheeks and neck.  Graphic [id number] Version 2.0  picture 4: Child with eczema     This picture shows eczema on the back of a child's legs. In some areas, the skin has been damaged by repeated scratching.  Graphic 172182 Version 3.0  picture 5: Eczema affecting the eyelids     This picture shows a person with red, scaly skin from eczema on the eyelids.  Graphic 527806 Version 2.0  picture 6: Skin thickening in eczema     Over time, eczema can lead to thickening of the skin.  Graphic [id number] Version 1.0  Consumer Information Use and Disclaimer   Disclaimer: This generalized information is a limited summary of diagnosis, treatment, and/or medication information. It is not meant to be comprehensive and should be used as a tool to help the user understand and/or assess potential diagnostic and treatment options. It does NOT include all information about conditions, treatments, medications, side effects, or risks that may apply to a specific patient. It is not intended to be medical advice or a substitute for the medical advice, diagnosis, or treatment of a health care provider based on the health care provider's examination and assessment of a patient's specific and unique circumstances. Patients must speak with a health care provider for complete information about their health, medical questions, and  treatment options, including any risks or benefits regarding use of medications. This information does not endorse any treatments or medications as safe, effective, or approved for treating a specific patient. UpToDate, Inc. and its affiliates disclaim any warranty or liability relating to this information or the use thereof.The use of this information is governed by the Terms of Use, available at https://www.[employer].com/en/know/clinical-effectiveness-terms. 2024© UpToDate, Inc. and its affiliates and/or licensors. All rights reserved.  Copyright   © 2024 UpToDate, Inc. and/or its affiliates. All rights reserved.

## 2024-07-11 NOTE — TELEPHONE ENCOUNTER
Reason for call:   [x] Prior Auth  [] Other:     Caller:  [x] Patient  [] Pharmacy  Name:   Address:   Callback Number:     Medication: pimecrolimus (ELIDEL) 1 % cream     Dose/Frequency: Apply topically 2 (two) times a day    Quantity: 100 g    Ordering Provider:   [x] PCP/Provider -   [] Speciality/Provider -     Has the patient tried other medications and failed? If failed, which medications did they fail?    [] No   [x] Yes - triamcinolone now causing rebound rash, OTC cortisone cream causes rebound rash    Is the patient's insurance updated in EPIC?   [x] Yes   [] No     Is a copy of the patient's insurance scanned in EPIC?   [x] Yes 1/2024  [] No

## 2024-07-12 LAB
ALBUMIN UR ELPH-MCNC: 100 %
ALPHA1 GLOB MFR UR ELPH: 0 %
ALPHA2 GLOB MFR UR ELPH: 0 %
B-GLOBULIN MFR UR ELPH: 0 %
DSDNA AB SER-ACNC: <1 IU/ML (ref 0–9)
GAMMA GLOB MFR UR ELPH: 0 %
KAPPA LC FREE SER-MCNC: 22.1 MG/L (ref 3.3–19.4)
KAPPA LC FREE/LAMBDA FREE SER: 0.85 {RATIO} (ref 0.26–1.65)
LAMBDA LC FREE SERPL-MCNC: 26 MG/L (ref 5.7–26.3)
PROT PATTERN UR ELPH-IMP: NORMAL
PROT UR-MCNC: 22 MG/DL

## 2024-07-12 PROCEDURE — 84166 PROTEIN E-PHORESIS/URINE/CSF: CPT | Performed by: STUDENT IN AN ORGANIZED HEALTH CARE EDUCATION/TRAINING PROGRAM

## 2024-07-13 LAB — STREP DNASE B SER-ACNC: 299 U/ML (ref 0–120)

## 2024-07-15 LAB
GBM AB SER IA-ACNC: <0.2 UNITS (ref 0–0.9)
MYELOPEROXIDASE AB SER IA-ACNC: <0.2 UNITS (ref 0–0.9)
PLA2R IGG SER IA-ACNC: <1.8 RU/ML (ref 0–19.9)
PROTEINASE3 AB SER IA-ACNC: <0.2 UNITS (ref 0–0.9)

## 2024-07-16 ENCOUNTER — TELEPHONE (OUTPATIENT)
Dept: OTHER | Facility: HOSPITAL | Age: 29
End: 2024-07-16

## 2024-07-16 LAB
C-ANCA TITR SER IF: NORMAL TITER
MYELOPEROXIDASE AB SER IA-ACNC: <0.2 UNITS (ref 0–0.9)
P-ANCA ATYPICAL TITR SER IF: NORMAL TITER
P-ANCA TITR SER IF: NORMAL TITER
PROTEINASE3 AB SER IA-ACNC: <0.2 UNITS (ref 0–0.9)

## 2024-07-16 NOTE — TELEPHONE ENCOUNTER
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  Urine proteinuria improved no acute indication for renal biopsy at this time.  Prior differentials of IgA nephropathy and thin basement membrane continue.  Advised patient that she has been lost to follow-up and she can call back the office for follow-up appointment

## 2024-07-18 ENCOUNTER — TELEPHONE (OUTPATIENT)
Age: 29
End: 2024-07-18

## 2024-07-18 NOTE — TELEPHONE ENCOUNTER
Patient is scheduled with DR Deal for 09/04/24. Mom called , patient needs a sooner appt. She has a bad case of Eczema and needs a referral to see an allergy Dr as soon as possible. Please contact mom if patient can be seen by any provider sooner. Call back# [phone]

## 2024-07-19 ENCOUNTER — OFFICE VISIT (OUTPATIENT)
Dept: URGENT CARE | Age: 29
End: 2024-07-19

## 2024-07-19 VITALS
WEIGHT: 130.2 LBS | OXYGEN SATURATION: 98 % | RESPIRATION RATE: 18 BRPM | HEIGHT: 69 IN | HEART RATE: 88 BPM | BODY MASS INDEX: 19.28 KG/M2 | TEMPERATURE: 97.1 F

## 2024-07-19 DIAGNOSIS — L30.9 ECZEMA, UNSPECIFIED TYPE: Primary | ICD-10-CM

## 2024-07-19 RX ORDER — DOXYCYCLINE 50 MG/1
50 CAPSULE ORAL 2 TIMES DAILY
COMMUNITY
Start: 2024-07-12 | End: 2024-07-26
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
## 2024-07-19 NOTE — TELEPHONE ENCOUNTER
PA for   pimecrolimus (ELIDEL) 1 % cream      Submitted via    [x]CMM-KEY  [id number]  []Surescripts-Case ID #   []Faxed to plan   []Other website   []Phone call Case ID #     Office notes sent, clinical questions answered. Awaiting determination    Turnaround time for your insurance to make a decision on your Prior Authorization can take 7-21 business days.

## 2024-07-20 NOTE — TELEPHONE ENCOUNTER
Brand Name Elidel External cream 1% is the preferred alternative, Generic Pimecrolimus External Cream 1% is not covered. Please send rx to pharmacy as Brand Name. Please make sure the ERMIAS box is checked off when sending rx so it's sent correctly to pharmacy as Brand Name Only.     Letter scanned in media.

## 2024-07-26 ENCOUNTER — OFFICE VISIT (OUTPATIENT)
Dept: INTERNAL MEDICINE CLINIC | Facility: CLINIC | Age: 29
End: 2024-07-26
Payer: COMMERCIAL

## 2024-07-26 VITALS
WEIGHT: 130 LBS | TEMPERATURE: 98.6 F | HEART RATE: 73 BPM | DIASTOLIC BLOOD PRESSURE: 68 MMHG | HEIGHT: 69 IN | OXYGEN SATURATION: 98 % | SYSTOLIC BLOOD PRESSURE: 118 MMHG | BODY MASS INDEX: 19.26 KG/M2

## 2024-07-26 DIAGNOSIS — E03.9 HYPOTHYROIDISM, UNSPECIFIED TYPE: ICD-10-CM

## 2024-07-26 DIAGNOSIS — Z00.00 HEALTHCARE MAINTENANCE: ICD-10-CM

## 2024-07-26 DIAGNOSIS — F41.1 GENERALIZED ANXIETY DISORDER: ICD-10-CM

## 2024-07-26 DIAGNOSIS — F43.10 PTSD (POST-TRAUMATIC STRESS DISORDER): ICD-10-CM

## 2024-07-26 DIAGNOSIS — L20.82 FLEXURAL ECZEMA: Primary | ICD-10-CM

## 2024-07-26 PROCEDURE — 99203 OFFICE O/P NEW LOW 30 MIN: CPT | Performed by: INTERNAL MEDICINE

## 2024-07-26 RX ORDER — DOXYCYCLINE 50 MG/1
50 TABLET ORAL 2 TIMES DAILY
Qty: 28 TABLET | Refills: 0 | Status: SHIPPED | OUTPATIENT
Start: 2024-07-26 | End: 2024-08-09

## 2024-07-26 NOTE — ASSESSMENT & PLAN NOTE
Patient relates that she does have posttraumatic stress disorder related to some emotional difficulties with previous relationships and with her mother.  Patient relates she is healed her relationship with her mother and still is having some stress and anxiety has noted.

## 2024-07-26 NOTE — ASSESSMENT & PLAN NOTE
History in the past of elevated TSH and she states she was placed on thyroid hormone previously.  At this time she is not taking any medication and thyroid testing will be performed and we will initiate treatment if indicated.  Again the question is whether that her dermatologic disorder may be related to her thyroid disease

## 2024-07-26 NOTE — ASSESSMENT & PLAN NOTE
Patient has been diagnosed in the past with eczema.  She states that she recently had an acute exacerbation and relates that she has been treated with multiple topical and oral medications to help with this acute episode.  She states right now she just finished taking some doxycycline which she states may have helped somewhat but wishes a renewal of medication prescription.  Does have a side effect with vaginal yeast infection.  She relates that she may have allergies to different food products and had lab testing to verify some of this but not complete and I really think that she would benefit from a full evaluation by an allergist a consult has been sent for her to be seen and evaluated.  Other than that patient will be going for routine labs to make sure there is no metabolic abnormalities that may be associated with this.
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
## 2024-07-26 NOTE — ASSESSMENT & PLAN NOTE
Patient does have a longstanding history of anxiety disorder.  As noted she has been on multiple medications in the past including she states different SSRIs which she has a reaction to unable to tolerate Wellbutrin or tricyclic antidepressants.  States the only medication that she seems to have a positive response with without side effect is benzodiazepines and she states that the Ativan seems to work the best.  I have a suspicion that her dermatologic problems may be closely related to her anxiety disorder but again she will be going for full evaluation as far as allergies are concerned and for any metabolic abnormalities and slip was given to have these performed in the near future.

## 2024-07-28 ENCOUNTER — OFFICE VISIT (OUTPATIENT)
Dept: URGENT CARE | Age: 29
End: 2024-07-28
Payer: COMMERCIAL

## 2024-07-28 VITALS
TEMPERATURE: 97.1 F | DIASTOLIC BLOOD PRESSURE: 68 MMHG | HEART RATE: 72 BPM | SYSTOLIC BLOOD PRESSURE: 104 MMHG | OXYGEN SATURATION: 98 % | RESPIRATION RATE: 18 BRPM

## 2024-07-28 DIAGNOSIS — H10.32 ACUTE BACTERIAL CONJUNCTIVITIS OF LEFT EYE: Primary | ICD-10-CM

## 2024-07-28 PROCEDURE — 99213 OFFICE O/P EST LOW 20 MIN: CPT | Performed by: PHYSICIAN ASSISTANT

## 2024-07-28 RX ORDER — FLUCONAZOLE 150 MG/1
150 TABLET ORAL ONCE
Qty: 1 TABLET | Refills: 0 | Status: SHIPPED | OUTPATIENT
Start: 2024-07-28 | End: 2024-07-28

## 2024-07-28 RX ORDER — TOBRAMYCIN 3 MG/ML
1 SOLUTION/ DROPS OPHTHALMIC
Qty: 5 ML | Refills: 0 | Status: SHIPPED | OUTPATIENT
Start: 2024-07-28

## 2024-07-28 RX ORDER — KETOTIFEN FUMARATE 0.35 MG/ML
1 SOLUTION/ DROPS OPHTHALMIC 2 TIMES DAILY
Qty: 5 ML | Refills: 0 | Status: SHIPPED | OUTPATIENT
Start: 2024-07-28
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
## 2024-08-08 ENCOUNTER — OFFICE VISIT (OUTPATIENT)
Dept: URGENT CARE | Facility: CLINIC | Age: 29
End: 2024-08-08
Payer: COMMERCIAL

## 2024-08-08 VITALS
WEIGHT: 134 LBS | RESPIRATION RATE: 18 BRPM | BODY MASS INDEX: 19.85 KG/M2 | HEART RATE: 92 BPM | DIASTOLIC BLOOD PRESSURE: 75 MMHG | SYSTOLIC BLOOD PRESSURE: 126 MMHG | TEMPERATURE: 97.8 F | HEIGHT: 69 IN | OXYGEN SATURATION: 98 %

## 2024-08-08 DIAGNOSIS — Z76.0 MEDICATION REFILL: Primary | ICD-10-CM

## 2024-08-08 PROCEDURE — 99213 OFFICE O/P EST LOW 20 MIN: CPT | Performed by: PHYSICIAN ASSISTANT

## 2024-08-08 RX ORDER — TRIAMCINOLONE ACETONIDE 1 MG/G
OINTMENT TOPICAL 2 TIMES DAILY
Qty: 30 G | Refills: 0 | Status: SHIPPED | OUTPATIENT
Start: 2024-08-08

## 2024-08-08 RX ORDER — KETOCONAZOLE 20 MG/ML
1 SHAMPOO TOPICAL 2 TIMES WEEKLY
Qty: 120 ML | Refills: 5 | Status: SHIPPED | OUTPATIENT
Start: 2024-08-08

## 2024-08-08 RX ORDER — FLUCONAZOLE 150 MG/1
150 TABLET ORAL ONCE
Qty: 1 TABLET | Refills: 0 | Status: SHIPPED | OUTPATIENT
Start: 2024-08-08 | End: 2024-08-08
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
## 2024-08-21 ENCOUNTER — OFFICE VISIT (OUTPATIENT)
Dept: INTERNAL MEDICINE CLINIC | Facility: CLINIC | Age: 29
End: 2024-08-21
Payer: COMMERCIAL

## 2024-08-21 VITALS
RESPIRATION RATE: 16 BRPM | SYSTOLIC BLOOD PRESSURE: 118 MMHG | DIASTOLIC BLOOD PRESSURE: 74 MMHG | TEMPERATURE: 98.2 F | BODY MASS INDEX: 19.4 KG/M2 | OXYGEN SATURATION: 98 % | WEIGHT: 131 LBS | HEIGHT: 69 IN | HEART RATE: 75 BPM

## 2024-08-21 DIAGNOSIS — R21 RASH: Primary | ICD-10-CM

## 2024-08-21 PROCEDURE — 99213 OFFICE O/P EST LOW 20 MIN: CPT | Performed by: INTERNAL MEDICINE

## 2024-08-21 NOTE — PROGRESS NOTES
Ambulatory Visit  Name: Qian Tierney      : 1995      MRN: [id number]  Encounter Provider: Rea Fitch DO  Encounter Date: 2024   Encounter department: [facility] INTERNAL MEDICINE    Assessment & Plan   1. Rash  Assessment & Plan:  -acute on chronic  -suspect stress induced  -does not involve oral mucosa or palms and soles  -add allegra in AM, continue zyrtec in PM  -restart ativan as she did not get relief with atarax.  Pt already prescription  -follow up with Allergist       History of Present Illness     HPI    Has had full body rash, was scratching so it has worsened.  Took hot showers as it made her feel.  Used vitamin E oil and eucerin.  When used cortisone, triamcinolone which ruined her skin.  It has been occurring for 6 months but has not been this bad.  Typically just on her flexural area and diagnosed with eczema.  Scheduled to see an allergist at end of Sept.       In the past, she was given doxycycline for three weeks with improvement but discontinued it as she works outside.  Also has been given prednisone twice with relief briefly.  She has been taking zyrtec daily and ibuprofen due to burning sensation.    Admits to increased stress.  Reports she has used hydroxyzine previously for itching which did not help.  Has been given ativan and reports she did not do well on antidepressants.    Review of Systems   Constitutional:  Negative for fever.   Skin:  Positive for color change and rash. Negative for pallor and wound.   Psychiatric/Behavioral:  The patient is nervous/anxious.      Medical History Reviewed by provider this encounter:  Tobacco  Allergies  Meds  Problems  Med Hx  Surg Hx  Fam Hx       Current Outpatient Medications on File Prior to Visit   Medication Sig Dispense Refill   • cetirizine (ZyrTEC) 10 mg tablet Take 10 mg by mouth daily as needed for allergies     • ciclopirox (LOPROX) 0.77 % cream Apply topically 2 (two) times a day 30 g 0   •  "ketoconazole (NIZORAL) 2 % shampoo Apply 1 Application topically 2 (two) times a week 120 mL 5   • triamcinolone (KENALOG) 0.1 % ointment Apply topically 2 (two) times a day Do not use more than 14 days 30 g 0   • acetaminophen (TYLENOL) 500 mg tablet Take 1,000 mg by mouth every 6 (six) hours as needed for mild pain (Patient not taking: Reported on 2024)     • ibuprofen (MOTRIN) 200 mg tablet Take 400 mg by mouth every 6 (six) hours as needed for mild pain (Patient not taking: Reported on 2024)     • Ketotifen Fumarate (ZADITOR) 0.035 % ophthalmic solution Administer 1 drop to both eyes 2 (two) times a day (Patient not taking: Reported on 2024) 5 mL 0   • pimecrolimus (ELIDEL) 1 % cream Apply topically 2 (two) times a day (Patient not taking: Reported on 2024) 100 g 4   • tobramycin (TOBREX) 0.3 % SOLN Administer 1 drop to both eyes every 4 (four) hours while awake (Patient not taking: Reported on 2024) 5 mL 0     No current facility-administered medications on file prior to visit.      Social History     Tobacco Use   • Smoking status: Former     Current packs/day: 0.00     Average packs/day: 0.3 packs/day for 4.0 years (1.0 ttl pk-yrs)     Types: Cigarettes     Start date:      Quit date: 2018     Years since quittin.6   • Smokeless tobacco: Never   Vaping Use   • Vaping status: Former   Substance and Sexual Activity   • Alcohol use: Not Currently   • Drug use: Not Currently   • Sexual activity: Yes     Partners: Male     Birth control/protection: None     Objective     /74 (BP Location: Left arm, Patient Position: Sitting, Cuff Size: Standard)   Pulse 75   Temp 98.2 °F (36.8 °C) (Tympanic Core)   Resp 16   Ht 5' 9\" (1.753 m)   Wt 59.4 kg (131 lb)   SpO2 98%   BMI 19.35 kg/m²     Physical Exam  Vitals reviewed.   HENT:      Mouth/Throat:      Mouth: Mucous membranes are moist.   Skin:     Findings: Rash present.      Comments: Maculopapular primarily on torso and upper " extremities with few papular scattered lesions on legs and back   Neurological:      Mental Status: She is alert.       Administrative Statements

## 2024-08-23 ENCOUNTER — HOSPITAL ENCOUNTER (EMERGENCY)
Facility: HOSPITAL | Age: 29
Discharge: HOME | End: 2024-08-23
Attending: EMERGENCY MEDICINE
Payer: COMMERCIAL

## 2024-08-23 VITALS
TEMPERATURE: 98.8 F | BODY MASS INDEX: 19.3 KG/M2 | HEIGHT: 69 IN | OXYGEN SATURATION: 100 % | HEART RATE: 85 BPM | RESPIRATION RATE: 18 BRPM | SYSTOLIC BLOOD PRESSURE: 120 MMHG | DIASTOLIC BLOOD PRESSURE: 58 MMHG | WEIGHT: 130.29 LBS

## 2024-08-23 DIAGNOSIS — L25.9 CONTACT DERMATITIS AND ECZEMA: Primary | ICD-10-CM

## 2024-08-23 PROCEDURE — 99284 EMERGENCY DEPT VISIT MOD MDM: CPT

## 2024-08-23 PROCEDURE — 99282 EMERGENCY DEPT VISIT SF MDM: CPT

## 2024-08-23 RX ORDER — CETIRIZINE HYDROCHLORIDE 10 MG/1
10 TABLET ORAL DAILY
Qty: 30 TABLET | Refills: 0 | Status: SHIPPED | OUTPATIENT
Start: 2024-08-23

## 2024-08-23 RX ORDER — HYDROXYZINE HYDROCHLORIDE 25 MG/1
25 TABLET, FILM COATED ORAL EVERY 6 HOURS
Qty: 12 TABLET | Refills: 0 | Status: SHIPPED | OUTPATIENT
Start: 2024-08-23

## 2024-08-23 RX ORDER — PREDNISONE 10 MG/1
TABLET ORAL
Qty: 30 TABLET | Refills: 0 | Status: SHIPPED | OUTPATIENT
Start: 2024-08-23 | End: 2024-09-04

## 2024-08-23 RX ORDER — HYDROXYZINE HYDROCHLORIDE 25 MG/1
25 TABLET, FILM COATED ORAL ONCE
Status: DISCONTINUED | OUTPATIENT
Start: 2024-08-23 | End: 2024-08-24 | Stop reason: HOSPADM

## 2024-08-23 RX ORDER — FAMOTIDINE 20 MG/1
20 TABLET, FILM COATED ORAL ONCE
Status: COMPLETED | OUTPATIENT
Start: 2024-08-23 | End: 2024-08-23

## 2024-08-23 RX ORDER — FAMOTIDINE 20 MG/1
20 TABLET, FILM COATED ORAL 2 TIMES DAILY
Qty: 30 TABLET | Refills: 0 | Status: SHIPPED | OUTPATIENT
Start: 2024-08-23

## 2024-08-23 RX ADMIN — PREDNISONE 50 MG: 20 TABLET ORAL at 21:42

## 2024-08-23 RX ADMIN — FAMOTIDINE 20 MG: 20 TABLET, FILM COATED ORAL at 21:36

## 2024-08-24 NOTE — DISCHARGE INSTRUCTIONS
Take medication as prescribed  Follow-up with dermatologist in outpatient setting  Return to Emergency Department if you develop fever, exam chills, weakness, nausea, vomiting, diarrhea, shortness of breath, chest pain, palpitations, or productive cough.
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
## 2024-09-03 ENCOUNTER — HOSPITAL ENCOUNTER (EMERGENCY)
Facility: HOSPITAL | Age: 29
Discharge: HOME | End: 2024-09-03
Attending: EMERGENCY MEDICINE | Admitting: EMERGENCY MEDICINE
Payer: COMMERCIAL

## 2024-09-03 VITALS
BODY MASS INDEX: 19.83 KG/M2 | WEIGHT: 134.26 LBS | HEART RATE: 60 BPM | OXYGEN SATURATION: 100 % | TEMPERATURE: 97.8 F | DIASTOLIC BLOOD PRESSURE: 60 MMHG | RESPIRATION RATE: 18 BRPM | SYSTOLIC BLOOD PRESSURE: 121 MMHG

## 2024-09-03 DIAGNOSIS — R21 RASH: Primary | ICD-10-CM

## 2024-09-03 DIAGNOSIS — L25.9 CONTACT DERMATITIS AND ECZEMA: ICD-10-CM

## 2024-09-03 PROCEDURE — 99283 EMERGENCY DEPT VISIT LOW MDM: CPT

## 2024-09-03 PROCEDURE — 99284 EMERGENCY DEPT VISIT MOD MDM: CPT | Performed by: PHYSICIAN ASSISTANT

## 2024-09-03 RX ORDER — PREDNISONE 10 MG/1
TABLET ORAL
Qty: 14 TABLET | Refills: 0 | Status: SHIPPED | OUTPATIENT
Start: 2024-09-03 | End: 2024-09-09

## 2024-09-03 RX ORDER — HYDROXYZINE HYDROCHLORIDE 25 MG/1
25 TABLET, FILM COATED ORAL EVERY 6 HOURS
Qty: 12 TABLET | Refills: 0 | Status: SHIPPED | OUTPATIENT
Start: 2024-09-03

## 2024-09-03 RX ORDER — CETIRIZINE HYDROCHLORIDE 10 MG/1
10 TABLET ORAL DAILY
Qty: 30 TABLET | Refills: 0 | Status: SHIPPED | OUTPATIENT
Start: 2024-09-03
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
## 2024-09-03 NOTE — DISCHARGE INSTRUCTIONS
Please refer to the attached information for strict return instructions. If symptoms worsen or new symptoms develop please return to the ER. Please follow up with dermatology for re-evaluation.

## 2024-09-08 ENCOUNTER — HOSPITAL ENCOUNTER (EMERGENCY)
Facility: HOSPITAL | Age: 29
Discharge: HOME | End: 2024-09-08
Attending: STUDENT IN AN ORGANIZED HEALTH CARE EDUCATION/TRAINING PROGRAM | Admitting: STUDENT IN AN ORGANIZED HEALTH CARE EDUCATION/TRAINING PROGRAM
Payer: COMMERCIAL

## 2024-09-08 ENCOUNTER — APPOINTMENT (EMERGENCY)
Dept: RADIOLOGY | Facility: HOSPITAL | Age: 29
End: 2024-09-08
Payer: COMMERCIAL

## 2024-09-08 VITALS
TEMPERATURE: 97.3 F | BODY MASS INDEX: 19.93 KG/M2 | WEIGHT: 134.6 LBS | DIASTOLIC BLOOD PRESSURE: 80 MMHG | RESPIRATION RATE: 18 BRPM | OXYGEN SATURATION: 100 % | HEART RATE: 80 BPM | HEIGHT: 69 IN | SYSTOLIC BLOOD PRESSURE: 118 MMHG

## 2024-09-08 DIAGNOSIS — T50.905A PILL ESOPHAGITIS: Primary | ICD-10-CM

## 2024-09-08 DIAGNOSIS — K20.80 PILL ESOPHAGITIS: Primary | ICD-10-CM

## 2024-09-08 PROCEDURE — 99284 EMERGENCY DEPT VISIT MOD MDM: CPT | Performed by: STUDENT IN AN ORGANIZED HEALTH CARE EDUCATION/TRAINING PROGRAM

## 2024-09-08 PROCEDURE — 93005 ELECTROCARDIOGRAM TRACING: CPT

## 2024-09-08 PROCEDURE — 71046 X-RAY EXAM CHEST 2 VIEWS: CPT

## 2024-09-08 PROCEDURE — 70360 X-RAY EXAM OF NECK: CPT

## 2024-09-08 PROCEDURE — 99283 EMERGENCY DEPT VISIT LOW MDM: CPT

## 2024-09-08 RX ORDER — LIDOCAINE HYDROCHLORIDE 20 MG/ML
15 SOLUTION OROPHARYNGEAL ONCE
Status: DISCONTINUED | OUTPATIENT
Start: 2024-09-08 | End: 2024-09-08 | Stop reason: HOSPADM
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
## 2024-09-08 NOTE — DISCHARGE INSTRUCTIONS
Please take your medications the plenty of fluids.  Please follow-up with your family doctor as needed.  Return to the emergency room for any trouble swallowing, trouble breathing, fevers, chest pain, shortness of breath, or for any other new or concerning symptoms.

## 2024-09-09 LAB
ATRIAL RATE: 64 BPM
P AXIS: 70 DEGREES
PR INTERVAL: 126 MS
QRS AXIS: 76 DEGREES
QRSD INTERVAL: 88 MS
QT INTERVAL: 388 MS
QTC INTERVAL: 400 MS
T WAVE AXIS: 65 DEGREES
VENTRICULAR RATE: 64 BPM

## 2024-09-09 PROCEDURE — 93010 ELECTROCARDIOGRAM REPORT: CPT | Performed by: INTERNAL MEDICINE

## 2024-09-13 ENCOUNTER — HOSPITAL ENCOUNTER (EMERGENCY)
Facility: HOSPITAL | Age: 29
Discharge: HOME | End: 2024-09-13
Payer: COMMERCIAL

## 2024-09-13 VITALS
TEMPERATURE: 98.1 F | OXYGEN SATURATION: 100 % | DIASTOLIC BLOOD PRESSURE: 81 MMHG | SYSTOLIC BLOOD PRESSURE: 118 MMHG | RESPIRATION RATE: 16 BRPM | WEIGHT: 132.72 LBS | BODY MASS INDEX: 19.6 KG/M2 | HEART RATE: 87 BPM

## 2024-09-13 DIAGNOSIS — R21 RASH AND NONSPECIFIC SKIN ERUPTION: Primary | ICD-10-CM

## 2024-09-13 PROCEDURE — 99283 EMERGENCY DEPT VISIT LOW MDM: CPT

## 2024-09-13 PROCEDURE — 99284 EMERGENCY DEPT VISIT MOD MDM: CPT

## 2024-09-13 RX ORDER — DOXYCYCLINE 100 MG/1
100 CAPSULE ORAL 2 TIMES DAILY
Qty: 14 CAPSULE | Refills: 0 | Status: SHIPPED | OUTPATIENT
Start: 2024-09-13 | End: 2024-09-13 | Stop reason: ALTCHOICE

## 2024-09-13 RX ORDER — DOXYCYCLINE 25 MG/5ML
100 POWDER, FOR SUSPENSION ORAL 2 TIMES DAILY
Qty: 280 ML | Refills: 0 | Status: SHIPPED | OUTPATIENT
Start: 2024-09-13 | End: 2024-09-20

## 2024-09-13 RX ORDER — DOXYCYCLINE 100 MG/1
100 TABLET ORAL 2 TIMES DAILY
Qty: 14 TABLET | Refills: 0 | Status: SHIPPED | OUTPATIENT
Start: 2024-09-13 | End: 2024-09-13

## 2024-09-13 RX ORDER — DOXYCYCLINE 100 MG/1
100 CAPSULE ORAL ONCE
Status: DISCONTINUED | OUTPATIENT
Start: 2024-09-13 | End: 2024-09-13 | Stop reason: HOSPADM

## 2024-09-13 RX ORDER — BETAMETHASONE DIPROPIONATE 0.05 %
OINTMENT (GRAM) TOPICAL 2 TIMES DAILY
Qty: 30 G | Refills: 0 | Status: SHIPPED | OUTPATIENT
Start: 2024-09-13

## 2024-09-13 NOTE — ED PROVIDER NOTES
1. Rash and nonspecific skin eruption      ED Disposition       ED Disposition   Discharge    Condition   Stable    Date/Time   Fri Sep 13, 2024  2:45 AM    Comment   Qian Tierney discharge to home/self care.                   Assessment & Plan       Medical Decision Making  Risk  Prescription drug management.      Patient with history as above presented with a rash. History obtained from patient.    Differential diagnosis includes: Eczema, contact dermatitis    Plan: At this point, the patient's rash is most consistent with uncomplicated eczema.  Patient has tried numerous remedies and none of it seems to be helping much, although she is not currently using an emollient as she should be and is not currently using a topical steroid.  Will recommend both of these to patient and prescribe the topical steroid.  Patient requesting doxycycline as this has helped with her symptoms in the past.  Given previous improvement with this medication we will trial this to see if it has benefit while she is awaiting evaluation by allergist.    Prescriptions written for doxycycline as well as topical steroid.  Patient instructed to use emollients for suspected eczema.  Given amatory referral to dermatology.  Instructed to follow-up with allergist which she already has scheduled in 10 days from now.  Stable for outpatient management.    Disposition: Discharged with instructions to obtain outpatient follow up of patient's symptoms and findings, with strict return precautions if patient develops new or worsening symptoms. Patient understands this plan and is agreeable. All questions answered. Patient discharged home with return precautions.                 Medications - No data to display    History of Present Illness       HPI    Patient is a 29-year-old female with a significant past medical history of eczema, presenting for evaluation of a rash.  Patient has been evaluated numerous times with similar rash.  She reports that she has  been told that this is eczema.  She has tried several remedies for this without much success.  Notably she has previously seen a dermatologist and prescribed emollients as well as doxycycline with thought that at that point in time there was coinfection.  She reports that this did seem to help her symptoms.  She says that other point in time she has been given topical steroids as well as oral steroids and she recently finished a long prednisone taper.  She reports that since being off this prednisone taper she has had recurrence of her symptoms.  She says that the rash is pruritic.  She denies any associated fevers.  She has tried to eliminate certain foods from her diet with the thought that this could be causing the rash namely wheat as well as dairy but neither of these have provided much help.  She says that currently she has only been using the oral steroid which she just finished.  She tried using some Cetaphil ointment however this made her very itchy and then she took a shower and came here because she was unable to sleep.    Review of Systems   Constitutional:  Negative for fever.   Skin:  Positive for rash.           Objective     ED Triage Vitals [09/13/24 0153]   Temperature Pulse Blood Pressure Respirations SpO2 Patient Position - Orthostatic VS   98.1 °F (36.7 °C) 87 118/81 16 100 % --      Temp Source Heart Rate Source BP Location FiO2 (%) Pain Score    Oral -- -- -- No Pain        Physical Exam  Vitals and nursing note reviewed.   Constitutional:       General: She is not in acute distress.     Appearance: Normal appearance. She is not ill-appearing or toxic-appearing.   HENT:      Head: Normocephalic and atraumatic.      Right Ear: External ear normal.      Left Ear: External ear normal.      Nose: Nose normal.   Eyes:      General: No scleral icterus.        Right eye: No discharge.         Left eye: No discharge.      Extraocular Movements: Extraocular movements intact.      Conjunctiva/sclera:  Conjunctivae normal.   Cardiovascular:      Rate and Rhythm: Normal rate.      Heart sounds: Normal heart sounds. No murmur heard.     No friction rub. No gallop.   Pulmonary:      Effort: Pulmonary effort is normal. No respiratory distress.      Breath sounds: Normal breath sounds.   Abdominal:      General: Abdomen is flat. There is no distension.      Palpations: Abdomen is soft. There is no mass.      Tenderness: There is no abdominal tenderness.   Genitourinary:     Comments: Deferred  Skin:     General: Skin is warm and dry.      Findings: Rash present.      Comments: Dry skin with associated erythematous, papular regions on the flexor surfaces of the bilateral antecubital areas, along the neck and upper chest, as well as in the right inguinal fold.   Neurological:      General: No focal deficit present.      Mental Status: She is alert.                   Labs Reviewed - No data to display  No orders to display       Procedures       Paulino Hinojosa,   09/13/24 0920       Paulino Hinojosa,   09/13/24 0920

## 2024-09-13 NOTE — DISCHARGE INSTRUCTIONS
You were evaluated in the Emergency Department today for a rash.    Please follow up with your primary care physician within 2-3 days.     Return to the Emergency Department if you experience worsening or spreading rash, worsening or uncontrolled pain, fevers 100.4°F or greater, recurrent vomiting, shortness of breath, discharge from your rash, or any other concerning symptoms.    Thank you for choosing us for your care.

## 2024-10-04 ENCOUNTER — OFFICE VISIT (OUTPATIENT)
Age: 29
End: 2024-10-04
Payer: COMMERCIAL

## 2024-10-04 VITALS
DIASTOLIC BLOOD PRESSURE: 66 MMHG | SYSTOLIC BLOOD PRESSURE: 90 MMHG | WEIGHT: 132.4 LBS | BODY MASS INDEX: 19.61 KG/M2 | HEIGHT: 69 IN | HEART RATE: 74 BPM | OXYGEN SATURATION: 97 % | TEMPERATURE: 97.5 F

## 2024-10-04 DIAGNOSIS — L20.82 FLEXURAL ECZEMA: ICD-10-CM

## 2024-10-04 DIAGNOSIS — Z20.828 EXPOSURE TO THE FLU: ICD-10-CM

## 2024-10-04 DIAGNOSIS — U07.1 COVID: ICD-10-CM

## 2024-10-04 DIAGNOSIS — Z91.89 AT INCREASED RISK OF EXPOSURE TO COVID-19 VIRUS: Primary | ICD-10-CM

## 2024-10-04 DIAGNOSIS — L25.9 CONTACT DERMATITIS AND ECZEMA: ICD-10-CM

## 2024-10-04 LAB
SARS-COV-2 AG UPPER RESP QL IA: POSITIVE
SL AMB POCT RAPID FLU A: NORMAL
SL AMB POCT RAPID FLU B: NORMAL
VALID CONTROL: ABNORMAL

## 2024-10-04 PROCEDURE — 99214 OFFICE O/P EST MOD 30 MIN: CPT | Performed by: INTERNAL MEDICINE

## 2024-10-04 PROCEDURE — 87811 SARS-COV-2 COVID19 W/OPTIC: CPT | Performed by: INTERNAL MEDICINE

## 2024-10-04 PROCEDURE — 87804 INFLUENZA ASSAY W/OPTIC: CPT | Performed by: INTERNAL MEDICINE

## 2024-10-04 RX ORDER — DOXYCYCLINE 25 MG/5ML
20 POWDER, FOR SUSPENSION ORAL 2 TIMES DAILY
Qty: 280 ML | Refills: 0 | Status: SHIPPED | OUTPATIENT
Start: 2024-10-04 | End: 2024-10-11

## 2024-10-04 RX ORDER — CETIRIZINE HYDROCHLORIDE 10 MG/1
10 TABLET ORAL DAILY PRN
Qty: 30 TABLET | Refills: 2 | Status: SHIPPED | OUTPATIENT
Start: 2024-10-04

## 2024-10-04 RX ORDER — FAMOTIDINE 20 MG/1
20 TABLET, FILM COATED ORAL 2 TIMES DAILY
Qty: 30 TABLET | Refills: 2 | Status: SHIPPED | OUTPATIENT
Start: 2024-10-04

## 2024-10-04 NOTE — ASSESSMENT & PLAN NOTE
Patient has symptoms of congestion cough and fatigue.  COVID testing in the office today was positive.  I have recommended that the patient quarantine for period of 5 days from the onset of her symptoms which was 2 days ago.  She does not seem to be toxic to require any Paxlovid medication.  I have recommended extra rest good nutrition and vitamin supplementation of vitamin D 2000 units daily and vitamin C 1000 units daily.  Follow-up if symptoms worsen or fail to resolve is advised.

## 2024-10-04 NOTE — ASSESSMENT & PLAN NOTE
A prescription for doxycycline was provided to the patient as this has been a beneficial treatment for her rash in the past she will take 100 mg of medication twice a day for 7 days.
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
## 2024-11-24 ENCOUNTER — HOSPITAL ENCOUNTER (EMERGENCY)
Facility: HOSPITAL | Age: 29
Discharge: HOME | End: 2024-11-25
Attending: EMERGENCY MEDICINE
Payer: COMMERCIAL

## 2024-11-24 ENCOUNTER — APPOINTMENT (EMERGENCY)
Dept: ULTRASOUND IMAGING | Facility: HOSPITAL | Age: 29
End: 2024-11-24
Payer: COMMERCIAL

## 2024-11-24 VITALS
TEMPERATURE: 98.8 F | DIASTOLIC BLOOD PRESSURE: 79 MMHG | OXYGEN SATURATION: 99 % | HEART RATE: 88 BPM | WEIGHT: 136.69 LBS | BODY MASS INDEX: 20.18 KG/M2 | RESPIRATION RATE: 16 BRPM | SYSTOLIC BLOOD PRESSURE: 123 MMHG

## 2024-11-24 DIAGNOSIS — N89.8 VAGINAL DISCHARGE: Primary | ICD-10-CM

## 2024-11-24 DIAGNOSIS — R10.2 PELVIC PAIN: ICD-10-CM

## 2024-11-24 LAB
BACTERIA UR QL AUTO: ABNORMAL /HPF
BILIRUB UR QL STRIP: NEGATIVE
CLARITY UR: ABNORMAL
COLOR UR: YELLOW
EXT PREGNANCY TEST URINE: NEGATIVE
EXT. CONTROL: NORMAL
GLUCOSE UR STRIP-MCNC: NEGATIVE MG/DL
HGB UR QL STRIP.AUTO: ABNORMAL
KETONES UR STRIP-MCNC: NEGATIVE MG/DL
LEUKOCYTE ESTERASE UR QL STRIP: NEGATIVE
MUCOUS THREADS UR QL AUTO: ABNORMAL
NITRITE UR QL STRIP: NEGATIVE
NON-SQ EPI CELLS URNS QL MICRO: ABNORMAL /HPF
PH UR STRIP.AUTO: 7 [PH] (ref 4.5–8)
PROT UR STRIP-MCNC: >=300 MG/DL
RBC #/AREA URNS AUTO: ABNORMAL /HPF
SP GR UR STRIP.AUTO: >=1.03 (ref 1–1.03)
UROBILINOGEN UR QL STRIP.AUTO: 0.2 E.U./DL
WBC #/AREA URNS AUTO: ABNORMAL /HPF

## 2024-11-24 PROCEDURE — 81001 URINALYSIS AUTO W/SCOPE: CPT

## 2024-11-24 PROCEDURE — 76856 US EXAM PELVIC COMPLETE: CPT

## 2024-11-24 PROCEDURE — 87591 N.GONORRHOEAE DNA AMP PROB: CPT | Performed by: PHYSICIAN ASSISTANT

## 2024-11-24 PROCEDURE — 76830 TRANSVAGINAL US NON-OB: CPT

## 2024-11-24 PROCEDURE — 87660 TRICHOMONAS VAGIN DIR PROBE: CPT | Performed by: PHYSICIAN ASSISTANT

## 2024-11-24 PROCEDURE — 87480 CANDIDA DNA DIR PROBE: CPT | Performed by: PHYSICIAN ASSISTANT

## 2024-11-24 PROCEDURE — 99284 EMERGENCY DEPT VISIT MOD MDM: CPT | Performed by: PHYSICIAN ASSISTANT

## 2024-11-24 PROCEDURE — 87491 CHLMYD TRACH DNA AMP PROBE: CPT | Performed by: PHYSICIAN ASSISTANT

## 2024-11-24 PROCEDURE — 81025 URINE PREGNANCY TEST: CPT | Performed by: PHYSICIAN ASSISTANT

## 2024-11-24 PROCEDURE — 87510 GARDNER VAG DNA DIR PROBE: CPT | Performed by: PHYSICIAN ASSISTANT

## 2024-11-24 PROCEDURE — 99284 EMERGENCY DEPT VISIT MOD MDM: CPT

## 2024-11-24 RX ORDER — METRONIDAZOLE 500 MG/1
500 TABLET ORAL ONCE
Status: COMPLETED | OUTPATIENT
Start: 2024-11-24 | End: 2024-11-24

## 2024-11-24 RX ORDER — DOXYCYCLINE 100 MG/1
100 CAPSULE ORAL ONCE
Status: DISCONTINUED | OUTPATIENT
Start: 2024-11-24 | End: 2024-11-24

## 2024-11-24 RX ORDER — DOXYCYCLINE 100 MG/1
100 CAPSULE ORAL 2 TIMES DAILY
Qty: 14 CAPSULE | Refills: 0 | Status: SHIPPED | OUTPATIENT
Start: 2024-11-24 | End: 2024-11-24 | Stop reason: ALTCHOICE

## 2024-11-24 RX ORDER — METRONIDAZOLE 500 MG/1
500 TABLET ORAL EVERY 12 HOURS SCHEDULED
Qty: 14 TABLET | Refills: 0 | Status: SHIPPED | OUTPATIENT
Start: 2024-11-24 | End: 2024-11-26

## 2024-11-24 RX ORDER — METRONIDAZOLE 500 MG/1
500 TABLET ORAL EVERY 12 HOURS SCHEDULED
Qty: 14 TABLET | Refills: 0 | Status: SHIPPED | OUTPATIENT
Start: 2024-11-24 | End: 2024-11-24 | Stop reason: ALTCHOICE

## 2024-11-24 RX ADMIN — METRONIDAZOLE 500 MG: 500 TABLET ORAL at 23:57

## 2024-11-25 LAB
C TRACH DNA SPEC QL NAA+PROBE: NEGATIVE
CANDIDA RRNA VAG QL PROBE: NOT DETECTED
G VAGINALIS RRNA GENITAL QL PROBE: DETECTED
N GONORRHOEA DNA SPEC QL NAA+PROBE: NEGATIVE
T VAGINALIS RRNA GENITAL QL PROBE: NOT DETECTED
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
## 2024-11-26 ENCOUNTER — RESULTS FOLLOW-UP (OUTPATIENT)
Dept: OTHER | Facility: HOSPITAL | Age: 29
End: 2024-11-26

## 2024-11-26 RX ORDER — METRONIDAZOLE 7.5 MG/G
1 GEL VAGINAL
Qty: 70 G | Refills: 0 | Status: SHIPPED | OUTPATIENT
Start: 2024-11-26 | End: 2024-12-01

## 2024-12-26 ENCOUNTER — NURSE TRIAGE (OUTPATIENT)
Age: 29
End: 2024-12-26

## 2024-12-26 NOTE — TELEPHONE ENCOUNTER
"Patient treated a few weeks ago for BV. Completed 5 day course of metrogel and symptoms subsided but then felt like they were coming back so she used medication for 3 more days. Now thinks she may have a yeast infection but unsure if it is that, or if it is the medication from the metrogel. Reports white chunky discharge, no itching, no irritation, no odor. Finished metrogel 3 days ago. Informed patient, the white residual discharge could be from the metrogel as it continues to expel from her body. Patient has an appt to be swabbed to ensure the infection has cleared up next week. Advised patient she should keep this for follow up. Informed if she does think it to be a yeast infection, with itching/white discharge- she can try using the OTC monistat cream. Patient does note when she was in the ED last month, they did imaging and did note something on her ovaries. Will follow up with this next week at her visit as well.     Reason for Disposition   Symptoms of a yeast infection' (i.e., itchy, white discharge, not bad smelling) and not improved > 3 days following Care Advice    Answer Assessment - Initial Assessment Questions  1. DISCHARGE: \"Describe the discharge.\" (e.g., white, yellow, green, gray, foamy, cottage cheese-like)      White chunky discharge  2. ODOR: \"Is there a bad odor?\"      Denies   3. ONSET: \"When did the discharge begin?\"      Treated for BV a few weeks ago  4. RASH: \"Is there a rash in the genital area?\" If Yes, ask: \"Describe it.\" (e.g., redness, blisters, sores, bumps)      Denies  5. ABDOMEN PAIN: \"Are you having any abdomen pain?\" If Yes, ask: \"What does it feel like? \" (e.g., crampy, dull, intermittent, constant)       Was having abdominal pain - seen in the ED in November 6. ABDOMEN PAIN SEVERITY: If present, ask: \"How bad is it?\" (e.g., Scale 1-10; mild, moderate, or severe)      N/a  7. CAUSE: \"What do you think is causing the discharge?\" \"Have you had the same problem before?\" \"What " "happened then?\"      Yeast  8. OTHER SYMPTOMS: \"Do you have any other symptoms?\" (e.g., fever, itching, vaginal bleeding, pain with urination, injury to genital area, vaginal foreign body)      Denies  9. PREGNANCY: \"Is there any chance you are pregnant?\" \"When was your last menstrual period?\"      N/a    Protocols used: Vaginal Discharge-Adult-[place]    "

## 2025-01-08 ENCOUNTER — OFFICE VISIT (OUTPATIENT)
Dept: URGENT CARE | Age: 30
End: 2025-01-08
Payer: COMMERCIAL

## 2025-01-08 ENCOUNTER — APPOINTMENT (OUTPATIENT)
Dept: RADIOLOGY | Age: 30
End: 2025-01-08
Payer: COMMERCIAL

## 2025-01-08 VITALS
SYSTOLIC BLOOD PRESSURE: 102 MMHG | RESPIRATION RATE: 18 BRPM | TEMPERATURE: 96.5 F | HEIGHT: 69 IN | BODY MASS INDEX: 19.7 KG/M2 | WEIGHT: 133 LBS | OXYGEN SATURATION: 99 % | DIASTOLIC BLOOD PRESSURE: 58 MMHG | HEART RATE: 65 BPM

## 2025-01-08 DIAGNOSIS — R07.89 CHEST HEAVINESS: ICD-10-CM

## 2025-01-08 DIAGNOSIS — H10.33 ACUTE CONJUNCTIVITIS OF BOTH EYES, UNSPECIFIED ACUTE CONJUNCTIVITIS TYPE: ICD-10-CM

## 2025-01-08 DIAGNOSIS — J01.90 ACUTE SINUSITIS, RECURRENCE NOT SPECIFIED, UNSPECIFIED LOCATION: Primary | ICD-10-CM

## 2025-01-08 PROCEDURE — 71046 X-RAY EXAM CHEST 2 VIEWS: CPT

## 2025-01-08 PROCEDURE — 99213 OFFICE O/P EST LOW 20 MIN: CPT

## 2025-01-08 RX ORDER — AMOXICILLIN 400 MG/5ML
400 POWDER, FOR SUSPENSION ORAL 3 TIMES DAILY
Qty: 105 ML | Refills: 0 | Status: SHIPPED | OUTPATIENT
Start: 2025-01-08 | End: 2025-01-08

## 2025-01-08 RX ORDER — AMOXICILLIN 400 MG/5ML
400 POWDER, FOR SUSPENSION ORAL 3 TIMES DAILY
Qty: 105 ML | Refills: 0 | Status: SHIPPED | OUTPATIENT
Start: 2025-01-08 | End: 2025-01-15

## 2025-01-08 RX ORDER — OFLOXACIN 3 MG/ML
2 SOLUTION/ DROPS OPHTHALMIC 4 TIMES DAILY
Qty: 5 ML | Refills: 0 | Status: SHIPPED | OUTPATIENT
Start: 2025-01-08 | End: 2025-01-15

## 2025-01-09 NOTE — PATIENT INSTRUCTIONS
Chest x-ray reviewed, no acute abnormality noted, awaiting official read.   Please begin antibiotics as directed.   Ensure good hydration.   Follow up with PCP if no relief within one week.
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
## 2025-01-15 DIAGNOSIS — L25.9 CONTACT DERMATITIS AND ECZEMA: ICD-10-CM

## 2025-01-15 RX ORDER — CETIRIZINE HYDROCHLORIDE 10 MG/1
TABLET ORAL
Qty: 30 TABLET | Refills: 5 | Status: SHIPPED | OUTPATIENT
Start: 2025-01-15

## 2025-01-30 ENCOUNTER — NURSE TRIAGE (OUTPATIENT)
Age: 30
End: 2025-01-30

## 2025-01-30 ENCOUNTER — HOSPITAL ENCOUNTER (EMERGENCY)
Facility: HOSPITAL | Age: 30
Discharge: HOME | End: 2025-01-30
Attending: EMERGENCY MEDICINE
Payer: COMMERCIAL

## 2025-01-30 ENCOUNTER — OFFICE VISIT (OUTPATIENT)
Dept: URGENT CARE | Facility: CLINIC | Age: 30
End: 2025-01-30
Payer: COMMERCIAL

## 2025-01-30 VITALS
DIASTOLIC BLOOD PRESSURE: 68 MMHG | RESPIRATION RATE: 20 BRPM | HEART RATE: 72 BPM | OXYGEN SATURATION: 99 % | SYSTOLIC BLOOD PRESSURE: 114 MMHG

## 2025-01-30 VITALS
HEIGHT: 69 IN | BODY MASS INDEX: 19.99 KG/M2 | TEMPERATURE: 98.6 F | RESPIRATION RATE: 18 BRPM | HEART RATE: 63 BPM | OXYGEN SATURATION: 99 % | DIASTOLIC BLOOD PRESSURE: 75 MMHG | SYSTOLIC BLOOD PRESSURE: 114 MMHG | WEIGHT: 135 LBS

## 2025-01-30 DIAGNOSIS — N93.9 ABNORMAL VAGINAL BLEEDING: Primary | ICD-10-CM

## 2025-01-30 DIAGNOSIS — R53.1 WEAKNESS: ICD-10-CM

## 2025-01-30 DIAGNOSIS — R11.0 NAUSEA: ICD-10-CM

## 2025-01-30 DIAGNOSIS — R42 DIZZINESS: ICD-10-CM

## 2025-01-30 DIAGNOSIS — N93.9 VAGINAL BLEEDING: Primary | ICD-10-CM

## 2025-01-30 LAB
ALBUMIN SERPL BCG-MCNC: 3.9 G/DL (ref 3.5–5)
ALP SERPL-CCNC: 29 U/L (ref 34–104)
ALT SERPL W P-5'-P-CCNC: 10 U/L (ref 7–52)
ANION GAP SERPL CALCULATED.3IONS-SCNC: 7 MMOL/L (ref 4–13)
APTT PPP: 25 SECONDS (ref 23–34)
AST SERPL W P-5'-P-CCNC: 13 U/L (ref 13–39)
B-HCG SERPL-ACNC: <0.6 MIU/ML (ref 0–5)
BACTERIA UR QL AUTO: ABNORMAL /HPF
BASOPHILS # BLD AUTO: 0.03 THOUSANDS/ΜL (ref 0–0.1)
BASOPHILS NFR BLD AUTO: 0 % (ref 0–1)
BILIRUB SERPL-MCNC: 0.51 MG/DL (ref 0.2–1)
BILIRUB UR QL STRIP: NEGATIVE
BUN SERPL-MCNC: 15 MG/DL (ref 5–25)
CALCIUM SERPL-MCNC: 9.4 MG/DL (ref 8.4–10.2)
CHLORIDE SERPL-SCNC: 105 MMOL/L (ref 96–108)
CLARITY UR: CLEAR
CO2 SERPL-SCNC: 27 MMOL/L (ref 21–32)
COLOR UR: YELLOW
CREAT SERPL-MCNC: 0.77 MG/DL (ref 0.6–1.3)
EOSINOPHIL # BLD AUTO: 0.11 THOUSAND/ΜL (ref 0–0.61)
EOSINOPHIL NFR BLD AUTO: 1 % (ref 0–6)
ERYTHROCYTE [DISTWIDTH] IN BLOOD BY AUTOMATED COUNT: 12.9 % (ref 11.6–15.1)
GFR SERPL CREATININE-BSD FRML MDRD: 103 ML/MIN/1.73SQ M
GLUCOSE SERPL-MCNC: 99 MG/DL (ref 65–140)
GLUCOSE UR STRIP-MCNC: NEGATIVE MG/DL
HCT VFR BLD AUTO: 37.8 % (ref 34.8–46.1)
HGB BLD-MCNC: 12.2 G/DL (ref 11.5–15.4)
HGB UR QL STRIP.AUTO: ABNORMAL
IMM GRANULOCYTES # BLD AUTO: 0.03 THOUSAND/UL (ref 0–0.2)
IMM GRANULOCYTES NFR BLD AUTO: 0 % (ref 0–2)
INR PPP: 1.13 (ref 0.85–1.19)
KETONES UR STRIP-MCNC: NEGATIVE MG/DL
LEUKOCYTE ESTERASE UR QL STRIP: NEGATIVE
LYMPHOCYTES # BLD AUTO: 1.61 THOUSANDS/ΜL (ref 0.6–4.47)
LYMPHOCYTES NFR BLD AUTO: 19 % (ref 14–44)
MCH RBC QN AUTO: 30 PG (ref 26.8–34.3)
MCHC RBC AUTO-ENTMCNC: 32.3 G/DL (ref 31.4–37.4)
MCV RBC AUTO: 93 FL (ref 82–98)
MONOCYTES # BLD AUTO: 0.72 THOUSAND/ΜL (ref 0.17–1.22)
MONOCYTES NFR BLD AUTO: 8 % (ref 4–12)
MUCOUS THREADS UR QL AUTO: ABNORMAL
NEUTROPHILS # BLD AUTO: 6.09 THOUSANDS/ΜL (ref 1.85–7.62)
NEUTS SEG NFR BLD AUTO: 72 % (ref 43–75)
NITRITE UR QL STRIP: NEGATIVE
NON-SQ EPI CELLS URNS QL MICRO: ABNORMAL /HPF
NRBC BLD AUTO-RTO: 0 /100 WBCS
PH UR STRIP.AUTO: 7 [PH]
PLATELET # BLD AUTO: 214 THOUSANDS/UL (ref 149–390)
PMV BLD AUTO: 9.3 FL (ref 8.9–12.7)
POTASSIUM SERPL-SCNC: 3.7 MMOL/L (ref 3.5–5.3)
PROT SERPL-MCNC: 6.8 G/DL (ref 6.4–8.4)
PROT UR STRIP-MCNC: ABNORMAL MG/DL
PROTHROMBIN TIME: 14.9 SECONDS (ref 12.3–15)
RBC # BLD AUTO: 4.06 MILLION/UL (ref 3.81–5.12)
RBC #/AREA URNS AUTO: ABNORMAL /HPF
SODIUM SERPL-SCNC: 139 MMOL/L (ref 135–147)
SP GR UR STRIP.AUTO: 1.02 (ref 1–1.03)
T4 FREE SERPL-MCNC: 0.77 NG/DL (ref 0.61–1.12)
TSH SERPL DL<=0.05 MIU/L-ACNC: 9.91 UIU/ML (ref 0.45–4.5)
UROBILINOGEN UR QL STRIP.AUTO: 0.2 E.U./DL
WBC # BLD AUTO: 8.59 THOUSAND/UL (ref 4.31–10.16)
WBC #/AREA URNS AUTO: ABNORMAL /HPF

## 2025-01-30 PROCEDURE — 99284 EMERGENCY DEPT VISIT MOD MDM: CPT | Performed by: PHYSICIAN ASSISTANT

## 2025-01-30 PROCEDURE — 36415 COLL VENOUS BLD VENIPUNCTURE: CPT | Performed by: PHYSICIAN ASSISTANT

## 2025-01-30 PROCEDURE — 96361 HYDRATE IV INFUSION ADD-ON: CPT

## 2025-01-30 PROCEDURE — 84702 CHORIONIC GONADOTROPIN TEST: CPT | Performed by: PHYSICIAN ASSISTANT

## 2025-01-30 PROCEDURE — 84439 ASSAY OF FREE THYROXINE: CPT | Performed by: PHYSICIAN ASSISTANT

## 2025-01-30 PROCEDURE — 99213 OFFICE O/P EST LOW 20 MIN: CPT | Performed by: PHYSICIAN ASSISTANT

## 2025-01-30 PROCEDURE — 99284 EMERGENCY DEPT VISIT MOD MDM: CPT

## 2025-01-30 PROCEDURE — 80053 COMPREHEN METABOLIC PANEL: CPT | Performed by: PHYSICIAN ASSISTANT

## 2025-01-30 PROCEDURE — 85025 COMPLETE CBC W/AUTO DIFF WBC: CPT | Performed by: PHYSICIAN ASSISTANT

## 2025-01-30 PROCEDURE — 81003 URINALYSIS AUTO W/O SCOPE: CPT | Performed by: PHYSICIAN ASSISTANT

## 2025-01-30 PROCEDURE — 85730 THROMBOPLASTIN TIME PARTIAL: CPT | Performed by: PHYSICIAN ASSISTANT

## 2025-01-30 PROCEDURE — 81001 URINALYSIS AUTO W/SCOPE: CPT | Performed by: PHYSICIAN ASSISTANT

## 2025-01-30 PROCEDURE — 85610 PROTHROMBIN TIME: CPT | Performed by: PHYSICIAN ASSISTANT

## 2025-01-30 PROCEDURE — 96360 HYDRATION IV INFUSION INIT: CPT

## 2025-01-30 PROCEDURE — 84443 ASSAY THYROID STIM HORMONE: CPT | Performed by: PHYSICIAN ASSISTANT

## 2025-01-30 RX ADMIN — SODIUM CHLORIDE 1000 ML: 0.9 INJECTION, SOLUTION INTRAVENOUS at 14:26
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
## 2025-01-30 NOTE — TELEPHONE ENCOUNTER
Pt calling, was discharged from ER with no imaging. Did have blood work which showed stable hemoglobin levels. Awaiting remaining thyroid levels. RN advised on Dr. Chiu' recommendations. Able to schedule follow up appointment for tomorrow afternoon in the office. Advised patient to go back to ER if feels like she is about to pass out. Advised patient take ibuprofen to help with cramping and reduce bleeding, increase hydration and rest. Pt verbalized an understanding. No further questions.

## 2025-01-30 NOTE — TELEPHONE ENCOUNTER
"Patient calling in from ED in [place], asking for an u/s to be ordered.  She was transferred from  to the ED today. Patient is having heavy bleeding and severe abdominal pain.  She is feeling dizzy and faint.  She reports she is soaking through tampons in minutes and passing clots.  She had a heavy period last month at as well, but this is not normal for her.  She was told by the ED that they could not do a pelvic u/s because she is not having an ectopic pregnancy.  She is reaching out to us, asking for a STAT u/s.  Encouraged patient to ask the providers again who are taking care of her.  Will reach out to provider for recommendations.      Answer Assessment - Initial Assessment Questions  1. BLEEDING SEVERITY: \"Describe the bleeding that you are having.\" \"How much bleeding is there?\"       Soaking tampons within minutes  2. ONSET: \"When did the bleeding begin?\" \"Is it continuing now?\"      2 days  3. MENSTRUAL PERIOD: \"When was the last normal menstrual period?\" \"How is this different than your period?\"        4. REGULARITY: \"How regular are your periods?\"      yes  5. ABDOMEN PAIN: \"Do you have any pain?\" \"How bad is the pain?\"  (e.g., Scale 0-10; none, mild, moderate, or severe)      Cramping is severe  6. PREGNANCY: \"Is there any chance you are pregnant?\" \"When was your last menstrual period?\"  Neg in ED  8. HORMONE MEDICINES: \"Are you taking any hormone medicines, prescription or over-the-counter?\" (e.g., birth control pills, estrogen)      denies  9. BLOOD THINNER MEDICINES: \"Do you take any blood thinners?\" (e.g., Coumadin / warfarin, Pradaxa / dabigatran, aspirin)      denies  10. CAUSE: \"What do you think is causing the bleeding?\" (e.g., recent gyn surgery, recent gyn procedure; known bleeding disorder, cervical cancer, polycystic ovarian disease, fibroids)          denies  11. HEMODYNAMIC STATUS: \"Are you weak or feeling lightheaded?\" If Yes, ask: \"Can you stand and walk normally?\"         dizzy  12. " "OTHER SYMPTOMS: \"What other symptoms are you having with the bleeding?\" (e.g., passed tissue, vaginal discharge, fever, menstrual-type cramps)    Protocols used: Vaginal Bleeding - Abnormal-Adult-[place]    "
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
## 2025-01-30 NOTE — Clinical Note
Qian Tierney was seen and treated in our emergency department on 1/30/2025.                Diagnosis:     Qian  may return to work on return date.    She may return on this date: 02/01/2025         If you have any questions or concerns, please don't hesitate to call.      Sapphire Sorensen PA-C    ______________________________           _______________          _______________  Hospital Representative                              Date                                Time

## 2025-01-30 NOTE — PATIENT INSTRUCTIONS
Discussed with patient that based on her presentation I would recommend she be seen at the ER for further evaluation and diagnostic workup.    Follow up with PCP in 3-5 days.  Proceed to  ER if symptoms worsen.    If tests are performed, our office will contact you with results only if changes need to made to the care plan discussed with you at the visit. You can review your full results on [facility] Mychart.

## 2025-01-30 NOTE — TELEPHONE ENCOUNTER
Cannot order imaging on an ongoing evaluation that we are not involved in; discussed the the PA taking care of her and agree that with a normal US 2mo prior, this would be less acutely helpful. Reviewed current eval so far -- not acutely anemic, but TSH elevated (t4 processing at time of this note). Thyroid dysfunction can contribute to menstrual changes as well and pending T4, may be contributing/need management.     I would recommend office appt prior to addt testing; if needed will order outpatient US.

## 2025-01-31 ENCOUNTER — OFFICE VISIT (OUTPATIENT)
Dept: OBGYN CLINIC | Facility: MEDICAL CENTER | Age: 30
End: 2025-01-31
Payer: COMMERCIAL

## 2025-01-31 VITALS
HEIGHT: 69 IN | BODY MASS INDEX: 19.99 KG/M2 | DIASTOLIC BLOOD PRESSURE: 70 MMHG | WEIGHT: 135 LBS | SYSTOLIC BLOOD PRESSURE: 118 MMHG

## 2025-01-31 DIAGNOSIS — N93.9 ABNORMAL UTERINE BLEEDING: ICD-10-CM

## 2025-01-31 DIAGNOSIS — E03.9 HYPOTHYROIDISM, UNSPECIFIED TYPE: Primary | ICD-10-CM

## 2025-01-31 PROCEDURE — 99213 OFFICE O/P EST LOW 20 MIN: CPT | Performed by: STUDENT IN AN ORGANIZED HEALTH CARE EDUCATION/TRAINING PROGRAM

## 2025-01-31 RX ORDER — THYROID 15 MG/1
15 TABLET ORAL DAILY
Qty: 90 TABLET | Refills: 3 | Status: SHIPPED | OUTPATIENT
Start: 2025-01-31 | End: 2026-01-31

## 2025-01-31 NOTE — ASSESSMENT & PLAN NOTE
- Suspect AUB could be secondary to hypothyroidism. Check pelvic US  - Declines synthroid due adverse mood effects  - Willing to start lowest dose of armour  - May benefit from endocrine consult due to past difficulties with thyroid medication    Orders:    thyroid (ARMOUR) 15 MG tablet; Take 1 tablet (15 mg total) by mouth daily    Ambulatory Referral to Endocrinology; Future

## 2025-01-31 NOTE — PROGRESS NOTES
"Name: Qian Tierney      : 1995      MRN: [id number]  Encounter Provider: Ariana Steele MD  Encounter Date: 2025   Encounter department: [facility]  :  Assessment & Plan  Hypothyroidism, unspecified type  - Suspect AUB could be secondary to hypothyroidism. Check pelvic US  - Declines synthroid due adverse mood effects  - Willing to start lowest dose of armour  - May benefit from endocrine consult due to past difficulties with thyroid medication    Orders:    thyroid (ARMOUR) 15 MG tablet; Take 1 tablet (15 mg total) by mouth daily    Ambulatory Referral to Endocrinology; Future    Abnormal uterine bleeding    Orders:    US pelvis complete w transvaginal; Future        History of Present Illness   Qian presents for ER follow up after a very  heavy period for 2 days, passing large clots and feeling light headed.      Qian Tierney is a 29 y.o. female who presents for ER follow up.  History obtained from: patient    Review of Systems   Constitutional:  Negative for chills and fever.   Respiratory:  Negative for cough and shortness of breath.    Cardiovascular:  Negative for chest pain and leg swelling.   Gastrointestinal:  Negative for abdominal pain, nausea and vomiting.   Genitourinary:  Negative for dysuria, frequency and urgency.   Neurological:  Negative for dizziness, light-headedness and headaches.     Medical History Reviewed by provider this encounter:     .     Objective   /70   Ht 5' 9\" (1.753 m)   Wt 61.2 kg (135 lb)   LMP 2025   BMI 19.94 kg/m²      Physical Exam  Constitutional:       Appearance: Normal appearance.   HENT:      Head: Normocephalic and atraumatic.   Cardiovascular:      Rate and Rhythm: Normal rate.   Pulmonary:      Effort: Pulmonary effort is normal.   Skin:     General: Skin is warm.   Neurological:      General: No focal deficit present.      Mental Status: She is alert.   Psychiatric:         Mood and Affect: Mood " normal.             Ariana Steele MD  [facility]  1/31/2025 4:39 PM

## 2025-02-03 ENCOUNTER — HOSPITAL ENCOUNTER (OUTPATIENT)
Dept: ULTRASOUND IMAGING | Facility: HOSPITAL | Age: 30
Discharge: HOME | End: 2025-02-03
Attending: STUDENT IN AN ORGANIZED HEALTH CARE EDUCATION/TRAINING PROGRAM
Payer: COMMERCIAL

## 2025-02-03 DIAGNOSIS — N93.9 ABNORMAL UTERINE BLEEDING: ICD-10-CM

## 2025-02-03 PROCEDURE — 76830 TRANSVAGINAL US NON-OB: CPT

## 2025-02-03 PROCEDURE — 76856 US EXAM PELVIC COMPLETE: CPT

## 2025-02-04 ENCOUNTER — TELEPHONE (OUTPATIENT)
Dept: OBGYN CLINIC | Facility: CLINIC | Age: 30
End: 2025-02-04

## 2025-02-04 ENCOUNTER — RESULTS FOLLOW-UP (OUTPATIENT)
Dept: OBGYN CLINIC | Facility: CLINIC | Age: 30
End: 2025-02-04

## 2025-02-04 ENCOUNTER — NURSE TRIAGE (OUTPATIENT)
Age: 30
End: 2025-02-04

## 2025-02-04 NOTE — TELEPHONE ENCOUNTER
"Contacted radiology reading room, spoke with Keyla.     Pelvic u/s report is not resulted in chart for provider to review.  Per Keyla, result has not been signed being reading radiologist.  She will reach out to obtain signature for result to be released to chart and call back to advise available for provider to review        Reason for Disposition  • Follow-up information-only call to recent contact, no triage required    Answer Assessment - Initial Assessment Questions  1. REASON FOR CALL: \"What is the main reason for your call?\" or \"How can I best help you?\"      Significant findings  2. SYMPTOMS : \"Do you have any symptoms?\"       N/a  3. OTHER QUESTIONS: \"Do you have any other questions?\"      N/a    Protocols used: Information Only Call - No Triage-Adult-[place]    "

## 2025-02-04 NOTE — TELEPHONE ENCOUNTER
Keyla from Radiology called back and the report has been signed.      She stated to make you aware you  should be able to read it.

## 2025-02-04 NOTE — TELEPHONE ENCOUNTER
Pt returning Dr Mehta's phone call,     Message sent to Dr Mehta, pt was notified that Dr Mehta will be calling the patient now.

## 2025-02-04 NOTE — TELEPHONE ENCOUNTER
TELEPHONE CALL  [facility]      Left detailed voicemail with ultrasound results which suggest a 5 mm endometrial polyp.    Recommend return to office consult and sign surgical consent for hysteroscopy polypectomy D&C.      Ariana Steele MD  [facility]  02/04/25 11:52 AM

## 2025-02-04 NOTE — TELEPHONE ENCOUNTER
Regarding: Significant findings  ----- Message from Gloria PENNY sent at 2/4/2025 10:16 AM EST -----  Immediate findings in US pelvis,please advise

## 2025-02-11 ENCOUNTER — NURSE TRIAGE (OUTPATIENT)
Age: 30
End: 2025-02-11

## 2025-02-11 DIAGNOSIS — N76.0 BV (BACTERIAL VAGINOSIS): Primary | ICD-10-CM

## 2025-02-11 DIAGNOSIS — B96.89 BV (BACTERIAL VAGINOSIS): Primary | ICD-10-CM

## 2025-02-11 RX ORDER — METRONIDAZOLE 7.5 MG/G
1 GEL VAGINAL
Qty: 5 G | Refills: 0 | Status: SHIPPED | OUTPATIENT
Start: 2025-02-11 | End: 2025-02-16

## 2025-02-11 NOTE — TELEPHONE ENCOUNTER
"Patient calling to report BV symptoms including yellow foul/fishy vaginal discharge that began yesterday. Denies itching, burning, irritation, abdominal pain/cramping, or urinary concerns. Metrogel sent to pharmacy per protocol. Patient additionally requesting 1 dose diflucan to take once metrogel is completed in case of yeast infection.     Message to Dr. Steele.    Reason for Disposition  • Bad smelling vaginal discharge    Answer Assessment - Initial Assessment Questions  1. DISCHARGE: \"Describe the discharge.\" (e.g., white, yellow, green, gray, foamy, cottage cheese-like)      yellow  2. ODOR: \"Is there a bad odor?\"      Foul fishy  3. ONSET: \"When did the discharge begin?\"      Last night  4. RASH: \"Is there a rash in the genital area?\" If Yes, ask: \"Describe it.\" (e.g., redness, blisters, sores, bumps)      denies  5. ABDOMEN PAIN: \"Are you having any abdomen pain?\" If Yes, ask: \"What does it feel like? \" (e.g., crampy, dull, intermittent, constant)       denies  6. ABDOMEN PAIN SEVERITY: If present, ask: \"How bad is it?\" (e.g., Scale 1-10; mild, moderate, or severe)      denies  7. CAUSE: \"What do you think is causing the discharge?\" \"Have you had the same problem before?\" \"What happened then?\"      BV  8. OTHER SYMPTOMS: \"Do you have any other symptoms?\" (e.g., fever, itching, vaginal bleeding, pain with urination, injury to genital area, vaginal foreign body)      Denies itching, burning, fever, urinary concerns  9. PREGNANCY: \"Is there any chance you are pregnant?\" \"When was your last menstrual period?\"      Denies    Protocols used: Vaginal Discharge-Adult-[place]    "

## 2025-02-17 NOTE — TELEPHONE ENCOUNTER
Chief Complaint   Patient presents with    Consult     New med spine     Michael Valenzuela     I called and left a message for Remington Ortega to call back for her results

## 2025-02-21 ENCOUNTER — PATIENT MESSAGE (OUTPATIENT)
Dept: OBGYN CLINIC | Facility: MEDICAL CENTER | Age: 30
End: 2025-02-21

## 2025-02-21 DIAGNOSIS — E03.9 HYPOTHYROIDISM, UNSPECIFIED TYPE: Primary | ICD-10-CM

## 2025-03-04 ENCOUNTER — PATIENT MESSAGE (OUTPATIENT)
Dept: FAMILY MEDICINE CLINIC | Facility: CLINIC | Age: 30
End: 2025-03-04

## 2025-03-05 NOTE — PATIENT COMMUNICATION
ALEXA for pt to call back regarding labs and medication. Was seen by Dr. Burgos in July but has not been seen since.
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
## 2025-03-11 ENCOUNTER — TRANSCRIBE ORDERS (OUTPATIENT)
Dept: LAB | Facility: CLINIC | Age: 30
End: 2025-03-11

## 2025-03-11 ENCOUNTER — TELEPHONE (OUTPATIENT)
Age: 30
End: 2025-03-11

## 2025-03-11 ENCOUNTER — APPOINTMENT (OUTPATIENT)
Dept: LAB | Facility: CLINIC | Age: 30
End: 2025-03-11
Payer: COMMERCIAL

## 2025-03-11 ENCOUNTER — OFFICE VISIT (OUTPATIENT)
Age: 30
End: 2025-03-11
Payer: COMMERCIAL

## 2025-03-11 VITALS
HEIGHT: 69 IN | BODY MASS INDEX: 19.55 KG/M2 | HEART RATE: 80 BPM | OXYGEN SATURATION: 99 % | RESPIRATION RATE: 16 BRPM | WEIGHT: 132 LBS

## 2025-03-11 DIAGNOSIS — E03.9 HYPOTHYROIDISM, UNSPECIFIED TYPE: Primary | ICD-10-CM

## 2025-03-11 DIAGNOSIS — L25.9 CONTACT DERMATITIS AND ECZEMA: ICD-10-CM

## 2025-03-11 DIAGNOSIS — E03.9 HYPOTHYROIDISM, UNSPECIFIED TYPE: ICD-10-CM

## 2025-03-11 LAB
T3 SERPL-MCNC: 1 NG/ML (ref 0.9–1.8)
T4 FREE SERPL-MCNC: 0.68 NG/DL (ref 0.61–1.12)
TSH SERPL DL<=0.05 MIU/L-ACNC: 10.85 UIU/ML (ref 0.45–4.5)

## 2025-03-11 PROCEDURE — 36415 COLL VENOUS BLD VENIPUNCTURE: CPT

## 2025-03-11 PROCEDURE — 99213 OFFICE O/P EST LOW 20 MIN: CPT | Performed by: STUDENT IN AN ORGANIZED HEALTH CARE EDUCATION/TRAINING PROGRAM

## 2025-03-11 PROCEDURE — 84480 ASSAY TRIIODOTHYRONINE (T3): CPT

## 2025-03-11 PROCEDURE — 84443 ASSAY THYROID STIM HORMONE: CPT

## 2025-03-11 PROCEDURE — 84439 ASSAY OF FREE THYROXINE: CPT

## 2025-03-11 RX ORDER — CETIRIZINE HYDROCHLORIDE 10 MG/1
10 TABLET ORAL DAILY
Qty: 90 TABLET | Refills: 1 | Status: SHIPPED | OUTPATIENT
Start: 2025-03-11

## 2025-03-11 RX ORDER — LEVOTHYROXINE SODIUM 25 MCG
25 TABLET ORAL DAILY
Qty: 30 TABLET | Refills: 3 | Status: SHIPPED | OUTPATIENT
Start: 2025-03-11

## 2025-03-11 NOTE — ASSESSMENT & PLAN NOTE
Patient wishes to discuss thyroid medication as she reports inability to tolerate both generic levothyroxine and now [place] thyroid.    She states years ago she was initially prescribed generic levothyroxine and was taking it but was not able to tolerate consistent dosing due to side effects of the medication such as insomnia and hot flashes, mood swings, irritability.  She stopped taking the medication for some time but unfortunately TSH was persistently elevated indicating hypothyroid state.  She was then prescribed thyroid [place] for supplementation but she experienced unpleasant side effects like insomnia and hot flashes.   She wants to continue to try thyroid supplementation as she wants to control her thyroid.  Patient had TSH testing performed on 01/30/2025 and at that time TSH was elevated to 9.907 though free T4 was just within normal range at 0.77.  She currently has new TSH, T4, T3 orders placed.   Given persistent hypothyroid state as evidenced by TSH and inability to tolerate both generic levothyroxine and [place] Thyroid we will try Synthroid to be dispensed as written to see if she will find this more tolerable.  Will initiate prior authorization for Synthroid based on previous treatment failures with both generic levothyroxine and [place] Thyroid.  Patient will repeat TSH and T4 in approximately 6 weeks after starting the Synthroid.  Orders:    Synthroid 25 MCG tablet; Take 1 tablet (25 mcg total) by mouth daily    TSH, 3rd generation with Free T4 reflex; Future    T4, free; Future

## 2025-03-11 NOTE — TELEPHONE ENCOUNTER
PA for Synthroid 25 MCG tablet SUBMITTED to [employer]    via    []CMM-KEY:   [x]Surescripts-Case ID # [id number]   []Availity-Auth ID # NDC #   []Faxed to plan   []Other website   []Phone call Case ID #     []PA sent as URGENT    All office notes, labs and other pertaining documents and studies sent. Clinical questions answered. Awaiting determination from insurance company.     Turnaround time for your insurance to make a decision on your Prior Authorization can take 7-21 business days.

## 2025-03-12 NOTE — TELEPHONE ENCOUNTER
PA for Synthroid 25 MCG tablet APPROVED     Date(s) approved 03/11/25-03/11/26    Case # [id number]    Patient advised by          []Insprohart Message  [x]Phone call   []LMOM  []L/M to call office as no active Communication consent on file  []Unable to leave detailed message as VM not approved on Communication consent       Pharmacy advised by    [x]Fax  []Phone call  []Secure Chat       Approval letter scanned into [employer] No , not available at time of approval

## 2025-03-13 ENCOUNTER — RESULTS FOLLOW-UP (OUTPATIENT)
Dept: OBGYN CLINIC | Facility: MEDICAL CENTER | Age: 30
End: 2025-03-13

## 2025-03-13 ENCOUNTER — OFFICE VISIT (OUTPATIENT)
Dept: URGENT CARE | Age: 30
End: 2025-03-13
Payer: COMMERCIAL

## 2025-03-13 ENCOUNTER — NURSE TRIAGE (OUTPATIENT)
Dept: OTHER | Facility: OTHER | Age: 30
End: 2025-03-13

## 2025-03-13 VITALS
RESPIRATION RATE: 20 BRPM | WEIGHT: 132 LBS | SYSTOLIC BLOOD PRESSURE: 96 MMHG | BODY MASS INDEX: 19.49 KG/M2 | TEMPERATURE: 98.3 F | DIASTOLIC BLOOD PRESSURE: 79 MMHG | HEART RATE: 110 BPM | OXYGEN SATURATION: 98 %

## 2025-03-13 DIAGNOSIS — R39.9 UTI SYMPTOMS: Primary | ICD-10-CM

## 2025-03-13 LAB
SL AMB  POCT GLUCOSE, UA: ABNORMAL
SL AMB LEUKOCYTE ESTERASE,UA: ABNORMAL
SL AMB POCT BILIRUBIN,UA: ABNORMAL
SL AMB POCT BLOOD,UA: ABNORMAL
SL AMB POCT CLARITY,UA: ABNORMAL
SL AMB POCT COLOR,UA: ABNORMAL
SL AMB POCT KETONES,UA: ABNORMAL
SL AMB POCT NITRITE,UA: ABNORMAL
SL AMB POCT PH,UA: 6
SL AMB POCT SPECIFIC GRAVITY,UA: 1.03
SL AMB POCT URINE HCG: NORMAL
SL AMB POCT URINE PROTEIN: ABNORMAL
SL AMB POCT UROBILINOGEN: 0.2

## 2025-03-13 PROCEDURE — 87491 CHLMYD TRACH DNA AMP PROBE: CPT | Performed by: STUDENT IN AN ORGANIZED HEALTH CARE EDUCATION/TRAINING PROGRAM

## 2025-03-13 PROCEDURE — 81002 URINALYSIS NONAUTO W/O SCOPE: CPT | Performed by: STUDENT IN AN ORGANIZED HEALTH CARE EDUCATION/TRAINING PROGRAM

## 2025-03-13 PROCEDURE — 87086 URINE CULTURE/COLONY COUNT: CPT | Performed by: STUDENT IN AN ORGANIZED HEALTH CARE EDUCATION/TRAINING PROGRAM

## 2025-03-13 PROCEDURE — 87591 N.GONORRHOEAE DNA AMP PROB: CPT | Performed by: STUDENT IN AN ORGANIZED HEALTH CARE EDUCATION/TRAINING PROGRAM

## 2025-03-13 PROCEDURE — 99213 OFFICE O/P EST LOW 20 MIN: CPT | Performed by: STUDENT IN AN ORGANIZED HEALTH CARE EDUCATION/TRAINING PROGRAM

## 2025-03-13 PROCEDURE — 81025 URINE PREGNANCY TEST: CPT | Performed by: STUDENT IN AN ORGANIZED HEALTH CARE EDUCATION/TRAINING PROGRAM

## 2025-03-13 RX ORDER — CEPHALEXIN 250 MG/5ML
500 POWDER, FOR SUSPENSION ORAL EVERY 12 HOURS SCHEDULED
Qty: 100 ML | Refills: 0 | Status: SHIPPED | OUTPATIENT
Start: 2025-03-13 | End: 2025-03-18

## 2025-03-13 RX ORDER — CEPHALEXIN 250 MG/5ML
500 POWDER, FOR SUSPENSION ORAL EVERY 12 HOURS SCHEDULED
Qty: 100 ML | Refills: 0 | Status: SHIPPED | OUTPATIENT
Start: 2025-03-13 | End: 2025-03-13
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
## 2025-03-13 NOTE — PATIENT INSTRUCTIONS
Please start antibiotic as prescribed.  We are sending out urine culture, gonorrhea, chlamydia testing, we will call you if we need to change treatment.  You can also follow results in your MyChart, please call any questions.  Focus on increasing your hydration and do not hold your urine if you feel the need to go.  Please follow-up with your PCP for a recheck of your urine, today there was a large amount of blood and protein in the urine.  To complete your testing including BV testing in case we do not find an answer with our current testing, I recommend a follow-up check with your gynecologist or PCP.  If you have any severe worsening symptoms in the meantime such as severe abdominal pain, flank pain, fevers or chills, please go to the ER.

## 2025-03-14 LAB
BACTERIA UR CULT: NORMAL
C TRACH DNA SPEC QL NAA+PROBE: NEGATIVE
N GONORRHOEA DNA SPEC QL NAA+PROBE: NEGATIVE

## 2025-03-14 NOTE — TELEPHONE ENCOUNTER
"Regarding: medication question  ----- Message from Cosme ROCA sent at 3/13/2025  9:52 PM EDT -----  Patient stated, \"I was seen at urgent care for UTI and I was prescribed Keflex. I want to know if that abx is okay to take with cranberry juice.\"    "

## 2025-04-13 ENCOUNTER — OFFICE VISIT (OUTPATIENT)
Dept: URGENT CARE | Age: 30
End: 2025-04-13
Payer: COMMERCIAL

## 2025-04-13 VITALS
HEIGHT: 69 IN | SYSTOLIC BLOOD PRESSURE: 90 MMHG | DIASTOLIC BLOOD PRESSURE: 60 MMHG | HEART RATE: 55 BPM | TEMPERATURE: 98.7 F | RESPIRATION RATE: 18 BRPM | WEIGHT: 132 LBS | OXYGEN SATURATION: 98 % | BODY MASS INDEX: 19.55 KG/M2

## 2025-04-13 DIAGNOSIS — R05.1 ACUTE COUGH: Primary | ICD-10-CM

## 2025-04-13 PROCEDURE — 87636 SARSCOV2 & INF A&B AMP PRB: CPT

## 2025-04-13 PROCEDURE — 99213 OFFICE O/P EST LOW 20 MIN: CPT

## 2025-04-13 PROCEDURE — 87070 CULTURE OTHR SPECIMN AEROBIC: CPT

## 2025-04-13 PROCEDURE — 87880 STREP A ASSAY W/OPTIC: CPT
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
## 2025-04-13 NOTE — PATIENT INSTRUCTIONS
Most upper respiratory infections are viral and resolve on their own within 10-14 days. Antibiotics are not indicated for the viral infection, and are only prescribed if there is evidence for a bacterial infection. Sometimes an upper respiratory infection may lead to secondary bacterial infection, such as bacterial sinusitis, in which case antibiotics would be indicated at that time. If your symptoms continue beyond 10-14 days or if you experience ongoing fevers, productive cough with green, brown, bloody phlegm production, you may have developed a bacterial infection. For the uncomplicated viral upper respiratory infection conservative management includes:      1. Fever and pain control:    a. Ibuprofen (Motrin) 600mg every 6 hours for fever, headaches, body aches    i. Ibuprofen is an NSAID. Please stop medication if you experience stomach/abdominal pain and report to your primary care provider.    ii. Ask your primary care provider before you take NSAIDs if you are on any blood thinners, or if you have a history of heart disease, kidney disease, gastric bypass surgery, GI bleed, or poorly controlled high blood pressure.    b. May use acetaminophen (Tylenol) as directed on the bottle between doses of ibuprofen. Do not exceed 4,000mg of Tylenol a day.    2. Cough & Congestion:    a. Guaifenesin (Mucinex) as directed on the bottle for congestion and mucous-y cough.    b. Dextromethorphan (Delsym, Robitussin) for dry cough and cough suppression    c. Pseudoephedrine (Sudafed) for congestion and sinus pressure    i. Sudafed may cause increased heart rate, irregular heart rate, and an increase in blood pressure. Please do not take Sudafed if you have a history of heart disease or high blood pressure.    ii. Sudafed should not be taken if you are on anti-depressants such as those belonging to the class MAOIs or tricyclics.     d. Combination cough and cold such as Dimetapp and Mucinex DM also available    i. Sudafed PE  Head Congestion +Flu Severe contains a combination of Sudafed, Tylenol, Mucinex, and Delsym    e. If prescribed, take Tessalon Pearles or Bromfed/Phenergan DM as directed    i. Avoid taking prescription cough/congestion medication and OTC options at the same time    3.. Sore Throat:    a. Cepacol lozenges    b. Chloraseptic spray    c. Throat Coat tea    d. Warm salt water gargles    4. Vitamin/Minerals:    a Vitamin D3 2,000 IU daily    b. Vitamin C 1000mg twice a day    c. Some studies suggest that Zinc 12.5-15mg every 2 hours while awake for 5 days may shorten symptom duration by 1-2 days    5. Other:    a.Plenty of fluids and rest    b. Cool mist humidifiers    c. Nasal sinus rinses such as NettiPot, Neimed, or Navage can be used to    help flush out sinuses    d. Please only use distilled/sterile water that can be purchased at your local pharmacy    e. Nasal spray options:    f. Nasal steroid sprays such as Flonase, Nasonex, Nasacort may help with  sinus congestion, itchy/watery eyes, clogged ears    g. These options must be used consistently for at least 2 weeks for full effect                 h. Afrin nasal spray for quick acting congestion relief    i. Saline nasal spray for dry nose, irritation of the nasal passages

## 2025-04-14 ENCOUNTER — TELEPHONE (OUTPATIENT)
Dept: URGENT CARE | Age: 30
End: 2025-04-14

## 2025-04-14 LAB
FLUAV RNA RESP QL NAA+PROBE: NEGATIVE
FLUBV RNA RESP QL NAA+PROBE: NEGATIVE
SARS-COV-2 RNA RESP QL NAA+PROBE: POSITIVE

## 2025-04-14 NOTE — TELEPHONE ENCOUNTER
Flu A and B - Negative    COVID 19- Positive. Patient was called and was educated on quarantine policy. Patient was offered Paxlovid but declined at this time. Any chest pain or shortness of breath go to ED.     Follow up with PCP if symptoms persist.

## 2025-04-16 LAB — BACTERIA THROAT CULT: NORMAL

## 2025-05-28 ENCOUNTER — TELEPHONE (OUTPATIENT)
Dept: OBGYN CLINIC | Facility: CLINIC | Age: 30
End: 2025-05-28

## 2025-05-28 ENCOUNTER — TELEPHONE (OUTPATIENT)
Age: 30
End: 2025-05-28

## 2025-05-28 DIAGNOSIS — L25.9 CONTACT DERMATITIS AND ECZEMA: ICD-10-CM

## 2025-05-28 NOTE — TELEPHONE ENCOUNTER
Refill approved as requested.   Who called:PATIENT    Is the patient Pregnant ?No  If so, How many weeks? N/A    Reason for the Call:Appointment for surgery consult for Endometrial Polyp Removal    Action Taken:Spoke to patient      Outcome/Plan/ Recommendations:  Scheduled patient on 6/5

## 2025-05-28 NOTE — TELEPHONE ENCOUNTER
Patient called asking Synthroid rx be changed to 90 day supply.  Please advise.  She will also discuss at appointment tomorrow with PCP.
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
## 2025-05-29 ENCOUNTER — APPOINTMENT (OUTPATIENT)
Dept: LAB | Age: 30
End: 2025-05-29
Payer: COMMERCIAL

## 2025-05-29 ENCOUNTER — OFFICE VISIT (OUTPATIENT)
Age: 30
End: 2025-05-29
Payer: COMMERCIAL

## 2025-05-29 ENCOUNTER — NURSE TRIAGE (OUTPATIENT)
Age: 30
End: 2025-05-29

## 2025-05-29 VITALS
SYSTOLIC BLOOD PRESSURE: 100 MMHG | HEIGHT: 69 IN | WEIGHT: 131 LBS | BODY MASS INDEX: 19.4 KG/M2 | OXYGEN SATURATION: 98 % | TEMPERATURE: 98.7 F | DIASTOLIC BLOOD PRESSURE: 70 MMHG | HEART RATE: 70 BPM

## 2025-05-29 DIAGNOSIS — E03.9 HYPOTHYROIDISM, UNSPECIFIED TYPE: ICD-10-CM

## 2025-05-29 DIAGNOSIS — E03.9 HYPOTHYROIDISM, UNSPECIFIED TYPE: Primary | ICD-10-CM

## 2025-05-29 LAB
T4 FREE SERPL-MCNC: 0.76 NG/DL (ref 0.61–1.12)
TSH SERPL DL<=0.05 MIU/L-ACNC: 4.34 UIU/ML (ref 0.45–4.5)

## 2025-05-29 PROCEDURE — 84443 ASSAY THYROID STIM HORMONE: CPT

## 2025-05-29 PROCEDURE — 99213 OFFICE O/P EST LOW 20 MIN: CPT | Performed by: STUDENT IN AN ORGANIZED HEALTH CARE EDUCATION/TRAINING PROGRAM

## 2025-05-29 PROCEDURE — 84439 ASSAY OF FREE THYROXINE: CPT

## 2025-05-29 PROCEDURE — 36415 COLL VENOUS BLD VENIPUNCTURE: CPT

## 2025-05-29 RX ORDER — LEVOTHYROXINE SODIUM 25 MCG
25 TABLET ORAL DAILY
Qty: 90 TABLET | Refills: 1 | Status: SHIPPED | OUTPATIENT
Start: 2025-05-29

## 2025-05-29 RX ORDER — CETIRIZINE HYDROCHLORIDE 10 MG/1
10 TABLET ORAL DAILY
Qty: 90 TABLET | Refills: 1 | Status: SHIPPED | OUTPATIENT
Start: 2025-05-29

## 2025-05-29 RX ORDER — FAMOTIDINE 20 MG/1
20 TABLET, FILM COATED ORAL 2 TIMES DAILY
Qty: 30 TABLET | Refills: 0 | Status: SHIPPED | OUTPATIENT
Start: 2025-05-29

## 2025-05-29 NOTE — ASSESSMENT & PLAN NOTE
She presents to clinic today to request thyroid testing.  Patient was last seen in office on 03/11/2025.  At that time she wished to discuss thyroid medication and she reported inability to tolerate both generic levothyroxine and [place] Thyroid.  She stopped taking the medications altogether and her TSH became persistently elevated indicating hypothyroid state.  Testing at that time showed TSH was elevated to 9.9 and free T4 was just within normal range at 0.77.    Given her persistent hypothyroid state and inability to tolerate generic levothyroxine and [place] Thyroid I had sent a prescription for patient to take Synthroid to be dispensed as written at 25 mcg daily.  I have instructed patient to repeat TSH and T4 approximately 6 weeks after starting the medication.  Patient's TSH was then rechecked on 04/20/2025 during ED visit after testing positive for COVID-19 infection.  TSH at that time was elevated to 17.07.  Since her COVID diagnosis patient states that she is feeling well and she feels much improved from her initial visit.  She would like to get her thyroid rechecked to see if further adjustment needs to be done to dose.  Patient has appointment at laboratory at 11:50 AM today.  Will have patient recheck TSH with free T4.  Given elevated TSH recently suspect TSH may still be elevated and will need to increase Synthroid dose.  If so, will contact patient will increase Synthroid to 50 mcg daily.  Orders:    TSH, 3rd generation; Future    T4, free; Future    Synthroid 25 MCG tablet; Take 1 tablet (25 mcg total) by mouth daily

## 2025-05-29 NOTE — TELEPHONE ENCOUNTER
"  REASON FOR CONVERSATION: Vaginal Problem    SYMPTOMS: Patient states she prone to get BV. Using oral probiotic vaginal pill x 1 month but still having whitish yellow discharge.no odor, no itch. Deneis bleeding, no UTI s/sx. Patient not sure if BV or something else. Wants office appointment before her upcoming surgical discussion.     OTHER HEALTH INFORMATION: upcoming surgery discussion endometrial polyp 6/5   w/ Dr. Bradley    PROTOCOL DISPOSITION: See Within 3 Days in Office    CARE ADVICE PROVIDED: Advised open to air at bedtime, avoid perfume soaps and feminine products, recommend cotton underwear only, no thongs, wear looser fit clothing, change out of wet clothing after exercise and/or bathing suits ASAP. May use A&D/Aquaphor oint externally to alleviate irritation, wash with unscented soap, Dove unscented,  Ivory or Cetaphil cleanser. Call back if symptoms do not improve, yellow/ green discharge, increased pelvic pain, fever or any other concerning symptoms.  Patient verbalzied understanding.  Scheduled first available appointment 6/3 and added to wait list.       PRACTICE FOLLOW-UP: wait list msg sent       Reason for Disposition   Patient wants to be seen    Answer Assessment - Initial Assessment Questions  1. SYMPTOM: \"What's the main symptom you're concerned about?\" (e.g., pain, itching, dryness)      White yellowish discharge, no odor, no bleeding, no pain   2. LOCATION: \"Where is the  s/sx located?\" (e.g., inside/outside, left/right)      Vaginal   3. ONSET: \"When did the  s/sx   start?\"      1 month ago  4. PAIN: \"Is there any pain?\" If Yes, ask: \"How bad is it?\" (Scale: 1-10; mild, moderate, severe)      Denies  5. ITCHING: \"Is there any itching?\" If Yes, ask: \"How bad is it?\" (Scale: 1-10; mild, moderate, severe)      Denies   6. CAUSE: \"What do you think is causing the discharge?\" \"Have you had the same problem before?\" \"What happened then?\"      Possible   7. OTHER SYMPTOMS: \"Do you have any other " "symptoms?\" (e.g., fever, itching, vaginal bleeding, pain with urination, injury to genital area, vaginal foreign body)      Denies   8. PREGNANCY: \"Is there any chance you are pregnant?\" \"When was your last menstrual period?\"      LMP 5/9    Protocols used: Vaginal Symptoms-Adult-[place]    "

## 2025-05-30 ENCOUNTER — RESULTS FOLLOW-UP (OUTPATIENT)
Age: 30
End: 2025-05-30

## 2025-06-03 ENCOUNTER — OFFICE VISIT (OUTPATIENT)
Dept: OBGYN CLINIC | Facility: MEDICAL CENTER | Age: 30
End: 2025-06-03
Payer: COMMERCIAL

## 2025-06-03 VITALS
DIASTOLIC BLOOD PRESSURE: 70 MMHG | HEIGHT: 69 IN | SYSTOLIC BLOOD PRESSURE: 110 MMHG | BODY MASS INDEX: 19.21 KG/M2 | WEIGHT: 129.7 LBS

## 2025-06-03 DIAGNOSIS — N89.8 VAGINAL DISCHARGE: Primary | ICD-10-CM

## 2025-06-03 PROCEDURE — 87480 CANDIDA DNA DIR PROBE: CPT | Performed by: OBSTETRICS & GYNECOLOGY

## 2025-06-03 PROCEDURE — 99213 OFFICE O/P EST LOW 20 MIN: CPT | Performed by: OBSTETRICS & GYNECOLOGY

## 2025-06-03 PROCEDURE — 87660 TRICHOMONAS VAGIN DIR PROBE: CPT | Performed by: OBSTETRICS & GYNECOLOGY

## 2025-06-03 PROCEDURE — 87510 GARDNER VAG DNA DIR PROBE: CPT | Performed by: OBSTETRICS & GYNECOLOGY
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
## 2025-06-04 ENCOUNTER — RESULTS FOLLOW-UP (OUTPATIENT)
Dept: OBGYN CLINIC | Facility: MEDICAL CENTER | Age: 30
End: 2025-06-04

## 2025-06-05 ENCOUNTER — OFFICE VISIT (OUTPATIENT)
Dept: OBGYN CLINIC | Facility: CLINIC | Age: 30
End: 2025-06-05
Payer: COMMERCIAL

## 2025-06-05 VITALS
SYSTOLIC BLOOD PRESSURE: 116 MMHG | HEIGHT: 69 IN | BODY MASS INDEX: 19.64 KG/M2 | DIASTOLIC BLOOD PRESSURE: 68 MMHG | WEIGHT: 132.6 LBS

## 2025-06-05 DIAGNOSIS — N93.9 ABNORMAL UTERINE BLEEDING: Primary | ICD-10-CM

## 2025-06-05 PROCEDURE — 99213 OFFICE O/P EST LOW 20 MIN: CPT | Performed by: OBSTETRICS & GYNECOLOGY

## 2025-06-05 NOTE — PROGRESS NOTES
"1  Endometrial polyp     UTERUS:  The uterus is anteverted in position, measuring 8.7 x 4.2 x 5.1 cm.  The uterus has a normal contour and echotexture.  The cervix appears within normal limits.     ENDOMETRIUM:  The endometrial echo complex has an AP caliber of 10.0 mm.  5 mm intracavitary hyperechoic structure with a probable fetal that vascular more likely to represent a polyp than a fibroid.       Hysteroscopy symphion polyp removed.   Risk of the surgery discussed     29 y/o for dc Symphion   For surgical  polyp removed.     Past medical / social / surgical / family history reviewed and updated   Medication and allergies discussed in detail and updated     /68   Ht 5' 9\" (1.753 m)   Wt 60.1 kg (132 lb 9.6 oz)   LMP 06/03/2025   BMI 19.58 kg/m²     Review of Systems - History obtained from chart review and the patient  General ROS: negative  Psychological ROS: negative  Ophthalmic ROS: negative  ENT ROS: negative  Allergy and Immunology ROS: negative  Hematological and Lymphatic ROS: negative  Endocrine ROS: negative  Breast ROS: negative for breast lumps  Respiratory ROS: no cough, shortness of breath, or wheezing  Cardiovascular ROS: no chest pain or dyspnea on exertion  Gastrointestinal ROS: no abdominal pain, change in bowel habits, or black or bloody stools  Genito-Urinary ROS: no dysuria, trouble voiding, or hematuria  + AUB   Musculoskeletal ROS: negative  Neurological ROS: no TIA or stroke symptoms  Dermatological ROS: negative      Physical Exam  Constitutional:       Appearance: She is well-developed.   HENT:      Nose: Nose normal.     Eyes:      Conjunctiva/sclera: Conjunctivae normal.     Neck:      Thyroid: No thyromegaly.     Cardiovascular:      Rate and Rhythm: Normal rate and regular rhythm.      Heart sounds: Normal heart sounds.   Pulmonary:      Effort: Pulmonary effort is normal. No respiratory distress.      Breath sounds: Normal breath sounds. No wheezing.   Abdominal:      " General: Bowel sounds are normal. There is no distension.      Palpations: Abdomen is soft.     Musculoskeletal:         General: Normal range of motion.      Cervical back: Normal range of motion and neck supple.     Skin:     General: Skin is warm.     Neurological:      Mental Status: She is alert and oriented to person, place, and time.     Psychiatric:         Behavior: Behavior normal.         Thought Content: Thought content normal.         Judgment: Judgment normal.

## 2025-06-09 ENCOUNTER — TELEPHONE (OUTPATIENT)
Dept: OBGYN CLINIC | Facility: CLINIC | Age: 30
End: 2025-06-09

## 2025-06-09 NOTE — TELEPHONE ENCOUNTER
----- Message from Lebron Bradley MD sent at 2025  7:29 PM EDT -----  [facility] GYN DepartmentSurgery Scheduling SheetPatient Name: Qian Hui: 1995Provider: Lebron Bradley MDTranslator Needed: no; Language: N/AProcedure: exam under anesthesia, operative hysteroscopy, and resection of uterine pathologyDiagnosis: PolypSpecial Needs or Equipment: symphion Anesthesia: choiceLength of stay: outpatientDoes patient have comorbid conditions that will require close perioperative monitoring prior to safe discharge: noThe patient has comorbid conditions that will require close perioperative monitoring prior to safe discharge, including . This may require acute care beyond the usual and routine recovery period. As such, inpatient admission post-operatively is expected and appropriate, and anticipated hospital length of stay will be >2 midnights.Pre-Admission Testing Needed:  Labs that should be ordered: Order PAT that is recommended in prep for procedure?: Medical Clearance Needed: no; Provider: N/AMA Form Signed (tubals/hysterectomy): Not IndicatedSurgical Drink Given: no How many days out of work: 2 day(s)   How many days no drivin day(s)   Is pre op appt needed?  noInterval for post op appt: 2 week(s) For Surgical Scheduler: Surgery Scheduled [facility]:[place] Post op Appt:Consult/Medical clearance appt:

## 2025-06-10 ENCOUNTER — TELEPHONE (OUTPATIENT)
Age: 30
End: 2025-06-10

## 2025-06-10 NOTE — TELEPHONE ENCOUNTER
Patient states she is returning call to office, she received 2 missed calls from OB GYN office.   She is aware of msg yesterday form surgery scheduler, no new notes in Epic.  Patient verbalzied understanding.

## 2025-06-10 NOTE — TELEPHONE ENCOUNTER
Patient states she had a missed call from the office. Made aware we did not call patient but it may have been Ob/Gyn. Call transferred to Ob/Gyn office per patients request.

## 2025-06-16 NOTE — PROGRESS NOTES
[facility] INTERNAL MEDICINE- [place]  OFFICE VISIT     PATIENT INFORMATION     Qian Tierney   30 y.o. female   MRN: [id number]    ASSESSMENT/PLAN     Assessment & Plan  Tongue fasciculation  Patient presents to clinic today for an acute visit with concern of tongue fasciculations.  She states that she has noticed that she had difficulty eating solid foods and has noticed a slower when she talks because of muscle spasms in her tongue. This generally gets worse as the day goes. It had been ongoing consistently over the past 2 weeks.    She tried taking magnesium but did not notice a difference.  She was curious if Botox injections could be helpful.  She states that she had a similar issue to this about 5 years ago in 2020 after she was infected with COVID-19 virus.  At that time she saw neurologist does not recall undergoing testing and was told it was likely a sequela of the infection, and benign.  It eventually stopped after about 6 months.  This time it did not occur after infection as she had no recent illness.  She does believe that it started after a significantly stressful time in her life.  Her grandmother was just recently placed into a nursing home and this placed a lot of strain on her family which she endured.  She believes it could be connected to that.  She denies any abnormal sensations in taste, smell, or eyesight.  She still is full strength and tongue muscle, cranial nerves appear intact on examination today.  She has no loss of strength in her upper or lower extremities.  No fatigue.  Will check vitamin B12 level.  Will also try Flexeril to see if this may help spasms and fasciculations.  She states in the past she was prescribed Valium but that she did not take it because she did not like take medication like that.  Eventually went away on its own.  Could consider trial of gabapentin as well.  Given recurrence of these fasciculations with no evident viral cause my concern is for possible  underlying movement disorder or autoimmune disorder.  Patient might benefit from EMG testing as this is the second time it has occurred and she has not had it done prior.  Will have patient evaluated by neurology to discuss any need for further workup.  Orders:    Vitamin B12; Future    Ambulatory Referral to Neurology; Future    cyclobenzaprine (FLEXERIL) 10 mg tablet; Take 1 tablet (10 mg total) by mouth 3 (three) times a day as needed for muscle spasms         HEALTH MAINTENANCE     Immunization History   Administered Date(s) Administered    INFLUENZA 10/14/2016, 10/14/2016, 10/12/2017, 10/12/2017, 01/13/2022    Influenza, injectable, quadrivalent, preservative free 0.5 mL 10/23/2018, 09/30/2020    Tdap 08/23/2019         CHIEF COMPLAINT     Chief Complaint   Patient presents with    Multiple Concerns      HISTORY OF PRESENT ILLNESS     Ms. Qian Tierney is a 29-year-old female with past medical history of hypothyroidism, CKD stage I, generalized anxiety disorder, PTSD, tremors, post-COVID chronic neurologic symptoms, insomnia. Presents to clinic today for an acute visit with concern of tongue fasciculations.  She states that she has noticed that she had difficulty eating solid foods and has noticed a slower when she talks because of muscle spasms in her tongue.  It had been ongoing consistently over the past 2 weeks.  She tried taking magnesium but did not notice a difference.  She was curious if Botox injections could be helpful.    REVIEW OF SYSTEMS     Review of Systems   Constitutional:  Negative for chills and fever.   HENT:  Negative for congestion, rhinorrhea and sore throat.    Respiratory:  Negative for cough, shortness of breath and wheezing.    Cardiovascular:  Negative for chest pain and leg swelling.   Gastrointestinal:  Negative for abdominal distention, abdominal pain, constipation, diarrhea, nausea and vomiting.   Genitourinary:  Negative for difficulty urinating and dysuria.   Musculoskeletal:   "Negative for arthralgias and back pain.   Skin:  Negative for rash and wound.   Neurological:  Negative for dizziness, syncope, weakness, light-headedness and headaches.   Psychiatric/Behavioral:  Negative for agitation, behavioral problems and confusion.      OBJECTIVE     Vitals:    06/17/25 0855   Pulse: 75   Resp: 16   SpO2: 99%   Weight: 61.2 kg (135 lb)   Height: 5' 9\" (1.753 m)     Physical Exam  Constitutional:       Appearance: Normal appearance.   HENT:      Right Ear: External ear normal.      Left Ear: External ear normal.     Cardiovascular:      Rate and Rhythm: Normal rate and regular rhythm.      Pulses: Normal pulses.      Heart sounds: Normal heart sounds. No murmur heard.  Pulmonary:      Effort: Pulmonary effort is normal. No respiratory distress.      Breath sounds: Normal breath sounds. No wheezing, rhonchi or rales.   Abdominal:      General: Abdomen is flat. Bowel sounds are normal. There is no distension.      Palpations: Abdomen is soft.      Tenderness: There is no abdominal tenderness.     Musculoskeletal:      Right lower leg: No edema.      Left lower leg: No edema.     Skin:     General: Skin is warm and dry.     Neurological:      Mental Status: She is alert and oriented to person, place, and time.      Cranial Nerves: No cranial nerve deficit.      Sensory: No sensory deficit.      Motor: No weakness.     Psychiatric:         Mood and Affect: Mood normal.         Behavior: Behavior normal.         Thought Content: Thought content normal.       CURRENT MEDICATIONS   Current Medications[1]    PAST MEDICAL & SURGICAL HISTORY   Past Medical History[2]  Past Surgical History[3]  SOCIAL & FAMILY HISTORY     Social History     Socioeconomic History    Marital status: Single     Spouse name: Not on file    Number of children: Not on file    Years of education: Not on file    Highest education level: Not on file   Occupational History    Not on file   Tobacco Use    Smoking status: Former     " Current packs/day: 0.00     Average packs/day: 0.3 packs/day for 4.0 years (1.0 ttl pk-yrs)     Types: Cigarettes     Start date:      Quit date: 2018     Years since quittin.4    Smokeless tobacco: Never    Tobacco comments:     Leisurely with long periods of non use.   Vaping Use    Vaping status: Former    Substances: Nicotine, Flavoring   Substance and Sexual Activity    Alcohol use: Not Currently     Comment: Regular leisurely use in the past.    Drug use: Not Currently    Sexual activity: Yes     Partners: Male     Birth control/protection: None   Other Topics Concern    Not on file   Social History Narrative    Single.  Living with Timothy since ..  In [facility].    -not working but still is a  or  and plans on returning to work.    Timothy is a nicho.        What type of home do you live in: Apartment    Age of your home: 15 yrs    How long have you been living there: 15 yrs    Type of heat: Forced hot air    Type of fuel: Electric    What type of jesus is in your bedroom: Carpet    Do you have the following in or near your home:    Air products: Central air    Pests: None    Pets: Cat    Basement: None    Exposure to second hand smoke: No              Social Drivers of Health     Financial Resource Strain: Low Risk  (2021)    Overall Financial Resource Strain (CARDIA)     Difficulty of Paying Living Expenses: Not hard at all   Food Insecurity: No Food Insecurity (2021)    Hunger Vital Sign     Worried About Running Out of Food in the Last Year: Never true     Ran Out of Food in the Last Year: Never true   Transportation Needs: No Transportation Needs (2021)    PRAPARE - Transportation     Lack of Transportation (Medical): No     Lack of Transportation (Non-Medical): No   Physical Activity: Sufficiently Active (2024)    Exercise Vital Sign     Days of Exercise per Week: 4 days     Minutes of Exercise per Session: 150+ min   Stress: No Stress Concern  Present (4/29/2021)    [place] [employer] - Occupational Stress Questionnaire     Feeling of Stress : Not at all   Social Connections: Moderately Isolated (4/29/2021)    Social Connection and Isolation Panel     Frequency of Communication with Friends and Family: More than three times a week     Frequency of Social Gatherings with Friends and Family: Once a week     Attends Sikh Services: Never     Active Member of Clubs or Organizations: No     Attends Club or Organization Meetings: Never     Marital Status: Living with partner   Intimate Partner Violence: Not At Risk (11/9/2021)    Humiliation, Afraid, Rape, and Kick questionnaire     Fear of Current or Ex-Partner: No     Emotionally Abused: No     Physically Abused: No     Sexually Abused: No   Housing Stability: Not on file     Social History     Substance and Sexual Activity   Alcohol Use Not Currently    Comment: Regular leisurely use in the past.       Social History     Substance and Sexual Activity   Drug Use Not Currently     Tobacco Use History[4]  Family History[5]  ==  Pedrito Parra DO  [facility] Internal Medicine  [address] [place] [address]  Office: [phone]  Fax: [phone]         [1]   Current Outpatient Medications:     cetirizine (ZyrTEC) 10 mg tablet, Take 1 tablet (10 mg total) by mouth daily, Disp: 90 tablet, Rfl: 1    cyclobenzaprine (FLEXERIL) 10 mg tablet, Take 1 tablet (10 mg total) by mouth 3 (three) times a day as needed for muscle spasms, Disp: 30 tablet, Rfl: 0    famotidine (PEPCID) 20 mg tablet, Take 1 tablet (20 mg total) by mouth 2 (two) times a day, Disp: 30 tablet, Rfl: 0    Synthroid 25 MCG tablet, Take 1 tablet (25 mcg total) by mouth daily, Disp: 90 tablet, Rfl: 1  [2]   Past Medical History:  Diagnosis Date    Allergic 2004    Allergic rhinitis     Anxiety     Chronic kidney disease 2007    Past, Glomerulonephritis    COVID-19 10/07/2021    Disease of thyroid  gland     Eczema     Glomerulonephritis     Hypothyroidism due to Hashimoto's thyroiditis     Borderline without medication    Insomnia 2022    Otitis media     Tinnitus and fluid    Panic attack 2000    Panic attacks 2019    Panic disorder 2000    Post-COVID chronic neurologic symptoms 2022    PTSD (post-traumatic stress disorder) 2020    RBBB 2019    Urinary tract infection    [3]   Past Surgical History:  Procedure Laterality Date    SKIN LESION EXCISION      destruction of birthmark on her abdomen    TONSILLECTOMY      WISDOM TOOTH EXTRACTION     [4]   Social History  Tobacco Use   Smoking Status Former    Current packs/day: 0.00    Average packs/day: 0.3 packs/day for 4.0 years (1.0 ttl pk-yrs)    Types: Cigarettes    Start date:     Quit date:     Years since quittin.4   Smokeless Tobacco Never   Tobacco Comments    Leisurely with long periods of non use.   [5]   Family History  Problem Relation Name Age of Onset    Anxiety disorder Mother Yolanda Tierney     OCD Mother Yolanda Tierney     Bipolar disorder Mother Yolanda Tierney     Heart disease Mother Yolanda Tierney         Afib and other problems unsure.    Coronary artery disease Father Dad     Alcohol abuse Father Dad         My dad had alcoholism my entire life.    Heart disease Father Dad         Afib and other problems unsure.    Drug abuse Father Dad     Anxiety disorder Father Dad     Anxiety disorder Brother Srini Razajojo     Post-traumatic stress disorder Brother Srini Lorraine     Drug abuse Brother Srini Suarezs     Substance Abuse Brother Srini Suarezs         My brother is clean 4 years.    Anxiety disorder Brother Ilia Ryerins     Lung cancer Maternal Grandfather      Breast cancer Maternal Aunt Ahsanti         My aunt ashanti  of breast cancer.    Hypertension Family      Lung cancer Family      Lymphoma Family          pancreatic    Hyperlipidemia Family          pure    Anxiety disorder Maternal  Grandmother Beth Simpson

## 2025-06-17 ENCOUNTER — OFFICE VISIT (OUTPATIENT)
Age: 30
End: 2025-06-17
Payer: COMMERCIAL

## 2025-06-17 VITALS
BODY MASS INDEX: 19.99 KG/M2 | RESPIRATION RATE: 16 BRPM | HEART RATE: 75 BPM | HEIGHT: 69 IN | OXYGEN SATURATION: 99 % | WEIGHT: 135 LBS

## 2025-06-17 DIAGNOSIS — R25.3 TONGUE FASCICULATION: Primary | ICD-10-CM

## 2025-06-17 PROCEDURE — 99213 OFFICE O/P EST LOW 20 MIN: CPT | Performed by: STUDENT IN AN ORGANIZED HEALTH CARE EDUCATION/TRAINING PROGRAM

## 2025-06-17 RX ORDER — CYCLOBENZAPRINE HCL 10 MG
10 TABLET ORAL 3 TIMES DAILY PRN
Qty: 30 TABLET | Refills: 0 | Status: SHIPPED | OUTPATIENT
Start: 2025-06-17

## 2025-07-01 ENCOUNTER — OFFICE VISIT (OUTPATIENT)
Dept: ENDOCRINOLOGY | Facility: CLINIC | Age: 30
End: 2025-07-01
Payer: COMMERCIAL

## 2025-07-01 ENCOUNTER — APPOINTMENT (OUTPATIENT)
Dept: LAB | Age: 30
End: 2025-07-01

## 2025-07-01 VITALS
WEIGHT: 132 LBS | SYSTOLIC BLOOD PRESSURE: 110 MMHG | DIASTOLIC BLOOD PRESSURE: 74 MMHG | OXYGEN SATURATION: 96 % | HEIGHT: 69 IN | HEART RATE: 62 BPM | BODY MASS INDEX: 19.55 KG/M2

## 2025-07-01 DIAGNOSIS — R25.3 TONGUE FASCICULATION: ICD-10-CM

## 2025-07-01 DIAGNOSIS — E55.9 VITAMIN D DEFICIENCY: ICD-10-CM

## 2025-07-01 DIAGNOSIS — F41.1 GENERALIZED ANXIETY DISORDER: ICD-10-CM

## 2025-07-01 DIAGNOSIS — M62.838 MUSCLE SPASM: ICD-10-CM

## 2025-07-01 DIAGNOSIS — E53.8 VITAMIN B 12 DEFICIENCY: ICD-10-CM

## 2025-07-01 DIAGNOSIS — E03.9 HYPOTHYROIDISM, UNSPECIFIED TYPE: Primary | ICD-10-CM

## 2025-07-01 PROCEDURE — 99214 OFFICE O/P EST MOD 30 MIN: CPT | Performed by: INTERNAL MEDICINE

## 2025-07-01 RX ORDER — LEVOTHYROXINE SODIUM 25 MCG
TABLET ORAL
Qty: 140 TABLET | Refills: 1 | Status: SHIPPED | OUTPATIENT
Start: 2025-07-01 | End: 2025-07-07

## 2025-07-01 NOTE — ASSESSMENT & PLAN NOTE
Lab Results   Component Value Date    MZU1NRNJIHDV 4.339 05/29/2025    TSH 17.07 (H) 04/20/2025    L4GDGUV 1.0 03/11/2025   TSH goal is 0.5-2.0  Will increase Synthroid to 1.5 tablet daily educated to take on empty stomach 1 hour before breakfast  Obtain TSH and free T4 in 6 weeks and follow-up  Discussed to avoid biotin supplementation as it can interfere with thyroid function test    Orders:    Ambulatory Referral to Endocrinology    Synthroid 25 MCG tablet; Take 1.5 tab daily on empty stomach 1 hour before breakfast    TSH, 3rd generation; Future    T4, free; Future    US thyroid; Future    Vitamin B12; Future    Magnesium; Future    T4, free; Future    TSH, 3rd generation; Future

## 2025-07-01 NOTE — PROGRESS NOTES
Name: Qian Tierney      : 1995      MRN: [id number]  Encounter Provider: Romeo Lomeli MD  Encounter Date: 2025   Encounter department: [facility] DIABETES AND ENDOCRINOLOGY [place]      Assessment & Plan  Hypothyroidism, unspecified type    Lab Results   Component Value Date    KYL5LBBDBPYR 4.339 2025    TSH 17.07 (H) 2025    E4OOIEM 1.0 2025   TSH goal is 0.5-2.0  Will increase Synthroid to 1.5 tablet daily educated to take on empty stomach 1 hour before breakfast  Obtain TSH and free T4 in 6 weeks and follow-up  Discussed to avoid biotin supplementation as it can interfere with thyroid function test    Orders:    Ambulatory Referral to Endocrinology    Synthroid 25 MCG tablet; Take 1.5 tab daily on empty stomach 1 hour before breakfast    TSH, 3rd generation; Future    T4, free; Future    US thyroid; Future    Vitamin B12; Future    Magnesium; Future    T4, free; Future    TSH, 3rd generation; Future    Generalized anxiety disorder  Symptoms well-controlled, follow-up with PCP       Vitamin D deficiency  Take vitamin D3 supplementation 2000 IU daily  Orders:    Vitamin D 25 hydroxy; Future    Muscle spasm  Obtain magnesium level vitamin B12 for muscle spasms  Orders:    Magnesium; Future    Vitamin B 12 deficiency  Currently not taking vitamin B12 supplementation, obtain vitamin B12         Assessment & Plan              History of Present Illness   History of Present Illness    Qian Tierney is a 30 y.o. female with medical history of hypothyroidism secondary to Hashimoto's thyroiditis is here for follow-up.  She is taking Synthroid since 2019.  She takes brand Synthroid 25 mcg daily on empty stomach 1 hour before breakfast.  She denies missing doses.  She takes Pepcid at least 4 hours apart from Synthroid.  She takes other vitamin supplementations 4-hour apart from Synthroid    Lab Results   Component Value Date    OTK0DOUYWAIQ 4.339 2025    TSH 17.07 (H)  "04/20/2025    M0NKFYR 1.0 03/11/2025         Review of Systems   Constitutional:  Negative for activity change, diaphoresis, fatigue, fever and unexpected weight change.   HENT: Negative.     Eyes:  Negative for visual disturbance.   Respiratory:  Negative for cough, chest tightness and shortness of breath.    Cardiovascular:  Negative for chest pain, palpitations and leg swelling.   Gastrointestinal:  Negative for abdominal pain, blood in stool, constipation, diarrhea, nausea and vomiting.   Endocrine: Negative for cold intolerance, heat intolerance, polydipsia, polyphagia and polyuria.   Genitourinary:  Negative for dysuria, enuresis, frequency and urgency.   Musculoskeletal:  Negative for arthralgias and myalgias.   Skin:  Negative for pallor, rash and wound.   Allergic/Immunologic: Negative.    Neurological:  Negative for dizziness, tremors, weakness and numbness.   Hematological: Negative.    Psychiatric/Behavioral: Negative.      as per HPI  Medications Ordered Prior to Encounter[1]      Medical History Reviewed by provider this encounter:     .    Objective   /74   Pulse 62   Ht 5' 9\" (1.753 m)   Wt 59.9 kg (132 lb)   LMP 06/03/2025   SpO2 96%   BMI 19.49 kg/m²      Body mass index is 19.49 kg/m².  Wt Readings from Last 3 Encounters:   07/01/25 59.9 kg (132 lb)   06/17/25 61.2 kg (135 lb)   06/05/25 60.1 kg (132 lb 9.6 oz)     Physical Exam  Vitals reviewed.   Constitutional:       Appearance: Normal appearance.     Cardiovascular:      Rate and Rhythm: Normal rate and regular rhythm.      Pulses: Normal pulses.      Heart sounds: Normal heart sounds.     Musculoskeletal:         General: Normal range of motion.      Cervical back: Normal range of motion and neck supple.      Right lower leg: No edema.      Left lower leg: No edema.     Skin:     General: Skin is warm and dry.     Neurological:      General: No focal deficit present.      Mental Status: She is alert and oriented to person, place, " "and time.     Psychiatric:         Mood and Affect: Mood normal.         Behavior: Behavior normal.       Physical Exam      Results    Labs: I have reviewed pertinent labs including:   Lab Results   Component Value Date    HGBA1C 5.1 06/11/2018      Lab Results   Component Value Date    CREATININE 0.69 05/10/2025    CREATININE 0.78 04/20/2025    CREATININE 0.77 01/30/2025    BUN 18 05/10/2025    K 4.5 05/10/2025     05/10/2025    CO2 24 05/10/2025      GFR, Calculated   Date Value Ref Range Status   01/15/2020 121 >60 mL/min/1.73m2 Final     Comment:     mL/min per 1.73 square meters                                            Normal Function or Mild Renal    Disease (if clinically at risk):  >or=60  Moderately Decreased:                30-59  Severely Decreased:                  15-29  Renal Failure:                         <15                                            -American GFR: multiply reported GFR by 1.16    Please note that the eGFR is based on the CKD-EPI calculation, and is not intended to be used for drug dosing.     eGFRcr   Date Value Ref Range Status   05/10/2025 118 >59 Final      No results found for: \"CHOL\", \"HDL\", \"LDL\", \"TRIG\", \"CHOLHDL\"   ALT   Date Value Ref Range Status   05/10/2025 10 <56 U/L Final     AST   Date Value Ref Range Status   05/10/2025 18 <41 U/L Final     Alkaline Phosphatase   Date Value Ref Range Status   05/10/2025 23 (L) 35 - 120 U/L Final      Lab Results   Component Value Date    FAT9TQVNCSNU 4.339 05/29/2025      Lab Results   Component Value Date    FREET4 0.76 05/29/2025    T3FREE 2.54 02/24/2022      Lab Results   Component Value Date    [id number] 34.9 07/10/2024      Radiology Results Review: I have reviewed the following images/report studies in PACS: No Thyroid Ultrasound results found in the past 2 years   There are no Patient Instructions on file for this visit.    Discussed with the patient and all questioned fully answered. She will call me if " any problems arise.    Administrative Statements            [1]   Current Outpatient Medications on File Prior to Visit   Medication Sig Dispense Refill    cetirizine (ZyrTEC) 10 mg tablet Take 1 tablet (10 mg total) by mouth daily 90 tablet 1    cyclobenzaprine (FLEXERIL) 10 mg tablet Take 1 tablet (10 mg total) by mouth 3 (three) times a day as needed for muscle spasms 30 tablet 0    famotidine (PEPCID) 20 mg tablet Take 1 tablet (20 mg total) by mouth 2 (two) times a day 30 tablet 0    [DISCONTINUED] Synthroid 25 MCG tablet Take 1 tablet (25 mcg total) by mouth daily 90 tablet 1     No current facility-administered medications on file prior to visit.

## 2025-07-02 ENCOUNTER — APPOINTMENT (OUTPATIENT)
Dept: LAB | Age: 30
End: 2025-07-02
Payer: COMMERCIAL

## 2025-07-02 ENCOUNTER — APPOINTMENT (OUTPATIENT)
Dept: LAB | Facility: HOSPITAL | Age: 30
End: 2025-07-02
Payer: COMMERCIAL

## 2025-07-02 DIAGNOSIS — Z01.818 PRE-OP TESTING: ICD-10-CM

## 2025-07-02 DIAGNOSIS — E03.9 HYPOTHYROIDISM, UNSPECIFIED TYPE: ICD-10-CM

## 2025-07-02 LAB
ANION GAP SERPL CALCULATED.3IONS-SCNC: 8 MMOL/L (ref 4–13)
ATRIAL RATE: 54 BPM
BASOPHILS # BLD AUTO: 0.02 THOUSANDS/ÂΜL (ref 0–0.1)
BASOPHILS NFR BLD AUTO: 0 % (ref 0–1)
BUN SERPL-MCNC: 14 MG/DL (ref 5–25)
CALCIUM SERPL-MCNC: 8.7 MG/DL (ref 8.4–10.2)
CHLORIDE SERPL-SCNC: 107 MMOL/L (ref 96–108)
CO2 SERPL-SCNC: 26 MMOL/L (ref 21–32)
CREAT SERPL-MCNC: 0.64 MG/DL (ref 0.6–1.3)
EOSINOPHIL # BLD AUTO: 0.1 THOUSAND/ÂΜL (ref 0–0.61)
EOSINOPHIL NFR BLD AUTO: 2 % (ref 0–6)
ERYTHROCYTE [DISTWIDTH] IN BLOOD BY AUTOMATED COUNT: 12.9 % (ref 11.6–15.1)
GFR SERPL CREATININE-BSD FRML MDRD: 118 ML/MIN/1.73SQ M
GLUCOSE P FAST SERPL-MCNC: 88 MG/DL (ref 65–99)
HCT VFR BLD AUTO: 34.3 % (ref 34.8–46.1)
HGB BLD-MCNC: 11.2 G/DL (ref 11.5–15.4)
IMM GRANULOCYTES # BLD AUTO: 0.01 THOUSAND/UL (ref 0–0.2)
IMM GRANULOCYTES NFR BLD AUTO: 0 % (ref 0–2)
LYMPHOCYTES # BLD AUTO: 1.6 THOUSANDS/ÂΜL (ref 0.6–4.47)
LYMPHOCYTES NFR BLD AUTO: 36 % (ref 14–44)
MCH RBC QN AUTO: 30.4 PG (ref 26.8–34.3)
MCHC RBC AUTO-ENTMCNC: 32.7 G/DL (ref 31.4–37.4)
MCV RBC AUTO: 93 FL (ref 82–98)
MONOCYTES # BLD AUTO: 0.47 THOUSAND/ÂΜL (ref 0.17–1.22)
MONOCYTES NFR BLD AUTO: 11 % (ref 4–12)
NEUTROPHILS # BLD AUTO: 2.27 THOUSANDS/ÂΜL (ref 1.85–7.62)
NEUTS SEG NFR BLD AUTO: 51 % (ref 43–75)
NRBC BLD AUTO-RTO: 0 /100 WBCS
P AXIS: 54 DEGREES
PLATELET # BLD AUTO: 219 THOUSANDS/UL (ref 149–390)
PMV BLD AUTO: 9.9 FL (ref 8.9–12.7)
POTASSIUM SERPL-SCNC: 4 MMOL/L (ref 3.5–5.3)
PR INTERVAL: 148 MS
QRS AXIS: 77 DEGREES
QRSD INTERVAL: 94 MS
QT INTERVAL: 438 MS
QTC INTERVAL: 416 MS
RBC # BLD AUTO: 3.69 MILLION/UL (ref 3.81–5.12)
SODIUM SERPL-SCNC: 141 MMOL/L (ref 135–147)
T WAVE AXIS: 52 DEGREES
T4 FREE SERPL-MCNC: 0.75 NG/DL (ref 0.61–1.12)
TSH SERPL DL<=0.05 MIU/L-ACNC: 7.46 UIU/ML (ref 0.45–4.5)
VENTRICULAR RATE: 54 BPM
VIT B12 SERPL-MCNC: 233 PG/ML (ref 180–914)
WBC # BLD AUTO: 4.47 THOUSAND/UL (ref 4.31–10.16)

## 2025-07-02 PROCEDURE — 93010 ELECTROCARDIOGRAM REPORT: CPT | Performed by: STUDENT IN AN ORGANIZED HEALTH CARE EDUCATION/TRAINING PROGRAM

## 2025-07-02 PROCEDURE — 80048 BASIC METABOLIC PNL TOTAL CA: CPT

## 2025-07-02 PROCEDURE — 36415 COLL VENOUS BLD VENIPUNCTURE: CPT

## 2025-07-02 PROCEDURE — 85025 COMPLETE CBC W/AUTO DIFF WBC: CPT

## 2025-07-02 PROCEDURE — 84439 ASSAY OF FREE THYROXINE: CPT

## 2025-07-02 PROCEDURE — 93005 ELECTROCARDIOGRAM TRACING: CPT

## 2025-07-02 PROCEDURE — 84443 ASSAY THYROID STIM HORMONE: CPT

## 2025-07-02 PROCEDURE — 82607 VITAMIN B-12: CPT

## 2025-07-07 DIAGNOSIS — E03.9 HYPOTHYROIDISM, UNSPECIFIED TYPE: ICD-10-CM

## 2025-07-07 RX ORDER — LEVOTHYROXINE SODIUM 50 MCG
50 TABLET ORAL DAILY
Qty: 90 TABLET | Refills: 1 | Status: SHIPPED | OUTPATIENT
Start: 2025-07-07

## 2025-07-08 ENCOUNTER — HOSPITAL ENCOUNTER (OUTPATIENT)
Dept: ULTRASOUND IMAGING | Facility: HOSPITAL | Age: 30
Discharge: HOME | End: 2025-07-08
Payer: COMMERCIAL

## 2025-07-08 ENCOUNTER — TELEPHONE (OUTPATIENT)
Age: 30
End: 2025-07-08

## 2025-07-08 DIAGNOSIS — E03.9 HYPOTHYROIDISM, UNSPECIFIED TYPE: ICD-10-CM

## 2025-07-08 PROCEDURE — 76536 US EXAM OF HEAD AND NECK: CPT

## 2025-07-08 NOTE — TELEPHONE ENCOUNTER
PA for SYNTHROID 50MCG SUBMITTED to [employer]    via    [x]CMM-KEY: [id number]  []Surescripts-Case ID #   []Availity-Auth ID # NDC #   []Faxed to plan   []Other website   []Phone call Case ID #     [x]PA sent as URGENT    All office notes, labs and other pertaining documents and studies sent. Clinical questions answered. Awaiting determination from insurance company.     Turnaround time for your insurance to make a decision on your Prior Authorization can take 7-21 business days.

## 2025-07-09 NOTE — TELEPHONE ENCOUNTER
PA for SYNTHROID 50MCG APPROVED     Date(s) approved 03/11/2025-3/11/26      Patient advised by          [x]MyChart Message  []Phone call   []LMOM  []L/M to call office as no active Communication consent on file  []Unable to leave detailed message as VM not approved on Communication consent       Pharmacy advised by    [x]Fax  []Phone call  []Secure Chat    Specialty Pharmacy    []     Approval letter scanned into Media Yes

## 2025-07-22 ENCOUNTER — TELEPHONE (OUTPATIENT)
Age: 30
End: 2025-07-22

## 2025-07-22 ENCOUNTER — ANESTHESIA EVENT (OUTPATIENT)
Dept: PERIOP | Facility: HOSPITAL | Age: 30
End: 2025-07-22
Payer: COMMERCIAL

## 2025-07-23 ENCOUNTER — HOSPITAL ENCOUNTER (OUTPATIENT)
Facility: HOSPITAL | Age: 30
Setting detail: OUTPATIENT SURGERY
Discharge: HOME | End: 2025-07-23
Attending: OBSTETRICS & GYNECOLOGY | Admitting: OBSTETRICS & GYNECOLOGY
Payer: COMMERCIAL

## 2025-07-23 ENCOUNTER — ANESTHESIA (OUTPATIENT)
Dept: PERIOP | Facility: HOSPITAL | Age: 30
End: 2025-07-23
Payer: COMMERCIAL

## 2025-07-23 VITALS
BODY MASS INDEX: 19.18 KG/M2 | DIASTOLIC BLOOD PRESSURE: 59 MMHG | HEART RATE: 71 BPM | RESPIRATION RATE: 16 BRPM | TEMPERATURE: 98.2 F | OXYGEN SATURATION: 98 % | WEIGHT: 129.85 LBS | SYSTOLIC BLOOD PRESSURE: 106 MMHG

## 2025-07-23 DIAGNOSIS — N84.0 ENDOMETRIAL POLYP: ICD-10-CM

## 2025-07-23 LAB
EXT PREGNANCY TEST URINE: NEGATIVE
EXT. CONTROL: NORMAL

## 2025-07-23 PROCEDURE — 81025 URINE PREGNANCY TEST: CPT | Performed by: ANESTHESIOLOGY

## 2025-07-23 PROCEDURE — NC001 PR NO CHARGE: Performed by: OBSTETRICS & GYNECOLOGY

## 2025-07-23 PROCEDURE — 88305 TISSUE EXAM BY PATHOLOGIST: CPT | Performed by: PATHOLOGY

## 2025-07-23 PROCEDURE — 58558 HYSTEROSCOPY BIOPSY: CPT | Performed by: OBSTETRICS & GYNECOLOGY

## 2025-07-23 RX ORDER — PROPOFOL 10 MG/ML
INJECTION, EMULSION INTRAVENOUS AS NEEDED
Status: DISCONTINUED | OUTPATIENT
Start: 2025-07-23 | End: 2025-07-23

## 2025-07-23 RX ORDER — SODIUM CHLORIDE, SODIUM LACTATE, POTASSIUM CHLORIDE, CALCIUM CHLORIDE 600; 310; 30; 20 MG/100ML; MG/100ML; MG/100ML; MG/100ML
125 INJECTION, SOLUTION INTRAVENOUS CONTINUOUS
Status: DISCONTINUED | OUTPATIENT
Start: 2025-07-23 | End: 2025-07-23 | Stop reason: HOSPADM

## 2025-07-23 RX ORDER — IBUPROFEN 200 MG
800 TABLET ORAL ONCE
Status: DISCONTINUED | OUTPATIENT
Start: 2025-07-23 | End: 2025-07-23 | Stop reason: HOSPADM

## 2025-07-23 RX ORDER — FENTANYL CITRATE 50 UG/ML
INJECTION, SOLUTION INTRAMUSCULAR; INTRAVENOUS AS NEEDED
Status: DISCONTINUED | OUTPATIENT
Start: 2025-07-23 | End: 2025-07-23

## 2025-07-23 RX ORDER — ACETAMINOPHEN 10 MG/ML
1000 INJECTION, SOLUTION INTRAVENOUS ONCE
Status: COMPLETED | OUTPATIENT
Start: 2025-07-23 | End: 2025-07-23

## 2025-07-23 RX ORDER — LIDOCAINE HYDROCHLORIDE 20 MG/ML
INJECTION, SOLUTION EPIDURAL; INFILTRATION; INTRACAUDAL; PERINEURAL AS NEEDED
Status: DISCONTINUED | OUTPATIENT
Start: 2025-07-23 | End: 2025-07-23

## 2025-07-23 RX ORDER — FAMOTIDINE 20 MG/1
20 TABLET, FILM COATED ORAL 2 TIMES DAILY PRN
Status: DISCONTINUED | OUTPATIENT
Start: 2025-07-23 | End: 2025-07-23 | Stop reason: HOSPADM

## 2025-07-23 RX ORDER — MAGNESIUM HYDROXIDE 1200 MG/15ML
LIQUID ORAL AS NEEDED
Status: DISCONTINUED | OUTPATIENT
Start: 2025-07-23 | End: 2025-07-23 | Stop reason: HOSPADM

## 2025-07-23 RX ORDER — ALBUTEROL SULFATE 0.83 MG/ML
2.5 SOLUTION RESPIRATORY (INHALATION) ONCE AS NEEDED
Status: DISCONTINUED | OUTPATIENT
Start: 2025-07-23 | End: 2025-07-23 | Stop reason: HOSPADM

## 2025-07-23 RX ORDER — OXYCODONE HYDROCHLORIDE 5 MG/1
5 TABLET ORAL ONCE AS NEEDED
Status: DISCONTINUED | OUTPATIENT
Start: 2025-07-23 | End: 2025-07-23 | Stop reason: HOSPADM

## 2025-07-23 RX ORDER — ACETAMINOPHEN 325 MG/1
975 TABLET ORAL ONCE
Status: DISCONTINUED | OUTPATIENT
Start: 2025-07-23 | End: 2025-07-23 | Stop reason: HOSPADM

## 2025-07-23 RX ORDER — MIDAZOLAM HYDROCHLORIDE 2 MG/2ML
INJECTION, SOLUTION INTRAMUSCULAR; INTRAVENOUS AS NEEDED
Status: DISCONTINUED | OUTPATIENT
Start: 2025-07-23 | End: 2025-07-23

## 2025-07-23 RX ORDER — ONDANSETRON 2 MG/ML
4 INJECTION INTRAMUSCULAR; INTRAVENOUS EVERY 6 HOURS PRN
Status: DISCONTINUED | OUTPATIENT
Start: 2025-07-23 | End: 2025-07-23 | Stop reason: HOSPADM

## 2025-07-23 RX ORDER — PROPOFOL 10 MG/ML
INJECTION, EMULSION INTRAVENOUS CONTINUOUS PRN
Status: DISCONTINUED | OUTPATIENT
Start: 2025-07-23 | End: 2025-07-23

## 2025-07-23 RX ORDER — PROMETHAZINE HYDROCHLORIDE 25 MG/ML
12.5 INJECTION, SOLUTION INTRAMUSCULAR; INTRAVENOUS ONCE AS NEEDED
Status: DISCONTINUED | OUTPATIENT
Start: 2025-07-23 | End: 2025-07-23 | Stop reason: HOSPADM

## 2025-07-23 RX ORDER — ONDANSETRON 2 MG/ML
4 INJECTION INTRAMUSCULAR; INTRAVENOUS ONCE AS NEEDED
Status: DISCONTINUED | OUTPATIENT
Start: 2025-07-23 | End: 2025-07-23 | Stop reason: HOSPADM

## 2025-07-23 RX ORDER — LORATADINE 10 MG/1
10 TABLET, ORALLY DISINTEGRATING ORAL DAILY
COMMUNITY

## 2025-07-23 RX ORDER — FENTANYL CITRATE/PF 50 MCG/ML
25 SYRINGE (ML) INJECTION
Status: DISCONTINUED | OUTPATIENT
Start: 2025-07-23 | End: 2025-07-23 | Stop reason: HOSPADM

## 2025-07-23 RX ORDER — DEXAMETHASONE SODIUM PHOSPHATE 10 MG/ML
INJECTION, SOLUTION INTRAMUSCULAR; INTRAVENOUS AS NEEDED
Status: DISCONTINUED | OUTPATIENT
Start: 2025-07-23 | End: 2025-07-23

## 2025-07-23 RX ADMIN — FENTANYL CITRATE 25 MCG: 50 INJECTION INTRAMUSCULAR; INTRAVENOUS at 09:04

## 2025-07-23 RX ADMIN — DEXMEDETOMIDINE 8 MCG: 100 INJECTION, SOLUTION, CONCENTRATE INTRAVENOUS at 09:03

## 2025-07-23 RX ADMIN — PROPOFOL 200 MG: 10 INJECTION, EMULSION INTRAVENOUS at 09:04

## 2025-07-23 RX ADMIN — PROPOFOL 100 MG: 10 INJECTION, EMULSION INTRAVENOUS at 09:06

## 2025-07-23 RX ADMIN — DEXAMETHASONE SODIUM PHOSPHATE 10 MG: 10 INJECTION, SOLUTION INTRAMUSCULAR; INTRAVENOUS at 09:04

## 2025-07-23 RX ADMIN — LIDOCAINE HYDROCHLORIDE 100 MG: 20 INJECTION, SOLUTION EPIDURAL; INFILTRATION; INTRACAUDAL at 09:04

## 2025-07-23 RX ADMIN — MIDAZOLAM HYDROCHLORIDE 2 MG: 1 INJECTION, SOLUTION INTRAMUSCULAR; INTRAVENOUS at 08:57

## 2025-07-23 RX ADMIN — ACETAMINOPHEN 1000 MG: 10 INJECTION INTRAVENOUS at 10:20

## 2025-07-23 RX ADMIN — PROPOFOL 80 MCG/KG/MIN: 10 INJECTION, EMULSION INTRAVENOUS at 09:09

## 2025-07-23 RX ADMIN — FENTANYL CITRATE 25 MCG: 50 INJECTION INTRAMUSCULAR; INTRAVENOUS at 09:10

## 2025-07-23 RX ADMIN — SODIUM CHLORIDE, SODIUM LACTATE, POTASSIUM CHLORIDE, AND CALCIUM CHLORIDE 125 ML/HR: .6; .31; .03; .02 INJECTION, SOLUTION INTRAVENOUS at 08:03

## 2025-07-23 NOTE — OP NOTE
OPERATIVE REPORT  PATIENT NAME: Qian Tierney    :  1995  MRN: [id number]  Pt Location: [facility] OR ROOM 02    SURGERY DATE: 2025    Surgeons and Role:     * Lebron Bradley MD - Primary     * Renee Waldrop MD - Assisting    Preop Diagnosis:  Endometrial polyp [N84.0]    Post-Op Diagnosis Codes:     * Endometrial polyp [N84.0]    Procedure(s):  DILATATION AND CURETTAGE. HYSTEROSCOPY. SYMPHION. EXAM UNDER ANESTHESIA. ENDOMETRIAL POLYPECTOMY    Specimen(s):  ID Type Source Tests Collected by Time Destination   1 : [facility] Tissue Endometrium TISSUE EXAM Lebron Bradley MD 2025 0920    2 : [facility] Tissue Endometrium TISSUE EXAM Lebron Bradley MD 2025 0929        Estimated Blood Loss:   Minimal    Drains:  * No LDAs found *    Anesthesia Type:   LMA Sedation with Anesthesia    Operative Indications:  Endometrial polyp [N84.0]    Operative Findings:  External genitalia grossly normal in appearance with no ulcerations, lacerations, or lesions.  Bimanual exam grossly normal with anteverted uterus with no palpable uterine or adnexal masses.  Vagina and cervix grossly normal in appearance with no lacerations or lesions.  Uterus sounded to 9 cm.  Hysteroscopic examination with a normal endometrial lining. A Polyp was noted in the posterior endometrial cavity. Bilateral tubal ostia werevisualized.  Fluid deficit was 400 mL.    Procedure:  The patient was taken to the operating room. General LMA anesthesia was administered. Sequential compression devices were placed, and the patient was positioned on the OR table in the dorsal lithotomy position. All pressure points were padded, and a mirza hugger was placed to maintain control of core body temperature. The patient was prepped and draped in the usual sterile fashion using chlorhexidine.    A time out was performed to confirm correct patient and procedure. The bladder was drained with a straight cath for 50cc of clear urine.  An Auvard speculum was inserted into the vagina, and a  Quincy retractor was used to visualize the anterior lip of the cervix, which was then grasped with a single toothed tenaculum. The uterus was carefully sounded to 9 cm. The cervical os was sequentially dilated to accommodate the hysteroscope using Mark dilators..    The Symphion device was primed prior to use. The hysteroscope was introduced, and excellent visualization of the endometrial cavity was appreciated. The Quincy retractor and the speculum were removed from the vagina. The hysteroscope was advanced to the uterine fundus, and the entire uterine cavity was inspected in a systematic manner. The endometrium was noted to be normal and the decision was made to proceed with resection of the mass and the Symphion resector was introduced into the endometrial cavity per the 's recommendation. Using the device under direct hysteroscopic visualization, the above mentioned was excised. Good hemostasis was noted. The hysteroscope was removed, and the specimen was sent for examination.     The speculum was replaced into the vagina and a sharp curet was introduced into the cavity and curettage was performed, starting at the 12 o'clock position and rotating a total of 360 degrees to cover all surfaces.  The Endocervical curette was introduced to obtain further tissue samples and both the endocervical and endometrial tissue was obtained and sent for pathology.     The tenaculum was removed from the anterior lip of the cervix, and good hemostasis was confirmed.  The speculum was removed from the vagina. At the conclusion of the procedure, all needle, sponge, and instrument counts were correct x2. The patient tolerated the procedure well.  The patient was awakened from anesthesia and taken to the recovery room in stable condition.  The patient will go home after recovering from anesthesia and meeting all the criteria for discharge.  She was given instructions regarding her follow-up visit.    Dr. Lebron Bradley  MD was present and participated in all key portions of the case.     SIGNATURE:  Renee Waldrop MD  DATE: July 23, 2025  TIME: 9:35 AM

## 2025-07-23 NOTE — H&P
1  Endometrial polyp     UTERUS:  The uterus is anteverted in position, measuring 8.7 x 4.2 x 5.1 cm.  The uterus has a normal contour and echotexture.  The cervix appears within normal limits.     ENDOMETRIUM:  The endometrial echo complex has an AP caliber of 10.0 mm.  5 mm intracavitary hyperechoic structure with a probable fetal that vascular more likely to represent a polyp than a fibroid.       Hysteroscopy symphion polyp removed.   Risk of the surgery discussed     29 y/o for dc Symphion   For surgical  polyp removed.     Past medical / social / surgical / family history reviewed and updated   Medication and allergies discussed in detail and updated     /60   Pulse 68   Temp 98.3 °F (36.8 °C) (Temporal)   Resp 17   Wt 58.9 kg (129 lb 13.6 oz)   LMP 06/29/2025   SpO2 98%   BMI 19.18 kg/m²     Review of Systems - History obtained from chart review and the patient  General ROS: negative  Psychological ROS: negative  Ophthalmic ROS: negative  ENT ROS: negative  Allergy and Immunology ROS: negative  Hematological and Lymphatic ROS: negative  Endocrine ROS: negative  Breast ROS: negative for breast lumps  Respiratory ROS: no cough, shortness of breath, or wheezing  Cardiovascular ROS: no chest pain or dyspnea on exertion  Gastrointestinal ROS: no abdominal pain, change in bowel habits, or black or bloody stools  Genito-Urinary ROS: no dysuria, trouble voiding, or hematuria  + AUB   Musculoskeletal ROS: negative  Neurological ROS: no TIA or stroke symptoms  Dermatological ROS: negative      Physical Exam  Constitutional:       Appearance: She is well-developed.   HENT:      Nose: Nose normal.     Eyes:      Conjunctiva/sclera: Conjunctivae normal.     Neck:      Thyroid: No thyromegaly.     Cardiovascular:      Rate and Rhythm: Normal rate and regular rhythm.      Heart sounds: Normal heart sounds.   Pulmonary:      Effort: Pulmonary effort is normal. No respiratory distress.      Breath sounds: Normal  breath sounds. No wheezing.   Abdominal:      General: Bowel sounds are normal. There is no distension.      Palpations: Abdomen is soft.     Musculoskeletal:         General: Normal range of motion.      Cervical back: Normal range of motion and neck supple.     Skin:     General: Skin is warm.     Neurological:      Mental Status: She is alert and oriented to person, place, and time.     Psychiatric:         Behavior: Behavior normal.         Thought Content: Thought content normal.         Judgment: Judgment normal.

## 2025-07-23 NOTE — ANESTHESIA POSTPROCEDURE EVALUATION
Post-Op Assessment Note    CV Status:  Stable  Pain Score: 2    Pain management: adequate       Mental Status:  Alert and awake   Hydration Status:  Euvolemic   PONV Controlled:  Controlled   Airway Patency:  Patent  Two or more mitigation strategies used for obstructive sleep apnea   Post Op Vitals Reviewed: Yes    No anethesia notable event occurred.    Staff: Anesthesiologist           Last Filed PACU Vitals:  Vitals Value Taken Time   Temp 97.5 °F (36.4 °C) 07/23/25 10:10   Pulse 54 07/23/25 10:29   /66 07/23/25 10:27   Resp 16 07/23/25 10:25   SpO2 100 % 07/23/25 10:29   Vitals shown include unfiled device data.    Modified Charlene:     Vitals Value Taken Time   Activity 2 07/23/25 10:25   Respiration 2 07/23/25 10:25   Circulation 2 07/23/25 10:25   Consciousness 2 07/23/25 10:25   Oxygen Saturation 2 07/23/25 10:25     Modified Charlene Score: 10             This is the new mini cartridge that requires a new injector--so vial pt will need the injector. Or pen--he has used that before, but the cartridge is a better option. Form completed. Please fax.

## 2025-07-23 NOTE — DISCHARGE INSTR - AVS FIRST PAGE
Hysteroscopy   WHAT YOU NEED TO KNOW:   A hysteroscopy is a procedure to find and treat problems in your uterus. A hysteroscopy may be done to find, and possibly treat, the cause of abnormal vaginal bleeding, problems getting pregnant, or miscarriage. It may also be done to insert or remove a device that prevents pregnancy.        DISCHARGE INSTRUCTIONS:   Call 911 if:   You have trouble breathing.      Seek care immediately if:   You have heavy vaginal bleeding that fills 1 or more sanitary pads in 1 hour.    You have severe pain or bloating in your abdomen.     You feel lightheaded, weak, and confused.     You see blood in your urine.    You stop urinating or urinate less than usual.    Contact your healthcare provider if:   You have a fever or chills.    You have pus or a foul-smelling odor coming from your vagina.     You see blood clots on your sanitary pad that are larger than the size of a quarter.     You have nausea or are vomiting.    Your skin is itchy, swollen, or you have a rash.    You have questions or concerns about your condition or care.    Medicines:  You may need any of the following:  Estrogen  helps heal the lining of your uterus.     Antibiotics  help prevent a bacterial infection.    Prescription pain medicine  may be given. Ask your healthcare provider how to take this medicine safely. Some prescription pain medicines contain acetaminophen. Do not take other medicines that contain acetaminophen without talking to your healthcare provider. Too much acetaminophen may cause liver damage. Prescription pain medicine may cause constipation. Ask your healthcare provider how to prevent or treat constipation.     NSAIDs , such as ibuprofen, help decrease swelling, pain, and fever. NSAIDs can cause stomach bleeding or kidney problems in certain people. If you take blood thinner medicine, always ask your healthcare provider if NSAIDs are safe for you. Always read the medicine label and follow  directions.    Take your medicine as directed.  Contact your healthcare provider if you think your medicine is not helping or if you have side effects. Tell him or her if you are allergic to any medicine. Keep a list of the medicines, vitamins, and herbs you take. Include the amounts, and when and why you take them. Bring the list or the pill bottles to follow-up visits. Carry your medicine list with you in case of an emergency.    Self-care:  Avoid sexual intercourse for 2 weeks (4 weeks if myomectomy was performed). Avoid douching and tampon use for 6 weeks. These actions may cause an infection. Ask your healthcare provider if it is okay to take a tub bath or swim. Rest as needed. Do not drive, return to work, or exercise for 24 hours or as directed. You can return to most activities in 1 to 2 days.   Follow up with your doctor as directed:  Write down your questions so you remember to ask them during your visits.   © Copyright [employer] 2022 Information is for End User's use only and may not be sold, redistributed or otherwise used for commercial purposes. All illustrations and images included in CareNotes® are the copyrighted property of [employer] or [employer]  The above information is an  only. It is not intended as medical advice for individual conditions or treatments. Talk to your doctor, nurse or pharmacist before following any medical regimen to see if it is safe and effective for you.

## 2025-07-23 NOTE — ANESTHESIA POSTPROCEDURE EVALUATION
Post-Op Assessment Note    CV Status:  Stable  Pain Score: 0    Pain management: adequate    Multimodal analgesia used between 6 hours prior to anesthesia start to PACU discharge    Mental Status:  Sleepy   Hydration Status:  Stable   PONV Controlled:  None   Airway Patency:  Patent     Post Op Vitals Reviewed: Yes    No anethesia notable event occurred.    Staff: CRNA           Last Filed PACU Vitals:  Vitals Value Taken Time   Temp 97.3 °F (36.3 °C) 07/23/25 09:40   Pulse 62 07/23/25 09:40   /70 07/23/25 09:39   Resp     SpO2 100 % 07/23/25 09:40   Vitals shown include unfiled device data.

## 2025-07-23 NOTE — ANESTHESIA PREPROCEDURE EVALUATION
Procedure:  DILATATION AND CURETTAGE, HYSTEROSCOPY, SYMPHION, EXAM UNDER ANESTHESIA (Uterus)    Relevant Problems   ENDO   (+) Hypothyroidism      /RENAL   (+) CKD (chronic kidney disease) stage 1, GFR 90 ml/min or greater      NEURO/PSYCH   (+) Generalized anxiety disorder   (+) PTSD (post-traumatic stress disorder)      Neurology/Sleep   (+) Post-COVID chronic neurologic symptoms        Physical Exam    Airway     Mallampati score: II  TM Distance: >3 FB        Cardiovascular  Cardiovascular exam normal    Dental   No notable dental hx     Pulmonary  Pulmonary exam normal     Neurological      Other Findings  post-pubertal.      Anesthesia Plan  ASA Score- 2     Anesthesia Type- IV sedation with anesthesia with ASA Monitors.         Additional Monitors:     Airway Plan:            Plan Factors-Exercise tolerance (METS): >4 METS.    Chart reviewed.        Patient is not a current smoker.              Induction-     Postoperative Plan- .   Monitoring Plan - Monitoring plan - standard ASA monitoring  Post Operative Pain Plan - multimodal analgesia        Informed Consent- Anesthetic plan and risks discussed with patient.        NPO Status:  Vitals Value Taken Time   Date of last liquid 07/22/25 07/23/25 07:37   Time of last liquid 2030 07/23/25 07:37   Date of last solid 07/22/25 07/23/25 07:37   Time of last solid 1900 07/23/25 07:37

## 2025-07-24 ENCOUNTER — TELEPHONE (OUTPATIENT)
Dept: OBGYN CLINIC | Facility: CLINIC | Age: 30
End: 2025-07-24

## 2025-07-24 NOTE — TELEPHONE ENCOUNTER
Who called:PARENT    Is the patient Pregnant ?No  If so, How many weeks? N/A    Reason for the Call:Schedule Post-Op    Action Taken:Spoke to patient's mother Yolanda      Outcome/Plan/ Recommendations: Scheduled patient on 8/21 as per YR

## 2025-07-28 PROCEDURE — 88305 TISSUE EXAM BY PATHOLOGIST: CPT | Performed by: PATHOLOGY

## 2025-08-04 ENCOUNTER — APPOINTMENT (OUTPATIENT)
Dept: LAB | Age: 30
End: 2025-08-04
Payer: COMMERCIAL

## 2025-08-04 DIAGNOSIS — M62.838 MUSCLE SPASM: ICD-10-CM

## 2025-08-04 DIAGNOSIS — E03.9 HYPOTHYROIDISM, UNSPECIFIED TYPE: ICD-10-CM

## 2025-08-04 DIAGNOSIS — E55.9 VITAMIN D DEFICIENCY: ICD-10-CM

## 2025-08-04 PROCEDURE — 84480 ASSAY TRIIODOTHYRONINE (T3): CPT

## 2025-08-04 PROCEDURE — 84439 ASSAY OF FREE THYROXINE: CPT

## 2025-08-04 PROCEDURE — 82607 VITAMIN B-12: CPT

## 2025-08-04 PROCEDURE — 36415 COLL VENOUS BLD VENIPUNCTURE: CPT

## 2025-08-04 PROCEDURE — 82306 VITAMIN D 25 HYDROXY: CPT

## 2025-08-04 PROCEDURE — 84443 ASSAY THYROID STIM HORMONE: CPT

## 2025-08-05 LAB
25(OH)D3 SERPL-MCNC: 25.1 NG/ML (ref 30–100)
T3 SERPL-MCNC: 1.5 NG/ML (ref 0.9–1.8)
T4 FREE SERPL-MCNC: 0.95 NG/DL (ref 0.61–1.12)
TSH SERPL DL<=0.05 MIU/L-ACNC: 2.47 UIU/ML (ref 0.45–4.5)
VIT B12 SERPL-MCNC: 211 PG/ML (ref 180–914)

## 2025-08-14 ENCOUNTER — OFFICE VISIT (OUTPATIENT)
Age: 30
End: 2025-08-14
Payer: COMMERCIAL

## 2025-08-19 ENCOUNTER — OFFICE VISIT (OUTPATIENT)
Dept: URGENT CARE | Age: 30
End: 2025-08-19
Payer: COMMERCIAL

## 2025-08-19 VITALS
TEMPERATURE: 97.9 F | DIASTOLIC BLOOD PRESSURE: 68 MMHG | RESPIRATION RATE: 18 BRPM | OXYGEN SATURATION: 99 % | HEART RATE: 84 BPM | WEIGHT: 131.2 LBS | BODY MASS INDEX: 19.37 KG/M2 | SYSTOLIC BLOOD PRESSURE: 106 MMHG

## 2025-08-19 DIAGNOSIS — B34.9 VIRAL ILLNESS: Primary | ICD-10-CM

## 2025-08-19 PROCEDURE — 99213 OFFICE O/P EST LOW 20 MIN: CPT | Performed by: STUDENT IN AN ORGANIZED HEALTH CARE EDUCATION/TRAINING PROGRAM

## 2025-08-19 PROCEDURE — 87636 SARSCOV2 & INF A&B AMP PRB: CPT | Performed by: STUDENT IN AN ORGANIZED HEALTH CARE EDUCATION/TRAINING PROGRAM

## 2025-08-21 ENCOUNTER — OFFICE VISIT (OUTPATIENT)
Dept: OBGYN CLINIC | Facility: MEDICAL CENTER | Age: 30
End: 2025-08-21

## 2025-08-21 VITALS
DIASTOLIC BLOOD PRESSURE: 60 MMHG | HEIGHT: 69 IN | SYSTOLIC BLOOD PRESSURE: 100 MMHG | WEIGHT: 131 LBS | BODY MASS INDEX: 19.4 KG/M2

## 2025-08-21 DIAGNOSIS — N93.9 ABNORMAL UTERINE BLEEDING: Primary | ICD-10-CM

## 2025-08-21 PROCEDURE — 99024 POSTOP FOLLOW-UP VISIT: CPT | Performed by: OBSTETRICS & GYNECOLOGY

## (undated) DEVICE — Device

## (undated) DEVICE — PREMIUM DRY TRAY LF: Brand: MEDLINE INDUSTRIES, INC.

## (undated) DEVICE — TISSUE REMOVAL SYSTEM FLUID MANAGEMENT ACCESSORIES: Brand: SYMPHION

## (undated) DEVICE — STRL ALLENTOWN HYSTEROSCOPY PK: Brand: CARDINAL HEALTH

## (undated) DEVICE — TISSUE REMOVAL SYSTEM RESECTING DEVICE: Brand: SYMPHION

## (undated) DEVICE — UNDER BUTTOCKS DRAPE W/FLUID CONTROL POUCH: Brand: CONVERTORS

## (undated) DEVICE — DOVER URETHRAL PVC CATHETER, INTEGRAL FUNNEL, SMOOTH ROUNDED TIP, 14 FR (4.7 MM) X 16": Brand: DOVER

## (undated) DEVICE — 2000CC GUARDIAN II: Brand: GUARDIAN

## (undated) DEVICE — KENDALL SCD SEQUENTIAL COMPRESSION COMFORT SLEEVES, KNEE LENGTH, MEDIUM: Brand: KENDALL SCD